# Patient Record
Sex: FEMALE | Race: WHITE | NOT HISPANIC OR LATINO | URBAN - METROPOLITAN AREA
[De-identification: names, ages, dates, MRNs, and addresses within clinical notes are randomized per-mention and may not be internally consistent; named-entity substitution may affect disease eponyms.]

---

## 2019-10-05 ENCOUNTER — INPATIENT (INPATIENT)
Facility: HOSPITAL | Age: 84
LOS: 0 days | Discharge: ANOTHER HOSPITAL | End: 2019-10-06
Attending: INTERNAL MEDICINE | Admitting: INTERNAL MEDICINE
Payer: MEDICARE

## 2019-10-05 VITALS
RESPIRATION RATE: 18 BRPM | SYSTOLIC BLOOD PRESSURE: 111 MMHG | DIASTOLIC BLOOD PRESSURE: 53 MMHG | OXYGEN SATURATION: 99 % | TEMPERATURE: 98 F | HEART RATE: 59 BPM

## 2019-10-05 DIAGNOSIS — Z90.710 ACQUIRED ABSENCE OF BOTH CERVIX AND UTERUS: Chronic | ICD-10-CM

## 2019-10-05 LAB
ALBUMIN SERPL ELPH-MCNC: 4.2 G/DL — SIGNIFICANT CHANGE UP (ref 3.5–5.2)
ALP SERPL-CCNC: 78 U/L — SIGNIFICANT CHANGE UP (ref 30–115)
ALT FLD-CCNC: 17 U/L — SIGNIFICANT CHANGE UP (ref 0–41)
ANION GAP SERPL CALC-SCNC: 13 MMOL/L — SIGNIFICANT CHANGE UP (ref 7–14)
APTT BLD: 37 SEC — SIGNIFICANT CHANGE UP (ref 27–39.2)
AST SERPL-CCNC: 16 U/L — SIGNIFICANT CHANGE UP (ref 0–41)
BILIRUB SERPL-MCNC: 0.2 MG/DL — SIGNIFICANT CHANGE UP (ref 0.2–1.2)
BUN SERPL-MCNC: 87 MG/DL — CRITICAL HIGH (ref 10–20)
CALCIUM SERPL-MCNC: 10.3 MG/DL — HIGH (ref 8.5–10.1)
CHLORIDE SERPL-SCNC: 101 MMOL/L — SIGNIFICANT CHANGE UP (ref 98–110)
CO2 SERPL-SCNC: 20 MMOL/L — SIGNIFICANT CHANGE UP (ref 17–32)
CREAT SERPL-MCNC: 2.5 MG/DL — HIGH (ref 0.7–1.5)
GLUCOSE BLDC GLUCOMTR-MCNC: 274 MG/DL — HIGH (ref 70–99)
GLUCOSE SERPL-MCNC: 132 MG/DL — HIGH (ref 70–99)
HCT VFR BLD CALC: 28.2 % — LOW (ref 37–47)
HGB BLD-MCNC: 9.2 G/DL — LOW (ref 12–16)
INR BLD: 1.52 RATIO — HIGH (ref 0.65–1.3)
LIDOCAIN IGE QN: 129 U/L — HIGH (ref 7–60)
MAGNESIUM SERPL-MCNC: 2 MG/DL — SIGNIFICANT CHANGE UP (ref 1.8–2.4)
MCHC RBC-ENTMCNC: 28 PG — SIGNIFICANT CHANGE UP (ref 27–31)
MCHC RBC-ENTMCNC: 32.6 G/DL — SIGNIFICANT CHANGE UP (ref 32–37)
MCV RBC AUTO: 86 FL — SIGNIFICANT CHANGE UP (ref 81–99)
NRBC # BLD: 0 /100 WBCS — SIGNIFICANT CHANGE UP (ref 0–0)
NT-PROBNP SERPL-SCNC: 2694 PG/ML — HIGH (ref 0–300)
PLATELET # BLD AUTO: 196 K/UL — SIGNIFICANT CHANGE UP (ref 130–400)
POTASSIUM SERPL-MCNC: 4.7 MMOL/L — SIGNIFICANT CHANGE UP (ref 3.5–5)
POTASSIUM SERPL-SCNC: 4.7 MMOL/L — SIGNIFICANT CHANGE UP (ref 3.5–5)
PROT SERPL-MCNC: 6.8 G/DL — SIGNIFICANT CHANGE UP (ref 6–8)
PROTHROM AB SERPL-ACNC: 17.4 SEC — HIGH (ref 9.95–12.87)
RBC # BLD: 3.28 M/UL — LOW (ref 4.2–5.4)
RBC # FLD: 13.1 % — SIGNIFICANT CHANGE UP (ref 11.5–14.5)
SODIUM SERPL-SCNC: 134 MMOL/L — LOW (ref 135–146)
TROPONIN T SERPL-MCNC: 0.03 NG/ML — CRITICAL HIGH
TROPONIN T SERPL-MCNC: 0.05 NG/ML — CRITICAL HIGH
WBC # BLD: 9.68 K/UL — SIGNIFICANT CHANGE UP (ref 4.8–10.8)
WBC # FLD AUTO: 9.68 K/UL — SIGNIFICANT CHANGE UP (ref 4.8–10.8)

## 2019-10-05 PROCEDURE — 93010 ELECTROCARDIOGRAM REPORT: CPT

## 2019-10-05 PROCEDURE — 99285 EMERGENCY DEPT VISIT HI MDM: CPT | Mod: GC

## 2019-10-05 PROCEDURE — 71045 X-RAY EXAM CHEST 1 VIEW: CPT | Mod: 26

## 2019-10-05 RX ORDER — ATORVASTATIN CALCIUM 80 MG/1
80 TABLET, FILM COATED ORAL AT BEDTIME
Refills: 0 | Status: DISCONTINUED | OUTPATIENT
Start: 2019-10-05 | End: 2019-10-06

## 2019-10-05 RX ORDER — ASPIRIN/CALCIUM CARB/MAGNESIUM 324 MG
81 TABLET ORAL DAILY
Refills: 0 | Status: DISCONTINUED | OUTPATIENT
Start: 2019-10-05 | End: 2019-10-06

## 2019-10-05 RX ORDER — CLOPIDOGREL BISULFATE 75 MG/1
300 TABLET, FILM COATED ORAL ONCE
Refills: 0 | Status: DISCONTINUED | OUTPATIENT
Start: 2019-10-05 | End: 2019-10-05

## 2019-10-05 RX ORDER — INSULIN GLARGINE 100 [IU]/ML
0 INJECTION, SOLUTION SUBCUTANEOUS
Qty: 0 | Refills: 0 | DISCHARGE

## 2019-10-05 RX ORDER — ISOSORBIDE DINITRATE 5 MG/1
5 TABLET ORAL THREE TIMES A DAY
Refills: 0 | Status: DISCONTINUED | OUTPATIENT
Start: 2019-10-05 | End: 2019-10-05

## 2019-10-05 RX ORDER — CHLORHEXIDINE GLUCONATE 213 G/1000ML
1 SOLUTION TOPICAL
Refills: 0 | Status: DISCONTINUED | OUTPATIENT
Start: 2019-10-05 | End: 2019-10-06

## 2019-10-05 RX ORDER — ASPIRIN/CALCIUM CARB/MAGNESIUM 324 MG
162 TABLET ORAL ONCE
Refills: 0 | Status: COMPLETED | OUTPATIENT
Start: 2019-10-05 | End: 2019-10-05

## 2019-10-05 RX ORDER — INSULIN GLARGINE 100 [IU]/ML
10 INJECTION, SOLUTION SUBCUTANEOUS EVERY MORNING
Refills: 0 | Status: DISCONTINUED | OUTPATIENT
Start: 2019-10-05 | End: 2019-10-06

## 2019-10-05 RX ORDER — APIXABAN 2.5 MG/1
2.5 TABLET, FILM COATED ORAL
Refills: 0 | Status: DISCONTINUED | OUTPATIENT
Start: 2019-10-05 | End: 2019-10-06

## 2019-10-05 RX ORDER — SODIUM CHLORIDE 9 MG/ML
1000 INJECTION INTRAMUSCULAR; INTRAVENOUS; SUBCUTANEOUS
Refills: 0 | Status: DISCONTINUED | OUTPATIENT
Start: 2019-10-05 | End: 2019-10-06

## 2019-10-05 RX ORDER — CALCITRIOL 0.5 UG/1
0.25 CAPSULE ORAL DAILY
Refills: 0 | Status: DISCONTINUED | OUTPATIENT
Start: 2019-10-05 | End: 2019-10-06

## 2019-10-05 RX ORDER — HYDRALAZINE HCL 50 MG
10 TABLET ORAL EVERY 8 HOURS
Refills: 0 | Status: DISCONTINUED | OUTPATIENT
Start: 2019-10-05 | End: 2019-10-05

## 2019-10-05 RX ORDER — FOLIC ACID 0.8 MG
1 TABLET ORAL DAILY
Refills: 0 | Status: DISCONTINUED | OUTPATIENT
Start: 2019-10-05 | End: 2019-10-06

## 2019-10-05 RX ORDER — CARVEDILOL PHOSPHATE 80 MG/1
6.25 CAPSULE, EXTENDED RELEASE ORAL EVERY 12 HOURS
Refills: 0 | Status: DISCONTINUED | OUTPATIENT
Start: 2019-10-05 | End: 2019-10-06

## 2019-10-05 RX ORDER — FUROSEMIDE 40 MG
40 TABLET ORAL DAILY
Refills: 0 | Status: DISCONTINUED | OUTPATIENT
Start: 2019-10-06 | End: 2019-10-06

## 2019-10-05 RX ORDER — HEPARIN SODIUM 5000 [USP'U]/ML
700 INJECTION INTRAVENOUS; SUBCUTANEOUS
Qty: 25000 | Refills: 0 | Status: DISCONTINUED | OUTPATIENT
Start: 2019-10-05 | End: 2019-10-05

## 2019-10-05 RX ADMIN — SODIUM CHLORIDE 50 MILLILITER(S): 9 INJECTION INTRAMUSCULAR; INTRAVENOUS; SUBCUTANEOUS at 21:23

## 2019-10-05 RX ADMIN — CARVEDILOL PHOSPHATE 6.25 MILLIGRAM(S): 80 CAPSULE, EXTENDED RELEASE ORAL at 21:24

## 2019-10-05 RX ADMIN — APIXABAN 2.5 MILLIGRAM(S): 2.5 TABLET, FILM COATED ORAL at 21:24

## 2019-10-05 RX ADMIN — ATORVASTATIN CALCIUM 80 MILLIGRAM(S): 80 TABLET, FILM COATED ORAL at 21:24

## 2019-10-05 RX ADMIN — Medication 162 MILLIGRAM(S): at 14:44

## 2019-10-05 NOTE — H&P ADULT - ATTENDING COMMENTS
This is an 83 year old female with a significant PMHx of CAD/Stent (3 in 2017), HFrEF, Atrial Fibrillation/Eliquis, and T2DM who presented with a cc/o chest pain. Her chest pain was similar to chest pain she felt in the past. Her pain is substernal, intermittent (episodes 10-15mins in duration), and 7/10 on the pain scale. Her pain is exacerbated with minimal exertion. Her symptoms are associated with dyspnea. She has a cardiologist affiliated with [facility]. ROS was otherwise negative.    REVIEW OF SYSTEMS:    CONSTITUTIONAL: No weakness, fevers or chills  EYES/ENT: No visual changes;  No vertigo or throat pain   NECK: No pain or stiffness  RESPIRATORY: No cough, wheezing, hemoptysis; No shortness of breath  CARDIOVASCULAR: No chest pain or palpitations  GASTROINTESTINAL: No abdominal or epigastric pain. No nausea, vomiting, or hematemesis; No diarrhea or constipation. No melena or hematochezia.  GENITOURINARY: No dysuria, frequency or hematuria  NEUROLOGICAL: No numbness or weakness  SKIN: No itching, rashes    Physical Exam:    General: WN/WD NAD  Neurology: A&Ox3, nonfocal, follows commands  Eyes: PERRLA/ EOMI  ENT/Neck: Neck supple, trachea midline, No JVD  Respiratory: CTA B/L, No wheezing, rales, rhonchi  CV: Normal rate regular rhythm, S1S2, no murmurs, rubs or gallops  Abdominal: Soft, NT, ND +BS,   Extremities: No edema, + peripheral pulses  Skin: No Rashes, Hematoma, Ecchymosis  Incisions: n/a  Tubes: n/a This is an 83 year old female with a significant PMHx of CAD/Stent (3 in 2017), HFrEF, Atrial Fibrillation/Eliquis, and T2DM who presented with a cc/o chest pain. Her chest pain was similar to chest pain she felt in the past. Her pain is substernal, intermittent (episodes 10-15mins in duration), and 7/10 on the pain scale. Her pain is exacerbated with minimal exertion. Her symptoms are associated with dyspnea. She has a cardiologist affiliated with [facility] where her has a recent catheterization. ROS was otherwise negative.    REVIEW OF SYSTEMS: see cc/HPI  CONSTITUTIONAL: No weakness, fevers or chills  EYES/ENT: No visual changes;  No vertigo or throat pain   NECK: No pain or stiffness  RESPIRATORY: No cough, wheezing, hemoptysis; No shortness of breath  CARDIOVASCULAR: (+) chest pain or palpitations  GASTROINTESTINAL: No abdominal or epigastric pain. No nausea, vomiting, or hematemesis; No diarrhea or constipation. No melena or hematochezia.  GENITOURINARY: No dysuria, frequency or hematuria  NEUROLOGICAL: No numbness or weakness  SKIN: No itching, rashes    Physical Exam:  General: WN/WD NAD, appears anxious and tearful - life changes and childhood memories causing stress  Neurology: A&Ox3, nonfocal, follows commands  Eyes: PERRLA/ EOMI  ENT/Neck: Neck supple, trachea midline, No JVD  Respiratory: CTA B/L, No wheezing, rales, rhonchi  CV: Normal rate regular rhythm, S1S2, no murmurs, rubs or gallops  Abdominal: Soft, NT, ND +BS,   Extremities: No edema, + peripheral pulses  Skin: No Rashes, Hematoma, Ecchymosis  Incisions: n/a  Tubes: n/a    A/P  ACS inpatient w/ CAD /PCI and stents  -serial Chon and EKG  -ASA/ BBlocker/ Statin/IV heparin for now  -will try to obtain recent Cath report from [facility]  -Cardiology on the case     A.Fib- rate controlled  -c/w AC ( unless procedure planned)    ELOISE on CKD III possible 2/2 dehydration (BUN/Cr =87/2.5)  -agree w/ gentle hydration   -check renal U/S r/o obstructive process  -Is and Os/Urine lytes/cr  -serial BUN/SCr    HTN - monitoring off ARB and started on BBlocker     Hypercalcemia   -agree w/ PTH, add Vit D level and hydration    Anxiety / ?? depression   -patient to f/u w/ outpatient PMD / therapist

## 2019-10-05 NOTE — CONSULT NOTE ADULT - SUBJECTIVE AND OBJECTIVE BOX
Date of Admission: 10/5/2019    CHIEF COMPLAINT: chest pain    HISTORY OF PRESENT ILLNESS:   82 YO F with PMH of HTN, DLD, CAD s/p MI s/p PCI x 3 stents in 2017, HFrEF (pt. reported EF of 45%), CKD 3, colon cancer s/p resection, DM 2, A.Fib on Eliquis presented with cc of chest pain. As per the patient all of her doctors are at [facility]. She     PAST MEDICAL & SURGICAL HISTORY:  Diverticulitis  History of Clostridium difficile colitis: s/p fecal transplant  CKD (chronic kidney disease): Stage 3  Hypertension  Colon cancer  Type 2 diabetes mellitus  Atrial fibrillation  Chronic CHF  CAD (coronary artery disease)  S/P hysterectomy      FAMILY HISTORY:  [x] no pertinent family history of premature cardiovascular disease in first degree relatives.  Mother:   Father:   Siblings:     SOCIAL HISTORY:    [x] Non-smoker  [ ] Smoker  [-] Alcohol    Allergies    No Known Allergies    Intolerances    	    REVIEW OF SYSTEMS:  CONSTITUTIONAL: No fever, weight loss, or fatigue  CARDIOLOGY: denies chest pain, shortness of breath or syncopal episodes.   RESPIRATORY: denies shortness of breath, wheezeing.   NEUROLOGICAL: No weakness, no focal deficits to report.  ENDOCRINOLOGICAL: no recent change in diabetic medications.   GI: no BRBPR, no N,V,diarrhea.    PSYCHIATRY: normal mood and affect  HEENT: no nasal discharge, no ecchymosis  SKIN: no ecchymosis, no breakdown  MUSCULOSKELETAL: Full range of motion x4.     PHYSICAL EXAM:  T(C): 36.3 (10-05-19 @ 15:50), Max: 36.4 (10-05-19 @ 14:01)  HR: 68 (10-05-19 @ 19:33) (59 - 68)  BP: 121/51 (10-05-19 @ 19:33) (111/53 - 152/66)  RR: 18 (10-05-19 @ 19:33) (18 - 18)  SpO2: 99% (10-05-19 @ 19:33) (99% - 100%)  Wt(kg): --  I&O's Summary      General Appearance: well appearing, normal for age and gender. 	  Neck: normal JVP, no bruit.   Eyes: No xanthomalasia, Extra ocular muscles intact.   Cardiovascular: regular rate and rhythm S1 S2, No JVD, No murmurs, No edema  Respiratory: Lungs clear to auscultation	  Psychiatry: Alert and oriented x 3, Mood & affect appropriate  Gastrointestinal:  Soft, Non-tender  Skin/Integumen: No rashes, No ecchymoses, No cyanosis	  Neurologic: Non-focal  Musculoskeletal/ extremities: Normal range of motion, No clubbing, cyanosis or edema  Vascular: Peripheral pulses palpable 2+ bilaterally    LABS:	 	                          9.2    9.68  )-----------( 196      ( 05 Oct 2019 14:30 )             28.2     10-05    134<L>  |  101  |  87<HH>  ----------------------------<  132<H>  4.7   |  20  |  2.5<H>    Ca    10.3<H>      05 Oct 2019 14:30  Mg     2.0     10-05    TPro  6.8  /  Alb  4.2  /  TBili  0.2  /  DBili  x   /  AST  16  /  ALT  17  /  AlkPhos  78  10-05    CARDIAC MARKERS ( 05 Oct 2019 20:00 )  x     / 0.03 ng/mL / x     / x     / x      CARDIAC MARKERS ( 05 Oct 2019 14:30 )  x     / 0.05 ng/mL / x     / x     / x          PT/INR - ( 05 Oct 2019 14:30 )   PT: 17.40 sec;   INR: 1.52 ratio         PTT - ( 05 Oct 2019 14:30 )  PTT:37.0 sec        TELEMETRY EVENTS: 	      ECG:  	  NSR @ 69 bpm, first degree AVB, inferior TWI, anterolateral TWI    RADIOLOGY:  CXR: no radiographic evidence of acute cardiopulmonary disease    PREVIOUS DIAGNOSTIC TESTING:    [x] Echocardiogram:  pending    [x]  Catheterization: reported cath in June 2017 with PCI and 3 stents       	    Home Medications:  atorvastatin 80 mg oral tablet:  (05 Oct 2019 15:44)  calcitriol 0.25 mcg oral capsule:  (05 Oct 2019 15:44)  carvedilol 25 mg oral tablet:  (05 Oct 2019 15:44)  Eliquis 2.5 mg oral tablet: 1 tab(s) orally 2 times a day (05 Oct 2019 15:44)  Evista 60 mg oral tablet: 1 tab(s) orally once a day (05 Oct 2019 15:44)  folic acid 1 mg oral tablet:  (05 Oct 2019 15:44)  furosemide 40 mg oral tablet: 1 tab(s) orally once a day (05 Oct 2019 15:44)  Lantus: 10 unit(s) subcutaneous once a day (in the morning) (05 Oct 2019 19:54)  losartan 25 mg oral tablet: 1 tab(s) orally once a day (05 Oct 2019 15:44)  spironolactone 25 mg oral tablet:  (05 Oct 2019 15:44)    MEDICATIONS  (STANDING):  apixaban 2.5 milliGRAM(s) Oral two times a day  aspirin enteric coated 81 milliGRAM(s) Oral daily  atorvastatin 80 milliGRAM(s) Oral at bedtime  calcitriol   Capsule 0.25 MICROGram(s) Oral daily  carvedilol 6.25 milliGRAM(s) Oral every 12 hours  chlorhexidine 4% Liquid 1 Application(s) Topical <User Schedule>  folic acid 1 milliGRAM(s) Oral daily  hydrALAZINE 10 milliGRAM(s) Oral every 8 hours  insulin glargine Injectable (LANTUS) 10 Unit(s) SubCutaneous every morning  sodium chloride 0.9%. 1000 milliLiter(s) (50 mL/Hr) IV Continuous <Continuous> Date of Admission: 10/5/2019    CHIEF COMPLAINT: chest pain    HISTORY OF PRESENT ILLNESS:   84 YO F with PMH of HTN, DLD, CAD s/p MI s/p PCI x 3 stents in 2017, HFrEF (pt. reported EF of 45%), CKD 3, colon cancer s/p resection, DM 2, A.Fib on Eliquis presented with cc of chest pain. Pt. describes the pain as chest pressure that is exertional, lasting several minutes, resolves with rest and is associated with sob. Reports that her first episode of chest pain was 1 day prior to presentation that lasted 15 minutes, and second episode was on the day of presentation that lasted several minutes and prompted her to come to ER.    As per the patient she see Dr. Brambila (Cardiologist) at [facility]. She reports that since her PCI she has not had a stress test. She reports that she is unable to tolerate nuclear stress test, as she got extremely sob when she received the test the first time, and she ended up getting cardiac cath.    Denies any episodes of syncope/loc, palpitations.    PAST MEDICAL & SURGICAL HISTORY:  Diverticulitis  History of Clostridium difficile colitis: s/p fecal transplant  CKD (chronic kidney disease): Stage 3  Hypertension  Colon cancer  Type 2 diabetes mellitus  Atrial fibrillation  Chronic CHF  CAD (coronary artery disease)  S/P hysterectomy      FAMILY HISTORY:  [x] no pertinent family history of premature cardiovascular disease in first degree relatives.  Mother:   Father:   Siblings:     SOCIAL HISTORY:    [x] Non-smoker  [ ] Smoker  [-] Alcohol    Allergies    No Known Allergies    Intolerances    	    REVIEW OF SYSTEMS:  CONSTITUTIONAL: No fever, weight loss, or fatigue  CARDIOLOGY: denies chest pain, shortness of breath or syncopal episodes.   RESPIRATORY: denies shortness of breath, wheezeing.   NEUROLOGICAL: No weakness, no focal deficits to report.  ENDOCRINOLOGICAL: no recent change in diabetic medications.   GI: no BRBPR, no N,V,diarrhea.    PSYCHIATRY: normal mood and affect  HEENT: no nasal discharge, no ecchymosis  SKIN: no ecchymosis, no breakdown  MUSCULOSKELETAL: Full range of motion x4.     PHYSICAL EXAM:  T(C): 36.3 (10-05-19 @ 15:50), Max: 36.4 (10-05-19 @ 14:01)  HR: 68 (10-05-19 @ 19:33) (59 - 68)  BP: 121/51 (10-05-19 @ 19:33) (111/53 - 152/66)  RR: 18 (10-05-19 @ 19:33) (18 - 18)  SpO2: 99% (10-05-19 @ 19:33) (99% - 100%)  Wt(kg): --  I&O's Summary      General Appearance: well appearing, normal for age and gender. 	  Neck: normal JVP, no bruit.   Eyes: No xanthomalasia, Extra ocular muscles intact.   Cardiovascular: regular rate and rhythm S1 S2, No JVD, No murmurs, No edema  Respiratory: Lungs clear to auscultation	  Psychiatry: Alert and oriented x 3, Mood & affect appropriate  Gastrointestinal:  Soft, Non-tender  Skin/Integumen: No rashes, No ecchymoses, No cyanosis	  Neurologic: Non-focal  Musculoskeletal/ extremities: Normal range of motion, No clubbing, cyanosis or edema  Vascular: Peripheral pulses palpable 2+ bilaterally    LABS:	 	                          9.2    9.68  )-----------( 196      ( 05 Oct 2019 14:30 )             28.2     10-05    134<L>  |  101  |  87<HH>  ----------------------------<  132<H>  4.7   |  20  |  2.5<H>    Ca    10.3<H>      05 Oct 2019 14:30  Mg     2.0     10-05    TPro  6.8  /  Alb  4.2  /  TBili  0.2  /  DBili  x   /  AST  16  /  ALT  17  /  AlkPhos  78  10-05    CARDIAC MARKERS ( 05 Oct 2019 20:00 )  x     / 0.03 ng/mL / x     / x     / x      CARDIAC MARKERS ( 05 Oct 2019 14:30 )  x     / 0.05 ng/mL / x     / x     / x          PT/INR - ( 05 Oct 2019 14:30 )   PT: 17.40 sec;   INR: 1.52 ratio         PTT - ( 05 Oct 2019 14:30 )  PTT:37.0 sec        TELEMETRY EVENTS: 	      ECG:  	  NSR @ 69 bpm, first degree AVB, inferior TWI, anterolateral TWI    RADIOLOGY:  CXR: no radiographic evidence of acute cardiopulmonary disease    PREVIOUS DIAGNOSTIC TESTING:    [x] Echocardiogram:  pending    [x]  Catheterization: reported cath in June 2017 with PCI and 3 stents       	    Home Medications:  atorvastatin 80 mg oral tablet:  (05 Oct 2019 15:44)  calcitriol 0.25 mcg oral capsule:  (05 Oct 2019 15:44)  carvedilol 25 mg oral tablet:  (05 Oct 2019 15:44)  Eliquis 2.5 mg oral tablet: 1 tab(s) orally 2 times a day (05 Oct 2019 15:44)  Evista 60 mg oral tablet: 1 tab(s) orally once a day (05 Oct 2019 15:44)  folic acid 1 mg oral tablet:  (05 Oct 2019 15:44)  furosemide 40 mg oral tablet: 1 tab(s) orally once a day (05 Oct 2019 15:44)  Lantus: 10 unit(s) subcutaneous once a day (in the morning) (05 Oct 2019 19:54)  losartan 25 mg oral tablet: 1 tab(s) orally once a day (05 Oct 2019 15:44)  spironolactone 25 mg oral tablet:  (05 Oct 2019 15:44)    MEDICATIONS  (STANDING):  apixaban 2.5 milliGRAM(s) Oral two times a day  aspirin enteric coated 81 milliGRAM(s) Oral daily  atorvastatin 80 milliGRAM(s) Oral at bedtime  calcitriol   Capsule 0.25 MICROGram(s) Oral daily  carvedilol 6.25 milliGRAM(s) Oral every 12 hours  chlorhexidine 4% Liquid 1 Application(s) Topical <User Schedule>  folic acid 1 milliGRAM(s) Oral daily  hydrALAZINE 10 milliGRAM(s) Oral every 8 hours  insulin glargine Injectable (LANTUS) 10 Unit(s) SubCutaneous every morning  sodium chloride 0.9%. 1000 milliLiter(s) (50 mL/Hr) IV Continuous <Continuous>

## 2019-10-05 NOTE — H&P ADULT - NSICDXPASTMEDICALHX_GEN_ALL_CORE_FT
PAST MEDICAL HISTORY:  Atrial fibrillation     CAD (coronary artery disease)     Chronic CHF     CKD (chronic kidney disease) Stage 3    Colon cancer     Diverticulitis     History of Clostridium difficile colitis s/p fecal transplant    Hypertension     Type 2 diabetes mellitus

## 2019-10-05 NOTE — ED PROVIDER NOTE - CLINICAL SUMMARY MEDICAL DECISION MAKING FREE TEXT BOX
84 yo woman with a few days of exertional chest pain without radiation.  Exacerbated by exertion and relieved by rest.  No radiation.  EKg with some changes but no old to compare.  Minimally elevated troponin.  ASA and admission to tele.  Currently chest pain free.

## 2019-10-05 NOTE — H&P ADULT - NSHPPHYSICALEXAM_GEN_ALL_CORE
:  VITAL SIGNS: Last 24 Hours  T(C): 36.3 (05 Oct 2019 15:50), Max: 36.4 (05 Oct 2019 14:01)  T(F): 97.3 (05 Oct 2019 15:50), Max: 97.6 (05 Oct 2019 14:01)  HR: 68 (05 Oct 2019 15:50) (59 - 68)  BP: 152/66 (05 Oct 2019 16:05) (111/53 - 152/66)  RR: 18 (05 Oct 2019 15:50) (18 - 18)  SpO2: 100% (05 Oct 2019 15:50) (99% - 100%)    PHYSICAL EXAM:  GENERAL:   Awake, alert; NAD.  HEENT:  Head NC/AT; Conjunctivae pink, Sclera anicteric; Oral mucosa moist.  CARDIO:   Regular rate; Regular rhythm; S1 & S2.  RESP:   No rales or rhonchi appreciated.  GI:   Soft; NT/ND; BS; No guarding; No rebound tenderness.  EXT:   No edema in UE and LE.  NEURO:   PERRL.  SKIN:   Intact. :  VITAL SIGNS: Last 24 Hours  T(C): 36.3 (05 Oct 2019 15:50), Max: 36.4 (05 Oct 2019 14:01)  T(F): 97.3 (05 Oct 2019 15:50), Max: 97.6 (05 Oct 2019 14:01)  HR: 68 (05 Oct 2019 15:50) (59 - 68)  BP: 152/66 (05 Oct 2019 16:05) (111/53 - 152/66)  RR: 18 (05 Oct 2019 15:50) (18 - 18)  SpO2: 100% (05 Oct 2019 15:50) (99% - 100%)    PHYSICAL EXAM:  GENERAL:   Awake, alert; NAD.  HEENT:  Head NC/AT; Conjunctivae pink, Sclera anicteric; Oral mucosa moist.  CARDIO:   Regular rate; IRRegular rhythm; S1 & S2; No murmur.  RESP:   No rales or rhonchi appreciated.  GI:   Soft; NT/ND; BS; No guarding; No rebound tenderness.  EXT:   No edema in LE.  NEURO:   PERRL.  SKIN:   Intact.

## 2019-10-05 NOTE — ED PROVIDER NOTE - PHYSICAL EXAMINATION
VITAL SIGNS: I have reviewed nursing notes and confirm.  CONSTITUTIONAL: well-appearing, non-toxic, NAD  SKIN: Warm dry, normal skin turgor  HEAD: NCAT  EYES: EOMI, PERRLA, no scleral icterus  ENT: Moist mucous membranes, normal pharynx with no erythema or exudates  NECK: Supple; non tender. Full ROM. No cervical LAD  CARD: irregular no murmurs, rubs or gallops  RESP: clear to ausculation b/l.  No rales, rhonchi, or wheezing.  ABD: soft, + BS, non-tender, non-distended, no rebound or guarding. No CVA tenderness  EXT: Full ROM, no bony tenderness, no pedal edema, no calf tenderness  NEURO: normal motor. normal sensory.   PSYCH: Cooperative, appropriate.

## 2019-10-05 NOTE — ED PROVIDER NOTE - OBJECTIVE STATEMENT
83 year old female with a significant PMHx of CAD/Stent (3 in 2017), HFrEF, Atrial Fibrillation/Eliquis, and T2DM who presented with a cc/o chest pain. Her chest pain was similar to chest pain she felt in the past. Her pain is substernal, intermittent (episodes 10-15mins in duration), and 7/10 on the pain scale. Her pain is exacerbated with minimal exertion. Her symptoms are associated with dyspnea. She has a cardiologist affiliated with [facility]. ROS was otherwise negative.

## 2019-10-05 NOTE — H&P ADULT - ASSESSMENT
This is an 83 year old male with a significant PMHx of HFrEF, Atrial Fibrillation/Eliquis, and T2DM who presented with a cc/o chest pain.    Rule out ACS  - Presented with chest pain  - EKG with NAD; 1st degree block; T-wave inversions; No prior EKG available  - Initial troponin 0.05  - Trend troponin  - Serial EKGs  - Follow up Cardiology    Hypercalcemia; Mild  - Asymptomatic;  - Follow up PTH    Normocytic Anemia; Likely of chronic disease: Baseline unknown; Monitor  Hx of HFrEF: BNP 2694; Baseline unknown  Hx of Osteoporosis: Resume home medications  Hx of T2DM: Monitor BS  Hx of CKD (stage unknown): ?Stable; Monitor  Hx of HTN: Continue antihypertensives from home    Electrolyte Imbalances:   [X]  Hyponatremia; mild; monitor      GI ppx:                                   [X] Not indicated    DVT ppx:  []     Fluids:   [X] IVF    Activity:  [X] Bed Rest    BMI:  Weight (kg): 60 (10-05)    DISPO:  Patient to be discharged when condition(s) optimized.    CODE STATUS  [X] FULL This is an 83 year old male with a significant PMHx of HFrEF, Atrial Fibrillation/Eliquis, and T2DM who presented with a cc/o chest pain.    ***[facility] is currently closed. Will need to complete medication reconciliation in AM. Patient does not have a list and is unaware of her dosing.***    Unstable angina; NSTEMI  - Presented with chest pain  - EKG with NAD; 1st degree block; T-wave inversions V3-V6; No prior EKG available  - Initial troponin 0.05; Trend troponin  - Serial EKGs  - Received ASA 325mg; Resume 81mg  - Will give Plavix load  - Atorvastatin 80mg PO daily  - Carvedilol home dose unknown; start 6.25mg PO BID for now  - Holding ARB given ELOISE  - Heparin for anticoagulation  - Follow up Cardiology    Acute Kidney Injury; Hx of CKD III  - Continue IVF and monitor  - Continue calcitriol  - Follow up home dose Evista    Hypercalcemia; Mild  - Asymptomatic;  - Follow up PTH    Normocytic Anemia; Likely of chronic disease: Baseline unknown; Monitor  Hx of HFrEF: BNP 2694; Clinically euvolemic; Resume home Lasix; Strict I+O; Daily weight  Hx of T2DM: Monitor BS; Continue home Lantus dose  Hx of CKD (stage unknown): ?Stable; Monitor  Hx of HTN: Monitor; Holding losartan given ELOISE; will start hydralazine/nitrate for BP and benefit of reducing preload/afterload  Hx of Colon Cancer; s/p resection  Hx of folate deficiency: Continue home folate    Electrolyte Imbalances:   [X]  Hyponatremia; mild; monitor      GI ppx:                                   [X] Not indicated    DVT ppx:  [X] Not indicated; on AC    Fluids:   [X] IVF    Activity:  [X] Bed Rest    BMI:  Weight (kg): 60 (10-05)    DISPO:  Patient to be discharged when condition(s) optimized.    CODE STATUS  [X] FULL This is an 83 year old male with a significant PMHx of HFrEF, Atrial Fibrillation/Eliquis, and T2DM who presented with a cc/o chest pain.    ***[facility] is currently closed. Will need to complete medication reconciliation in AM. Patient does not have a list and is unaware of her dosing.***    Unstable angina; NSTEMI  - Presented with chest pain  - EKG with NAD; 1st degree block; T-wave inversions V3-V6; No prior EKG available  - Initial troponin 0.05; Trend troponin  - Serial EKGs  - Received ASA 325mg today; Resume 81mg  - Will give Plavix load  - Atorvastatin 80mg PO daily  - Carvedilol home dose unknown; start 6.25mg PO BID for now  - Holding ARB given ELOISE  - Heparin for anticoagulation  - Follow up Cardiology    Acute Kidney Injury; Hx of CKD III  - Continue IVF and monitor  - Continue calcitriol    Hypercalcemia; Mild  - Asymptomatic;  - Follow up PTH    Normocytic Anemia; Likely of chronic disease: Baseline unknown; Monitor  Hx of HFrEF: BNP 2694; Clinically euvolemic; Resume home Lasix; Strict I+O; Daily weight  Hx of T2DM: Monitor BS; Continue home Lantus dose  Hx of CKD (stage unknown): ?Stable; Monitor  Hx of HTN: Monitor; Holding losartan given ELOISE; will start hydralazine/nitrate for BP and benefit of reducing preload/afterload  Hx of Colon Cancer; s/p resection  Hx of folate deficiency: Continue home folate    Electrolyte Imbalances:   [X]  Hyponatremia; mild; monitor      GI ppx:                                   [X] Not indicated    DVT ppx:  [X] Not indicated; on AC    Fluids:   [X] IVF    Activity:  [X] Bed Rest    BMI:  Weight (kg): 60 (10-05)    DISPO:  Patient to be discharged when condition(s) optimized.    CODE STATUS  [X] FULL This is an 83 year old male with a significant PMHx of HFrEF, Atrial Fibrillation/Eliquis, and T2DM who presented with a cc/o chest pain.    ***[facility] is currently closed. Will need to complete medication reconciliation in AM. Patient does not have a list and is unaware of her dosing.***    Unstable angina; NSTEMI  - Presented with chest pain  - EKG with NAD; 1st degree block; T-wave inversions V3-V6; No prior EKG available  - Initial troponin 0.05; Trend troponin  - Serial EKGs  - Received ASA 325mg today; Resume 81mg  - Will give Plavix load  - Atorvastatin 80mg PO daily  - Carvedilol home dose unknown; start 6.25mg PO BID for now  - Holding ARB given ELOISE  - Heparin for anticoagulation  - Follow up Cardiology    Acute Kidney Injury; Hx of CKD III  - Continue IVF and monitor  - Continue calcitriol    Hypercalcemia; Mild  - Asymptomatic;  - Follow up PTH    Normocytic Anemia; Likely of chronic disease: Baseline unknown; Monitor  Hx of HFrEF: BNP 2694; Clinically euvolemic; Resume home Lasix; Strict I+O; Daily weight  Hx of T2DM: Monitor BS; Continue home Lantus dose  Hx of CKD (stage unknown): ?Stable; Monitor  Hx of HTN: Monitor; Holding losartan given ELOISE and lack of med rec; will start hydralazine in interim  Hx of Colon Cancer; s/p resection  Hx of folate deficiency: Continue home folate    Electrolyte Imbalances:   [X]  Hyponatremia; mild; monitor      GI ppx:                                   [X] Not indicated    DVT ppx:  [X] Not indicated; on AC    Fluids:   [X] IVF    Activity:  [X] Bed Rest    BMI:  Weight (kg): 60 (10-05)    DISPO:  Patient to be discharged when condition(s) optimized.    CODE STATUS  [X] FULL This is an 83 year old male with a significant PMHx of HFrEF, Atrial Fibrillation/Eliquis, and T2DM who presented with a cc/o chest pain.    ***[facility] is currently closed. Will need to complete medication reconciliation in AM. Patient does not have a list and is unaware of her dosing.***    Typical chest pain; Rule out ACS  - Presented with chest pain  - EKG with NAD; 1st degree block; T-wave inversions V3-V6; No prior EKG available  - Initial troponin 0.05; Trend troponin  - Serial EKGs  - Received ASA 325mg today; Resume 81mg  - Will give Plavix load  - Atorvastatin 80mg PO daily  - Carvedilol home dose unknown; start 6.25mg PO BID for now  - Holding ARB given ELOISE  - Heparin for anticoagulation  - Follow up Cardiology    Acute Kidney Injury; Hx of CKD III  - Continue IVF and monitor  - Continue calcitriol    Hypercalcemia; Mild  - Asymptomatic;  - Follow up PTH    Normocytic Anemia; Likely of chronic disease: Baseline unknown; Monitor  Hx of HFrEF: BNP 2694; Clinically euvolemic; Resume home Lasix; Strict I+O; Daily weight  Hx of T2DM: Monitor BS; Continue home Lantus dose  Hx of CKD (stage unknown): ?Stable; Monitor  Hx of HTN: Monitor; Holding losartan given ELOISE and lack of med rec; will start hydralazine in interim  Hx of Colon Cancer; s/p resection  Hx of folate deficiency: Continue home folate    Electrolyte Imbalances:   [X]  Hyponatremia; mild; monitor      GI ppx:                                   [X] Not indicated    DVT ppx:  [X] Not indicated; on AC    Fluids:   [X] IVF    Activity:  [X] Bed Rest    BMI:  Weight (kg): 60 (10-05)    DISPO:  Patient to be discharged when condition(s) optimized.    CODE STATUS  [X] FULL This is an 83 year old male with a significant PMHx of HFrEF, Atrial Fibrillation/Eliquis, and T2DM who presented with a cc/o chest pain.    ***[facility] is currently closed. Will need to complete medication reconciliation in AM. Patient does not have a list and is unaware of her dosing.***    Typical chest pain; Rule out ACS  - Presented with chest pain  - EKG with NAD; 1st degree block; T-wave inversions V3-V6; No prior EKG available  - Initial troponin 0.05; Trend troponin  - Serial EKGs  - Received ASA 325mg today; Resume 81mg  - Atorvastatin 80mg PO daily  - Carvedilol home dose unknown; start 6.25mg PO BID for now  - Holding ARB given ELOISE  - Heparin for anticoagulation  - Follow up Cardiology    Acute Kidney Injury; Hx of CKD III  - Continue IVF and monitor  - Continue calcitriol    Hypercalcemia; Mild  - Asymptomatic;  - Follow up PTH    Normocytic Anemia; Likely of chronic disease: Baseline unknown; Monitor  Hx of HFrEF: BNP 2694; Clinically euvolemic; Resume home Lasix; Strict I+O; Daily weight  Hx of T2DM: Monitor BS; Continue home Lantus dose  Hx of CKD (stage unknown): ?Stable; Monitor  Hx of HTN: Monitor; Holding losartan given ELOISE and lack of med rec; will start hydralazine in interim  Hx of Colon Cancer; s/p resection  Hx of folate deficiency: Continue home folate    Electrolyte Imbalances:   [X]  Hyponatremia; mild; monitor      GI ppx:                                   [X] Not indicated    DVT ppx:  [X] Not indicated; on AC    Fluids:   [X] IVF    Activity:  [X] Bed Rest    BMI:  Weight (kg): 60 (10-05)    DISPO:  Patient to be discharged when condition(s) optimized.    CODE STATUS  [X] FULL This is an 83 year old male with a significant PMHx of HFrEF, Atrial Fibrillation/Eliquis, and T2DM who presented with a cc/o chest pain.    ***[facility] is currently closed. Will need to complete medication reconciliation in AM. Patient does not have a list and is unaware of her dosing.***    Typical chest pain; Rule out ACS  - Presented with chest pain  - EKG with NAD; 1st degree block; T-wave inversions V3-V6; No prior EKG available  - Initial troponin 0.05; Trend troponin  - Serial EKGs  - Received ASA 325mg today; Resume 81mg  - Atorvastatin 80mg PO daily  - Carvedilol home dose unknown; start 6.25mg PO BID for now  - Holding ARB given ELOISE  - Heparin for anticoagulation  - Discussed plan with cardiology; appreciated and will continue to follow    Acute Kidney Injury; Hx of CKD III  - Continue IVF and monitor  - Continue calcitriol    Hypercalcemia; Mild  - Asymptomatic;  - Follow up PTH    Normocytic Anemia; Likely of chronic disease: Baseline unknown; Monitor  Hx of HFrEF: BNP 2694; Clinically euvolemic; Resume home Lasix; Strict I+O; Daily weight  Hx of T2DM: Monitor BS; Continue home Lantus dose  Hx of CKD (stage unknown): ?Stable; Monitor  Hx of HTN: Monitor; Holding losartan given ELOISE and lack of med rec; will start hydralazine in interim  Hx of Colon Cancer; s/p resection  Hx of folate deficiency: Continue home folate    Electrolyte Imbalances:   [X]  Hyponatremia; mild; monitor      GI ppx:                                   [X] Not indicated    DVT ppx:  [X] Not indicated; on AC    Fluids:   [X] IVF    Activity:  [X] Bed Rest    BMI:  Weight (kg): 60 (10-05)    DISPO:  Patient to be discharged when condition(s) optimized.    CODE STATUS  [X] FULL

## 2019-10-05 NOTE — H&P ADULT - HISTORY OF PRESENT ILLNESS
This is an 83 year old male with a significant PMHx of HFrEF, Atrial Fibrillation/Eliquis, and T2DM who presented with a cc/o chest pain. This is an 83 year old female with a significant PMHx of CAD/Stent (3 in 2017), HFrEF, Atrial Fibrillation/Eliquis, and T2DM who presented with a cc/o chest pain. Her chest pain was similar to chest pain she felt in the past. Her pain is substernal, intermittent (episodes 10-15mins in duration), and 7/10 on the pain scale. Her pain is exacerbated with minimal exertion. Her symptoms are associated with dyspnea. She has a cardiologist affiliated with [facility]. ROS was otherwise negative.

## 2019-10-05 NOTE — ED PROVIDER NOTE - NS ED ROS FT
Constitutional: See HPI.  Eyes: No visual changes, eye pain or discharge. No Photophobia  ENMT: No hearing changes, pain, discharge or infections. No neck pain or stiffness. No limited ROM  Cardiac: see hpi  Respiratory: No cough or respiratory distress. No hemoptysis.   GI: No nausea, vomiting, diarrhea or abdominal pain.  : No dysuria, frequency or burning. No Discharge  MS: No myalgia, muscle weakness, joint pain or back pain.  Neuro: No headache or weakness. No LOC.  Skin: No skin rash.  Except as documented in the HPI, all other systems are negative.

## 2019-10-05 NOTE — H&P ADULT - NSHPSOCIALHISTORY_GEN_ALL_CORE
:  Lives   Smoking Hx:  EtOH Hx:  Illicit Drug Hx: :  Lives home alone; walks with cane  Smoking Hx: Denied  EtOH Hx: Denied  Illicit Drug Hx: Denied

## 2019-10-05 NOTE — ED PROVIDER NOTE - ATTENDING CONTRIBUTION TO CARE
I personally evaluated the patient. I reviewed the Resident’s or Physician Assistant’s note (as assigned above), and agree with the findings and plan except as documented in my note.  82 yo woman with known CAD and cardiac stents, last one 2 years ago presents with a few days of worsening exertional chest pain associated with SOB.  No leg pain.  Pain does not radiate.  No other alleviating or exacerbating factors.  Pain is 5/10 when it occurs.  EKG with some ST changes in anterior septal leads but no old to compare.  Admit to tele for further monitoring and workup.

## 2019-10-05 NOTE — H&P ADULT - NSHPLABSRESULTS_GEN_ALL_CORE
DAILY PROGRESS NOTE  ===========================================================    Patient Information:  SEAN SAWYER  /  83y  /  Female  /  MRN#: [id number]    Hospital Day:  1    |:::::::::::::::::::::::::::| SUBJECTIVE |:::::::::::::::::::::::::::|    OVERNIGHT EVENTS:   TODAY: Patient was seen today at bedside. Review of systems is otherwise negative.    |:::::::::::::::::::::::::::| OBJECTIVE |:::::::::::::::::::::::::::|    VITAL SIGNS: Last 24 Hours  T(C): 36.3 (05 Oct 2019 15:50), Max: 36.4 (05 Oct 2019 14:01)  T(F): 97.3 (05 Oct 2019 15:50), Max: 97.6 (05 Oct 2019 14:01)  HR: 68 (05 Oct 2019 15:50) (59 - 68)  BP: 152/66 (05 Oct 2019 16:05) (111/53 - 152/66)  RR: 18 (05 Oct 2019 15:50) (18 - 18)  SpO2: 100% (05 Oct 2019 15:50) (99% - 100%)    PHYSICAL EXAM:  GENERAL:   Awake, alert; NAD.  HEENT:  Head NC/AT; Conjunctivae pink, Sclera anicteric; Oral mucosa moist.  CARDIO:   Regular rate; Regular rhythm; S1 & S2.  RESP:   No rales or rhonchi appreciated.  GI:   Soft; NT/ND; BS; No guarding; No rebound tenderness.  EXT:   No edema in UE and LE.  NEURO:   PERRL.  SKIN:   Intact.    LAB RESULTS:                        9.2    9.68  )-----------( 196      ( 05 Oct 2019 14:30 )             28.2     134<L>  |  101  |  87<HH>  -------------------------------<  132<H>  4.7   |  20  |  2.5<H>    Ca      10.3<H>       Mg     2.0         TPro  6.8  /  Alb  4.2  /  TBili  0.2  /  DBili  x   /  AST  16  /  ALT  17  /  AlkPhos  78     PT: 17.40 sec;   INR: 1.52 ratio    PTT:37.0 sec    Troponin T, Serum: 0.05 ng/mL <HH> (10-05-19 @ 14:30)    MICROBIOLOGY: NTR    RADIOLOGY: NTR    ALLERGIES:  No Known Allergies  =========================================================== :  LAB RESULTS:                        9.2    9.68  )-----------( 196      ( 05 Oct 2019 14:30 )             28.2     134<L>  |  101  |  87<HH>  -------------------------------<  132<H>  4.7   |  20  |  2.5<H>    Ca      10.3<H>       Mg     2.0         TPro  6.8  /  Alb  4.2  /  TBili  0.2  /  DBili  x   /  AST  16  /  ALT  17  /  AlkPhos  78     PT: 17.40 sec;   INR: 1.52 ratio    PTT:37.0 sec    Troponin T, Serum: 0.05 ng/mL <HH> (10-05-19 @ 14:30)    MICROBIOLOGY: NTR    RADIOLOGY: NTR    ALLERGIES:  No Known Allergies  ===========================================================

## 2019-10-06 VITALS
RESPIRATION RATE: 18 BRPM | DIASTOLIC BLOOD PRESSURE: 70 MMHG | SYSTOLIC BLOOD PRESSURE: 153 MMHG | OXYGEN SATURATION: 98 % | HEART RATE: 74 BPM | TEMPERATURE: 97 F

## 2019-10-06 LAB
ANION GAP SERPL CALC-SCNC: 14 MMOL/L — SIGNIFICANT CHANGE UP (ref 7–14)
APTT BLD: 31.8 SEC — SIGNIFICANT CHANGE UP (ref 27–39.2)
APTT BLD: 34.4 SEC — SIGNIFICANT CHANGE UP (ref 27–39.2)
BASOPHILS # BLD AUTO: 0.02 K/UL — SIGNIFICANT CHANGE UP (ref 0–0.2)
BASOPHILS NFR BLD AUTO: 0.3 % — SIGNIFICANT CHANGE UP (ref 0–1)
BLD GP AB SCN SERPL QL: SIGNIFICANT CHANGE UP
BUN SERPL-MCNC: 85 MG/DL — CRITICAL HIGH (ref 10–20)
CALCIUM SERPL-MCNC: 9.4 MG/DL — SIGNIFICANT CHANGE UP (ref 8.5–10.1)
CHLORIDE SERPL-SCNC: 108 MMOL/L — SIGNIFICANT CHANGE UP (ref 98–110)
CO2 SERPL-SCNC: 14 MMOL/L — LOW (ref 17–32)
CREAT SERPL-MCNC: 2.4 MG/DL — HIGH (ref 0.7–1.5)
EOSINOPHIL # BLD AUTO: 0.12 K/UL — SIGNIFICANT CHANGE UP (ref 0–0.7)
EOSINOPHIL NFR BLD AUTO: 1.8 % — SIGNIFICANT CHANGE UP (ref 0–8)
GLUCOSE BLDC GLUCOMTR-MCNC: 153 MG/DL — HIGH (ref 70–99)
GLUCOSE BLDC GLUCOMTR-MCNC: 229 MG/DL — HIGH (ref 70–99)
GLUCOSE BLDC GLUCOMTR-MCNC: 234 MG/DL — HIGH (ref 70–99)
GLUCOSE SERPL-MCNC: 162 MG/DL — HIGH (ref 70–99)
HCT VFR BLD CALC: 24.4 % — LOW (ref 37–47)
HGB BLD-MCNC: 8.1 G/DL — LOW (ref 12–16)
IMM GRANULOCYTES NFR BLD AUTO: 0.6 % — HIGH (ref 0.1–0.3)
INR BLD: 1.4 RATIO — HIGH (ref 0.65–1.3)
LYMPHOCYTES # BLD AUTO: 1.4 K/UL — SIGNIFICANT CHANGE UP (ref 1.2–3.4)
LYMPHOCYTES # BLD AUTO: 20.7 % — SIGNIFICANT CHANGE UP (ref 20.5–51.1)
MCHC RBC-ENTMCNC: 28.3 PG — SIGNIFICANT CHANGE UP (ref 27–31)
MCHC RBC-ENTMCNC: 33.2 G/DL — SIGNIFICANT CHANGE UP (ref 32–37)
MCV RBC AUTO: 85.3 FL — SIGNIFICANT CHANGE UP (ref 81–99)
MONOCYTES # BLD AUTO: 0.59 K/UL — SIGNIFICANT CHANGE UP (ref 0.1–0.6)
MONOCYTES NFR BLD AUTO: 8.7 % — SIGNIFICANT CHANGE UP (ref 1.7–9.3)
NEUTROPHILS # BLD AUTO: 4.59 K/UL — SIGNIFICANT CHANGE UP (ref 1.4–6.5)
NEUTROPHILS NFR BLD AUTO: 67.9 % — SIGNIFICANT CHANGE UP (ref 42.2–75.2)
NRBC # BLD: 0 /100 WBCS — SIGNIFICANT CHANGE UP (ref 0–0)
PLATELET # BLD AUTO: 178 K/UL — SIGNIFICANT CHANGE UP (ref 130–400)
POTASSIUM SERPL-MCNC: 4.8 MMOL/L — SIGNIFICANT CHANGE UP (ref 3.5–5)
POTASSIUM SERPL-SCNC: 4.8 MMOL/L — SIGNIFICANT CHANGE UP (ref 3.5–5)
PROTHROM AB SERPL-ACNC: 16.1 SEC — HIGH (ref 9.95–12.87)
RBC # BLD: 2.86 M/UL — LOW (ref 4.2–5.4)
RBC # FLD: 13.1 % — SIGNIFICANT CHANGE UP (ref 11.5–14.5)
SODIUM SERPL-SCNC: 136 MMOL/L — SIGNIFICANT CHANGE UP (ref 135–146)
TROPONIN T SERPL-MCNC: 0.04 NG/ML — CRITICAL HIGH
TROPONIN T SERPL-MCNC: 0.04 NG/ML — CRITICAL HIGH
WBC # BLD: 6.76 K/UL — SIGNIFICANT CHANGE UP (ref 4.8–10.8)
WBC # FLD AUTO: 6.76 K/UL — SIGNIFICANT CHANGE UP (ref 4.8–10.8)

## 2019-10-06 PROCEDURE — 99222 1ST HOSP IP/OBS MODERATE 55: CPT

## 2019-10-06 PROCEDURE — 93306 TTE W/DOPPLER COMPLETE: CPT | Mod: 26

## 2019-10-06 PROCEDURE — 93010 ELECTROCARDIOGRAM REPORT: CPT

## 2019-10-06 PROCEDURE — 99223 1ST HOSP IP/OBS HIGH 75: CPT | Mod: AI

## 2019-10-06 RX ORDER — ASPIRIN/CALCIUM CARB/MAGNESIUM 324 MG
1 TABLET ORAL
Qty: 0 | Refills: 0 | DISCHARGE
Start: 2019-10-06

## 2019-10-06 RX ORDER — CALCITRIOL 0.5 UG/1
0 CAPSULE ORAL
Qty: 0 | Refills: 0 | DISCHARGE

## 2019-10-06 RX ORDER — ONDANSETRON 8 MG/1
4 TABLET, FILM COATED ORAL ONCE
Refills: 0 | Status: COMPLETED | OUTPATIENT
Start: 2019-10-06 | End: 2019-10-06

## 2019-10-06 RX ORDER — LOSARTAN POTASSIUM 100 MG/1
1 TABLET, FILM COATED ORAL
Qty: 0 | Refills: 0 | DISCHARGE

## 2019-10-06 RX ADMIN — APIXABAN 2.5 MILLIGRAM(S): 2.5 TABLET, FILM COATED ORAL at 17:34

## 2019-10-06 RX ADMIN — INSULIN GLARGINE 10 UNIT(S): 100 INJECTION, SOLUTION SUBCUTANEOUS at 10:05

## 2019-10-06 RX ADMIN — Medication 81 MILLIGRAM(S): at 11:05

## 2019-10-06 RX ADMIN — CALCITRIOL 0.25 MICROGRAM(S): 0.5 CAPSULE ORAL at 11:05

## 2019-10-06 RX ADMIN — Medication 1 MILLIGRAM(S): at 11:05

## 2019-10-06 RX ADMIN — CARVEDILOL PHOSPHATE 6.25 MILLIGRAM(S): 80 CAPSULE, EXTENDED RELEASE ORAL at 06:47

## 2019-10-06 RX ADMIN — Medication 40 MILLIGRAM(S): at 06:47

## 2019-10-06 RX ADMIN — ONDANSETRON 4 MILLIGRAM(S): 8 TABLET, FILM COATED ORAL at 17:34

## 2019-10-06 RX ADMIN — APIXABAN 2.5 MILLIGRAM(S): 2.5 TABLET, FILM COATED ORAL at 06:47

## 2019-10-06 RX ADMIN — CARVEDILOL PHOSPHATE 6.25 MILLIGRAM(S): 80 CAPSULE, EXTENDED RELEASE ORAL at 17:34

## 2019-10-06 NOTE — DISCHARGE NOTE PROVIDER - NSDCCPCAREPLAN_GEN_ALL_CORE_FT
PRINCIPAL DISCHARGE DIAGNOSIS  Diagnosis: Unstable angina  Assessment and Plan of Treatment: plan to transfer to [facility]

## 2019-10-06 NOTE — CHART NOTE - NSCHARTNOTEFT_GEN_A_CORE
I was called by Dr Arevalo to initiate transfer to [facility] Dr Brambila as per family wishes. Family wants the patient to get cardiac cath by Dr Brambila at [facility].     Contact Number: [phone]    Resident on call was Dr Lemons there. They took pt info and said they will call us back.

## 2019-10-07 LAB
CALCIUM SERPL-MCNC: 10.2 MG/DL — SIGNIFICANT CHANGE UP (ref 8.4–10.5)
PTH-INTACT FLD-MCNC: 56 PG/ML — SIGNIFICANT CHANGE UP (ref 15–65)

## 2019-10-21 DIAGNOSIS — R07.9 CHEST PAIN, UNSPECIFIED: ICD-10-CM

## 2019-10-21 DIAGNOSIS — F32.9 MAJOR DEPRESSIVE DISORDER, SINGLE EPISODE, UNSPECIFIED: ICD-10-CM

## 2019-10-21 DIAGNOSIS — F41.9 ANXIETY DISORDER, UNSPECIFIED: ICD-10-CM

## 2019-10-21 DIAGNOSIS — Z79.4 LONG TERM (CURRENT) USE OF INSULIN: ICD-10-CM

## 2019-10-21 DIAGNOSIS — Z79.01 LONG TERM (CURRENT) USE OF ANTICOAGULANTS: ICD-10-CM

## 2019-10-21 DIAGNOSIS — N18.3 CHRONIC KIDNEY DISEASE, STAGE 3 (MODERATE): ICD-10-CM

## 2019-10-21 DIAGNOSIS — Z85.038 PERSONAL HISTORY OF OTHER MALIGNANT NEOPLASM OF LARGE INTESTINE: ICD-10-CM

## 2019-10-21 DIAGNOSIS — R79.89 OTHER SPECIFIED ABNORMAL FINDINGS OF BLOOD CHEMISTRY: ICD-10-CM

## 2019-10-21 DIAGNOSIS — E11.22 TYPE 2 DIABETES MELLITUS WITH DIABETIC CHRONIC KIDNEY DISEASE: ICD-10-CM

## 2019-10-21 DIAGNOSIS — I50.22 CHRONIC SYSTOLIC (CONGESTIVE) HEART FAILURE: ICD-10-CM

## 2019-10-21 DIAGNOSIS — E86.0 DEHYDRATION: ICD-10-CM

## 2019-10-21 DIAGNOSIS — I13.0 HYPERTENSIVE HEART AND CHRONIC KIDNEY DISEASE WITH HEART FAILURE AND STAGE 1 THROUGH STAGE 4 CHRONIC KIDNEY DISEASE, OR UNSPECIFIED CHRONIC KIDNEY DISEASE: ICD-10-CM

## 2019-10-21 DIAGNOSIS — Z90.49 ACQUIRED ABSENCE OF OTHER SPECIFIED PARTS OF DIGESTIVE TRACT: ICD-10-CM

## 2019-10-21 DIAGNOSIS — E83.52 HYPERCALCEMIA: ICD-10-CM

## 2019-10-21 DIAGNOSIS — I25.2 OLD MYOCARDIAL INFARCTION: ICD-10-CM

## 2019-10-21 DIAGNOSIS — Z95.5 PRESENCE OF CORONARY ANGIOPLASTY IMPLANT AND GRAFT: ICD-10-CM

## 2019-10-21 DIAGNOSIS — I25.110 ATHEROSCLEROTIC HEART DISEASE OF NATIVE CORONARY ARTERY WITH UNSTABLE ANGINA PECTORIS: ICD-10-CM

## 2019-10-21 DIAGNOSIS — I48.91 UNSPECIFIED ATRIAL FIBRILLATION: ICD-10-CM

## 2019-10-21 DIAGNOSIS — E53.8 DEFICIENCY OF OTHER SPECIFIED B GROUP VITAMINS: ICD-10-CM

## 2021-09-04 ENCOUNTER — INPATIENT (INPATIENT)
Facility: HOSPITAL | Age: 86
LOS: 22 days | Discharge: NURSING, REHAB OR LONG-TERM CARE | End: 2021-09-27
Attending: INTERNAL MEDICINE | Admitting: INTERNAL MEDICINE
Payer: MEDICARE

## 2021-09-04 VITALS
DIASTOLIC BLOOD PRESSURE: 79 MMHG | OXYGEN SATURATION: 99 % | HEART RATE: 73 BPM | TEMPERATURE: 98 F | SYSTOLIC BLOOD PRESSURE: 142 MMHG | RESPIRATION RATE: 17 BRPM | WEIGHT: 130.07 LBS

## 2021-09-04 DIAGNOSIS — Z90.710 ACQUIRED ABSENCE OF BOTH CERVIX AND UTERUS: Chronic | ICD-10-CM

## 2021-09-04 LAB
ALBUMIN SERPL ELPH-MCNC: 4.2 G/DL — SIGNIFICANT CHANGE UP (ref 3.5–5.2)
ALP SERPL-CCNC: 114 U/L — SIGNIFICANT CHANGE UP (ref 30–115)
ALT FLD-CCNC: 11 U/L — SIGNIFICANT CHANGE UP (ref 0–41)
ANION GAP SERPL CALC-SCNC: 13 MMOL/L — SIGNIFICANT CHANGE UP (ref 7–14)
APTT BLD: 35 SEC — SIGNIFICANT CHANGE UP (ref 27–39.2)
AST SERPL-CCNC: 13 U/L — SIGNIFICANT CHANGE UP (ref 0–41)
BASOPHILS # BLD AUTO: 0.02 K/UL — SIGNIFICANT CHANGE UP (ref 0–0.2)
BASOPHILS NFR BLD AUTO: 0.2 % — SIGNIFICANT CHANGE UP (ref 0–1)
BILIRUB SERPL-MCNC: 0.2 MG/DL — SIGNIFICANT CHANGE UP (ref 0.2–1.2)
BLD GP AB SCN SERPL QL: SIGNIFICANT CHANGE UP
BUN SERPL-MCNC: 50 MG/DL — HIGH (ref 10–20)
CALCIUM SERPL-MCNC: 9.8 MG/DL — SIGNIFICANT CHANGE UP (ref 8.5–10.1)
CHLORIDE SERPL-SCNC: 106 MMOL/L — SIGNIFICANT CHANGE UP (ref 98–110)
CK SERPL-CCNC: 56 U/L — SIGNIFICANT CHANGE UP (ref 0–225)
CK SERPL-CCNC: 56 U/L — SIGNIFICANT CHANGE UP (ref 0–225)
CO2 SERPL-SCNC: 21 MMOL/L — SIGNIFICANT CHANGE UP (ref 17–32)
CREAT SERPL-MCNC: 1.8 MG/DL — HIGH (ref 0.7–1.5)
EOSINOPHIL # BLD AUTO: 0.19 K/UL — SIGNIFICANT CHANGE UP (ref 0–0.7)
EOSINOPHIL NFR BLD AUTO: 2 % — SIGNIFICANT CHANGE UP (ref 0–8)
GLUCOSE SERPL-MCNC: 164 MG/DL — HIGH (ref 70–99)
HCT VFR BLD CALC: 29.6 % — LOW (ref 37–47)
HGB BLD-MCNC: 9.7 G/DL — LOW (ref 12–16)
IMM GRANULOCYTES NFR BLD AUTO: 0.5 % — HIGH (ref 0.1–0.3)
INR BLD: 1.55 RATIO — HIGH (ref 0.65–1.3)
LACTATE SERPL-SCNC: 0.8 MMOL/L — SIGNIFICANT CHANGE UP (ref 0.7–2)
LIDOCAIN IGE QN: 88 U/L — HIGH (ref 7–60)
LYMPHOCYTES # BLD AUTO: 1.83 K/UL — SIGNIFICANT CHANGE UP (ref 1.2–3.4)
LYMPHOCYTES # BLD AUTO: 19.4 % — LOW (ref 20.5–51.1)
MCHC RBC-ENTMCNC: 27.6 PG — SIGNIFICANT CHANGE UP (ref 27–31)
MCHC RBC-ENTMCNC: 32.8 G/DL — SIGNIFICANT CHANGE UP (ref 32–37)
MCV RBC AUTO: 84.3 FL — SIGNIFICANT CHANGE UP (ref 81–99)
MONOCYTES # BLD AUTO: 0.6 K/UL — SIGNIFICANT CHANGE UP (ref 0.1–0.6)
MONOCYTES NFR BLD AUTO: 6.4 % — SIGNIFICANT CHANGE UP (ref 1.7–9.3)
NEUTROPHILS # BLD AUTO: 6.73 K/UL — HIGH (ref 1.4–6.5)
NEUTROPHILS NFR BLD AUTO: 71.5 % — SIGNIFICANT CHANGE UP (ref 42.2–75.2)
NRBC # BLD: 0 /100 WBCS — SIGNIFICANT CHANGE UP (ref 0–0)
PLATELET # BLD AUTO: 179 K/UL — SIGNIFICANT CHANGE UP (ref 130–400)
POTASSIUM SERPL-MCNC: 3.9 MMOL/L — SIGNIFICANT CHANGE UP (ref 3.5–5)
POTASSIUM SERPL-SCNC: 3.9 MMOL/L — SIGNIFICANT CHANGE UP (ref 3.5–5)
PROT SERPL-MCNC: 6.6 G/DL — SIGNIFICANT CHANGE UP (ref 6–8)
PROTHROM AB SERPL-ACNC: 17.8 SEC — HIGH (ref 9.95–12.87)
RBC # BLD: 3.51 M/UL — LOW (ref 4.2–5.4)
RBC # FLD: 13.1 % — SIGNIFICANT CHANGE UP (ref 11.5–14.5)
SARS-COV-2 RNA SPEC QL NAA+PROBE: SIGNIFICANT CHANGE UP
SODIUM SERPL-SCNC: 140 MMOL/L — SIGNIFICANT CHANGE UP (ref 135–146)
TROPONIN T SERPL-MCNC: 0.02 NG/ML — HIGH
WBC # BLD: 9.42 K/UL — SIGNIFICANT CHANGE UP (ref 4.8–10.8)
WBC # FLD AUTO: 9.42 K/UL — SIGNIFICANT CHANGE UP (ref 4.8–10.8)

## 2021-09-04 PROCEDURE — 70450 CT HEAD/BRAIN W/O DYE: CPT | Mod: 26,MA

## 2021-09-04 PROCEDURE — 72125 CT NECK SPINE W/O DYE: CPT | Mod: 26,MA

## 2021-09-04 PROCEDURE — 99285 EMERGENCY DEPT VISIT HI MDM: CPT | Mod: GC

## 2021-09-04 PROCEDURE — 93010 ELECTROCARDIOGRAM REPORT: CPT

## 2021-09-04 PROCEDURE — 73551 X-RAY EXAM OF FEMUR 1: CPT | Mod: 26,LT

## 2021-09-04 PROCEDURE — 71250 CT THORAX DX C-: CPT | Mod: 26,MA

## 2021-09-04 PROCEDURE — 71045 X-RAY EXAM CHEST 1 VIEW: CPT | Mod: 26

## 2021-09-04 PROCEDURE — 73562 X-RAY EXAM OF KNEE 3: CPT | Mod: 26,LT

## 2021-09-04 PROCEDURE — 74176 CT ABD & PELVIS W/O CONTRAST: CPT | Mod: 26,MA

## 2021-09-04 PROCEDURE — 73502 X-RAY EXAM HIP UNI 2-3 VIEWS: CPT | Mod: 26,LT

## 2021-09-04 RX ORDER — METOCLOPRAMIDE HCL 10 MG
10 TABLET ORAL ONCE
Refills: 0 | Status: COMPLETED | OUTPATIENT
Start: 2021-09-04 | End: 2021-09-04

## 2021-09-04 RX ORDER — SODIUM CHLORIDE 9 MG/ML
1000 INJECTION, SOLUTION INTRAVENOUS ONCE
Refills: 0 | Status: COMPLETED | OUTPATIENT
Start: 2021-09-04 | End: 2021-09-04

## 2021-09-04 RX ORDER — FENTANYL CITRATE 50 UG/ML
50 INJECTION INTRAVENOUS ONCE
Refills: 0 | Status: DISCONTINUED | OUTPATIENT
Start: 2021-09-04 | End: 2021-09-04

## 2021-09-04 RX ORDER — ONDANSETRON 8 MG/1
4 TABLET, FILM COATED ORAL ONCE
Refills: 0 | Status: COMPLETED | OUTPATIENT
Start: 2021-09-04 | End: 2021-09-04

## 2021-09-04 RX ORDER — MORPHINE SULFATE 50 MG/1
4 CAPSULE, EXTENDED RELEASE ORAL ONCE
Refills: 0 | Status: DISCONTINUED | OUTPATIENT
Start: 2021-09-04 | End: 2021-09-04

## 2021-09-04 RX ADMIN — MORPHINE SULFATE 4 MILLIGRAM(S): 50 CAPSULE, EXTENDED RELEASE ORAL at 22:17

## 2021-09-04 RX ADMIN — Medication 10 MILLIGRAM(S): at 21:52

## 2021-09-04 RX ADMIN — FENTANYL CITRATE 50 MICROGRAM(S): 50 INJECTION INTRAVENOUS at 20:10

## 2021-09-04 RX ADMIN — SODIUM CHLORIDE 1000 MILLILITER(S): 9 INJECTION, SOLUTION INTRAVENOUS at 21:52

## 2021-09-04 RX ADMIN — ONDANSETRON 4 MILLIGRAM(S): 8 TABLET, FILM COATED ORAL at 20:29

## 2021-09-04 RX ADMIN — ONDANSETRON 4 MILLIGRAM(S): 8 TABLET, FILM COATED ORAL at 21:00

## 2021-09-04 NOTE — CONSULT NOTE ADULT - ASSESSMENT
ORTHOPEDIC SURGERY CONSULT NOTE      CC / HPI    85F w/PMHx CAD, CHF, Afib on Eliquis, DM, HTN, colon cancer, CKD stage 3, hx of diverticulitis, presents s/p mechanical fall at home, +HT, -LOC, +AC (Eliquis). Patient was in her kitchen and "turned around too fast" when she fell over and hit her head on a chair. She denies loss of consciousness. She remained down for ~30 min before EMS came to take her into the ED. She is GCS 15, primary survey negative. No external signs of trauma. Complains of LLE pain. Denies scalp, spinal, chest, abdominal pain. ROM of all extremities in tact. C-collar in place.     c/o L hip hip, constant, sharp pain. worse with movt and touch, since the fall. no abd pain. no cp. no sob.    PAST MEDICAL & SURGICAL HISTORY:  CAD (coronary artery disease)  Chronic CHF  Atrial fibrillation  Type 2 diabetes mellitus  Colon cancer  Hypertension  CKD (chronic kidney disease)  Stage 3  History of Clostridium difficile colitis  s/p fecal transplant  Diverticulitis  S/P hysterectomy    Allergies  No Known Allergies    Home Medications:  aspirin 81 mg oral delayed release tablet: 1 tab(s) orally once a day (06 Oct 2019 15:30)  atorvastatin 80 mg oral tablet:  (05 Oct 2019 15:44)  carvedilol 25 mg oral tablet:  (05 Oct 2019 15:44)  Eliquis 2.5 mg oral tablet: 1 tab(s) orally 2 times a day (05 Oct 2019 15:44)  Evista 60 mg oral tablet: 1 tab(s) orally once a day (05 Oct 2019 15:44)  folic acid 1 mg oral tablet:  (05 Oct 2019 15:44)  furosemide 40 mg oral tablet: 1 tab(s) orally once a day (05 Oct 2019 15:44)  Lantus: 10 unit(s) subcutaneous once a day (in the morning) (05 Oct 2019 19:54)  spironolactone 25 mg oral tablet:  (05 Oct 2019 15:44)    ALLERGIES AND HOME MEDICATIONS:   No Known Allergies:       PHYSICAL EXAM  General: In no acute distress, awake/alert and oriented    Left Hip  Unable to range hip secondary to pain  No swelling or ecchymosis  Skin intact about the hip  AROM limited due to pain  Motor intact: 5/5 gastroc-soleus, tibialis anterior, FHL, EHL  Sensation intact to light touch sural/saphenous/DP/SP/tibial   DP/PT palpable pulses, toes warm and well perfused    Complete orthopaedic secondary exam unremarkable for other injuries    IMAGING  Studies During Admission:  Multiple XR views today of pelvis, L hip and femur demonstrate   L basi-cervical femoral neck fracture     ASSESSMENT AND PLAN   67F w/ a Left basi-cervical femoral neck fracture.     Discussed with patient the nature and severity of the injury, and the need and rationale for operative intervention for best chance return to function. All questions answered.  patient would like to proceed with surgery.        - Medicine team consult, needs medical clearance  - Cardiology consult per medicine eval  - Preop labs: need CBC, BMP, PT/PTT, active type and screen   - Chest XR  - EKG  - Nonweight bearing  - Pain control

## 2021-09-04 NOTE — ED ADULT NURSE NOTE - OBJECTIVE STATEMENT
Pt fell while reaching to grab items in her kitchen. Pt fell around 05;40pm and was not able to get up until the  EMS arrived on scene about 30 min ago. Pt denies LOC but is on Eliquis for previous MI and stents. Pt complaining of left leg pain and is unable to  her hip.

## 2021-09-04 NOTE — CONSULT NOTE ADULT - ASSESSMENT
85F w/PMHx CAD, CHF, Afib on Eliquis, DM, HTN, colon cancer, CKD stage 3, hx of diverticulitis, presents s/p mechanical fall at home, +HT, -LOC, +AC (Eliquis).     Plan:   -CT pan scan  -CXR, pelvis XR, LLE XR  -Trauma labs  -C-collar   -Patient and plan discussed with Dr. Rouse

## 2021-09-04 NOTE — ED ADULT TRIAGE NOTE - CHIEF COMPLAINT QUOTE
Pt fell while standing in her kitchen. Pt fell around 1740, and has been on the ground until EMS arrived on scene about 30 min ago. Pt denies LOC but is on Eliquis for previous MI and stents. Pt complaining of left leg pain.

## 2021-09-04 NOTE — CONSULT NOTE ADULT - SUBJECTIVE AND OBJECTIVE BOX
TRAUMA ACTIVATION LEVEL:  Trauma Alert     MECHANISM OF INJURY: S/p mechanical fall, +HT, -LOC, +AC (Eliquis)     GCS: 15 	E: 4	V: 5	M: 6    HPI:  85F w/PMHx CAD, CHF, Afib on Eliquis, DM, HTN, colon cancer, CKD stage 3, hx of diverticulitis, presents s/p mechanical fall at home, +HT, -LOC, +AC (Eliquis). Patient was in her kitchen and "turned around too fast" when she fell over and hit her head on a chair. She denies loss of consciousness. She remained down for ~30 min before EMS came to take her into the ED. She is GCS 15, primary survey negative. No external signs of trauma. Complains of LLE pain. Denies scalp, spinal, chest, abdominal pain. ROM of all extremities in tact. C-collar in place.     PAST MEDICAL & SURGICAL HISTORY:  CAD (coronary artery disease)  Chronic CHF  Atrial fibrillation  Type 2 diabetes mellitus  Colon cancer  Hypertension  CKD (chronic kidney disease)  Stage 3  History of Clostridium difficile colitis  s/p fecal transplant  Diverticulitis  S/P hysterectomy    Allergies  No Known Allergies    Home Medications:  aspirin 81 mg oral delayed release tablet: 1 tab(s) orally once a day (06 Oct 2019 15:30)  atorvastatin 80 mg oral tablet:  (05 Oct 2019 15:44)  carvedilol 25 mg oral tablet:  (05 Oct 2019 15:44)  Eliquis 2.5 mg oral tablet: 1 tab(s) orally 2 times a day (05 Oct 2019 15:44)  Evista 60 mg oral tablet: 1 tab(s) orally once a day (05 Oct 2019 15:44)  folic acid 1 mg oral tablet:  (05 Oct 2019 15:44)  furosemide 40 mg oral tablet: 1 tab(s) orally once a day (05 Oct 2019 15:44)  Lantus: 10 unit(s) subcutaneous once a day (in the morning) (05 Oct 2019 19:54)  spironolactone 25 mg oral tablet:  (05 Oct 2019 15:44)      ROS: 10-system review is otherwise negative except HPI above.      Primary Survey:    A - airway intact  B - bilateral breath sounds and good chest rise  C - palpable pulses in all extremities  D - GCS 15 on arrival, SHARIF  Exposure obtained    Vital Signs Last 24 Hrs  T(C): 36.3 (04 Sep 2021 19:45), Max: 36.9 (04 Sep 2021 19:22)  T(F): 97.3 (04 Sep 2021 19:45), Max: 98.4 (04 Sep 2021 19:22)  HR: 72 (04 Sep 2021 19:45) (72 - 80)  BP: 167/81 (04 Sep 2021 19:45) (107/87 - 167/81)  BP(mean): 113 (04 Sep 2021 19:45) (100 - 113)  RR: 19 (04 Sep 2021 19:45) (17 - 19)  SpO2: 100% (04 Sep 2021 19:45) (99% - 100%)    Secondary Survey:   General: NAD, GCS 15  HEENT: Normocephalic, atraumatic, EOMI, PEERLA. no scalp lacerations   Neck: Soft, midline trachea. no cspine tenderness  Chest: No chest wall tenderness. or subq  emphysema   Cardiac: S1, S2, RRR  Respiratory: Bilateral breath sounds, clear and equal bilaterally  Abdomen: Soft, non-distended, non-tender, no rebound,   Groin: Normal appearing, pelvis stable   Ext: palp radial b/l UE, b/l DP palp in Lower Extrem. LLE tenderness, ROM in tact  Back: no TTP, no palpable runoff/stepoff/deformity    LABS:  *Trauma labs pending     RADIOLOGY & ADDITIONAL STUDIES:  *Pending pan scan, CXR, pelvis XR, LLE XR

## 2021-09-05 PROBLEM — Z86.19 PERSONAL HISTORY OF OTHER INFECTIOUS AND PARASITIC DISEASES: Chronic | Status: ACTIVE | Noted: 2019-10-05

## 2021-09-05 PROBLEM — K57.92 DIVERTICULITIS OF INTESTINE, PART UNSPECIFIED, WITHOUT PERFORATION OR ABSCESS WITHOUT BLEEDING: Chronic | Status: ACTIVE | Noted: 2019-10-05

## 2021-09-05 PROBLEM — I48.91 UNSPECIFIED ATRIAL FIBRILLATION: Chronic | Status: ACTIVE | Noted: 2019-10-05

## 2021-09-05 PROBLEM — I10 ESSENTIAL (PRIMARY) HYPERTENSION: Chronic | Status: ACTIVE | Noted: 2019-10-05

## 2021-09-05 PROBLEM — C18.9 MALIGNANT NEOPLASM OF COLON, UNSPECIFIED: Chronic | Status: ACTIVE | Noted: 2019-10-05

## 2021-09-05 PROBLEM — I25.10 ATHEROSCLEROTIC HEART DISEASE OF NATIVE CORONARY ARTERY WITHOUT ANGINA PECTORIS: Chronic | Status: ACTIVE | Noted: 2019-10-05

## 2021-09-05 PROBLEM — I50.9 HEART FAILURE, UNSPECIFIED: Chronic | Status: ACTIVE | Noted: 2019-10-05

## 2021-09-05 PROBLEM — N18.9 CHRONIC KIDNEY DISEASE, UNSPECIFIED: Chronic | Status: ACTIVE | Noted: 2019-10-05

## 2021-09-05 PROBLEM — E11.9 TYPE 2 DIABETES MELLITUS WITHOUT COMPLICATIONS: Chronic | Status: ACTIVE | Noted: 2019-10-05

## 2021-09-05 LAB
ANION GAP SERPL CALC-SCNC: 14 MMOL/L — SIGNIFICANT CHANGE UP (ref 7–14)
ANION GAP SERPL CALC-SCNC: 9 MMOL/L — SIGNIFICANT CHANGE UP (ref 7–14)
APPEARANCE UR: CLEAR — SIGNIFICANT CHANGE UP
APTT BLD: 34.7 SEC — SIGNIFICANT CHANGE UP (ref 27–39.2)
BACTERIA # UR AUTO: ABNORMAL
BASOPHILS # BLD AUTO: 0 K/UL — SIGNIFICANT CHANGE UP (ref 0–0.2)
BASOPHILS # BLD AUTO: 0.01 K/UL — SIGNIFICANT CHANGE UP (ref 0–0.2)
BASOPHILS NFR BLD AUTO: 0 % — SIGNIFICANT CHANGE UP (ref 0–1)
BASOPHILS NFR BLD AUTO: 0.1 % — SIGNIFICANT CHANGE UP (ref 0–1)
BILIRUB UR-MCNC: NEGATIVE — SIGNIFICANT CHANGE UP
BLD GP AB SCN SERPL QL: SIGNIFICANT CHANGE UP
BUN SERPL-MCNC: 50 MG/DL — HIGH (ref 10–20)
BUN SERPL-MCNC: 55 MG/DL — HIGH (ref 10–20)
CALCIUM SERPL-MCNC: 8.9 MG/DL — SIGNIFICANT CHANGE UP (ref 8.5–10.1)
CALCIUM SERPL-MCNC: 9.5 MG/DL — SIGNIFICANT CHANGE UP (ref 8.5–10.1)
CHLORIDE SERPL-SCNC: 104 MMOL/L — SIGNIFICANT CHANGE UP (ref 98–110)
CHLORIDE SERPL-SCNC: 108 MMOL/L — SIGNIFICANT CHANGE UP (ref 98–110)
CK SERPL-CCNC: 41 U/L — SIGNIFICANT CHANGE UP (ref 0–225)
CO2 SERPL-SCNC: 17 MMOL/L — SIGNIFICANT CHANGE UP (ref 17–32)
CO2 SERPL-SCNC: 20 MMOL/L — SIGNIFICANT CHANGE UP (ref 17–32)
COLOR SPEC: SIGNIFICANT CHANGE UP
CREAT SERPL-MCNC: 1.9 MG/DL — HIGH (ref 0.7–1.5)
CREAT SERPL-MCNC: 2.2 MG/DL — HIGH (ref 0.7–1.5)
DIFF PNL FLD: NEGATIVE — SIGNIFICANT CHANGE UP
EOSINOPHIL # BLD AUTO: 0 K/UL — SIGNIFICANT CHANGE UP (ref 0–0.7)
EOSINOPHIL # BLD AUTO: 0.06 K/UL — SIGNIFICANT CHANGE UP (ref 0–0.7)
EOSINOPHIL NFR BLD AUTO: 0 % — SIGNIFICANT CHANGE UP (ref 0–8)
EOSINOPHIL NFR BLD AUTO: 0.6 % — SIGNIFICANT CHANGE UP (ref 0–8)
EPI CELLS # UR: 1 /HPF — SIGNIFICANT CHANGE UP (ref 0–5)
GLUCOSE BLDC GLUCOMTR-MCNC: 134 MG/DL — HIGH (ref 70–99)
GLUCOSE BLDC GLUCOMTR-MCNC: 160 MG/DL — HIGH (ref 70–99)
GLUCOSE BLDC GLUCOMTR-MCNC: 164 MG/DL — HIGH (ref 70–99)
GLUCOSE BLDC GLUCOMTR-MCNC: 233 MG/DL — HIGH (ref 70–99)
GLUCOSE SERPL-MCNC: 128 MG/DL — HIGH (ref 70–99)
GLUCOSE SERPL-MCNC: 170 MG/DL — HIGH (ref 70–99)
GLUCOSE UR QL: NEGATIVE — SIGNIFICANT CHANGE UP
HCT VFR BLD CALC: 20.5 % — LOW (ref 37–47)
HCT VFR BLD CALC: 23.2 % — LOW (ref 37–47)
HCT VFR BLD CALC: 23.4 % — LOW (ref 37–47)
HGB BLD-MCNC: 6.7 G/DL — CRITICAL LOW (ref 12–16)
HGB BLD-MCNC: 7.4 G/DL — LOW (ref 12–16)
HGB BLD-MCNC: 7.6 G/DL — LOW (ref 12–16)
HYALINE CASTS # UR AUTO: 2 /LPF — SIGNIFICANT CHANGE UP (ref 0–7)
IMM GRANULOCYTES NFR BLD AUTO: 0.4 % — HIGH (ref 0.1–0.3)
IMM GRANULOCYTES NFR BLD AUTO: 0.5 % — HIGH (ref 0.1–0.3)
INR BLD: 1.45 RATIO — HIGH (ref 0.65–1.3)
KETONES UR-MCNC: NEGATIVE — SIGNIFICANT CHANGE UP
LEUKOCYTE ESTERASE UR-ACNC: ABNORMAL
LYMPHOCYTES # BLD AUTO: 1.53 K/UL — SIGNIFICANT CHANGE UP (ref 1.2–3.4)
LYMPHOCYTES # BLD AUTO: 1.77 K/UL — SIGNIFICANT CHANGE UP (ref 1.2–3.4)
LYMPHOCYTES # BLD AUTO: 16.7 % — LOW (ref 20.5–51.1)
LYMPHOCYTES # BLD AUTO: 17.7 % — LOW (ref 20.5–51.1)
MAGNESIUM SERPL-MCNC: 1.8 MG/DL — SIGNIFICANT CHANGE UP (ref 1.8–2.4)
MAGNESIUM SERPL-MCNC: 1.8 MG/DL — SIGNIFICANT CHANGE UP (ref 1.8–2.4)
MCHC RBC-ENTMCNC: 27.1 PG — SIGNIFICANT CHANGE UP (ref 27–31)
MCHC RBC-ENTMCNC: 27.2 PG — SIGNIFICANT CHANGE UP (ref 27–31)
MCHC RBC-ENTMCNC: 27.9 PG — SIGNIFICANT CHANGE UP (ref 27–31)
MCHC RBC-ENTMCNC: 31.9 G/DL — LOW (ref 32–37)
MCHC RBC-ENTMCNC: 32.5 G/DL — SIGNIFICANT CHANGE UP (ref 32–37)
MCHC RBC-ENTMCNC: 32.7 G/DL — SIGNIFICANT CHANGE UP (ref 32–37)
MCV RBC AUTO: 83.9 FL — SIGNIFICANT CHANGE UP (ref 81–99)
MCV RBC AUTO: 85 FL — SIGNIFICANT CHANGE UP (ref 81–99)
MCV RBC AUTO: 85.4 FL — SIGNIFICANT CHANGE UP (ref 81–99)
MONOCYTES # BLD AUTO: 0.72 K/UL — HIGH (ref 0.1–0.6)
MONOCYTES # BLD AUTO: 0.96 K/UL — HIGH (ref 0.1–0.6)
MONOCYTES NFR BLD AUTO: 7.8 % — SIGNIFICANT CHANGE UP (ref 1.7–9.3)
MONOCYTES NFR BLD AUTO: 9.6 % — HIGH (ref 1.7–9.3)
NEUTROPHILS # BLD AUTO: 6.89 K/UL — HIGH (ref 1.4–6.5)
NEUTROPHILS # BLD AUTO: 7.14 K/UL — HIGH (ref 1.4–6.5)
NEUTROPHILS NFR BLD AUTO: 71.5 % — SIGNIFICANT CHANGE UP (ref 42.2–75.2)
NEUTROPHILS NFR BLD AUTO: 75.1 % — SIGNIFICANT CHANGE UP (ref 42.2–75.2)
NITRITE UR-MCNC: NEGATIVE — SIGNIFICANT CHANGE UP
NRBC # BLD: 0 /100 WBCS — SIGNIFICANT CHANGE UP (ref 0–0)
PH UR: 6 — SIGNIFICANT CHANGE UP (ref 5–8)
PHOSPHATE SERPL-MCNC: 3 MG/DL — SIGNIFICANT CHANGE UP (ref 2.1–4.9)
PHOSPHATE SERPL-MCNC: 3.2 MG/DL — SIGNIFICANT CHANGE UP (ref 2.1–4.9)
PLATELET # BLD AUTO: 122 K/UL — LOW (ref 130–400)
PLATELET # BLD AUTO: 137 K/UL — SIGNIFICANT CHANGE UP (ref 130–400)
PLATELET # BLD AUTO: 140 K/UL — SIGNIFICANT CHANGE UP (ref 130–400)
POTASSIUM SERPL-MCNC: 3.5 MMOL/L — SIGNIFICANT CHANGE UP (ref 3.5–5)
POTASSIUM SERPL-MCNC: 3.8 MMOL/L — SIGNIFICANT CHANGE UP (ref 3.5–5)
POTASSIUM SERPL-SCNC: 3.5 MMOL/L — SIGNIFICANT CHANGE UP (ref 3.5–5)
POTASSIUM SERPL-SCNC: 3.8 MMOL/L — SIGNIFICANT CHANGE UP (ref 3.5–5)
PROT UR-MCNC: SIGNIFICANT CHANGE UP
PROTHROM AB SERPL-ACNC: 16.6 SEC — HIGH (ref 9.95–12.87)
RBC # BLD: 2.4 M/UL — LOW (ref 4.2–5.4)
RBC # BLD: 2.73 M/UL — LOW (ref 4.2–5.4)
RBC # BLD: 2.79 M/UL — LOW (ref 4.2–5.4)
RBC # FLD: 12.9 % — SIGNIFICANT CHANGE UP (ref 11.5–14.5)
RBC # FLD: 13.1 % — SIGNIFICANT CHANGE UP (ref 11.5–14.5)
RBC # FLD: 13.2 % — SIGNIFICANT CHANGE UP (ref 11.5–14.5)
RBC CASTS # UR COMP ASSIST: 0 /HPF — SIGNIFICANT CHANGE UP (ref 0–4)
SODIUM SERPL-SCNC: 133 MMOL/L — LOW (ref 135–146)
SODIUM SERPL-SCNC: 139 MMOL/L — SIGNIFICANT CHANGE UP (ref 135–146)
SP GR SPEC: 1.01 — SIGNIFICANT CHANGE UP (ref 1.01–1.03)
TROPONIN T SERPL-MCNC: 0.02 NG/ML — HIGH
UROBILINOGEN FLD QL: SIGNIFICANT CHANGE UP
WBC # BLD: 11.68 K/UL — HIGH (ref 4.8–10.8)
WBC # BLD: 9.18 K/UL — SIGNIFICANT CHANGE UP (ref 4.8–10.8)
WBC # BLD: 9.99 K/UL — SIGNIFICANT CHANGE UP (ref 4.8–10.8)
WBC # FLD AUTO: 11.68 K/UL — HIGH (ref 4.8–10.8)
WBC # FLD AUTO: 9.18 K/UL — SIGNIFICANT CHANGE UP (ref 4.8–10.8)
WBC # FLD AUTO: 9.99 K/UL — SIGNIFICANT CHANGE UP (ref 4.8–10.8)
WBC UR QL: 15 /HPF — HIGH (ref 0–5)

## 2021-09-05 PROCEDURE — 73700 CT LOWER EXTREMITY W/O DYE: CPT | Mod: 26,LT

## 2021-09-05 PROCEDURE — 99223 1ST HOSP IP/OBS HIGH 75: CPT

## 2021-09-05 RX ORDER — OXYCODONE HYDROCHLORIDE 5 MG/1
5 TABLET ORAL EVERY 4 HOURS
Refills: 0 | Status: DISCONTINUED | OUTPATIENT
Start: 2021-09-05 | End: 2021-09-06

## 2021-09-05 RX ORDER — FOLIC ACID 0.8 MG
1 TABLET ORAL DAILY
Refills: 0 | Status: DISCONTINUED | OUTPATIENT
Start: 2021-09-05 | End: 2021-09-06

## 2021-09-05 RX ORDER — FAMOTIDINE 10 MG/ML
20 INJECTION INTRAVENOUS ONCE
Refills: 0 | Status: DISCONTINUED | OUTPATIENT
Start: 2021-09-05 | End: 2021-09-06

## 2021-09-05 RX ORDER — MAGNESIUM SULFATE 500 MG/ML
2 VIAL (ML) INJECTION ONCE
Refills: 0 | Status: COMPLETED | OUTPATIENT
Start: 2021-09-05 | End: 2021-09-06

## 2021-09-05 RX ORDER — ATORVASTATIN CALCIUM 80 MG/1
0 TABLET, FILM COATED ORAL
Qty: 0 | Refills: 0 | DISCHARGE

## 2021-09-05 RX ORDER — LATANOPROST 0.05 MG/ML
1 SOLUTION/ DROPS OPHTHALMIC; TOPICAL AT BEDTIME
Refills: 0 | Status: DISCONTINUED | OUTPATIENT
Start: 2021-09-05 | End: 2021-09-06

## 2021-09-05 RX ORDER — FUROSEMIDE 40 MG
40 TABLET ORAL DAILY
Refills: 0 | Status: DISCONTINUED | OUTPATIENT
Start: 2021-09-05 | End: 2021-09-06

## 2021-09-05 RX ORDER — LEVOTHYROXINE SODIUM 125 MCG
25 TABLET ORAL DAILY
Refills: 0 | Status: DISCONTINUED | OUTPATIENT
Start: 2021-09-05 | End: 2021-09-06

## 2021-09-05 RX ORDER — ASCORBIC ACID 60 MG
500 TABLET,CHEWABLE ORAL
Refills: 0 | Status: DISCONTINUED | OUTPATIENT
Start: 2021-09-05 | End: 2021-09-06

## 2021-09-05 RX ORDER — CARVEDILOL PHOSPHATE 80 MG/1
25 CAPSULE, EXTENDED RELEASE ORAL EVERY 12 HOURS
Refills: 0 | Status: DISCONTINUED | OUTPATIENT
Start: 2021-09-05 | End: 2021-09-06

## 2021-09-05 RX ORDER — CEFTRIAXONE 500 MG/1
1000 INJECTION, POWDER, FOR SOLUTION INTRAMUSCULAR; INTRAVENOUS ONCE
Refills: 0 | Status: COMPLETED | OUTPATIENT
Start: 2021-09-05 | End: 2021-09-05

## 2021-09-05 RX ORDER — FUROSEMIDE 40 MG
1 TABLET ORAL
Qty: 0 | Refills: 0 | DISCHARGE

## 2021-09-05 RX ORDER — SODIUM CHLORIDE 9 MG/ML
1000 INJECTION, SOLUTION INTRAVENOUS
Refills: 0 | Status: DISCONTINUED | OUTPATIENT
Start: 2021-09-05 | End: 2021-09-06

## 2021-09-05 RX ORDER — DEXTROSE 50 % IN WATER 50 %
15 SYRINGE (ML) INTRAVENOUS ONCE
Refills: 0 | Status: DISCONTINUED | OUTPATIENT
Start: 2021-09-05 | End: 2021-09-06

## 2021-09-05 RX ORDER — DEXTROSE 50 % IN WATER 50 %
25 SYRINGE (ML) INTRAVENOUS ONCE
Refills: 0 | Status: DISCONTINUED | OUTPATIENT
Start: 2021-09-05 | End: 2021-09-06

## 2021-09-05 RX ORDER — CALCITRIOL 0.5 UG/1
1 CAPSULE ORAL
Qty: 0 | Refills: 0 | DISCHARGE

## 2021-09-05 RX ORDER — FERROUS SULFATE 325(65) MG
325 TABLET ORAL DAILY
Refills: 0 | Status: DISCONTINUED | OUTPATIENT
Start: 2021-09-05 | End: 2021-09-06

## 2021-09-05 RX ORDER — RALOXIFENE HYDROCHLORIDE 60 MG/1
1 TABLET, COATED ORAL
Qty: 0 | Refills: 0 | DISCHARGE

## 2021-09-05 RX ORDER — APIXABAN 2.5 MG/1
1 TABLET, FILM COATED ORAL
Qty: 0 | Refills: 0 | DISCHARGE

## 2021-09-05 RX ORDER — DIPHENHYDRAMINE HCL 50 MG
25 CAPSULE ORAL AT BEDTIME
Refills: 0 | Status: DISCONTINUED | OUTPATIENT
Start: 2021-09-05 | End: 2021-09-06

## 2021-09-05 RX ORDER — LOSARTAN POTASSIUM 100 MG/1
1 TABLET, FILM COATED ORAL
Qty: 0 | Refills: 0 | DISCHARGE

## 2021-09-05 RX ORDER — INSULIN LISPRO 100/ML
VIAL (ML) SUBCUTANEOUS EVERY 6 HOURS
Refills: 0 | Status: DISCONTINUED | OUTPATIENT
Start: 2021-09-05 | End: 2021-09-06

## 2021-09-05 RX ORDER — CLOPIDOGREL BISULFATE 75 MG/1
1 TABLET, FILM COATED ORAL
Qty: 0 | Refills: 0 | DISCHARGE

## 2021-09-05 RX ORDER — LEVOTHYROXINE SODIUM 125 MCG
1 TABLET ORAL
Qty: 0 | Refills: 0 | DISCHARGE

## 2021-09-05 RX ORDER — CARVEDILOL PHOSPHATE 80 MG/1
0 CAPSULE, EXTENDED RELEASE ORAL
Qty: 0 | Refills: 0 | DISCHARGE

## 2021-09-05 RX ORDER — MORPHINE SULFATE 50 MG/1
4 CAPSULE, EXTENDED RELEASE ORAL EVERY 4 HOURS
Refills: 0 | Status: DISCONTINUED | OUTPATIENT
Start: 2021-09-05 | End: 2021-09-05

## 2021-09-05 RX ORDER — SENNA PLUS 8.6 MG/1
2 TABLET ORAL AT BEDTIME
Refills: 0 | Status: DISCONTINUED | OUTPATIENT
Start: 2021-09-05 | End: 2021-09-06

## 2021-09-05 RX ORDER — OXYCODONE HYDROCHLORIDE 5 MG/1
10 TABLET ORAL EVERY 4 HOURS
Refills: 0 | Status: DISCONTINUED | OUTPATIENT
Start: 2021-09-05 | End: 2021-09-05

## 2021-09-05 RX ORDER — SPIRONOLACTONE 25 MG/1
0 TABLET, FILM COATED ORAL
Qty: 0 | Refills: 0 | DISCHARGE

## 2021-09-05 RX ORDER — LATANOPROST 0.05 MG/ML
1 SOLUTION/ DROPS OPHTHALMIC; TOPICAL
Qty: 0 | Refills: 0 | DISCHARGE

## 2021-09-05 RX ORDER — CALCITRIOL 0.5 UG/1
0.25 CAPSULE ORAL DAILY
Refills: 0 | Status: DISCONTINUED | OUTPATIENT
Start: 2021-09-05 | End: 2021-09-06

## 2021-09-05 RX ORDER — HEPARIN SODIUM 5000 [USP'U]/ML
5000 INJECTION INTRAVENOUS; SUBCUTANEOUS EVERY 8 HOURS
Refills: 0 | Status: DISCONTINUED | OUTPATIENT
Start: 2021-09-05 | End: 2021-09-06

## 2021-09-05 RX ORDER — SODIUM CHLORIDE 9 MG/ML
1000 INJECTION INTRAMUSCULAR; INTRAVENOUS; SUBCUTANEOUS
Refills: 0 | Status: DISCONTINUED | OUTPATIENT
Start: 2021-09-05 | End: 2021-09-05

## 2021-09-05 RX ORDER — GLUCAGON INJECTION, SOLUTION 0.5 MG/.1ML
1 INJECTION, SOLUTION SUBCUTANEOUS ONCE
Refills: 0 | Status: DISCONTINUED | OUTPATIENT
Start: 2021-09-05 | End: 2021-09-06

## 2021-09-05 RX ORDER — PANTOPRAZOLE SODIUM 20 MG/1
40 TABLET, DELAYED RELEASE ORAL
Refills: 0 | Status: DISCONTINUED | OUTPATIENT
Start: 2021-09-05 | End: 2021-09-06

## 2021-09-05 RX ORDER — ACETAMINOPHEN 500 MG
650 TABLET ORAL EVERY 6 HOURS
Refills: 0 | Status: DISCONTINUED | OUTPATIENT
Start: 2021-09-05 | End: 2021-09-05

## 2021-09-05 RX ORDER — CHLORHEXIDINE GLUCONATE 213 G/1000ML
1 SOLUTION TOPICAL
Refills: 0 | Status: DISCONTINUED | OUTPATIENT
Start: 2021-09-05 | End: 2021-09-06

## 2021-09-05 RX ORDER — ATORVASTATIN CALCIUM 80 MG/1
80 TABLET, FILM COATED ORAL AT BEDTIME
Refills: 0 | Status: DISCONTINUED | OUTPATIENT
Start: 2021-09-05 | End: 2021-09-06

## 2021-09-05 RX ORDER — ACETAMINOPHEN 500 MG
650 TABLET ORAL EVERY 6 HOURS
Refills: 0 | Status: DISCONTINUED | OUTPATIENT
Start: 2021-09-05 | End: 2021-09-06

## 2021-09-05 RX ORDER — DEXTROSE MONOHYDRATE, SODIUM CHLORIDE, AND POTASSIUM CHLORIDE 50; .745; 4.5 G/1000ML; G/1000ML; G/1000ML
1000 INJECTION, SOLUTION INTRAVENOUS
Refills: 0 | Status: DISCONTINUED | OUTPATIENT
Start: 2021-09-06 | End: 2021-09-06

## 2021-09-05 RX ORDER — LOSARTAN POTASSIUM 100 MG/1
25 TABLET, FILM COATED ORAL DAILY
Refills: 0 | Status: DISCONTINUED | OUTPATIENT
Start: 2021-09-05 | End: 2021-09-06

## 2021-09-05 RX ORDER — RALOXIFENE HYDROCHLORIDE 60 MG/1
60 TABLET, COATED ORAL DAILY
Refills: 0 | Status: DISCONTINUED | OUTPATIENT
Start: 2021-09-05 | End: 2021-09-06

## 2021-09-05 RX ORDER — INSULIN GLARGINE 100 [IU]/ML
10 INJECTION, SOLUTION SUBCUTANEOUS
Qty: 0 | Refills: 0 | DISCHARGE

## 2021-09-05 RX ORDER — CEFTRIAXONE 500 MG/1
INJECTION, POWDER, FOR SOLUTION INTRAMUSCULAR; INTRAVENOUS
Refills: 0 | Status: DISCONTINUED | OUTPATIENT
Start: 2021-09-05 | End: 2021-09-05

## 2021-09-05 RX ORDER — FOLIC ACID 0.8 MG
0 TABLET ORAL
Qty: 0 | Refills: 0 | DISCHARGE

## 2021-09-05 RX ADMIN — Medication 25 MILLIGRAM(S): at 21:42

## 2021-09-05 RX ADMIN — CARVEDILOL PHOSPHATE 25 MILLIGRAM(S): 80 CAPSULE, EXTENDED RELEASE ORAL at 17:07

## 2021-09-05 RX ADMIN — HEPARIN SODIUM 5000 UNIT(S): 5000 INJECTION INTRAVENOUS; SUBCUTANEOUS at 13:16

## 2021-09-05 RX ADMIN — LOSARTAN POTASSIUM 25 MILLIGRAM(S): 100 TABLET, FILM COATED ORAL at 09:47

## 2021-09-05 RX ADMIN — Medication 325 MILLIGRAM(S): at 11:29

## 2021-09-05 RX ADMIN — Medication 1 TABLET(S): at 11:29

## 2021-09-05 RX ADMIN — Medication 25 MICROGRAM(S): at 06:04

## 2021-09-05 RX ADMIN — CARVEDILOL PHOSPHATE 25 MILLIGRAM(S): 80 CAPSULE, EXTENDED RELEASE ORAL at 06:04

## 2021-09-05 RX ADMIN — LATANOPROST 1 DROP(S): 0.05 SOLUTION/ DROPS OPHTHALMIC; TOPICAL at 21:39

## 2021-09-05 RX ADMIN — Medication 650 MILLIGRAM(S): at 17:06

## 2021-09-05 RX ADMIN — ATORVASTATIN CALCIUM 80 MILLIGRAM(S): 80 TABLET, FILM COATED ORAL at 21:39

## 2021-09-05 RX ADMIN — CHLORHEXIDINE GLUCONATE 1 APPLICATION(S): 213 SOLUTION TOPICAL at 06:05

## 2021-09-05 RX ADMIN — PANTOPRAZOLE SODIUM 40 MILLIGRAM(S): 20 TABLET, DELAYED RELEASE ORAL at 06:05

## 2021-09-05 RX ADMIN — SENNA PLUS 2 TABLET(S): 8.6 TABLET ORAL at 21:39

## 2021-09-05 RX ADMIN — Medication 650 MILLIGRAM(S): at 12:30

## 2021-09-05 RX ADMIN — RALOXIFENE HYDROCHLORIDE 60 MILLIGRAM(S): 60 TABLET, COATED ORAL at 09:47

## 2021-09-05 RX ADMIN — HEPARIN SODIUM 5000 UNIT(S): 5000 INJECTION INTRAVENOUS; SUBCUTANEOUS at 21:40

## 2021-09-05 RX ADMIN — Medication 650 MILLIGRAM(S): at 11:29

## 2021-09-05 RX ADMIN — SODIUM CHLORIDE 100 MILLILITER(S): 9 INJECTION INTRAMUSCULAR; INTRAVENOUS; SUBCUTANEOUS at 01:25

## 2021-09-05 RX ADMIN — Medication 500 MILLIGRAM(S): at 06:04

## 2021-09-05 RX ADMIN — Medication 40 MILLIGRAM(S): at 09:47

## 2021-09-05 RX ADMIN — CALCITRIOL 0.25 MICROGRAM(S): 0.5 CAPSULE ORAL at 09:47

## 2021-09-05 RX ADMIN — Medication 4: at 17:07

## 2021-09-05 RX ADMIN — Medication 500 MILLIGRAM(S): at 17:07

## 2021-09-05 RX ADMIN — Medication 1 MILLIGRAM(S): at 11:29

## 2021-09-05 NOTE — ED PROVIDER NOTE - ATTENDING CONTRIBUTION TO CARE
I personally evaluated the patient. I reviewed the Resident’s or Physician Assistant’s note (as assigned above), and agree with the findings and plan except as documented in my note.    85F w/PMHx CAD, CHF, Afib on Eliquis, DM, HTN, colon cancer, CKD stage 3, hx of diverticulitis, presents s/p mechanical fall at home, +HT, -LOC, +AC (Eliquis) I personally evaluated the patient. I reviewed the Resident’s or Physician Assistant’s note (as assigned above), and agree with the findings and plan except as documented in my note.    85F w/PMHx CAD, CHF, Afib on Eliquis, DM, HTN, colon cancer, CKD stage 3, hx of diverticulitis, presents s/p mechanical fall at home, +HT, -LOC, +AC (Eliquis). Pt was a trauma alert.  Pan scanned. Has left intertrochanetric fx. Orthopedic consulted. Pt admitted for further management and care.

## 2021-09-05 NOTE — ED PROVIDER NOTE - CLINICAL SUMMARY MEDICAL DECISION MAKING FREE TEXT BOX
Left hip fracture. Admitted to the hospital for further management and care. Trauma and ortho consulted.

## 2021-09-05 NOTE — CONSULT NOTE ADULT - SUBJECTIVE AND OBJECTIVE BOX
HPI:  85F w/PMHx CAD, CHF, Afib on Eliquis, DM, HTN, colon cancer, CKD stage 3, hx of diverticulitis, presents s/p mechanical fall at home, +HT, -LOC, +AC (Eliquis). Patient was in her kitchen and "turned around too fast" when she fell over and hit her head on a chair. She denies loss of consciousness. She remained down for ~30 min before EMS came to take her into the ED. She is GCS 15, primary survey negative. No external signs of trauma. Complains of LLE pain. Denies scalp, spinal, chest, abdominal pain. ROM of all extremities in tact. C-collar in place.    (05 Sep 2021 00:18)      PAST MEDICAL & SURGICAL HISTORY  CAD (coronary artery disease)    Chronic CHF    Atrial fibrillation    Type 2 diabetes mellitus    Colon cancer    Hypertension    CKD (chronic kidney disease)  Stage 3    History of Clostridium difficile colitis  s/p fecal transplant    Diverticulitis    S/P hysterectomy        FAMILY HISTORY:  FAMILY HISTORY:  FHx: breast cancer  Mother        SOCIAL HISTORY:  []smoker  []Alcohol  []Drug    ALLERGIES:  No Known Allergies      MEDICATIONS:  MEDICATIONS  (STANDING):  acetaminophen   Tablet .. 650 milliGRAM(s) Oral every 6 hours  ascorbic acid 500 milliGRAM(s) Oral two times a day  atorvastatin 80 milliGRAM(s) Oral at bedtime  calcitriol   Capsule 0.25 MICROGram(s) Oral daily  carvedilol 25 milliGRAM(s) Oral every 12 hours  chlorhexidine 4% Liquid 1 Application(s) Topical <User Schedule>  dextrose 40% Gel 15 Gram(s) Oral once  dextrose 5%. 1000 milliLiter(s) (50 mL/Hr) IV Continuous <Continuous>  dextrose 50% Injectable 25 Gram(s) IV Push once  famotidine Injectable 20 milliGRAM(s) IV Push once  ferrous    sulfate 325 milliGRAM(s) Oral daily  folic acid 1 milliGRAM(s) Oral daily  furosemide    Tablet 40 milliGRAM(s) Oral daily  glucagon  Injectable 1 milliGRAM(s) IntraMuscular once  heparin   Injectable 5000 Unit(s) SubCutaneous every 8 hours  insulin lispro (ADMELOG) corrective regimen sliding scale   SubCutaneous every 6 hours  latanoprost 0.005% Ophthalmic Solution 1 Drop(s) Both EYES at bedtime  levothyroxine 25 MICROGram(s) Oral daily  losartan 25 milliGRAM(s) Oral daily  multivitamin 1 Tablet(s) Oral daily  pantoprazole    Tablet 40 milliGRAM(s) Oral before breakfast  raloxifene 60 milliGRAM(s) Oral daily  senna 2 Tablet(s) Oral at bedtime  sodium chloride 0.9%. 1000 milliLiter(s) (100 mL/Hr) IV Continuous <Continuous>    MEDICATIONS  (PRN):  diphenhydrAMINE 25 milliGRAM(s) Oral at bedtime PRN Insomnia  oxyCODONE    IR 5 milliGRAM(s) Oral every 4 hours PRN Mild Pain (1 - 3)      HOME MEDICATIONS:  Home Medications:  atorvastatin 80 mg oral tablet:  (05 Sep 2021 00:23)  calcitriol 0.25 mcg oral capsule: 1 cap(s) orally once a day (05 Sep 2021 00:23)  carvedilol 25 mg oral tablet:  (05 Sep 2021 00:23)  clopidogrel 75 mg oral tablet: 1 tab(s) orally once a day (05 Sep 2021 00:23)  Eliquis 2.5 mg oral tablet: 1 tab(s) orally 2 times a day (05 Sep 2021 00:23)  Evista 60 mg oral tablet: 1 tab(s) orally once a day (05 Sep 2021 00:23)  folic acid 1 mg oral tablet:  (05 Sep 2021 00:23)  furosemide 40 mg oral tablet: 1 tab(s) orally once a day (05 Sep 2021 00:23)  Lantus: 10 unit(s) subcutaneous once a day (in the morning) (05 Sep 2021 00:23)  latanoprost 0.005% ophthalmic solution: 1 drop(s) to each affected eye once a day (in the evening) (05 Sep 2021 00:23)  levothyroxine 25 mcg (0.025 mg) oral tablet: 1 tab(s) orally once a day (05 Sep 2021 00:23)  losartan 25 mg oral tablet: 1 tab(s) orally once a day (05 Sep 2021 00:23)      VITALS:   T(F): 97.6 (09-05 @ 09:16), Max: 98.4 (09-04 @ 19:22)  HR: 64 (09-05 @ 09:16) (64 - 80)  BP: 128/63 (09-05 @ 09:16) (107/87 - 167/81)  BP(mean): 89 (09-04 @ 20:10) (89 - 113)  RR: 18 (09-05 @ 09:16) (17 - 19)  SpO2: 98% (09-05 @ 09:16) (98% - 100%)    I&O's Summary    04 Sep 2021 07:01  -  05 Sep 2021 07:00  --------------------------------------------------------  IN: 400 mL / OUT: 0 mL / NET: 400 mL        REVIEW OF SYSTEMS:  CONSTITUTIONAL: No weakness, fevers or chills  EYES: No visual changes  ENT: No vertigo or throat pain   NECK: No pain or stiffness  RESPIRATORY: No cough, wheezing, hemoptysis; No shortness of breath  CARDIOVASCULAR: No chest pain or palpitations  GASTROINTESTINAL: No abdominal or epigastric pain. No nausea, vomiting, or hematemesis; No diarrhea or constipation. No melena or hematochezia.  GENITOURINARY: No dysuria, frequency or hematuria  NEUROLOGICAL: No numbness or weakness  SKIN: No itching, no rashes  MSK: No pain    PHYSICAL EXAM:  NEURO: patient is awake , alert and oriented  GEN: Not in acute distress  NECK: no thyroid enlargement, no JVD  LUNGS: Clear to auscultation bilaterally   CARDIOVASCULAR: S1/S2 present, RRR , no murmurs or rubs, no carotid bruits,  + PP bilaterally  ABD: Soft, non-tender, non-distended, +BS  EXT: No RACHEL  SKIN: Intact    LABS:                        7.6    11.68 )-----------( 137      ( 05 Sep 2021 06:12 )             23.4     09-05    139  |  108  |  50<H>  ----------------------------<  170<H>  3.8   |  17  |  1.9<H>    Ca    9.5      05 Sep 2021 06:12  Phos  3.2     09-05  Mg     1.8     09-05    TPro  6.6  /  Alb  4.2  /  TBili  0.2  /  DBili  x   /  AST  13  /  ALT  11  /  AlkPhos  114  09-04    PT/INR - ( 04 Sep 2021 19:50 )   PT: 17.80 sec;   INR: 1.55 ratio         PTT - ( 04 Sep 2021 19:50 )  PTT:35.0 sec  Creatine Kinase, Serum: 41 U/L (09-05-21 @ 06:12)  Troponin T, Serum: 0.02 ng/mL *H* (09-05-21 @ 06:12)  Lactate, Blood: 0.8 mmol/L (09-04-21 @ 19:50)  Creatine Kinase, Serum: 56 U/L (09-04-21 @ 19:50)  Troponin T, Serum: 0.02 ng/mL *H* (09-04-21 @ 19:50)  Creatine Kinase, Serum: 56 U/L (09-04-21 @ 19:50)    CARDIAC MARKERS ( 05 Sep 2021 06:12 )  x     / 0.02 ng/mL / 41 U/L / x     / x      CARDIAC MARKERS ( 04 Sep 2021 19:50 )  x     / 0.02 ng/mL / 56 U/L / x     / x            Troponin trend:            RADIOLOGY:  -CXR:  -TTE:  -CCTA:  -STRESS TEST:  -CATHETERIZATION:    ECG:    TELEMETRY EVENTS:   HPI:  85F w/PMHx CAD, CHF, Afib on Eliquis, DM, HTN, colon cancer, CKD stage 3, hx of diverticulitis, presents s/p mechanical fall at home, +HT, -LOC, +AC (Eliquis). Patient was in her kitchen and "turned around too fast" when she fell over and hit her head on a chair. She denies loss of consciousness. She remained down for ~30 min before EMS came to take her into the ED. She is GCS 15, primary survey negative. No external signs of trauma. Complains of LLE pain. Denies scalp, spinal, chest, abdominal pain. ROM of all extremities intact. C-collar in place. Imaging showed Left femoral intertrochanteric fracture. Cardiology consulted for preoperative risk assessment.      PAST MEDICAL & SURGICAL HISTORY  CAD (coronary artery disease)    Chronic CHF    Atrial fibrillation    Type 2 diabetes mellitus    Colon cancer    Hypertension    CKD (chronic kidney disease)  Stage 3    History of Clostridium difficile colitis  s/p fecal transplant    Diverticulitis    S/P hysterectomy        FAMILY HISTORY:  FAMILY HISTORY:  FHx: breast cancer  Mother        SOCIAL HISTORY:  []smoker  []Alcohol  []Drug    ALLERGIES:  No Known Allergies      MEDICATIONS:  MEDICATIONS  (STANDING):  acetaminophen   Tablet .. 650 milliGRAM(s) Oral every 6 hours  ascorbic acid 500 milliGRAM(s) Oral two times a day  atorvastatin 80 milliGRAM(s) Oral at bedtime  calcitriol   Capsule 0.25 MICROGram(s) Oral daily  carvedilol 25 milliGRAM(s) Oral every 12 hours  chlorhexidine 4% Liquid 1 Application(s) Topical <User Schedule>  dextrose 40% Gel 15 Gram(s) Oral once  dextrose 5%. 1000 milliLiter(s) (50 mL/Hr) IV Continuous <Continuous>  dextrose 50% Injectable 25 Gram(s) IV Push once  famotidine Injectable 20 milliGRAM(s) IV Push once  ferrous    sulfate 325 milliGRAM(s) Oral daily  folic acid 1 milliGRAM(s) Oral daily  furosemide    Tablet 40 milliGRAM(s) Oral daily  glucagon  Injectable 1 milliGRAM(s) IntraMuscular once  heparin   Injectable 5000 Unit(s) SubCutaneous every 8 hours  insulin lispro (ADMELOG) corrective regimen sliding scale   SubCutaneous every 6 hours  latanoprost 0.005% Ophthalmic Solution 1 Drop(s) Both EYES at bedtime  levothyroxine 25 MICROGram(s) Oral daily  losartan 25 milliGRAM(s) Oral daily  multivitamin 1 Tablet(s) Oral daily  pantoprazole    Tablet 40 milliGRAM(s) Oral before breakfast  raloxifene 60 milliGRAM(s) Oral daily  senna 2 Tablet(s) Oral at bedtime  sodium chloride 0.9%. 1000 milliLiter(s) (100 mL/Hr) IV Continuous <Continuous>    MEDICATIONS  (PRN):  diphenhydrAMINE 25 milliGRAM(s) Oral at bedtime PRN Insomnia  oxyCODONE    IR 5 milliGRAM(s) Oral every 4 hours PRN Mild Pain (1 - 3)      HOME MEDICATIONS:  Home Medications:  atorvastatin 80 mg oral tablet:  (05 Sep 2021 00:23)  calcitriol 0.25 mcg oral capsule: 1 cap(s) orally once a day (05 Sep 2021 00:23)  carvedilol 25 mg oral tablet:  (05 Sep 2021 00:23)  clopidogrel 75 mg oral tablet: 1 tab(s) orally once a day (05 Sep 2021 00:23)  Eliquis 2.5 mg oral tablet: 1 tab(s) orally 2 times a day (05 Sep 2021 00:23)  Evista 60 mg oral tablet: 1 tab(s) orally once a day (05 Sep 2021 00:23)  folic acid 1 mg oral tablet:  (05 Sep 2021 00:23)  furosemide 40 mg oral tablet: 1 tab(s) orally once a day (05 Sep 2021 00:23)  Lantus: 10 unit(s) subcutaneous once a day (in the morning) (05 Sep 2021 00:23)  latanoprost 0.005% ophthalmic solution: 1 drop(s) to each affected eye once a day (in the evening) (05 Sep 2021 00:23)  levothyroxine 25 mcg (0.025 mg) oral tablet: 1 tab(s) orally once a day (05 Sep 2021 00:23)  losartan 25 mg oral tablet: 1 tab(s) orally once a day (05 Sep 2021 00:23)      VITALS:   T(F): 97.6 (09-05 @ 09:16), Max: 98.4 (09-04 @ 19:22)  HR: 64 (09-05 @ 09:16) (64 - 80)  BP: 128/63 (09-05 @ 09:16) (107/87 - 167/81)  BP(mean): 89 (09-04 @ 20:10) (89 - 113)  RR: 18 (09-05 @ 09:16) (17 - 19)  SpO2: 98% (09-05 @ 09:16) (98% - 100%)    I&O's Summary    04 Sep 2021 07:01  -  05 Sep 2021 07:00  --------------------------------------------------------  IN: 400 mL / OUT: 0 mL / NET: 400 mL        REVIEW OF SYSTEMS:  CONSTITUTIONAL: No weakness, fevers or chills  EYES: No visual changes  ENT: No vertigo or throat pain   NECK: No pain or stiffness  RESPIRATORY: No cough, wheezing, hemoptysis; No shortness of breath  CARDIOVASCULAR: No chest pain or palpitations  GASTROINTESTINAL: No abdominal or epigastric pain. No nausea, vomiting, or hematemesis; No diarrhea or constipation. No melena or hematochezia.  GENITOURINARY: No dysuria, frequency or hematuria  NEUROLOGICAL: No numbness or weakness  SKIN: No itching, no rashes  MSK: left hip pain with limited ROM    PHYSICAL EXAM:  NEURO: patient is awake , alert and oriented  GEN: Not in acute distress  NECK: no thyroid enlargement, no JVD  LUNGS: Clear to auscultation bilaterally   CARDIOVASCULAR: S1/S2 present, RRR , no murmurs or rubs, no carotid bruits,  + PP bilaterally  ABD: Soft, non-tender, non-distended, +BS  EXT: LLE limited ROM  SKIN: Intact    LABS:                        7.6    11.68 )-----------( 137      ( 05 Sep 2021 06:12 )             23.4     09-05    139  |  108  |  50<H>  ----------------------------<  170<H>  3.8   |  17  |  1.9<H>    Ca    9.5      05 Sep 2021 06:12  Phos  3.2     09-05  Mg     1.8     09-05    TPro  6.6  /  Alb  4.2  /  TBili  0.2  /  DBili  x   /  AST  13  /  ALT  11  /  AlkPhos  114  09-04    PT/INR - ( 04 Sep 2021 19:50 )   PT: 17.80 sec;   INR: 1.55 ratio         PTT - ( 04 Sep 2021 19:50 )  PTT:35.0 sec  Creatine Kinase, Serum: 41 U/L (09-05-21 @ 06:12)  Troponin T, Serum: 0.02 ng/mL *H* (09-05-21 @ 06:12)  Lactate, Blood: 0.8 mmol/L (09-04-21 @ 19:50)  Creatine Kinase, Serum: 56 U/L (09-04-21 @ 19:50)  Troponin T, Serum: 0.02 ng/mL *H* (09-04-21 @ 19:50)  Creatine Kinase, Serum: 56 U/L (09-04-21 @ 19:50)    CARDIAC MARKERS ( 05 Sep 2021 06:12 )  x     / 0.02 ng/mL / 41 U/L / x     / x      CARDIAC MARKERS ( 04 Sep 2021 19:50 )  x     / 0.02 ng/mL / 56 U/L / x     / x            Troponin trend:            RADIOLOGY:  -CXR:  < from: CT Chest No Cont (09.04.21 @ 21:45) >    IMPRESSION:      Left femoral intertrochanteric fracture    Otherwise, no evidence of acute abdominal or pelvic pathology    Above fluid density lesion in the interpolar region of the right kidney measuring up to 2.3 cm. Nonemergent ultrasound may be of benefit for further evaluation.    < end of copied text >      -TTE:  < from: Transthoracic Echocardiogram (10.06.19 @ 09:28) >   1. Mid anteroseptal segment is abnormal as described above.   2. LV Ejection Fraction by Oro's Method with a biplane EF of 56 %.   3. Spectral Doppler shows impaired relaxation pattern of left   ventricular myocardial filling (Grade I diastolic dysfunction).   4. Mild mitral valve regurgitation.   5. Thickening and calcification of the anterior and posterior mitral   valve leaflets.   6. Mild-moderate tricuspid regurgitation.   7. Estimated pulmonary artery systolic pressure is 39.9 mmHg assuming a   right atrial pressure of 3 mmHg, which is consistent with borderline   pulmonary hypertension.    < end of copied text >      ECG:    < from: 12 Lead ECG (09.04.21 @ 22:48) >  Diagnosis Line Sinus rhythm with 1st degree A-V block  Cannot rule out Anterior infarct , age undetermined  Abnormal ECG    < end of copied text >     HPI:  85F w/PMHx CAD, CHF, Afib on Eliquis, DM, HTN, colon cancer, CKD stage 3, hx of diverticulitis, presents s/p mechanical fall at home, +HT, -LOC, +AC (Eliquis). Patient was in her kitchen and "turned around too fast" when she fell over and hit her head on a chair. She denies loss of consciousness. She remained down for ~30 min before EMS came to take her into the ED. She is GCS 15, primary survey negative. No external signs of trauma. Complains of LLE pain. Denies scalp, spinal, chest, abdominal pain. ROM of all extremities intact. C-collar in place. Imaging showed Left femoral intertrochanteric fracture. Cardiology consulted for preoperative risk assessment.      PAST MEDICAL & SURGICAL HISTORY  CAD (coronary artery disease)    Chronic CHF    Atrial fibrillation    Type 2 diabetes mellitus    Colon cancer    Hypertension    CKD (chronic kidney disease)  Stage 3    History of Clostridium difficile colitis  s/p fecal transplant    Diverticulitis    S/P hysterectomy      No pertinent family history of premature CAD in first degree relatives    ALLERGIES:  No Known Allergies      MEDICATIONS:  MEDICATIONS  (STANDING):  acetaminophen   Tablet .. 650 milliGRAM(s) Oral every 6 hours  ascorbic acid 500 milliGRAM(s) Oral two times a day  atorvastatin 80 milliGRAM(s) Oral at bedtime  calcitriol   Capsule 0.25 MICROGram(s) Oral daily  carvedilol 25 milliGRAM(s) Oral every 12 hours  chlorhexidine 4% Liquid 1 Application(s) Topical <User Schedule>  dextrose 40% Gel 15 Gram(s) Oral once  dextrose 5%. 1000 milliLiter(s) (50 mL/Hr) IV Continuous <Continuous>  dextrose 50% Injectable 25 Gram(s) IV Push once  famotidine Injectable 20 milliGRAM(s) IV Push once  ferrous    sulfate 325 milliGRAM(s) Oral daily  folic acid 1 milliGRAM(s) Oral daily  furosemide    Tablet 40 milliGRAM(s) Oral daily  glucagon  Injectable 1 milliGRAM(s) IntraMuscular once  heparin   Injectable 5000 Unit(s) SubCutaneous every 8 hours  insulin lispro (ADMELOG) corrective regimen sliding scale   SubCutaneous every 6 hours  latanoprost 0.005% Ophthalmic Solution 1 Drop(s) Both EYES at bedtime  levothyroxine 25 MICROGram(s) Oral daily  losartan 25 milliGRAM(s) Oral daily  multivitamin 1 Tablet(s) Oral daily  pantoprazole    Tablet 40 milliGRAM(s) Oral before breakfast  raloxifene 60 milliGRAM(s) Oral daily  senna 2 Tablet(s) Oral at bedtime  sodium chloride 0.9%. 1000 milliLiter(s) (100 mL/Hr) IV Continuous <Continuous>    MEDICATIONS  (PRN):  diphenhydrAMINE 25 milliGRAM(s) Oral at bedtime PRN Insomnia  oxyCODONE    IR 5 milliGRAM(s) Oral every 4 hours PRN Mild Pain (1 - 3)      HOME MEDICATIONS:  Home Medications:  atorvastatin 80 mg oral tablet:  (05 Sep 2021 00:23)  calcitriol 0.25 mcg oral capsule: 1 cap(s) orally once a day (05 Sep 2021 00:23)  carvedilol 25 mg oral tablet:  (05 Sep 2021 00:23)  clopidogrel 75 mg oral tablet: 1 tab(s) orally once a day (05 Sep 2021 00:23)  Eliquis 2.5 mg oral tablet: 1 tab(s) orally 2 times a day (05 Sep 2021 00:23)  Evista 60 mg oral tablet: 1 tab(s) orally once a day (05 Sep 2021 00:23)  folic acid 1 mg oral tablet:  (05 Sep 2021 00:23)  furosemide 40 mg oral tablet: 1 tab(s) orally once a day (05 Sep 2021 00:23)  Lantus: 10 unit(s) subcutaneous once a day (in the morning) (05 Sep 2021 00:23)  latanoprost 0.005% ophthalmic solution: 1 drop(s) to each affected eye once a day (in the evening) (05 Sep 2021 00:23)  levothyroxine 25 mcg (0.025 mg) oral tablet: 1 tab(s) orally once a day (05 Sep 2021 00:23)  losartan 25 mg oral tablet: 1 tab(s) orally once a day (05 Sep 2021 00:23)      VITALS:   T(F): 97.6 (09-05 @ 09:16), Max: 98.4 (09-04 @ 19:22)  HR: 64 (09-05 @ 09:16) (64 - 80)  BP: 128/63 (09-05 @ 09:16) (107/87 - 167/81)  BP(mean): 89 (09-04 @ 20:10) (89 - 113)  RR: 18 (09-05 @ 09:16) (17 - 19)  SpO2: 98% (09-05 @ 09:16) (98% - 100%)    I&O's Summary    04 Sep 2021 07:01  -  05 Sep 2021 07:00  --------------------------------------------------------  IN: 400 mL / OUT: 0 mL / NET: 400 mL        REVIEW OF SYSTEMS:  CONSTITUTIONAL: No weakness, fevers or chills  EYES: No visual changes  ENT: No vertigo or throat pain   NECK: No pain or stiffness  RESPIRATORY: No cough, wheezing, hemoptysis; No shortness of breath  CARDIOVASCULAR: No chest pain or palpitations  GASTROINTESTINAL: No abdominal or epigastric pain. No nausea, vomiting, or hematemesis; No diarrhea or constipation. No melena or hematochezia.  GENITOURINARY: No dysuria, frequency or hematuria  NEUROLOGICAL: No numbness or weakness  SKIN: No itching, no rashes  MSK: left hip pain with limited ROM    PHYSICAL EXAM:  NEURO: patient is awake , alert and oriented  GEN: Not in acute distress  NECK: no thyroid enlargement, no JVD  LUNGS: Clear to auscultation bilaterally   CARDIOVASCULAR: S1/S2 present, RRR , no murmurs or rubs, no carotid bruits,  + PP bilaterally  ABD: Soft, non-tender, non-distended, +BS  EXT: LLE limited ROM  SKIN: Intact    LABS:                        7.6    11.68 )-----------( 137      ( 05 Sep 2021 06:12 )             23.4     09-05    139  |  108  |  50<H>  ----------------------------<  170<H>  3.8   |  17  |  1.9<H>    Ca    9.5      05 Sep 2021 06:12  Phos  3.2     09-05  Mg     1.8     09-05    TPro  6.6  /  Alb  4.2  /  TBili  0.2  /  DBili  x   /  AST  13  /  ALT  11  /  AlkPhos  114  09-04    PT/INR - ( 04 Sep 2021 19:50 )   PT: 17.80 sec;   INR: 1.55 ratio    PTT - ( 04 Sep 2021 19:50 )  PTT:35.0 sec      Creatine Kinase, Serum: 41 U/L (09-05-21 @ 06:12)  Troponin T, Serum: 0.02 ng/mL *H* (09-05-21 @ 06:12)  Lactate, Blood: 0.8 mmol/L (09-04-21 @ 19:50)  Creatine Kinase, Serum: 56 U/L (09-04-21 @ 19:50)  Troponin T, Serum: 0.02 ng/mL *H* (09-04-21 @ 19:50)  Creatine Kinase, Serum: 56 U/L (09-04-21 @ 19:50)        RADIOLOGY:    < from: CT Chest No Cont (09.04.21 @ 21:45) >    IMPRESSION:      Left femoral intertrochanteric fracture    Otherwise, no evidence of acute abdominal or pelvic pathology    Above fluid density lesion in the interpolar region of the right kidney measuring up to 2.3 cm. Nonemergent ultrasound may be of benefit for further evaluation.    < end of copied text >        < from: Transthoracic Echocardiogram (10.06.19 @ 09:28) >   1. Mid anteroseptal segment is abnormal as described above.   2. LV Ejection Fraction by Oro's Method with a biplane EF of 56 %.   3. Spectral Doppler shows impaired relaxation pattern of left   ventricular myocardial filling (Grade I diastolic dysfunction).   4. Mild mitral valve regurgitation.   5. Thickening and calcification of the anterior and posterior mitral   valve leaflets.   6. Mild-moderate tricuspid regurgitation.   7. Estimated pulmonary artery systolic pressure is 39.9 mmHg assuming a   right atrial pressure of 3 mmHg, which is consistent with borderline   pulmonary hypertension.        < from: 12 Lead ECG (09.04.21 @ 22:48) >  Diagnosis Line Sinus rhythm with 1st degree A-V block  Cannot rule out Anterior infarct , age undetermined  Abnormal ECG

## 2021-09-05 NOTE — H&P ADULT - NSHPLABSRESULTS_GEN_ALL_CORE
Labs:    POCT Blood Glucose.: 170 mg/dL (04 Sep 2021 19:42)                        9.7    9.42  )-----------( 179      ( 04 Sep 2021 19:50 )             29.6       Auto Neutrophil %: 71.5 % (09-04-21 @ 19:50)  Auto Immature Granulocyte %: 0.5 % (09-04-21 @ 19:50)    09-04    140  |  106  |  50<H>  ----------------------------<  164<H>  3.9   |  21  |  1.8<H>    Calcium, Total Serum: 9.8 mg/dL (09-04-21 @ 19:50)    LFTs:             6.6  | 0.2  | 13       ------------------[114     ( 04 Sep 2021 19:50 )  4.2  | x    | 11          Lipase:88       Lactate, Blood: 0.8 mmol/L (09-04-21 @ 19:50)    Coags:     17.80  ----< 1.55    ( 04 Sep 2021 19:50 )     35.0      CARDIAC MARKERS ( 04 Sep 2021 19:50 )  x     / 0.02 ng/mL / 56 U/L / x     / x        Radiology:   < from: CT Head No Cont (09.04.21 @ 21:34) >  Impression:  No evidence of intracranial hemorrhage, territorial infarct, or mass effect.  Left frontoparietal scalp soft tissue swelling.    < from: CT Cervical Spine No Cont (09.04.21 @ 21:41) >  IMPRESSION:  No evidence of a cervical spine fracture or subluxation.    < from: CT Chest No Cont (09.04.21 @ 21:45) >  IMPRESSION:  Left femoral intertrochanteric fracture  Otherwise, no evidence of acute abdominal or pelvic pathology  Above fluid density lesion in the interpolar region of the right kidney measuring up to 2.3 cm. Nonemergent ultrasound may be of benefit for further evaluation.    < from: Xray Hip 2-3 Views, Left (09.04.21 @ 22:19) >  Pelvis/hip radiograph:  There is a left intertrochanteric fracture with extension through the left greater trochanter. There are minimal degenerative changes of both hips. Osteopenia is noted.

## 2021-09-05 NOTE — H&P ADULT - ASSESSMENT
85F w/PMHx CAD, CHF, Afib on Eliquis, DM, HTN, colon cancer, CKD stage 3, hx of diverticulitis, presents s/p mechanical fall at home, +HT, -LOC, +AC (Eliquis), with the following traumatic injuries:     1. Left femoral intertrochanteric fracture    Plan:   -Admission to Trauma Surgery Service   -Per orthopedics: will require surgical intervention, plan for 9/5 as long as cleared by medicine and cardiology   -Medicine and cardiology clearance   -NPO, IVF  -Adequate pain control  -Continue home medications   -DVT ppx  -GI ppx   -Patient and plan discussed with Dr. Rouse

## 2021-09-05 NOTE — H&P ADULT - HISTORY OF PRESENT ILLNESS
85F w/PMHx CAD, CHF, Afib on Eliquis, DM, HTN, colon cancer, CKD stage 3, hx of diverticulitis, presents s/p mechanical fall at home, +HT, -LOC, +AC (Eliquis). Patient was in her kitchen and "turned around too fast" when she fell over and hit her head on a chair. She denies loss of consciousness. She remained down for ~30 min before EMS came to take her into the ED. She is GCS 15, primary survey negative. No external signs of trauma. Complains of LLE pain. Denies scalp, spinal, chest, abdominal pain. ROM of all extremities in tact. C-collar in place.

## 2021-09-05 NOTE — H&P ADULT - ATTENDING COMMENTS
s/p fall   intertroch fracture   on Eliquis   ortho eval   pre op evaluation   hold Eliquis   admit to trauma   pain control

## 2021-09-05 NOTE — CONSULT NOTE ADULT - SUBJECTIVE AND OBJECTIVE BOX
85F w/PMHx CAD, CHF, Afib on Eliquis, DM, HTN, colon cancer, CKD stage 3, hx of diverticulitis, presents s/p mechanical fall at home, +HT, -LOC, +AC (Eliquis). Patient was in her kitchen and "turned around too fast" when she fell over and hit her head on a chair. She denies loss of consciousness. She remained down for ~30 min before EMS came to take her into the ED. She is GCS 15, primary survey negative. No external signs of trauma. Complains of LLE pain. Denies scalp, spinal, chest, abdominal pain. ROM of all extremities intact. C-collar in place. Imaging showed Left femoral intertrochanteric fracture. Cardiology consulted for preoperative risk assessment.    Preop Dx: L femoral intertrochanteric fracture   Surgeon:   Procedure: L Hip fracture repair    Vital Signs Last 24 Hrs  T(C): 37.1 (05 Sep 2021 21:36), Max: 37.1 (05 Sep 2021 21:36)  T(F): 98.8 (05 Sep 2021 21:36), Max: 98.8 (05 Sep 2021 21:36)  HR: 78 (05 Sep 2021 21:36) (64 - 78)  BP: 126/88 (05 Sep 2021 21:36) (122/59 - 138/69)  BP(mean): --  RR: 18 (05 Sep 2021 21:36) (18 - 18)  SpO2: 98% (05 Sep 2021 21:36) (98% - 99%)                        6.7    9.99  )-----------( 122      ( 05 Sep 2021 21:36 )             20.5     09-05    133<L>  |  104  |  55<H>  ----------------------------<  128<H>  3.5   |  20  |  2.2<H>    Ca    8.9      05 Sep 2021 21:36  Phos  3.0     09-05  Mg     1.8     09-05    TPro  6.6  /  Alb  4.2  /  TBili  0.2  /  DBili  x   /  AST  13  /  ALT  11  /  AlkPhos  114  09-04    PT/INR - ( 05 Sep 2021 21:36 )   PT: 16.60 sec;   INR: 1.45 ratio         PTT - ( 05 Sep 2021 21:36 )  PTT:34.7 sec  Daily Height in cm: 160.02 (05 Sep 2021 03:30)    Daily     acetaminophen   Tablet .. 650 milliGRAM(s) Oral every 6 hours  ascorbic acid 500 milliGRAM(s) Oral two times a day  atorvastatin 80 milliGRAM(s) Oral at bedtime  calcitriol   Capsule 0.25 MICROGram(s) Oral daily  carvedilol 25 milliGRAM(s) Oral every 12 hours  chlorhexidine 4% Liquid 1 Application(s) Topical <User Schedule>  dextrose 40% Gel 15 Gram(s) Oral once  dextrose 5%. 1000 milliLiter(s) IV Continuous <Continuous>  dextrose 50% Injectable 25 Gram(s) IV Push once  diphenhydrAMINE 25 milliGRAM(s) Oral at bedtime PRN  famotidine Injectable 20 milliGRAM(s) IV Push once  ferrous    sulfate 325 milliGRAM(s) Oral daily  folic acid 1 milliGRAM(s) Oral daily  furosemide    Tablet 40 milliGRAM(s) Oral daily  glucagon  Injectable 1 milliGRAM(s) IntraMuscular once  heparin   Injectable 5000 Unit(s) SubCutaneous every 8 hours  insulin lispro (ADMELOG) corrective regimen sliding scale   SubCutaneous every 6 hours  latanoprost 0.005% Ophthalmic Solution 1 Drop(s) Both EYES at bedtime  levothyroxine 25 MICROGram(s) Oral daily  losartan 25 milliGRAM(s) Oral daily  magnesium sulfate  IVPB 2 Gram(s) IV Intermittent once  multivitamin 1 Tablet(s) Oral daily  oxyCODONE    IR 5 milliGRAM(s) Oral every 4 hours PRN  pantoprazole    Tablet 40 milliGRAM(s) Oral before breakfast  raloxifene 60 milliGRAM(s) Oral daily  senna 2 Tablet(s) Oral at bedtime    CAD (coronary artery disease)    Chronic CHF    Atrial fibrillation    Type 2 diabetes mellitus    Colon cancer    Hypertension    CKD (chronic kidney disease)    History of Clostridium difficile colitis    Diverticulitis    S/P hysterectomy      Marital Status:  (   )    (   ) Single    (   )    (  )   Occupation:   Lives with: (  ) alone  (  ) children   (  ) spouse   (  ) parents  (  ) other  Recent Travel:   Substance Use (street drugs): (  ) never used  (  ) other:  Tobacco Usage:  (   ) never smoked   (   ) former smoker   (   ) current smoker  (     ) pack year  Alcohol Usage:  Sexual History:       Physical Exam:  General: WN/WD NAD  Neurology: A&Ox3, nonfocal, follows commands  Eyes: PERRLA/ EOMI  ENT/Neck: Neck supple, trachea midline, No JVD  Respiratory: CTA B/L, No wheezing, rales, rhonchi  CV: Normal rate regular rhythm, S1S2, no murmurs, rubs or gallops  Abdominal: Soft, NT, ND +BS,   Extremities: No edema, + peripheral pulses  Skin: No Rashes, Hematoma, Ecchymosis  Incisions:   Tubes:    EKG: < from: 12 Lead ECG (09.04.21 @ 22:48) >  Ventricular Rate 68 BPM  < from: 12 Lead ECG (09.04.21 @ 22:48) >  Diagnosis Line Sinus rhythm with 1st degree A-V block  Cannot rule out Anterior infarct , age undetermined  Abnormal ECG    Confirmed by Lucrecia Perez MD ([id number]) on 9/5/2021 8:20:56 AM    < end of copied text >      CXR: < from: Xray Chest 1 View AP/PA (09.04.21 @ 22:18) >  Impression:  No radiographic evidence of acute cardiopulmonary disease.  < end of copied text >    Type and Screen:     Plan:  - OR 09/06/21 for ___L hip fracture repair___ with  _____  - NPO after midnight except meds  - IVF while NPO  -PRN pain Rx     H/o CAD   chronic A. fib   HTN   -stable and cleared by Cardiology   -c/w Blbocker / statin / ARB / raloxifene / furosemide     Anemia - ?? acute on chronic and on Eliquis   -transfuse PRBC to goal Hgb >8 pre-op and monitor for intra-op loss w/ PRN transfusions  DM type II   -F/S monitoring and Lispro sliding scale while NPO     CKD III   -avoid nephrotoxic Rx and monitoring of Scr    DVT prophylaxis  85F w/PMHx CAD, CHF, Afib on Eliquis, DM, HTN, colon cancer, CKD stage 3, hx of diverticulitis, presents s/p mechanical fall at home, +HT, -LOC, +AC (Eliquis). Patient was in her kitchen and "turned around too fast" when she fell over and hit her head on a chair. She denies loss of consciousness. She remained down for ~30 min before EMS came to take her into the ED. She is GCS 15, primary survey negative. No external signs of trauma. Complains of LLE pain. Denies scalp, spinal, chest, abdominal pain. ROM of all extremities intact. C-collar in place. Imaging showed Left femoral intertrochanteric fracture. Cardiology consulted for preoperative risk assessment.    Preop Dx: L femoral intertrochanteric fracture   Surgeon:   Procedure: L Hip fracture repair    Vital Signs Last 24 Hrs  T(C): 37.1 (05 Sep 2021 21:36), Max: 37.1 (05 Sep 2021 21:36)  T(F): 98.8 (05 Sep 2021 21:36), Max: 98.8 (05 Sep 2021 21:36)  HR: 78 (05 Sep 2021 21:36) (64 - 78)  BP: 126/88 (05 Sep 2021 21:36) (122/59 - 138/69)  BP(mean): --  RR: 18 (05 Sep 2021 21:36) (18 - 18)  SpO2: 98% (05 Sep 2021 21:36) (98% - 99%)                        6.7    9.99  )-----------( 122      ( 05 Sep 2021 21:36 )             20.5     09-05    133<L>  |  104  |  55<H>  ----------------------------<  128<H>  3.5   |  20  |  2.2<H>    Ca    8.9      05 Sep 2021 21:36  Phos  3.0     09-05  Mg     1.8     09-05    TPro  6.6  /  Alb  4.2  /  TBili  0.2  /  DBili  x   /  AST  13  /  ALT  11  /  AlkPhos  114  09-04    PT/INR - ( 05 Sep 2021 21:36 )   PT: 16.60 sec;   INR: 1.45 ratio         PTT - ( 05 Sep 2021 21:36 )  PTT:34.7 sec  Daily Height in cm: 160.02 (05 Sep 2021 03:30)    Daily     acetaminophen   Tablet .. 650 milliGRAM(s) Oral every 6 hours  ascorbic acid 500 milliGRAM(s) Oral two times a day  atorvastatin 80 milliGRAM(s) Oral at bedtime  calcitriol   Capsule 0.25 MICROGram(s) Oral daily  carvedilol 25 milliGRAM(s) Oral every 12 hours  chlorhexidine 4% Liquid 1 Application(s) Topical <User Schedule>  dextrose 40% Gel 15 Gram(s) Oral once  dextrose 5%. 1000 milliLiter(s) IV Continuous <Continuous>  dextrose 50% Injectable 25 Gram(s) IV Push once  diphenhydrAMINE 25 milliGRAM(s) Oral at bedtime PRN  famotidine Injectable 20 milliGRAM(s) IV Push once  ferrous    sulfate 325 milliGRAM(s) Oral daily  folic acid 1 milliGRAM(s) Oral daily  furosemide    Tablet 40 milliGRAM(s) Oral daily  glucagon  Injectable 1 milliGRAM(s) IntraMuscular once  heparin   Injectable 5000 Unit(s) SubCutaneous every 8 hours  insulin lispro (ADMELOG) corrective regimen sliding scale   SubCutaneous every 6 hours  latanoprost 0.005% Ophthalmic Solution 1 Drop(s) Both EYES at bedtime  levothyroxine 25 MICROGram(s) Oral daily  losartan 25 milliGRAM(s) Oral daily  magnesium sulfate  IVPB 2 Gram(s) IV Intermittent once  multivitamin 1 Tablet(s) Oral daily  oxyCODONE    IR 5 milliGRAM(s) Oral every 4 hours PRN  pantoprazole    Tablet 40 milliGRAM(s) Oral before breakfast  raloxifene 60 milliGRAM(s) Oral daily  senna 2 Tablet(s) Oral at bedtime    CAD (coronary artery disease)    Chronic CHF    Atrial fibrillation    Type 2 diabetes mellitus    Colon cancer    Hypertension    CKD (chronic kidney disease)    History of Clostridium difficile colitis    Diverticulitis    S/P hysterectomy      Marital Status:  (   )    (   ) Single    (   )    (  )   Occupation: Retires   Lives with: ( X ) alone  (  ) children   (  ) spouse   (  ) parents  (  ) other  Recent Travel:   Substance Use (street drugs): (  X ) never used  (  ) other:  Tobacco Usage:  ( X  ) never smoked   (   ) former smoker   (   ) current smoker  (     ) pack year  Alcohol Usage: No abuse   Sexual History: N/A       Physical Exam:  General: WN/WD NAD  Neurology: A&Ox3, nonfocal, follows commands  Eyes: PERRLA/ EOMI  ENT/Neck: Neck supple, trachea midline, No JVD  Respiratory: CTA B/L, No wheezing, rales, rhonchi  CV: Normal rate regular rhythm, S1S2, no murmurs, rubs or gallops  Abdominal: Soft, NT, ND +BS,   Extremities: No edema, + peripheral pulses  Skin: No Rashes, Hematoma, Ecchymosis  Incisions:   Tubes:    EKG: < from: 12 Lead ECG (09.04.21 @ 22:48) >  Ventricular Rate 68 BPM  < from: 12 Lead ECG (09.04.21 @ 22:48) >  Diagnosis Line Sinus rhythm with 1st degree A-V block  Cannot rule out Anterior infarct , age undetermined  Abnormal ECG    Confirmed by Chris YOON Lucrecia ([id number]) on 9/5/2021 8:20:56 AM    < end of copied text >      CXR: < from: Xray Chest 1 View AP/PA (09.04.21 @ 22:18) >  Impression:  No radiographic evidence of acute cardiopulmonary disease.  < end of copied text >    Type and Screen:     Plan:  L hip fracture in patient  w/ osteoporosis   - OR 09/06/21 for ___L hip fracture repair___ with  _____  - NPO after midnight except meds  - IVF while NPO  -PRN pain Rx   -c/w Raloxifene as an outpatient     H/o CAD   chronic A. fib   HTN   -stable and cleared by Cardiology   -c/w Blbocker / statin / ARB / raloxifene / furosemide  -hold A/C and Plavix for surgery     Anemia - ?? acute on chronic ( most likely 2/2 CKD) and on Eliquis w/o evidence of acute blood loss ( last colonoscopy w/in last yr - no mass/polyp)  -transfuse PRBC to goal Hgb >8 pre-op and monitor for intra-op loss w/ PRN transfusions  -follow up with H[facility]ology ( [facility]. [facility] and Dr. Aguayo @ [facility] )   -c/w FeSO4 and Vit C     DM type II -stable blood glucose   -F/S monitoring and Lispro sliding scale while NPO     CKD IV  - stable as per patient history   -avoid nephrotoxic Rx and monitoring of Scr  -patient may need Epo and will follow up w/ Dr. Aguayo @ [facility])    Patient has good functional capacity but is at high risk for per-op complications ( multiple predictors by RCRI criteria) while undergoing an intermediate risk procedure. May proceed once hemodynamically stable/ Hgb stable after transfusion.    DVT prophylaxis

## 2021-09-05 NOTE — ED PROVIDER NOTE - OBJECTIVE STATEMENT
5F w/PMHx CAD, CHF, Afib on Eliquis, DM, HTN, colon cancer, CKD stage 3, hx of diverticulitis, presents s/p mechanical fall at home, +HT, -LOC, +AC (Eliquis). Patient was in her kitchen and "turned around too fast" when she fell over and hit her head on a chair. She denies loss of consciousness. She remained down for ~30 min before EMS came to take her into the ED. She is GCS 15, primary survey negative. No external signs of trauma. Complains of LLE pain. Denies scalp, spinal, chest, abdominal pain. ROM of all extremities in tact. C-collar in place.

## 2021-09-05 NOTE — H&P ADULT - NSHPPHYSICALEXAM_GEN_ALL_CORE
General: NAD, GCS 15  HEENT: Normocephalic, atraumatic, EOMI, PEERLA. no scalp lacerations   Neck: Soft, midline trachea. no cspine tenderness  Chest: No chest wall tenderness. or subq  emphysema   Cardiac: S1, S2, RRR  Respiratory: Bilateral breath sounds, clear and equal bilaterally  Abdomen: Soft, non-distended, non-tender, no rebound,   Groin: Normal appearing, pelvis stable   Ext: palp radial b/l UE, b/l DP palp in Lower Extrem. LLE tenderness, ROM in tact  Back: no TTP, no palpable runoff/stepoff/deformity

## 2021-09-05 NOTE — CONSULT NOTE ADULT - ASSESSMENT
Impression   -Left femoral intertrochanteric fracture.  -CAD s/p PCI in 2017 at [facility]  -Recovered  HFrEF  -Paroxysmal Afib on Eliquis  -DM II  -HTN  -CKD III      * SUMMARY:    * Patient-based characteristics (Functional capacity)  Patient is able to achieve more than 4 MET (walk 4 blocks, climb 2 flights of stairs, etc...)          Y [X] / N []    High-risk patient features:  - Recent (<30 days) or active MI          Y [] / N [X]  - Unstable or severe angina          Y [] / N [X]  - Decompensated heart failure, or worsening or new-onset heart failure          Y [] / N [X]  - Severe valvular disease          Y [] / N [X]  - Significant arrhythmia (Tachy- or Bradyarrhythmia)          Y [] / N [X]    * Surgery/Procedure-based characteristics (Type of surgery)  - Elevated or Moderate-risk procedure (Inpatient)          Y [x] / N []      * Revised Cardiac Risk Index (RCRI)  1- History of ischemic heart disease          Y [x] / N []  2- History of congestive heart failure          Y [x] / N []  3- History of stroke/TIA          Y [] / N [X]  4- History of insulin-dependent diabetes          Y [x] / N []  5- Chronic kidney disease (Cr >2mg/dL)          Y [x] / N []  6- Undergoing suprainguinal vascular, intraperitoneal, or intrathoracic surgery          Y [] / N [X]    Class IV risk (Three factors or more) --> >15% risk (30-day risk of death, MI, or cardiac arrest)    * IMPRESSION & RECOMMENDATIONS:   Moderate risk patient for a  Moderate risk surgery/procedure  please restart plavix and Eliquis as appropriate after OR  No further cardiac work-up is needed at the moment. There are no current cardiac contraindications to prevent from proceeding with the scheduled surgery/procedure.    - This consult serves only as a esme-operative cardiac risk stratification and evaluation to predict 30-days cardiac complications risk and mortality. The decision to proceed with the surgery/procedure is made by the performing physician and the patient -   Impression   -Left femoral intertrochanteric fracture.  -CAD s/p PCI in 2017 at [facility]  -Recovered HFrEF  -Paroxysmal Afib on Eliquis  -DM II  -HTN  -CKD III      * SUMMARY:    * Patient-based characteristics (Functional capacity)  Patient is able to achieve more than 4 MET (walk 4 blocks, climb 2 flights of stairs, etc...)          Y [X] / N []    High-risk patient features:  - Recent (<30 days) or active MI          Y [] / N [X]  - Unstable or severe angina          Y [] / N [X]  - Decompensated heart failure, or worsening or new-onset heart failure          Y [] / N [X]  - Severe valvular disease          Y [] / N [X]  - Significant arrhythmia (Tachy- or Bradyarrhythmia)          Y [] / N [X]    * Surgery/Procedure-based characteristics (Type of surgery)  - Elevated or Moderate-risk procedure (Inpatient)          Y [X] / N []      * Revised Cardiac Risk Index (RCRI)  1- History of ischemic heart disease          Y [x] / N []  2- History of congestive heart failure          Y [x] / N []  3- History of stroke/TIA          Y [] / N [X]  4- History of insulin-dependent diabetes          Y [x] / N []  5- Chronic kidney disease (Cr >2mg/dL)          Y [x] / N []  6- Undergoing suprainguinal vascular, intraperitoneal, or intrathoracic surgery          Y [] / N [X]    Class IV risk (Three factors or more) --> >15% risk (30-day risk of death, MI, or cardiac arrest)

## 2021-09-06 LAB
A1C WITH ESTIMATED AVERAGE GLUCOSE RESULT: 6.3 % — HIGH (ref 4–5.6)
ANION GAP SERPL CALC-SCNC: 13 MMOL/L — SIGNIFICANT CHANGE UP (ref 7–14)
ANION GAP SERPL CALC-SCNC: 9 MMOL/L — SIGNIFICANT CHANGE UP (ref 7–14)
APTT BLD: 33.7 SEC — SIGNIFICANT CHANGE UP (ref 27–39.2)
BASOPHILS # BLD AUTO: 0.02 K/UL — SIGNIFICANT CHANGE UP (ref 0–0.2)
BASOPHILS # BLD AUTO: 0.02 K/UL — SIGNIFICANT CHANGE UP (ref 0–0.2)
BASOPHILS NFR BLD AUTO: 0.1 % — SIGNIFICANT CHANGE UP (ref 0–1)
BASOPHILS NFR BLD AUTO: 0.2 % — SIGNIFICANT CHANGE UP (ref 0–1)
BUN SERPL-MCNC: 48 MG/DL — HIGH (ref 10–20)
BUN SERPL-MCNC: 48 MG/DL — HIGH (ref 10–20)
CALCIUM SERPL-MCNC: 8.6 MG/DL — SIGNIFICANT CHANGE UP (ref 8.5–10.1)
CALCIUM SERPL-MCNC: 9 MG/DL — SIGNIFICANT CHANGE UP (ref 8.5–10.1)
CHLORIDE SERPL-SCNC: 107 MMOL/L — SIGNIFICANT CHANGE UP (ref 98–110)
CHLORIDE SERPL-SCNC: 109 MMOL/L — SIGNIFICANT CHANGE UP (ref 98–110)
CO2 SERPL-SCNC: 15 MMOL/L — LOW (ref 17–32)
CO2 SERPL-SCNC: 19 MMOL/L — SIGNIFICANT CHANGE UP (ref 17–32)
CREAT SERPL-MCNC: 2 MG/DL — HIGH (ref 0.7–1.5)
CREAT SERPL-MCNC: 2.1 MG/DL — HIGH (ref 0.7–1.5)
EOSINOPHIL # BLD AUTO: 0 K/UL — SIGNIFICANT CHANGE UP (ref 0–0.7)
EOSINOPHIL # BLD AUTO: 0.08 K/UL — SIGNIFICANT CHANGE UP (ref 0–0.7)
EOSINOPHIL NFR BLD AUTO: 0 % — SIGNIFICANT CHANGE UP (ref 0–8)
EOSINOPHIL NFR BLD AUTO: 0.9 % — SIGNIFICANT CHANGE UP (ref 0–8)
ESTIMATED AVERAGE GLUCOSE: 134 MG/DL — HIGH (ref 68–114)
GLUCOSE BLDC GLUCOMTR-MCNC: 112 MG/DL — HIGH (ref 70–99)
GLUCOSE BLDC GLUCOMTR-MCNC: 143 MG/DL — HIGH (ref 70–99)
GLUCOSE BLDC GLUCOMTR-MCNC: 143 MG/DL — HIGH (ref 70–99)
GLUCOSE SERPL-MCNC: 126 MG/DL — HIGH (ref 70–99)
GLUCOSE SERPL-MCNC: 203 MG/DL — HIGH (ref 70–99)
HCT VFR BLD CALC: 23.5 % — LOW (ref 37–47)
HCT VFR BLD CALC: 23.6 % — LOW (ref 37–47)
HCT VFR BLD CALC: 30 % — LOW (ref 37–47)
HGB BLD-MCNC: 10 G/DL — LOW (ref 12–16)
HGB BLD-MCNC: 7.7 G/DL — LOW (ref 12–16)
HGB BLD-MCNC: 7.8 G/DL — LOW (ref 12–16)
IMM GRANULOCYTES NFR BLD AUTO: 0.6 % — HIGH (ref 0.1–0.3)
IMM GRANULOCYTES NFR BLD AUTO: 0.7 % — HIGH (ref 0.1–0.3)
INR BLD: 1.35 RATIO — HIGH (ref 0.65–1.3)
LACTATE SERPL-SCNC: 0.8 MMOL/L — SIGNIFICANT CHANGE UP (ref 0.7–2)
LYMPHOCYTES # BLD AUTO: 1.04 K/UL — LOW (ref 1.2–3.4)
LYMPHOCYTES # BLD AUTO: 1.81 K/UL — SIGNIFICANT CHANGE UP (ref 1.2–3.4)
LYMPHOCYTES # BLD AUTO: 19.4 % — LOW (ref 20.5–51.1)
LYMPHOCYTES # BLD AUTO: 7.2 % — LOW (ref 20.5–51.1)
MAGNESIUM SERPL-MCNC: 1.9 MG/DL — SIGNIFICANT CHANGE UP (ref 1.8–2.4)
MAGNESIUM SERPL-MCNC: 2.2 MG/DL — SIGNIFICANT CHANGE UP (ref 1.8–2.4)
MCHC RBC-ENTMCNC: 25.2 PG — LOW (ref 27–31)
MCHC RBC-ENTMCNC: 25.2 PG — LOW (ref 27–31)
MCHC RBC-ENTMCNC: 27.5 PG — SIGNIFICANT CHANGE UP (ref 27–31)
MCHC RBC-ENTMCNC: 32.8 G/DL — SIGNIFICANT CHANGE UP (ref 32–37)
MCHC RBC-ENTMCNC: 33.1 G/DL — SIGNIFICANT CHANGE UP (ref 32–37)
MCHC RBC-ENTMCNC: 33.3 G/DL — SIGNIFICANT CHANGE UP (ref 32–37)
MCV RBC AUTO: 76.1 FL — LOW (ref 81–99)
MCV RBC AUTO: 76.8 FL — LOW (ref 81–99)
MCV RBC AUTO: 82.6 FL — SIGNIFICANT CHANGE UP (ref 81–99)
MONOCYTES # BLD AUTO: 0.55 K/UL — SIGNIFICANT CHANGE UP (ref 0.1–0.6)
MONOCYTES # BLD AUTO: 0.73 K/UL — HIGH (ref 0.1–0.6)
MONOCYTES NFR BLD AUTO: 3.8 % — SIGNIFICANT CHANGE UP (ref 1.7–9.3)
MONOCYTES NFR BLD AUTO: 7.8 % — SIGNIFICANT CHANGE UP (ref 1.7–9.3)
NEUTROPHILS # BLD AUTO: 12.77 K/UL — HIGH (ref 1.4–6.5)
NEUTROPHILS # BLD AUTO: 6.61 K/UL — HIGH (ref 1.4–6.5)
NEUTROPHILS NFR BLD AUTO: 71.1 % — SIGNIFICANT CHANGE UP (ref 42.2–75.2)
NEUTROPHILS NFR BLD AUTO: 88.2 % — HIGH (ref 42.2–75.2)
NRBC # BLD: 0 /100 WBCS — SIGNIFICANT CHANGE UP (ref 0–0)
PHOSPHATE SERPL-MCNC: 2.7 MG/DL — SIGNIFICANT CHANGE UP (ref 2.1–4.9)
PHOSPHATE SERPL-MCNC: 3.8 MG/DL — SIGNIFICANT CHANGE UP (ref 2.1–4.9)
PLATELET # BLD AUTO: 115 K/UL — LOW (ref 130–400)
PLATELET # BLD AUTO: 121 K/UL — LOW (ref 130–400)
PLATELET # BLD AUTO: 99 K/UL — LOW (ref 130–400)
POTASSIUM SERPL-MCNC: 3.9 MMOL/L — SIGNIFICANT CHANGE UP (ref 3.5–5)
POTASSIUM SERPL-MCNC: 4.6 MMOL/L — SIGNIFICANT CHANGE UP (ref 3.5–5)
POTASSIUM SERPL-SCNC: 3.9 MMOL/L — SIGNIFICANT CHANGE UP (ref 3.5–5)
POTASSIUM SERPL-SCNC: 4.6 MMOL/L — SIGNIFICANT CHANGE UP (ref 3.5–5)
PROTHROM AB SERPL-ACNC: 15.5 SEC — HIGH (ref 9.95–12.87)
RBC # BLD: 3.06 M/UL — LOW (ref 4.2–5.4)
RBC # BLD: 3.1 M/UL — LOW (ref 4.2–5.4)
RBC # BLD: 3.63 M/UL — LOW (ref 4.2–5.4)
RBC # FLD: 19.1 % — HIGH (ref 11.5–14.5)
RBC # FLD: SIGNIFICANT CHANGE UP % (ref 11.5–14.5)
RBC # FLD: SIGNIFICANT CHANGE UP % (ref 11.5–14.5)
SODIUM SERPL-SCNC: 135 MMOL/L — SIGNIFICANT CHANGE UP (ref 135–146)
SODIUM SERPL-SCNC: 137 MMOL/L — SIGNIFICANT CHANGE UP (ref 135–146)
WBC # BLD: 10.02 K/UL — SIGNIFICANT CHANGE UP (ref 4.8–10.8)
WBC # BLD: 14.48 K/UL — HIGH (ref 4.8–10.8)
WBC # BLD: 9.31 K/UL — SIGNIFICANT CHANGE UP (ref 4.8–10.8)
WBC # FLD AUTO: 10.02 K/UL — SIGNIFICANT CHANGE UP (ref 4.8–10.8)
WBC # FLD AUTO: 14.48 K/UL — HIGH (ref 4.8–10.8)
WBC # FLD AUTO: 9.31 K/UL — SIGNIFICANT CHANGE UP (ref 4.8–10.8)

## 2021-09-06 PROCEDURE — 99232 SBSQ HOSP IP/OBS MODERATE 35: CPT

## 2021-09-06 PROCEDURE — 99223 1ST HOSP IP/OBS HIGH 75: CPT

## 2021-09-06 RX ORDER — FERROUS SULFATE 325(65) MG
325 TABLET ORAL DAILY
Refills: 0 | Status: DISCONTINUED | OUTPATIENT
Start: 2021-09-06 | End: 2021-09-27

## 2021-09-06 RX ORDER — GLUCAGON INJECTION, SOLUTION 0.5 MG/.1ML
1 INJECTION, SOLUTION SUBCUTANEOUS ONCE
Refills: 0 | Status: DISCONTINUED | OUTPATIENT
Start: 2021-09-06 | End: 2021-09-27

## 2021-09-06 RX ORDER — ATORVASTATIN CALCIUM 80 MG/1
80 TABLET, FILM COATED ORAL AT BEDTIME
Refills: 0 | Status: DISCONTINUED | OUTPATIENT
Start: 2021-09-06 | End: 2021-09-15

## 2021-09-06 RX ORDER — LOSARTAN POTASSIUM 100 MG/1
25 TABLET, FILM COATED ORAL DAILY
Refills: 0 | Status: DISCONTINUED | OUTPATIENT
Start: 2021-09-06 | End: 2021-09-08

## 2021-09-06 RX ORDER — CARVEDILOL PHOSPHATE 80 MG/1
25 CAPSULE, EXTENDED RELEASE ORAL EVERY 12 HOURS
Refills: 0 | Status: DISCONTINUED | OUTPATIENT
Start: 2021-09-06 | End: 2021-09-27

## 2021-09-06 RX ORDER — ASCORBIC ACID 60 MG
500 TABLET,CHEWABLE ORAL
Refills: 0 | Status: DISCONTINUED | OUTPATIENT
Start: 2021-09-06 | End: 2021-09-27

## 2021-09-06 RX ORDER — HYDROMORPHONE HYDROCHLORIDE 2 MG/ML
0.5 INJECTION INTRAMUSCULAR; INTRAVENOUS; SUBCUTANEOUS
Refills: 0 | Status: DISCONTINUED | OUTPATIENT
Start: 2021-09-06 | End: 2021-09-06

## 2021-09-06 RX ORDER — SODIUM CHLORIDE 9 MG/ML
1000 INJECTION, SOLUTION INTRAVENOUS
Refills: 0 | Status: DISCONTINUED | OUTPATIENT
Start: 2021-09-06 | End: 2021-09-06

## 2021-09-06 RX ORDER — ONDANSETRON 8 MG/1
4 TABLET, FILM COATED ORAL EVERY 6 HOURS
Refills: 0 | Status: DISCONTINUED | OUTPATIENT
Start: 2021-09-06 | End: 2021-09-27

## 2021-09-06 RX ORDER — PANTOPRAZOLE SODIUM 20 MG/1
40 TABLET, DELAYED RELEASE ORAL
Refills: 0 | Status: DISCONTINUED | OUTPATIENT
Start: 2021-09-06 | End: 2021-09-11

## 2021-09-06 RX ORDER — CEFAZOLIN SODIUM 1 G
2000 VIAL (EA) INJECTION EVERY 8 HOURS
Refills: 0 | Status: COMPLETED | OUTPATIENT
Start: 2021-09-06 | End: 2021-09-07

## 2021-09-06 RX ORDER — ACETAMINOPHEN 500 MG
650 TABLET ORAL EVERY 6 HOURS
Refills: 0 | Status: DISCONTINUED | OUTPATIENT
Start: 2021-09-06 | End: 2021-09-27

## 2021-09-06 RX ORDER — HEPARIN SODIUM 5000 [USP'U]/ML
5000 INJECTION INTRAVENOUS; SUBCUTANEOUS EVERY 8 HOURS
Refills: 0 | Status: DISCONTINUED | OUTPATIENT
Start: 2021-09-06 | End: 2021-09-06

## 2021-09-06 RX ORDER — MORPHINE SULFATE 50 MG/1
4 CAPSULE, EXTENDED RELEASE ORAL EVERY 4 HOURS
Refills: 0 | Status: DISCONTINUED | OUTPATIENT
Start: 2021-09-06 | End: 2021-09-06

## 2021-09-06 RX ORDER — RALOXIFENE HYDROCHLORIDE 60 MG/1
60 TABLET, COATED ORAL DAILY
Refills: 0 | Status: DISCONTINUED | OUTPATIENT
Start: 2021-09-06 | End: 2021-09-27

## 2021-09-06 RX ORDER — CALCITRIOL 0.5 UG/1
0.25 CAPSULE ORAL DAILY
Refills: 0 | Status: DISCONTINUED | OUTPATIENT
Start: 2021-09-06 | End: 2021-09-27

## 2021-09-06 RX ORDER — LATANOPROST 0.05 MG/ML
1 SOLUTION/ DROPS OPHTHALMIC; TOPICAL AT BEDTIME
Refills: 0 | Status: DISCONTINUED | OUTPATIENT
Start: 2021-09-06 | End: 2021-09-27

## 2021-09-06 RX ORDER — HEPARIN SODIUM 5000 [USP'U]/ML
5000 INJECTION INTRAVENOUS; SUBCUTANEOUS EVERY 12 HOURS
Refills: 0 | Status: DISCONTINUED | OUTPATIENT
Start: 2021-09-06 | End: 2021-09-08

## 2021-09-06 RX ORDER — OXYCODONE HYDROCHLORIDE 5 MG/1
10 TABLET ORAL EVERY 4 HOURS
Refills: 0 | Status: DISCONTINUED | OUTPATIENT
Start: 2021-09-06 | End: 2021-09-07

## 2021-09-06 RX ORDER — LEVOTHYROXINE SODIUM 125 MCG
25 TABLET ORAL DAILY
Refills: 0 | Status: DISCONTINUED | OUTPATIENT
Start: 2021-09-06 | End: 2021-09-27

## 2021-09-06 RX ORDER — DIPHENHYDRAMINE HCL 50 MG
25 CAPSULE ORAL AT BEDTIME
Refills: 0 | Status: DISCONTINUED | OUTPATIENT
Start: 2021-09-06 | End: 2021-09-27

## 2021-09-06 RX ORDER — SENNA PLUS 8.6 MG/1
2 TABLET ORAL AT BEDTIME
Refills: 0 | Status: DISCONTINUED | OUTPATIENT
Start: 2021-09-06 | End: 2021-09-17

## 2021-09-06 RX ORDER — OXYCODONE HYDROCHLORIDE 5 MG/1
5 TABLET ORAL EVERY 4 HOURS
Refills: 0 | Status: DISCONTINUED | OUTPATIENT
Start: 2021-09-06 | End: 2021-09-07

## 2021-09-06 RX ORDER — FOLIC ACID 0.8 MG
1 TABLET ORAL DAILY
Refills: 0 | Status: DISCONTINUED | OUTPATIENT
Start: 2021-09-06 | End: 2021-09-27

## 2021-09-06 RX ORDER — DEXTROSE 50 % IN WATER 50 %
15 SYRINGE (ML) INTRAVENOUS ONCE
Refills: 0 | Status: DISCONTINUED | OUTPATIENT
Start: 2021-09-06 | End: 2021-09-27

## 2021-09-06 RX ORDER — FUROSEMIDE 40 MG
40 TABLET ORAL DAILY
Refills: 0 | Status: DISCONTINUED | OUTPATIENT
Start: 2021-09-06 | End: 2021-09-08

## 2021-09-06 RX ORDER — DEXTROSE 50 % IN WATER 50 %
25 SYRINGE (ML) INTRAVENOUS ONCE
Refills: 0 | Status: DISCONTINUED | OUTPATIENT
Start: 2021-09-06 | End: 2021-09-27

## 2021-09-06 RX ORDER — INSULIN LISPRO 100/ML
VIAL (ML) SUBCUTANEOUS EVERY 6 HOURS
Refills: 0 | Status: DISCONTINUED | OUTPATIENT
Start: 2021-09-06 | End: 2021-09-27

## 2021-09-06 RX ORDER — ONDANSETRON 8 MG/1
4 TABLET, FILM COATED ORAL ONCE
Refills: 0 | Status: DISCONTINUED | OUTPATIENT
Start: 2021-09-06 | End: 2021-09-06

## 2021-09-06 RX ADMIN — OXYCODONE HYDROCHLORIDE 10 MILLIGRAM(S): 5 TABLET ORAL at 21:10

## 2021-09-06 RX ADMIN — Medication 50 GRAM(S): at 05:38

## 2021-09-06 RX ADMIN — HYDROMORPHONE HYDROCHLORIDE 0.5 MILLIGRAM(S): 2 INJECTION INTRAMUSCULAR; INTRAVENOUS; SUBCUTANEOUS at 14:25

## 2021-09-06 RX ADMIN — Medication 650 MILLIGRAM(S): at 05:38

## 2021-09-06 RX ADMIN — LOSARTAN POTASSIUM 25 MILLIGRAM(S): 100 TABLET, FILM COATED ORAL at 05:38

## 2021-09-06 RX ADMIN — DEXTROSE MONOHYDRATE, SODIUM CHLORIDE, AND POTASSIUM CHLORIDE 75 MILLILITER(S): 50; .745; 4.5 INJECTION, SOLUTION INTRAVENOUS at 00:01

## 2021-09-06 RX ADMIN — CHLORHEXIDINE GLUCONATE 1 APPLICATION(S): 213 SOLUTION TOPICAL at 05:39

## 2021-09-06 RX ADMIN — OXYCODONE HYDROCHLORIDE 10 MILLIGRAM(S): 5 TABLET ORAL at 20:01

## 2021-09-06 RX ADMIN — SODIUM CHLORIDE 75 MILLILITER(S): 9 INJECTION, SOLUTION INTRAVENOUS at 14:00

## 2021-09-06 RX ADMIN — Medication 25 MICROGRAM(S): at 05:38

## 2021-09-06 RX ADMIN — Medication 40 MILLIGRAM(S): at 05:38

## 2021-09-06 RX ADMIN — Medication 500 MILLIGRAM(S): at 05:39

## 2021-09-06 RX ADMIN — HEPARIN SODIUM 5000 UNIT(S): 5000 INJECTION INTRAVENOUS; SUBCUTANEOUS at 17:35

## 2021-09-06 RX ADMIN — CARVEDILOL PHOSPHATE 25 MILLIGRAM(S): 80 CAPSULE, EXTENDED RELEASE ORAL at 05:38

## 2021-09-06 RX ADMIN — Medication 650 MILLIGRAM(S): at 00:10

## 2021-09-06 RX ADMIN — PANTOPRAZOLE SODIUM 40 MILLIGRAM(S): 20 TABLET, DELAYED RELEASE ORAL at 05:38

## 2021-09-06 RX ADMIN — Medication 650 MILLIGRAM(S): at 00:40

## 2021-09-06 NOTE — PROGRESS NOTE ADULT - ASSESSMENT
ORTHOPAEDIC SURGERY   POST-OP CHECK NOTE    Patient Name: SEAN SAWYER  Operation: LEFT FEMUR IMN  Surgeon:  CINTIA  ---------------------------------------  SUBJECTIVE    85y Female seen and examined approximately 4 hours after above procedure. Patient is resting comfortably in no acute distress. Denies f/c/cp/sob/n/v/d.    ---------------------------------------  P/E:  T(F): 96.6 (09-07-21 @ 00:39), Max: 97.8 (09-06-21 @ 14:00)  HR: 73 (09-07-21 @ 00:39) (47 - 80)  BP: 116/58 (09-07-21 @ 00:39) (102/52 - 150/67)  RR: 18 (09-07-21 @ 00:39) (12 - 21)    General: awake, alert, resting comfortably in NAD  Cardio: RRR per peripheral pulses  Respiratory: breathing quietly without use of accessory muscles    LLE  Dressing in place, c/d/i  SILT sp/dp/t/sural/saph  Firing ta/ehl/fhl/gs  Foot WWP, 2+ DP pulse  ---------------------------------------  LABS                        10.0   14.48 )-----------( 99       ( 06 Sep 2021 20:57 )             30.0       09-06    135  |  107  |  48<H>  ----------------------------<  203<H>  4.6   |  15<L>  |  2.0<H>    Ca    8.6      06 Sep 2021 20:57  Phos  3.8     09-06  Mg     1.9     09-06        PT/INR - ( 06 Sep 2021 07:11 )   PT: 15.50 sec;   INR: 1.35 ratio    PTT - ( 06 Sep 2021 07:11 )  PTT:33.7 sec    COVID-19 PCR: NotDetec (04 Sep 2021 22:40)      MEDS  MEDICATIONS  (STANDING):  acetaminophen   Tablet .. 650 milliGRAM(s) Oral every 6 hours  ascorbic acid 500 milliGRAM(s) Oral two times a day  atorvastatin 80 milliGRAM(s) Oral at bedtime  calcitriol   Capsule 0.25 MICROGram(s) Oral daily  carvedilol 25 milliGRAM(s) Oral every 12 hours  ceFAZolin   IVPB 2000 milliGRAM(s) IV Intermittent every 8 hours  dextrose 40% Gel 15 Gram(s) Oral once  dextrose 50% Injectable 25 Gram(s) IV Push once  ferrous    sulfate 325 milliGRAM(s) Oral daily  folic acid 1 milliGRAM(s) Oral daily  furosemide    Tablet 40 milliGRAM(s) Oral daily  glucagon  Injectable 1 milliGRAM(s) IntraMuscular once  heparin   Injectable 5000 Unit(s) SubCutaneous every 12 hours  insulin lispro (ADMELOG) corrective regimen sliding scale   SubCutaneous every 6 hours  latanoprost 0.005% Ophthalmic Solution 1 Drop(s) Both EYES at bedtime  levothyroxine 25 MICROGram(s) Oral daily  losartan 25 milliGRAM(s) Oral daily  multivitamin 1 Tablet(s) Oral daily  pantoprazole    Tablet 40 milliGRAM(s) Oral before breakfast  raloxifene 60 milliGRAM(s) Oral daily  senna 2 Tablet(s) Oral at bedtime    MEDICATIONS  (PRN):  bisacodyl Suppository 10 milliGRAM(s) Rectal daily PRN If no bowel movement by POD#2  diphenhydrAMINE 25 milliGRAM(s) Oral at bedtime PRN Insomnia  morphine IVPB 4 milliGRAM(s) IV Intermittent every 4 hours PRN Severe Pain (7 - 10)  ondansetron Injectable 4 milliGRAM(s) IV Push every 6 hours PRN Nausea and/or Vomiting  oxyCODONE    IR 10 milliGRAM(s) Oral every 4 hours PRN Moderate Pain (4 - 6)  oxyCODONE    IR 5 milliGRAM(s) Oral every 4 hours PRN Mild Pain (1 - 3)    ---------------------------------------    ASSESSMENT  85y Female w/ Left femur IT fx s/p L femur IMN on 9/6/21, seen approximately 4 hours, after surgery, in stable condition.    PLAN:   - follow-up post-op labs  - Activity: WBAT LLE  - Antibiotics: Ancef 2g q8h for 24h  - Pain: continue care per current regimen  - Prophylaxis: SCDs; pharmacologic prophylaxis to begin at 23h post-op  - PT    -Disposition:   Return to floor when cleared by anesthesia  Home vs. subacute rehab vs. acute rehab  pending PT evaluation      Please page ORTHO w/ any questions or concerns

## 2021-09-06 NOTE — CHART NOTE - NSCHARTNOTEFT_GEN_A_CORE
PACU ANESTHESIA ADMISSION NOTE      Procedure: ORIF lef hip  Post op diagnosis:  Left hip fracture     ____  Intubated  TV:______       Rate: ______      FiO2: ______    _x___  Patent Airway    _x___  Full return of protective reflexes    _x___  Full recovery from anesthesia / back to baseline status    Vitals:  T-97  HR: 50  BP: 114/57  RR: 18 (09-06-21 @ 11:36) (18 - 18)  SpO2: 98% (09-06-21 @ 11:36) (98% - 99%)    Mental Status:  _x___ Awake   _____ Alert   _____ Drowsy   _____ Sedated    Nausea/Vomiting:  _x___  NO       ______Yes,   See Post - Op Orders         Pain Scale (0-10):  __0___    Treatment: _x___ None    ____ See Post - Op/PCA Orders    Post - Operative Fluids:   __x__ Oral   ____ See Post - Op Orders    Plan: Discharge:   ____Home       ___x__Floor     _____Critical Care    _____  Other:_________________    Comments:  No anesthesia issues or complications noted.  Discharge when criteria met.

## 2021-09-06 NOTE — CHART NOTE - NSCHARTNOTEFT_GEN_A_CORE
Surgery Transfer Note    Transfer from: Surgery  Transfer to: Orthopedics  Accepting physican: EduarTerese    Primary Dx: INTERTOCHANTERIC FRACTURE OF RIGHT HIP FALL      Procedures: s/p orif    Hospital Course:  This is a 85y Female admitted for right intertrochanteric fracture s/p fall, now s/p ORIF with orthopedics. The patient was admitted to trauma for pre-operative management, the patient was evaluated by cardiology and medicine. The patient was transfused 1 unit prbc pre-operatively for a hgb of 6.8, repeat was 7.8. Routine UA showed possible UTI, with leuks and bacteria, but patient denied any symptoms and refused treatment. The patient will be transferred to orthopedic service for post operative management.     PMHx/PSHx:  CAD (coronary artery disease)    Chronic CHF    Atrial fibrillation    Type 2 diabetes mellitus    Colon cancer    Hypertension    CKD (chronic kidney disease)    History of Clostridium difficile colitis    Diverticulitis      S/P hysterectomy      Medications:  ceFAZolin   IVPB 2000 milliGRAM(s) IV Intermittent every 8 hours  ferrous    sulfate 325 milliGRAM(s) Oral daily  folic acid 1 milliGRAM(s) Oral daily  heparin   Injectable 5000 Unit(s) SubCutaneous every 12 hours  HYDROmorphone  Injectable 0.5 milliGRAM(s) IV Push every 10 minutes PRN  lactated ringers. 1000 milliLiter(s) IV Continuous <Continuous>  morphine IVPB 4 milliGRAM(s) IV Intermittent every 4 hours PRN  multivitamin 1 Tablet(s) Oral daily  ondansetron Injectable 4 milliGRAM(s) IV Push every 6 hours PRN  ondansetron Injectable 4 milliGRAM(s) IV Push once PRN  oxyCODONE    IR 10 milliGRAM(s) Oral every 4 hours PRN  oxyCODONE    IR 5 milliGRAM(s) Oral every 4 hours PRN    Allergy:  No Known Allergies      ASSESSMENT & PLAN:   -UA +leuks, bacteria, but patient is asymptomatic. no treatment necessary  -PT/Rehab per orthopedics  -Continue RD, IVL,   -Recommend DVT ppx, gi ppx  -On home medications      For Follow-Up:  -PT/rehab per orthopedics  -Routine labs, trend vitals as per routine    -----------------------------------------------------------------------------------------------------    Sign out provided to orthopedics resident at [id number]   at  09-06-21 @ 15:01

## 2021-09-06 NOTE — PROGRESS NOTE ADULT - ASSESSMENT
ASSESSMENT:  85F w/PMHx CAD, CHF, Afib on Eliquis, DM, HTN, colon cancer, CKD stage 3, hx of diverticulitis, presents s/p mechanical fall at home, +HT, -LOC, +AC (Eliquis), with Left femoral intertrochanteric fracture.    PLAN:  -OR today with orthopedics  -Medicine: Patient has good functional capacity but is at high risk for per-op complications ( multiple predictors by RCRI criteria) while undergoing an intermediate risk procedure. May proceed once hemodynamically stable/ Hgb stable after transfusion.  -Cardiology: Class IV risk (Three factors or more) --> >15% risk (30-day risk of death, MI, or cardiac arrest)  -NPO, IVF  -Adequate pain control  -Continue home medications   -GI ppx     TRAUMA SPECTRA: [id number]

## 2021-09-06 NOTE — BRIEF OPERATIVE NOTE - NSICDXBRIEFPOSTOP_GEN_ALL_CORE_FT
POST-OP DIAGNOSIS:  Closed intertrochanteric fracture of left femur 07-Sep-2021 03:02:21  Misha Raygoza

## 2021-09-06 NOTE — PROGRESS NOTE ADULT - SUBJECTIVE AND OBJECTIVE BOX
GENERAL SURGERY PROGRESS NOTE    Patient: SEAN SAWYER , 85y (35)Female   MRN: [id number]  Location: [address]  Visit: 21 Inpatient  Date: 21 @ 08:29    Hospital Day #: 3  Post-Op Day #: none    Procedure/Dx/Injuries:  85F w/PMHx CAD, CHF, Afib on Eliquis, DM, HTN, colon cancer, CKD stage 3, hx of diverticulitis, presents s/p mechanical fall at home, +HT, -LOC, +AC (Eliquis), with Left femoral intertrochanteric fracture.    Events of past 24 hours: HgB dropped to 6.7, 1 unit PRBC administered with repeat HGB 7.8.     PAST MEDICAL & SURGICAL HISTORY:  CAD (coronary artery disease)    Chronic CHF    Atrial fibrillation    Type 2 diabetes mellitus    Colon cancer    Hypertension    CKD (chronic kidney disease)  Stage 3    History of Clostridium difficile colitis  s/p fecal transplant    Diverticulitis    S/P hysterectomy        Vitals:   T(F): 96.5 (21 @ 05:00), Max: 98.8 (21 @ 21:36)  HR: 80 (21 @ 05:00)  BP: 136/68 (21 @ 05:00)  RR: 18 (21 @ 05:00)  SpO2: 98% (21 @ 05:00)      Diet, NPO after Midnight:      NPO Start Date: 05-Sep-2021,   NPO Start Time: 23:59  Diet, Regular      Fluids:     I & O's:    21 @ 07:01  -  21 @ 07:00  --------------------------------------------------------  IN:    PRBCs (Packed Red Blood Cells): 311 mL    sodium chloride 0.9% w/ Additives: 300 mL  Total IN: 611 mL    OUT:    Indwelling Catheter - Urethral (mL): 700 mL    Voided (mL): 400 mL  Total OUT: 1100 mL    Total NET: -489 mL    Bowel Movement: : [x] YES [] NO  Flatus: : [x] YES [] NO    PHYSICAL EXAM:  General: NAD, AAOx3, calm and cooperative  HEENT: Normocephalic, atraumatic, EOMI, PEERLA. no scalp lacerations   Neck: Soft, midline trachea  Chest: No chest wall tenderness. or subq  emphysema   Cardiac: S1, S2, RRR  Respiratory: Bilateral breath sounds, clear and equal bilaterally  Abdomen: Soft, non-distended, non-tender, no rebound  Ext: Left femoral fracture    MEDICATIONS  (STANDING):  acetaminophen   Tablet .. 650 milliGRAM(s) Oral every 6 hours  ascorbic acid 500 milliGRAM(s) Oral two times a day  atorvastatin 80 milliGRAM(s) Oral at bedtime  calcitriol   Capsule 0.25 MICROGram(s) Oral daily  carvedilol 25 milliGRAM(s) Oral every 12 hours  chlorhexidine 4% Liquid 1 Application(s) Topical <User Schedule>  dextrose 40% Gel 15 Gram(s) Oral once  dextrose 5%. 1000 milliLiter(s) (50 mL/Hr) IV Continuous <Continuous>  dextrose 50% Injectable 25 Gram(s) IV Push once  famotidine Injectable 20 milliGRAM(s) IV Push once  ferrous    sulfate 325 milliGRAM(s) Oral daily  folic acid 1 milliGRAM(s) Oral daily  furosemide    Tablet 40 milliGRAM(s) Oral daily  glucagon  Injectable 1 milliGRAM(s) IntraMuscular once  insulin lispro (ADMELOG) corrective regimen sliding scale   SubCutaneous every 6 hours  latanoprost 0.005% Ophthalmic Solution 1 Drop(s) Both EYES at bedtime  levothyroxine 25 MICROGram(s) Oral daily  losartan 25 milliGRAM(s) Oral daily  multivitamin 1 Tablet(s) Oral daily  pantoprazole    Tablet 40 milliGRAM(s) Oral before breakfast  raloxifene 60 milliGRAM(s) Oral daily  senna 2 Tablet(s) Oral at bedtime  sodium chloride 0.9% with potassium chloride 20 mEq/L 1000 milliLiter(s) (75 mL/Hr) IV Continuous <Continuous>    MEDICATIONS  (PRN):  diphenhydrAMINE 25 milliGRAM(s) Oral at bedtime PRN Insomnia  oxyCODONE    IR 5 milliGRAM(s) Oral every 4 hours PRN Mild Pain (1 - 3)      DVT PROPHYLAXIS:   GI PROPHYLAXIS: pantoprazole    Tablet 40 milliGRAM(s) Oral before breakfast    ANTICOAGULATION:   ANTIBIOTICS:      LAB/STUDIES:  Labs:  CAPILLARY BLOOD GLUCOSE      POCT Blood Glucose.: 143 mg/dL (06 Sep 2021 06:06)  POCT Blood Glucose.: 143 mg/dL (06 Sep 2021 00:04)  POCT Blood Glucose.: 160 mg/dL (05 Sep 2021 21:03)  POCT Blood Glucose.: 233 mg/dL (05 Sep 2021 16:14)  POCT Blood Glucose.: 134 mg/dL (05 Sep 2021 11:43)                          7.8    10.02 )-----------( 115      ( 06 Sep 2021 04:00 )             23.6       Auto Immature Granulocyte %: 0.5 % (21 @ 21:36)  Auto Neutrophil %: 71.5 % (21 @ 21:36)  Auto Neutrophil %: 75.1 % (21 @ 11:37)  Auto Immature Granulocyte %: 0.4 % (21 @ 11:37)        133<L>  |  104  |  55<H>  ----------------------------<  128<H>  3.5   |  20  |  2.2<H>      Calcium, Total Serum: 8.9 mg/dL (21 @ 21:36)      LFTs:             6.6  | 0.2  | 13       ------------------[114     ( 04 Sep 2021 19:50 )  4.2  | x    | 11          Lipase:88     Amylase:x         Lactate, Blood: 0.8 mmol/L (21 @ 19:50)      Coags:     16.60  ----< 1.45    ( 05 Sep 2021 21:36 )     34.7        CARDIAC MARKERS ( 05 Sep 2021 06:12 )  x     / 0.02 ng/mL / 41 U/L / x     / x      CARDIAC MARKERS ( 04 Sep 2021 19:50 )  x     / 0.02 ng/mL / 56 U/L / x     / x        Urinalysis Basic - ( 05 Sep 2021 01:33 )    Color: Light Yellow / Appearance: Clear / S.012 / pH: x  Gluc: x / Ketone: Negative  / Bili: Negative / Urobili: <2 mg/dL   Blood: x / Protein: Trace / Nitrite: Negative   Leuk Esterase: Moderate / RBC: 0 /HPF / WBC 15 /HPF   Sq Epi: x / Non Sq Epi: 1 /HPF / Bacteria: Many

## 2021-09-07 LAB
COVID-19 SPIKE DOMAIN AB INTERP: POSITIVE
COVID-19 SPIKE DOMAIN ANTIBODY RESULT: 44.9 U/ML — HIGH
GLUCOSE BLDC GLUCOMTR-MCNC: 186 MG/DL — HIGH (ref 70–99)
HCT VFR BLD CALC: 25 % — LOW (ref 37–47)
HGB BLD-MCNC: 8.3 G/DL — LOW (ref 12–16)
MCHC RBC-ENTMCNC: 27.2 PG — SIGNIFICANT CHANGE UP (ref 27–31)
MCHC RBC-ENTMCNC: 33.2 G/DL — SIGNIFICANT CHANGE UP (ref 32–37)
MCV RBC AUTO: 82 FL — SIGNIFICANT CHANGE UP (ref 81–99)
NRBC # BLD: 0 /100 WBCS — SIGNIFICANT CHANGE UP (ref 0–0)
PLATELET # BLD AUTO: 113 K/UL — LOW (ref 130–400)
RBC # BLD: 3.05 M/UL — LOW (ref 4.2–5.4)
RBC # FLD: 19.2 % — HIGH (ref 11.5–14.5)
SARS-COV-2 IGG+IGM SERPL QL IA: 44.9 U/ML — HIGH
SARS-COV-2 IGG+IGM SERPL QL IA: POSITIVE
WBC # BLD: 13.58 K/UL — HIGH (ref 4.8–10.8)
WBC # FLD AUTO: 13.58 K/UL — HIGH (ref 4.8–10.8)

## 2021-09-07 PROCEDURE — 99223 1ST HOSP IP/OBS HIGH 75: CPT | Mod: AI

## 2021-09-07 RX ORDER — MIDODRINE HYDROCHLORIDE 2.5 MG/1
5 TABLET ORAL THREE TIMES A DAY
Refills: 0 | Status: DISCONTINUED | OUTPATIENT
Start: 2021-09-07 | End: 2021-09-13

## 2021-09-07 RX ORDER — MIDODRINE HYDROCHLORIDE 2.5 MG/1
10 TABLET ORAL ONCE
Refills: 0 | Status: COMPLETED | OUTPATIENT
Start: 2021-09-07 | End: 2021-09-07

## 2021-09-07 RX ORDER — SODIUM CHLORIDE 9 MG/ML
500 INJECTION, SOLUTION INTRAVENOUS ONCE
Refills: 0 | Status: COMPLETED | OUTPATIENT
Start: 2021-09-07 | End: 2021-09-07

## 2021-09-07 RX ORDER — MIDODRINE HYDROCHLORIDE 2.5 MG/1
TABLET ORAL
Refills: 0 | Status: DISCONTINUED | OUTPATIENT
Start: 2021-09-07 | End: 2021-09-13

## 2021-09-07 RX ADMIN — Medication 4: at 00:29

## 2021-09-07 RX ADMIN — Medication 650 MILLIGRAM(S): at 05:20

## 2021-09-07 RX ADMIN — CARVEDILOL PHOSPHATE 25 MILLIGRAM(S): 80 CAPSULE, EXTENDED RELEASE ORAL at 18:10

## 2021-09-07 RX ADMIN — SODIUM CHLORIDE 500 MILLILITER(S): 9 INJECTION, SOLUTION INTRAVENOUS at 15:41

## 2021-09-07 RX ADMIN — Medication 500 MILLIGRAM(S): at 05:21

## 2021-09-07 RX ADMIN — Medication 325 MILLIGRAM(S): at 11:57

## 2021-09-07 RX ADMIN — Medication 2: at 17:16

## 2021-09-07 RX ADMIN — OXYCODONE HYDROCHLORIDE 10 MILLIGRAM(S): 5 TABLET ORAL at 09:08

## 2021-09-07 RX ADMIN — Medication 25 MICROGRAM(S): at 05:21

## 2021-09-07 RX ADMIN — Medication 650 MILLIGRAM(S): at 12:00

## 2021-09-07 RX ADMIN — Medication 4: at 11:58

## 2021-09-07 RX ADMIN — PANTOPRAZOLE SODIUM 40 MILLIGRAM(S): 20 TABLET, DELAYED RELEASE ORAL at 05:21

## 2021-09-07 RX ADMIN — SENNA PLUS 2 TABLET(S): 8.6 TABLET ORAL at 21:35

## 2021-09-07 RX ADMIN — LATANOPROST 1 DROP(S): 0.05 SOLUTION/ DROPS OPHTHALMIC; TOPICAL at 00:31

## 2021-09-07 RX ADMIN — SENNA PLUS 2 TABLET(S): 8.6 TABLET ORAL at 00:31

## 2021-09-07 RX ADMIN — Medication 650 MILLIGRAM(S): at 00:30

## 2021-09-07 RX ADMIN — ATORVASTATIN CALCIUM 80 MILLIGRAM(S): 80 TABLET, FILM COATED ORAL at 21:35

## 2021-09-07 RX ADMIN — Medication 500 MILLIGRAM(S): at 18:11

## 2021-09-07 RX ADMIN — LOSARTAN POTASSIUM 25 MILLIGRAM(S): 100 TABLET, FILM COATED ORAL at 05:21

## 2021-09-07 RX ADMIN — HEPARIN SODIUM 5000 UNIT(S): 5000 INJECTION INTRAVENOUS; SUBCUTANEOUS at 05:21

## 2021-09-07 RX ADMIN — MIDODRINE HYDROCHLORIDE 10 MILLIGRAM(S): 2.5 TABLET ORAL at 11:56

## 2021-09-07 RX ADMIN — Medication 650 MILLIGRAM(S): at 18:10

## 2021-09-07 RX ADMIN — Medication 650 MILLIGRAM(S): at 13:34

## 2021-09-07 RX ADMIN — Medication 1 MILLIGRAM(S): at 11:57

## 2021-09-07 RX ADMIN — Medication 650 MILLIGRAM(S): at 05:28

## 2021-09-07 RX ADMIN — RALOXIFENE HYDROCHLORIDE 60 MILLIGRAM(S): 60 TABLET, COATED ORAL at 11:58

## 2021-09-07 RX ADMIN — Medication 2: at 07:55

## 2021-09-07 RX ADMIN — ATORVASTATIN CALCIUM 80 MILLIGRAM(S): 80 TABLET, FILM COATED ORAL at 00:31

## 2021-09-07 RX ADMIN — LATANOPROST 1 DROP(S): 0.05 SOLUTION/ DROPS OPHTHALMIC; TOPICAL at 21:35

## 2021-09-07 RX ADMIN — Medication 650 MILLIGRAM(S): at 00:32

## 2021-09-07 RX ADMIN — CALCITRIOL 0.25 MICROGRAM(S): 0.5 CAPSULE ORAL at 11:57

## 2021-09-07 RX ADMIN — HEPARIN SODIUM 5000 UNIT(S): 5000 INJECTION INTRAVENOUS; SUBCUTANEOUS at 18:11

## 2021-09-07 RX ADMIN — Medication 1 TABLET(S): at 11:58

## 2021-09-07 NOTE — DIETITIAN INITIAL EVALUATION ADULT. - PHYSCIAL ASSESSMENT
WDL; No edema noted; GI: WDL, LBM 9/4 -- reports feeling like she can't go to the bathroom, no exactly constipated though -- active order for dulcolax, senna noted; Skin: ecchymosis; no other wts in EMR

## 2021-09-07 NOTE — DIETITIAN INITIAL EVALUATION ADULT. - PERTINENT LABORATORY DATA
(9/7) H/H: 8.3/25.0  99/6) BUN: 48, Cr: 2.0, Glucose: 203, eGFR: 22  A1c: 6.3     CAPILLARY BLOOD GLUCOSE  POCT Blood Glucose.: 172 mg/dL (07 Sep 2021 07:41)  POCT Blood Glucose.: 231 mg/dL (07 Sep 2021 00:18)  POCT Blood Glucose.: 225 mg/dL (06 Sep 2021 21:05)  POCT Blood Glucose.: 170 mg/dL (06 Sep 2021 16:53)  POCT Blood Glucose.: 112 mg/dL (06 Sep 2021 11:55)

## 2021-09-07 NOTE — DIETITIAN INITIAL EVALUATION ADULT. - ADD RECOMMEND
1. add CHO consistent diet modifier. 2. Add Glucerna BID (vanilla only). 2. Add Prosource gelatein daily. 3. encourage po intake. 1. add CHO consistent diet modifier. 2. Add Glucerna BID (vanilla only). 2. Add Ensure Pudding daily. 3. encourage po intake.

## 2021-09-07 NOTE — PROGRESS NOTE ADULT - ASSESSMENT
ORTHOPAEDIC SURGERY PROGRESS NOTE    Patient seen and examined at bedside this morning s/p L femur IMN yesterday. Pain well controlled. No new complaints. Denies f/c, cp/sob, n/v, numbness/tingling.    EXAM  LLE  Dressings in place with mild spotting, intact  Compartments soft, compressible  Motor intact ehl/fhl, ta/gs  SILT dp/sp/s/s  Foot wwp, CR <2s    A&P  85F L basi-cervical femoral neck fracture s/p IMN yesterday. Doing well post-operatively.  -WBAT LLE  -pain control  -DVT ppx  -ancef x 24h post-op   -PT/OT

## 2021-09-07 NOTE — OCCUPATIONAL THERAPY INITIAL EVALUATION ADULT - THERAPY FREQUENCY, OT EVAL
Please call patient:   Your vitamin D is low and your cholesterol is high. Take Vitamin D 50,000 units weekly for 8 weeks (prescription has been sent to your pharmacy), then take over-the-counter vitamin D 2000 units daily indefinitely. You can lower your risk for heart disease by taking a daily cholesterol medication called atorvastatin 40 mg daily. Possible side effects include muscle aches and liver problems. I have sent a prescription to the pharmacy.     Your kidney test is normal. Your iron level is still low -- continue taking the iron tabs for 3 more months, then discontinue.     See the educator as planned for follow-up of diabetes and with me in 3 months.     Edilia Xie MD        Result Notes   Notes Recorded by Kaia Calhoun, RN on 4/25/2018 at 2:27 PM  Message left for patient to call the RN hotline # at [phone]  Telephone encounter started.    Kaia Calhoun RN - BC        2-3x/week

## 2021-09-07 NOTE — DIETITIAN INITIAL EVALUATION ADULT. - NAME AND PHONE
Nutrition Intervention:meals and snacks,medical food supplements; Nutrition Monitoring:diet order,energy intake,body composition,NFPF, glucose profile

## 2021-09-07 NOTE — OCCUPATIONAL THERAPY INITIAL EVALUATION ADULT - PERTINENT HX OF CURRENT PROBLEM, REHAB EVAL
85F w/PMHx CAD, CHF, Afib on Eliquis, DM, HTN, colon cancer, CKD stage 3, hx of diverticulitis, presents s/p mechanical fall at home, +HT, -LOC, +AC (Eliquis). Patient was in her kitchen and "turned around too fast" when she fell over and hit her head on a chair.

## 2021-09-07 NOTE — DIETITIAN INITIAL EVALUATION ADULT. - OTHER CALCULATIONS
using ABW 50.7kg; 1112-1205kcal (MSJ 1.2-1.3AF); protein: 61-64g/day (1.2-1.3 g/kg -- d/t recent sx vs. renal fx noted -- will mon); Fluid: 1mL/kcal or LIP

## 2021-09-07 NOTE — OCCUPATIONAL THERAPY INITIAL EVALUATION ADULT - LEVEL OF INDEPENDENCE: BED TO CHAIR, REHAB EVAL
Pt completed a sit pivot and did not reach full standing position/maximum assist (25% patients effort)

## 2021-09-07 NOTE — CONSULT NOTE ADULT - ASSESSMENT
IMPRESSION: Rehab of left hip it fx, s/p orif    PRECAUTIONS: [   ] Cardiac  [   ] Respiratory  [   ] Seizures [   ] Contact Isolation  [   ] Droplet Isolation  [   ] Other    Weight Bearing Status: WBAT LLE    RECOMMENDATION:    Out of Bed to Chair     DVT/Decubiti Prophylaxis    REHAB PLAN:     [  x  ] Bedside P/T 3-5 times a week   [ x   ]   Bedside O/T  2-3 times a week             [    ] Speech Therapy               [    ]  No Rehab Therapy Indicated   Conditioning/ROM                                    ADL  Bed Mobility                                               Conditioning/ROM  Transfers                                                     Bed Mobility  Sitting /Standing Balance                         Transfers                                        Gait Training                                               Sitting/Standing Balance  Stair Training [   ]Applicable                    Home equipment Eval                                                                        Splinting  [   ] Only      GOALS:   ADL   [  x  ]   Independent                    Transfers  [  x  ] Independent                          Ambulation  [   x ] Independent     [   x  ] With device                            [    ]  CG                                                         [    ]  CG                                                                  [    ] CG                            [    ] Min A                                                   [    ] Min A                                                              [    ] Min  A          DISCHARGE PLAN:   [    ]  Good candidate for Intensive Rehabilitation/Hospital based                                             Will tolerate 3hrs Intensive Rehab Daily                                       [     ]  Short Term Rehab in Skilled Nursing Facility                                       [     ]  Home with Outpatient or  services                                         [   x  ]  Possible Candidate for Intensive Hospital based Rehab

## 2021-09-07 NOTE — PROGRESS NOTE ADULT - SUBJECTIVE AND OBJECTIVE BOX
ORTHOPAEDIC SURGERY UPDATE NOTE    Nursing called orthopaedic team to alter the team that patient's BP was 66/46 after attempting stand and sit in a chair at bedside. This hypotension was associated with dizziness. The patient was placed back in bed, and her blood pressure improved to 86/49. Patient's symptoms of dizziness also improved, per nursing.    Once alerted of this episode, orthopaedics came to the bedside to assess the patient. Patient states that she is feeling better back in bed. Her dizziness has improved. Denies cp/sob. Ortho repeated her BP again, and it was 80/51, HR 64, RR 18.     Orthopaedics immediately called the hospitalist on call for recommendations regarding hypotension management given the patient's CHF.

## 2021-09-07 NOTE — CONSULT NOTE ADULT - SUBJECTIVE AND OBJECTIVE BOX
HPI:  85F w/PMHx CAD, CHF, Afib on Eliquis, DM, HTN, colon cancer, CKD stage 3, hx of diverticulitis, presents s/p mechanical fall at home, +HT, -LOC, +AC (Eliquis). Patient was in her kitchen and "turned around too fast" when she fell over and hit her head on a chair. She denies loss of consciousness. She remained down for ~30 min before EMS came to take her into the ED. She is GCS 15, primary survey negative. No external signs of trauma. Complains of LLE pain. Denies scalp, spinal, chest, abdominal pain. ROM of all extremities in tact. C-collar in place. Trauma w/u + for left hip intertrochanteric fx, s/p orif on 9/6, wbat as per ortho        PAST MEDICAL & SURGICAL HISTORY:  CAD (coronary artery disease)    Chronic CHF    Atrial fibrillation    Type 2 diabetes mellitus    Colon cancer    Hypertension    CKD (chronic kidney disease)  Stage 3    History of Clostridium difficile colitis  s/p fecal transplant    Diverticulitis    S/P hysterectomy        Hospital Course:    TODAY'S SUBJECTIVE & REVIEW OF SYMPTOMS:     Constitutional WNL   Cardio WNL   Resp WNL   GI WNL  Heme WNL  Endo WNL  Skin WNL  MSK pain  Neuro WNL  Cognitive WNL  Psych WNL      MEDICATIONS  (STANDING):  acetaminophen   Tablet .. 650 milliGRAM(s) Oral every 6 hours  ascorbic acid 500 milliGRAM(s) Oral two times a day  atorvastatin 80 milliGRAM(s) Oral at bedtime  calcitriol   Capsule 0.25 MICROGram(s) Oral daily  carvedilol 25 milliGRAM(s) Oral every 12 hours  dextrose 40% Gel 15 Gram(s) Oral once  dextrose 50% Injectable 25 Gram(s) IV Push once  ferrous    sulfate 325 milliGRAM(s) Oral daily  folic acid 1 milliGRAM(s) Oral daily  furosemide    Tablet 40 milliGRAM(s) Oral daily  glucagon  Injectable 1 milliGRAM(s) IntraMuscular once  heparin   Injectable 5000 Unit(s) SubCutaneous every 12 hours  insulin lispro (ADMELOG) corrective regimen sliding scale   SubCutaneous every 6 hours  latanoprost 0.005% Ophthalmic Solution 1 Drop(s) Both EYES at bedtime  levothyroxine 25 MICROGram(s) Oral daily  losartan 25 milliGRAM(s) Oral daily  midodrine.      midodrine. 5 milliGRAM(s) Oral three times a day  multivitamin 1 Tablet(s) Oral daily  pantoprazole    Tablet 40 milliGRAM(s) Oral before breakfast  raloxifene 60 milliGRAM(s) Oral daily  senna 2 Tablet(s) Oral at bedtime    MEDICATIONS  (PRN):  bisacodyl Suppository 10 milliGRAM(s) Rectal daily PRN If no bowel movement by POD#2  diphenhydrAMINE 25 milliGRAM(s) Oral at bedtime PRN Insomnia  morphine IVPB 4 milliGRAM(s) IV Intermittent every 4 hours PRN Severe Pain (7 - 10)  ondansetron Injectable 4 milliGRAM(s) IV Push every 6 hours PRN Nausea and/or Vomiting      FAMILY HISTORY:  FHx: breast cancer  Mother        Allergies    No Known Allergies    Intolerances        SOCIAL HISTORY:    [  ] Etoh  [  ] Smoking  [  ] Substance abuse     Home Environment:  [ x  ] Home Alone  [   ] Lives with Family  [   ] Home Health Aid    Dwelling:  [   ] Apartment  [ x  ] Private House  [   ] Adult Home  [   ] Skilled Nursing Facility      [   ] Short Term  [   ] Long Term  [ x  ] Stairs       Elevator [   ]    FUNCTIONAL STATUS PTA: (Check all that apply)  Ambulation: [  x  ]Independent    [   ] Dependent     [   ] Non-Ambulatory  Assistive Device: [ x  ] SA Cane  [   ]  Q Cane  [   ] Walker  [   ]  Wheelchair  ADL : [x   ] Independent  [    ]  Dependent       Vital Signs Last 24 Hrs  T(C): 36.1 (07 Sep 2021 13:44), Max: 36.1 (07 Sep 2021 09:26)  T(F): 97 (07 Sep 2021 13:44), Max: 97 (07 Sep 2021 13:44)  HR: 69 (07 Sep 2021 14:18) (60 - 84)  BP: 108/54 (07 Sep 2021 14:18) (66/46 - 145/65)  BP(mean): --  RR: 20 (07 Sep 2021 14:18) (18 - 20)  SpO2: 97% (07 Sep 2021 13:44) (95% - 100%)      PHYSICAL EXAM: Awake & Alert  GENERAL: NAD  HEAD:  Normocephalic  CHEST/LUNG: Clear   HEART: S1S2+  ABDOMEN: Soft, Nontender  EXTREMITIES:  no calf tenderness    NERVOUS SYSTEM:  Cranial Nerves 2-12 intact [   ] Abnormal  [   ]  ROM: WFL all extremities [   ]  Abnormal [ x  ]limited lle  Motor Strength: WFL all extremities  [   ]  Abnormal [ x  ]limited lle  Sensation: intact to light touch [x   ] Abnormal [   ]    FUNCTIONAL STATUS:  Bed Mobility: Independent [   ]  Supervision [   ]  Needs Assistance [  x ]  N/A [   ]  Transfers: Independent [   ]  Supervision [   ]  Needs Assistance [x   ]  N/A [   ]   Ambulation: Independent [   ]  Supervision [   ]  Needs Assistance [   ]  N/A [   ]  ADL: Independent [   ] Requires Assistance [   ] N/A [   ]      LABS:                        8.3    13.58 )-----------( 113      ( 07 Sep 2021 05:43 )             25.0     09-06    135  |  107  |  48<H>  ----------------------------<  203<H>  4.6   |  15<L>  |  2.0<H>    Ca    8.6      06 Sep 2021 20:57  Phos  3.8     09-06  Mg     1.9     09-06      PT/INR - ( 06 Sep 2021 07:11 )   PT: 15.50 sec;   INR: 1.35 ratio         PTT - ( 06 Sep 2021 07:11 )  PTT:33.7 sec      RADIOLOGY & ADDITIONAL STUDIES:

## 2021-09-07 NOTE — PROGRESS NOTE ADULT - ATTENDING COMMENTS
84 YO F with a PMH of CAD, HFpEF, paroxysmal Afib (Apixaban), DM2, HTN, CKD3, and hx of diverticulitis who was admitted to ortho service for eval and surgical correction of left femoral IT fracture s/p mechanical fall. While on ortho service, pt experienced episode of hypotension and Ortho called medical service to evaluate.     Currently, pt only complains of left hip dull pain. Denies any flatus since 9/6 in the AM, no BM since her admission. Denies any fevers/chills, CP, SOB, sore throat, headache, cough, ABD pain, N/V/D, or rashes.     FMHx: Reviewed, not relevant    Physical exam shows pt in NAD. VSS, afebrile, not hypoxic on RA. A&Ox3. Neuro exam without deficits, motor/sensory intact, no dysarthria, no facial asymmetry. Muscle strength/sensation intact. CTA B/L with no W/C/R. RRR, no M/G/R. ABD is soft and non-tender, normoactive BSs. Left hip with lateral surgical wound, clean bandage, TTP around the site, firm skin findings, no erythema, or foul smelling drainage. No rashes. Labs and radiology as above.     Hypotension, rule out opioid-induced vs cardiac vs infectious. Send procal/CRP. TSH. Echo. Hold opioids for now, APAP only. 86 YO F with a PMH of CAD, HFpEF, paroxysmal Afib (Apixaban), DM2, HTN, CKD3, and hx of diverticulitis who was admitted to ortho service for eval and surgical correction of left femoral IT fracture s/p mechanical fall. While on ortho service, pt experienced episode of hypotension and Ortho called medical service to evaluate.     Currently, pt only complains of left hip dull pain. Denies any flatus since 9/6 in the AM, no BM since her admission. Denies any fevers/chills, CP, SOB, sore throat, headache, cough, ABD pain, N/V/D, or rashes.     FMHx: Reviewed, not relevant    Physical exam shows pt in NAD. VSS, afebrile, not hypoxic on RA. A&Ox3. Neuro exam without deficits, motor/sensory intact, no dysarthria, no facial asymmetry. Muscle strength/sensation intact. CTA B/L with no W/C/R. RRR, no M/G/R. ABD is soft and non-tender, normoactive BSs. Left hip with lateral surgical wound, clean bandage, TTP around the site, firm skin findings, no erythema, or foul smelling drainage. No rashes. Labs and radiology as above.     Hypotension, rule out opioid-induced vs cardiac vs infectious. Send procal/CRP. TSH. Echo. Send blood cultures. Hold opioids for now, APAP only. Check orthostatics. Hold ABXs for now. Give small bolus of 500 cc IVFs. Hold BP meds.   -Pt denies passing any gas or stool for > 24 hours. Denies any ABD pain. Bowel sounds are present, no TTP. If pt develops N/V, obtain STAT CT-ABD.   -Will take pt onto medical service. Ortho/surgical teams will co-manage     Recent left hip surgery. Wound care. Management as per ortho. PT consult. Fall precautions.    HX of paroxysmal Afib (Apixaban), DM2, HTN, CKD3, and hx of diverticulitis. Restart home meds, except as stated above. DVT PPX. Inform PCP of pt's admission to hospital. My note supersedes the residents note.

## 2021-09-07 NOTE — DIETITIAN INITIAL EVALUATION ADULT. - OTHER INFO
pertinent medical information:   --85F w/PMHx CAD, CHF, Afib on Eliquis, DM, HTN, colon cancer, CKD stage 3, hx of diverticulitis, presents s/p mechanical fall at home, +HT, -LOC, +AC (Eliquis).  -- L basi-cervical femoral neck fracture s/p IMN yesterday. Doing well post-operatively.  -WBAT LLE  -pain control    pertinent subjective information: Pt reports "so-so" appetite po @ admit. She just doesn't feel too good. Had some of her oatmeal and jello for breakfast today. No chewing/swallowing difficulty reported. discussed nutritional supplements -- agreeable to add, prefers vanilla flavor.

## 2021-09-07 NOTE — DIETITIAN INITIAL EVALUATION ADULT. - PERTINENT MEDS FT
MEDICATIONS  (STANDING):  ascorbic acid 500 milliGRAM(s) Oral two times a day  atorvastatin 80 milliGRAM(s) Oral at bedtime  calcitriol   Capsule 0.25 MICROGram(s) Oral daily  carvedilol 25 milliGRAM(s) Oral every 12 hours  dextrose 40% Gel 15 Gram(s) Oral once  dextrose 50% Injectable 25 Gram(s) IV Push once  ferrous    sulfate 325 milliGRAM(s) Oral daily  folic acid 1 milliGRAM(s) Oral daily  furosemide    Tablet 40 milliGRAM(s) Oral daily  glucagon  Injectable 1 milliGRAM(s) IntraMuscular once  heparin   Injectable 5000 Unit(s) SubCutaneous every 12 hours  insulin lispro (ADMELOG) corrective regimen sliding scale   SubCutaneous every 6 hours  levothyroxine 25 MICROGram(s) Oral daily  losartan 25 milliGRAM(s) Oral daily  midodrine.      midodrine. 10 milliGRAM(s) Oral once  midodrine. 5 milliGRAM(s) Oral three times a day  multivitamin 1 Tablet(s) Oral daily  pantoprazole    Tablet 40 milliGRAM(s) Oral before breakfast  raloxifene 60 milliGRAM(s) Oral daily  senna 2 Tablet(s) Oral at bedtime    MEDICATIONS  (PRN):  bisacodyl Suppository 10 milliGRAM(s) Rectal daily PRN If no bowel movement by POD#2  morphine IVPB 4 milliGRAM(s) IV Intermittent every 4 hours PRN Severe Pain (7 - 10)  ondansetron Injectable 4 milliGRAM(s) IV Push every 6 hours PRN Nausea and/or Vomiting  oxyCODONE    IR 10 milliGRAM(s) Oral every 4 hours PRN Moderate Pain (4 - 6)

## 2021-09-07 NOTE — DIETITIAN INITIAL EVALUATION ADULT. - ORAL INTAKE PTA/DIET HISTORY
PTA PO/appetite is good. Eats 3 meals a day w/ snacks in between. Says she hates to cook and eats a lot of pre-prepared foods. UBW: 51.3kg vs. wt at admit: 50.7kg -- % change: 1.16%. pt herself does not report any wt loss. says 2 yrs ago she lost some wt when she had C.diff but doesn't remember the wt prior to loss. NKFA. Takes Vit D, folic acid, B12 vitamins. No cul/rel or personal food rest/prefs noted.

## 2021-09-07 NOTE — PROVIDER CONTACT NOTE (OTHER) - BACKGROUND
Pt received 500cc LR bolus  Pt drinking and eating  no distention, no pain or tenderness upon palpation

## 2021-09-08 LAB
ALBUMIN SERPL ELPH-MCNC: 3 G/DL — LOW (ref 3.5–5.2)
ALP SERPL-CCNC: 53 U/L — SIGNIFICANT CHANGE UP (ref 30–115)
ALT FLD-CCNC: 21 U/L — SIGNIFICANT CHANGE UP (ref 0–41)
ANION GAP SERPL CALC-SCNC: 15 MMOL/L — HIGH (ref 7–14)
AST SERPL-CCNC: 30 U/L — SIGNIFICANT CHANGE UP (ref 0–41)
BASOPHILS # BLD AUTO: 0.01 K/UL — SIGNIFICANT CHANGE UP (ref 0–0.2)
BASOPHILS # BLD AUTO: 0.02 K/UL — SIGNIFICANT CHANGE UP (ref 0–0.2)
BASOPHILS NFR BLD AUTO: 0.1 % — SIGNIFICANT CHANGE UP (ref 0–1)
BASOPHILS NFR BLD AUTO: 0.1 % — SIGNIFICANT CHANGE UP (ref 0–1)
BILIRUB SERPL-MCNC: 0.3 MG/DL — SIGNIFICANT CHANGE UP (ref 0.2–1.2)
BLD GP AB SCN SERPL QL: SIGNIFICANT CHANGE UP
BUN SERPL-MCNC: 71 MG/DL — CRITICAL HIGH (ref 10–20)
CALCIUM SERPL-MCNC: 9.3 MG/DL — SIGNIFICANT CHANGE UP (ref 8.5–10.1)
CHLORIDE SERPL-SCNC: 96 MMOL/L — LOW (ref 98–110)
CO2 SERPL-SCNC: 19 MMOL/L — SIGNIFICANT CHANGE UP (ref 17–32)
CREAT SERPL-MCNC: 2.8 MG/DL — HIGH (ref 0.7–1.5)
EOSINOPHIL # BLD AUTO: 0.02 K/UL — SIGNIFICANT CHANGE UP (ref 0–0.7)
EOSINOPHIL # BLD AUTO: 0.03 K/UL — SIGNIFICANT CHANGE UP (ref 0–0.7)
EOSINOPHIL NFR BLD AUTO: 0.1 % — SIGNIFICANT CHANGE UP (ref 0–8)
EOSINOPHIL NFR BLD AUTO: 0.2 % — SIGNIFICANT CHANGE UP (ref 0–8)
GLUCOSE BLDC GLUCOMTR-MCNC: 139 MG/DL — HIGH (ref 70–99)
GLUCOSE BLDC GLUCOMTR-MCNC: 162 MG/DL — HIGH (ref 70–99)
GLUCOSE BLDC GLUCOMTR-MCNC: 162 MG/DL — HIGH (ref 70–99)
GLUCOSE BLDC GLUCOMTR-MCNC: 228 MG/DL — HIGH (ref 70–99)
GLUCOSE BLDC GLUCOMTR-MCNC: 264 MG/DL — HIGH (ref 70–99)
GLUCOSE SERPL-MCNC: 149 MG/DL — HIGH (ref 70–99)
HCT VFR BLD CALC: 22.7 % — LOW (ref 37–47)
HCT VFR BLD CALC: 25 % — LOW (ref 37–47)
HGB BLD-MCNC: 7.7 G/DL — LOW (ref 12–16)
HGB BLD-MCNC: 8.4 G/DL — LOW (ref 12–16)
IMM GRANULOCYTES NFR BLD AUTO: 0.7 % — HIGH (ref 0.1–0.3)
IMM GRANULOCYTES NFR BLD AUTO: 0.8 % — HIGH (ref 0.1–0.3)
LYMPHOCYTES # BLD AUTO: 11.6 % — LOW (ref 20.5–51.1)
LYMPHOCYTES # BLD AUTO: 12.8 % — LOW (ref 20.5–51.1)
LYMPHOCYTES # BLD AUTO: 2.02 K/UL — SIGNIFICANT CHANGE UP (ref 1.2–3.4)
LYMPHOCYTES # BLD AUTO: 2.15 K/UL — SIGNIFICANT CHANGE UP (ref 1.2–3.4)
MAGNESIUM SERPL-MCNC: 2 MG/DL — SIGNIFICANT CHANGE UP (ref 1.8–2.4)
MCHC RBC-ENTMCNC: 27.5 PG — SIGNIFICANT CHANGE UP (ref 27–31)
MCHC RBC-ENTMCNC: 28.2 PG — SIGNIFICANT CHANGE UP (ref 27–31)
MCHC RBC-ENTMCNC: 33.6 G/DL — SIGNIFICANT CHANGE UP (ref 32–37)
MCHC RBC-ENTMCNC: 33.9 G/DL — SIGNIFICANT CHANGE UP (ref 32–37)
MCV RBC AUTO: 81.7 FL — SIGNIFICANT CHANGE UP (ref 81–99)
MCV RBC AUTO: 83.2 FL — SIGNIFICANT CHANGE UP (ref 81–99)
MONOCYTES # BLD AUTO: 1.12 K/UL — HIGH (ref 0.1–0.6)
MONOCYTES # BLD AUTO: 1.2 K/UL — HIGH (ref 0.1–0.6)
MONOCYTES NFR BLD AUTO: 6.7 % — SIGNIFICANT CHANGE UP (ref 1.7–9.3)
MONOCYTES NFR BLD AUTO: 6.9 % — SIGNIFICANT CHANGE UP (ref 1.7–9.3)
NEUTROPHILS # BLD AUTO: 13.29 K/UL — HIGH (ref 1.4–6.5)
NEUTROPHILS # BLD AUTO: 14 K/UL — HIGH (ref 1.4–6.5)
NEUTROPHILS NFR BLD AUTO: 79.4 % — HIGH (ref 42.2–75.2)
NEUTROPHILS NFR BLD AUTO: 80.6 % — HIGH (ref 42.2–75.2)
NRBC # BLD: 0 /100 WBCS — SIGNIFICANT CHANGE UP (ref 0–0)
NRBC # BLD: 0 /100 WBCS — SIGNIFICANT CHANGE UP (ref 0–0)
PLATELET # BLD AUTO: 138 K/UL — SIGNIFICANT CHANGE UP (ref 130–400)
PLATELET # BLD AUTO: 154 K/UL — SIGNIFICANT CHANGE UP (ref 130–400)
POTASSIUM SERPL-MCNC: 4.8 MMOL/L — SIGNIFICANT CHANGE UP (ref 3.5–5)
POTASSIUM SERPL-SCNC: 4.8 MMOL/L — SIGNIFICANT CHANGE UP (ref 3.5–5)
PROT SERPL-MCNC: 4.9 G/DL — LOW (ref 6–8)
RBC # BLD: 2.73 M/UL — LOW (ref 4.2–5.4)
RBC # BLD: 3.06 M/UL — LOW (ref 4.2–5.4)
RBC # FLD: 19.6 % — HIGH (ref 11.5–14.5)
RBC # FLD: 19.9 % — HIGH (ref 11.5–14.5)
SODIUM SERPL-SCNC: 130 MMOL/L — LOW (ref 135–146)
TROPONIN T SERPL-MCNC: 0.1 NG/ML — CRITICAL HIGH
TROPONIN T SERPL-MCNC: 0.11 NG/ML — CRITICAL HIGH
TROPONIN T SERPL-MCNC: 0.11 NG/ML — CRITICAL HIGH
WBC # BLD: 16.75 K/UL — HIGH (ref 4.8–10.8)
WBC # BLD: 17.38 K/UL — HIGH (ref 4.8–10.8)
WBC # FLD AUTO: 16.75 K/UL — HIGH (ref 4.8–10.8)
WBC # FLD AUTO: 17.38 K/UL — HIGH (ref 4.8–10.8)

## 2021-09-08 PROCEDURE — 93010 ELECTROCARDIOGRAM REPORT: CPT

## 2021-09-08 PROCEDURE — 93306 TTE W/DOPPLER COMPLETE: CPT | Mod: 26

## 2021-09-08 PROCEDURE — 74176 CT ABD & PELVIS W/O CONTRAST: CPT | Mod: 26

## 2021-09-08 PROCEDURE — 99233 SBSQ HOSP IP/OBS HIGH 50: CPT

## 2021-09-08 RX ORDER — SODIUM CHLORIDE 9 MG/ML
500 INJECTION, SOLUTION INTRAVENOUS
Refills: 0 | Status: DISCONTINUED | OUTPATIENT
Start: 2021-09-08 | End: 2021-09-13

## 2021-09-08 RX ORDER — APIXABAN 2.5 MG/1
2.5 TABLET, FILM COATED ORAL
Refills: 0 | Status: DISCONTINUED | OUTPATIENT
Start: 2021-09-08 | End: 2021-09-10

## 2021-09-08 RX ORDER — POLYETHYLENE GLYCOL 3350 17 G/17G
17 POWDER, FOR SOLUTION ORAL DAILY
Refills: 0 | Status: DISCONTINUED | OUTPATIENT
Start: 2021-09-08 | End: 2021-09-13

## 2021-09-08 RX ADMIN — ATORVASTATIN CALCIUM 80 MILLIGRAM(S): 80 TABLET, FILM COATED ORAL at 21:42

## 2021-09-08 RX ADMIN — CARVEDILOL PHOSPHATE 25 MILLIGRAM(S): 80 CAPSULE, EXTENDED RELEASE ORAL at 17:34

## 2021-09-08 RX ADMIN — POLYETHYLENE GLYCOL 3350 17 GRAM(S): 17 POWDER, FOR SOLUTION ORAL at 11:46

## 2021-09-08 RX ADMIN — SENNA PLUS 2 TABLET(S): 8.6 TABLET ORAL at 21:42

## 2021-09-08 RX ADMIN — Medication 1 MILLIGRAM(S): at 11:43

## 2021-09-08 RX ADMIN — APIXABAN 2.5 MILLIGRAM(S): 2.5 TABLET, FILM COATED ORAL at 17:34

## 2021-09-08 RX ADMIN — Medication 650 MILLIGRAM(S): at 17:34

## 2021-09-08 RX ADMIN — CALCITRIOL 0.25 MICROGRAM(S): 0.5 CAPSULE ORAL at 11:43

## 2021-09-08 RX ADMIN — MIDODRINE HYDROCHLORIDE 5 MILLIGRAM(S): 2.5 TABLET ORAL at 11:43

## 2021-09-08 RX ADMIN — Medication 1 TABLET(S): at 11:43

## 2021-09-08 RX ADMIN — HEPARIN SODIUM 5000 UNIT(S): 5000 INJECTION INTRAVENOUS; SUBCUTANEOUS at 06:18

## 2021-09-08 RX ADMIN — Medication 650 MILLIGRAM(S): at 11:58

## 2021-09-08 RX ADMIN — RALOXIFENE HYDROCHLORIDE 60 MILLIGRAM(S): 60 TABLET, COATED ORAL at 11:43

## 2021-09-08 RX ADMIN — MIDODRINE HYDROCHLORIDE 5 MILLIGRAM(S): 2.5 TABLET ORAL at 17:34

## 2021-09-08 RX ADMIN — Medication 325 MILLIGRAM(S): at 11:43

## 2021-09-08 RX ADMIN — Medication 650 MILLIGRAM(S): at 11:46

## 2021-09-08 RX ADMIN — Medication 6: at 02:07

## 2021-09-08 RX ADMIN — Medication 4: at 17:33

## 2021-09-08 RX ADMIN — LATANOPROST 1 DROP(S): 0.05 SOLUTION/ DROPS OPHTHALMIC; TOPICAL at 21:45

## 2021-09-08 RX ADMIN — Medication 500 MILLIGRAM(S): at 06:14

## 2021-09-08 RX ADMIN — ONDANSETRON 4 MILLIGRAM(S): 8 TABLET, FILM COATED ORAL at 06:22

## 2021-09-08 RX ADMIN — Medication 2: at 13:02

## 2021-09-08 RX ADMIN — Medication 500 MILLIGRAM(S): at 17:34

## 2021-09-08 NOTE — PROGRESS NOTE ADULT - ASSESSMENT
ORTHOPAEDIC SURGERY PROGRESS NOTE    Patient seen and examined at bedside this morning. Nursing reports a history of coffee ground emesis overnight. Upon examination this morning, patient states that her nausea has improved. Pain in LLE controlled.     EXAM  LLE  Dressings in place with mild spotting, intact  Compartments soft, compressible  Motor intact ehl/fhl, ta/gs  SILT dp/sp/s/s  Foot wwp, CR <2s    A&P  85F L basi-cervical femoral neck fracture s/p IMN POD2. Doing well post-operatively. No orthopaedic contraindication to discharge.  -WBAT LLE  -pain control  -DVT ppx  -PT/OT  -Upon discharge, please have patient follow up at 2 weeks post-op with Dr. Parsons at [facility]. Please call [phone] to schedule an appointment.

## 2021-09-08 NOTE — PHYSICAL THERAPY INITIAL EVALUATION ADULT - GENERAL OBSERVATIONS, REHAB EVAL
9:15-Chart reviewed. Attempted to see pt for PT IE however pt with decreased BP sitting in chair (BP 66/37 HR 75). RN Romana placed pt back to bed, vitals taken again (BP 86/49 HR 73). PT to hold IE. PT to f/u
Pt encountered semifowler in bed in NAD, + primafit, + IV R UE,  dressing R lateral hip clean and intact, pt agreeable to PT.

## 2021-09-08 NOTE — CHART NOTE - NSCHARTNOTEFT_GEN_A_CORE
Patient with PMHx of CAD w/multiple stents complained of chest pain at 00:50. Pt complained of worsening CP when coughing. On physical exam, CP was not reproducible to palpation. Vital signs were (BP: 133/62) (HR: 99) (SpO2 on RA: 97). Stat ECG  was ordered and Troponin was ordered for 04:30.

## 2021-09-08 NOTE — PROGRESS NOTE ADULT - ASSESSMENT
85F w/PMHx CAD, CHF, Afib on Eliquis, DM, HTN, colon cancer, CKD stage 3, hx of diverticulitis, presents s/p mechanical fall at home, +HT, -LOC, +AC (Eliquis).     86 YO F with a PMH of CAD, HFpEF, paroxysmal Afib (Apixaban), DM2, HTN, CKD3, and hx of diverticulitis who was admitted to ortho service for eval and surgical correction of left femoral IT fracture s/p mechanical fall at home. While on ortho service, pt experienced episode of hypotension and Ortho called medical service to evaluate.     #Recent left hip surgery.   -Management as per ortho.   -Wound care.   -PT consult.   -Fall precautions.  -Adequate pain control    Hypotension, rule out opioid-induced vs cardiac vs infectious.   - Send procal/CRP. TSH. Echo.   - Send blood cultures. Hold opioids for now, APAP only. Check orthostatics.   - Hold ABXs for now. Give small bolus of 500 cc IVFs. Hold BP meds.   -Pt denies passing any gas or stool for > 24 hours. Denies any ABD pain.   - Bowel sounds are present, no TTP. If pt develops N/V, obtain STAT CT-ABD.   -Will take pt onto medical service. Ortho/surgical teams will co-manage     #HX of paroxysmal Afib (Apixaban),     DM2  - c/w home meds     HTN  - c/w home meds       CKD3  - c/w home meds     hx of diverticulitis.   - c/w home meds     DVT PPX.   - c/w home meds     GI ppx   -     -Patient and plan discussed with Dr. Rouse        84 YO F with a PMH of CAD, HFpEF, paroxysmal Afib (Apixaban), DM2, HTN, CKD3, and hx of diverticulitis who was admitted to ortho service for eval and surgical correction of left femoral IT fracture s/p mechanical fall at home. While on ortho service, pt experienced episode of hypotension.     #Recent left hip surgery.   -Wound care.   -Fall precautions.  -Adequate pain control  Management as per ortho:   -WBAT LLE  -pain control  -DVT ppx  -ancef x 24h post-op   -PT/OT    Hypotension, rule out opioid-induced vs cardiac vs infectious.   - Send procal/CRP. TSH. Echo.   - Send blood cultures.   - Hold opioids for now, APAP only. Check orthostatics.   - Hold ABXs for now. Give small bolus of 500 cc IVFs. Hold BP meds.   - Pt denies passing any gas or stool for > 24 hours. Denies any ABD pain.   - Bowel sounds are present, no TTP. If pt develops N/V, obtain STAT CT-ABD.   -Will take pt onto medical service. Ortho/surgical teams will co-manage     #HX of paroxysmal Afib (Apixaban),     #DM2 #HTN #CKD3  - c/w home meds     #hx of diverticulitis.   - c/w home meds     DVT PPX.   - c/w home meds     GI ppx            86 YO F with a PMH of CAD, HFpEF, paroxysmal Afib (Apixaban), DM2, HTN, CKD3, and hx of diverticulitis who was admitted to ortho service for eval and surgical correction of left femoral IT fracture s/p mechanical fall at home. While on ortho service, pt experienced episode of hypotension. Transferred to medicine. Ortho/surgical teams co-managing.     #left hip intertrochanteric fx, s/p orif on 9/6, wbat as per ortho  - Wound care.   - Fall precautions.  - Adequate pain control  Management as per ortho:   - WBAT LLE  - DVT ppx  - PT/OT    #Hypotension, rule out opioid-induced vs cardiac vs infectious.   - Send procal/CRP. TSH. Echo.   - Send blood cultures.   - Hold opioids for now, APAP only. Check orthostatics.   - Holding ABXs for now.    #DM2 #HTN #CKD3 #Diverticulitis, #HFpEF #Paroxysmal Afib   - c/w home meds: atorvastatin, clopidogrel, calcitriol, , evista, folic acid, latanoprost, levothyroxine, losartan, carvededilol    #HTN  - c/w home med(s): furosemide     #DM 2  - c/w home med(s): lantus,    DVT PPX.   - c/w eliquis 2.5 bid    GI ppx   - pantoprazole 40mg daily            86 YO F with a PMH of CAD, HFpEF, paroxysmal Afib (Apixaban), DM2, HTN, CKD3, and hx of diverticulitis who was admitted to ortho service for eval and surgical correction of left femoral IT fracture s/p mechanical fall at home. While on ortho service, pt experienced episode of hypotension. Transferred to medicine. Ortho/surgical teams co-managing.     #left hip intertrochanteric fx, s/p orif on 9/6, wbat as per ortho  - Wound care.   - Fall precautions.  - Adequate pain control  Management as per ortho:   - WBAT LLE  - DVT ppx  - PT/OT    #Hypotension, rule out opioid-induced vs cardiac vs infectious.   - Send procal/CRP. TSH. Echo.   - Send blood cultures.   - Hold opioids for now, APAP only. Check orthostatics.   - Holding ABXs for now.  - EKG ordered    #DM2 #HTN #CKD3 #Diverticulitis, #HFpEF #Paroxysmal Afib   - c/w home meds: atorvastatin, clopidogrel, calcitriol, , evista, folic acid, latanoprost, levothyroxine, losartan, carvededilol    #HTN  - c/w home med(s): furosemide     #DM 2  - c/w home med(s): lantus,    DVT PPX.   - c/w eliquis 2.5 bid    GI ppx   - pantoprazole 40mg daily            86 YO F with a PMH of CAD, HFpEF, paroxysmal Afib (Apixaban), DM2, HTN, CKD3, and hx of diverticulitis who was admitted to ortho service for eval and surgical correction of left femoral IT fracture s/p mechanical fall at home. While on ortho service, pt experienced episode of hypotension. Transferred to medicine. Ortho/surgical teams co-managing.     #left hip intertrochanteric fx, s/p orif on 9/6, wbat as per ortho  - Wound care.   - Fall precautions.  - Adequate pain control  Management as per ortho:   - WBAT LLE  - DVT ppx  - PT/OT    #Hypotension, rule out opioid-induced vs cardiac vs infectious.   - Send procal/CRP. TSH. Echo.   - Send blood cultures.   - Hold opioids for now, APAP only. Check orthostatics.   - Holding ABXs for now.  - EKG ordered    #DM2 #HTN #CKD3 #Diverticulitis, #HFpEF #Paroxysmal Afib   - c/w home meds: atorvastatin, clopidogrel, calcitriol, , evista, folic acid, latanoprost, levothyroxine, losartan, carvededilol, eliquis 2.5 bid    #HTN  - c/w home med(s): furosemide     #DM 2  - c/w home med(s): lantus,    DVT PPX.   - SQH q12h    GI ppx   - pantoprazole 40mg daily            84 YO F with a PMH of CAD, HFpEF, paroxysmal Afib (Apixaban), DM2, HTN, CKD3, and hx of diverticulitis who was admitted to ortho service for eval and surgical correction of left femoral IT fracture s/p mechanical fall at home. While on ortho service, pt experienced episode of hypotension. Transferred to medicine. Ortho/surgical teams co-managing.     #left hip intertrochanteric fx, s/p orif on 9/6, wbat as per ortho  - Wound care.   - Fall precautions.  - Adequate pain control  Management as per ortho:   - WBAT LLE  - PT/OT    #Hypotension, rule out opioid-induced vs cardiac vs infectious.   - Improved significantly. Normotensive in AM today.   - ordered procal/CRP. TSH.   - Ordered blood cultures.   - Echo.   - Hold opioids for now. Check orthostatics.   - Holding ABXs for now.  - EKG: T wave abnormality, consider lateral ischemia    #DM2 #HTN #CKD3 #Diverticulitis, #HFpEF #Paroxysmal Afib   - c/w home meds: atorvastatin, clopidogrel, calcitriol, , evista, folic acid, latanoprost, levothyroxine, losartan, carvededilol, eliquis 2.5 bid  - Patient reports ongoing retrosternal chest pain, cardiac consult     #HTN  - c/w home med(s): furosemide     #DM 2  - c/w home med(s): lantus,    DVT PPX.   - SQH q12h    GI ppx   - pantoprazole 40mg daily            86 YO F with a PMH of CAD, HFpEF, paroxysmal Afib (Apixaban), DM2, HTN, CKD3, and hx of diverticulitis who was admitted to ortho service for eval and surgical correction of left femoral IT fracture s/p mechanical fall at home. While on ortho service, pt experienced episode of hypotension. Transferred to medicine. Ortho/surgical teams co-managing.     #left hip intertrochanteric fx, s/p orif on 9/6, wbat as per ortho  - Wound care.   - Fall precautions.  - Adequate pain control  Management as per ortho:   - WBAT LLE  - PT/OT    #Hypotension, rule out opioid-induced vs cardiac vs infectious.   - Improved significantly - Normotensive in AM today 9/8/21   - BP was previously 66/46. 1u PRBC before OR. 2 units in OR.   - ordered procal/CRP. TSH.   - Ordered blood cultures.   - Echo.   - Hold opioids for now.   - Ordered orthostatics.   - Holding ABXs for now.  - EKG: T wave abnormality, consider lateral ischemia    #DM2 #HTN #CKD3 #Diverticulitis, #HFpEF #Paroxysmal Afib   - c/w home meds: atorvastatin, clopidogrel, calcitriol, , evista, folic acid, latanoprost, levothyroxine, losartan, carvededilol   - c/w eliquis 2.5 bid  - cardiac consult - for retrosternal chest pain.     #HTN  - c/w home med(s): furosemide     #DM 2  - c/w home med(s): lantus,    DVT PPX.   - SQH q12h    GI ppx   - pantoprazole 40mg daily            84 YO F with a PMH of CAD, HFpEF, paroxysmal Afib (Apixaban), DM2, HTN, CKD3, and hx of diverticulitis who was admitted to ortho service for eval and surgical correction of left femoral IT fracture s/p mechanical fall at home. While on ortho service, pt experienced episode of hypotension. Transferred to medicine. Ortho/surgical teams co-managing.     #left hip intertrochanteric fx, s/p orif on 9/6, wbat as per ortho  - Wound care.   - Fall precautions.  - Adequate pain control  Management as per ortho:   - WBAT LLE  - PT/OT    #Hypotension, rule out opioid-induced vs cardiac vs infectious.   - Improved significantly - Normotensive in AM today 9/8/21   - BP was previously 66/46. 1u PRBC before OR. 2 units in OR.   - ordered procal/CRP. TSH.   - Ordered blood cultures.   - Echo.   - Hold opioids for now.   - Ordered orthostatics.   - Holding ABXs for now.  - EKG: T wave abnormality, consider lateral ischemia    #DM2 #HTN #CKD3 #Diverticulitis, #HFpEF #Paroxysmal Afib   - c/w home meds: atorvastatin, clopidogrel, calcitriol, , evista, folic acid, latanoprost, levothyroxine, losartan, carvededilol   - c/w eliquis 2.5 bid  - cardiac consult - for retrosternal chest pain.     #HTN  - c/w home med(s): furosemide     #DM 2  - c/w home med(s): lantus,    DVT PPX.   - eliquis    GI ppx   - pantoprazole 40mg daily

## 2021-09-08 NOTE — PROGRESS NOTE ADULT - SUBJECTIVE AND OBJECTIVE BOX
Hospital Day:  3d    Subjective:    No acute events overnight.    Chart reviewed, patient seen and examined at bedside in AM. Patient appears comfortable while at rest in bed. No other complaints.       Past Medical Hx:   CAD (coronary artery disease)    Chronic CHF    Atrial fibrillation    Type 2 diabetes mellitus    Colon cancer    Hypertension    CKD (chronic kidney disease)    History of Clostridium difficile colitis    Diverticulitis      Past Sx:  S/P hysterectomy      Allergies:  No Known Allergies    Current Meds:   Standng Meds:  acetaminophen   Tablet .. 650 milliGRAM(s) Oral every 6 hours  ascorbic acid 500 milliGRAM(s) Oral two times a day  atorvastatin 80 milliGRAM(s) Oral at bedtime  calcitriol   Capsule 0.25 MICROGram(s) Oral daily  carvedilol 25 milliGRAM(s) Oral every 12 hours  dextrose 40% Gel 15 Gram(s) Oral once  dextrose 50% Injectable 25 Gram(s) IV Push once  ferrous    sulfate 325 milliGRAM(s) Oral daily  folic acid 1 milliGRAM(s) Oral daily  glucagon  Injectable 1 milliGRAM(s) IntraMuscular once  heparin   Injectable 5000 Unit(s) SubCutaneous every 12 hours  insulin lispro (ADMELOG) corrective regimen sliding scale   SubCutaneous every 6 hours  lactated ringers. 500 milliLiter(s) (75 mL/Hr) IV Continuous <Continuous>  latanoprost 0.005% Ophthalmic Solution 1 Drop(s) Both EYES at bedtime  levothyroxine 25 MICROGram(s) Oral daily  losartan 25 milliGRAM(s) Oral daily  midodrine.      midodrine. 5 milliGRAM(s) Oral three times a day  multivitamin 1 Tablet(s) Oral daily  pantoprazole    Tablet 40 milliGRAM(s) Oral before breakfast  polyethylene glycol 3350 17 Gram(s) Oral daily  raloxifene 60 milliGRAM(s) Oral daily  senna 2 Tablet(s) Oral at bedtime    PRN Meds:  bisacodyl Suppository 10 milliGRAM(s) Rectal daily PRN If no bowel movement by POD#2  diphenhydrAMINE 25 milliGRAM(s) Oral at bedtime PRN Insomnia  morphine IVPB 4 milliGRAM(s) IV Intermittent every 4 hours PRN Severe Pain (7 - 10)  ondansetron Injectable 4 milliGRAM(s) IV Push every 6 hours PRN Nausea and/or Vomiting    HOME MEDICATIONS:  atorvastatin 80 mg oral tablet:   calcitriol 0.25 mcg oral capsule: 1 cap(s) orally once a day  carvedilol 25 mg oral tablet:   clopidogrel 75 mg oral tablet: 1 tab(s) orally once a day  Eliquis 2.5 mg oral tablet: 1 tab(s) orally 2 times a day  Evista 60 mg oral tablet: 1 tab(s) orally once a day  folic acid 1 mg oral tablet:   furosemide 40 mg oral tablet: 1 tab(s) orally once a day  Lantus: 10 unit(s) subcutaneous once a day (in the morning)  latanoprost 0.005% ophthalmic solution: 1 drop(s) to each affected eye once a day (in the evening)  levothyroxine 25 mcg (0.025 mg) oral tablet: 1 tab(s) orally once a day  losartan 25 mg oral tablet: 1 tab(s) orally once a day      Vital Signs:   T(F): 97.3 (21 @ 08:03), Max: 97.8 (21 @ 22:49)  HR: 79 (21 @ 08:03) (68 - 99)  BP: 120/57 (21 @ 08:03) (66/46 - 141/67)  RR: 18 (21 @ 08:03) (17 - 20)  SpO2: 99% (21 @ 06:00) (95% - 99%)      21 @ 07:01  -  21 @ 07:00  --------------------------------------------------------  IN: 0 mL / OUT: 250 mL / NET: -250 mL        Physical Exam:   CONSTITUTIONAL: Well-developed; well-nourished; in no acute distress, speaking in full sentences  HEAD: Normocephalic; atraumatic  EYES: PERRL, EOMI, no conjunctival erythema  NECK: Supple; non tender, FROM  CARD: +S1, S2 Regular rate and rhythm.  RESP: CTABL  ABD: soft nontender, nondistended, no rebound, no guarding, no rigidity  EXT: moves all extremities,  No clubbing, cyanosis or edema.   NEURO: Alert, oriented, grossly unremarkable, no focal deficits, cn ii-xii grossly intact  PSYCH: Cooperative, appropriate         Labs:                         8.4    16.75 )-----------( 154      ( 08 Sep 2021 06:48 )             25.0     Hemoglobin: 8.4 g/dL ( @ 06:48)  Hemoglobin: 8.3 g/dL ( @ 05:43)  Hemoglobin: 10.0 g/dL ( @ 20:57)  Hemoglobin: 7.7 g/dL ( @ 07:11)  Hemoglobin: 7.8 g/dL ( @ 04:00)      Neutophil% 79.4, Lymphocyte% 12.8, Monocyte% 6.7, Bands% 0.8 21 @ 06:48    08 Sep 2021 06:48    130    |  96     |  71     ----------------------------<  149    4.8     |  19     |  2.8      Ca    9.3        08 Sep 2021 06:48  Phos  3.8       06 Sep 2021 20:57  Mg     2.0       08 Sep 2021 06:48    TPro  4.9    /  Alb  3.0    /  TBili  0.3    /  DBili  x      /  AST  30     /  ALT  21     /  AlkPhos  53     08 Sep 2021 06:48        Amylase --, Lipase 88, 21 @ 19:50        Troponin 0.11, CKMB --, CK -- 21 @ 06:48  Troponin 0.02, CKMB --, CK 41 21 @ 06:12  Troponin 0.02, CKMB --, CK 56 21 @ 19:50      Urinalysis Basic - ( 05 Sep 2021 01:33 )    Color: Light Yellow / Appearance: Clear / S.012 / pH: x  Gluc: x / Ketone: Negative  / Bili: Negative / Urobili: <2 mg/dL   Blood: x / Protein: Trace / Nitrite: Negative   Leuk Esterase: Moderate / RBC: 0 /HPF / WBC 15 /HPF   Sq Epi: x / Non Sq Epi: 1 /HPF / Bacteria: Many        Radiology:   IMAGING:    < from: CT Abdomen and Pelvis No Cont (21 @ 05:52) >    IMPRESSION:    Diffuse gaseous distention of the bowel including 10 cm gaseous distention of the rectum. No definite obstructions. Findings could reflect with ileus in the appropriate setting.    Bilateral pleural effusions with adjacent opacities, likely atelectasis.    Interval left intramedullary gamma nail fixation with adjacent partially imaged intramuscular hematoma.    --- End of Report ---        < end of copied text >  < from: CT Hip No Cont, Left (21 @ 10:43) >    IMPRESSION:    Acute minimally displaced left trochanteric hip fracture with extension through the greater trochanter, unchanged.    --- End of Report ---      < end of copied text >  < from: Xray Hip 2-3 Views, Left (21 @ 22:19) >      IMPRESSION:      Left femoral intertrochanteric fracture    Otherwise, no evidence of acute abdominal or pelvic pathology    Above fluid density lesion in the interpolar region of the right kidney measuring up to 2.3 cm. Nonemergent ultrasound may be of benefit for further evaluation.    --- End of Report ---      < end of copied text >  < from: Xray Femur 1 View, Left (21 @ 22:19) >  FINDINGS/  IMPRESSION:    Left intertrochanteric fracture, better evaluated on referenced CT.    Degenerative changes left knee with chondrocalcinosis.    --- End of Report ---      < end of copied text >  < from: Xray Chest 1 View AP/PA (21 @ 22:18) >  Impression:    No radiographic evidence of acute cardiopulmonary disease.        --- End of Report ---    < end of copied text >  < from: CT Abdomen and Pelvis No Cont (21 @ 21:45) >  IMPRESSION:      Left femoral intertrochanteric fracture    Otherwise, no evidence of acute abdominal or pelvic pathology    Above fluid density lesion in the interpolar region of the right kidney measuring up to 2.3 cm. Nonemergent ultrasound may be of benefit for further evaluation.    --- End of Report ---      < end of copied text >  < from: CT Chest No Cont (21 @ 21:45) >  IMPRESSION:      Left femoral intertrochanteric fracture    Otherwise, no evidence of acute abdominal or pelvic pathology    Above fluid density lesion in the interpolar region of the right kidney measuring up to 2.3 cm. Nonemergent ultrasound may be of benefit for further evaluation.    --- End of Report ---      < end of copied text >           Hospital Day:  3d    Subjective:    No acute events overnight.    Chart reviewed, patient seen and examined at bedside in AM. Patient appears comfortable while at rest in bed. No other complaints.       Past Medical Hx:   CAD (coronary artery disease)    Chronic CHF    Atrial fibrillation    Type 2 diabetes mellitus    Colon cancer    Hypertension    CKD (chronic kidney disease)    History of Clostridium difficile colitis    Diverticulitis      Past Sx:  S/P hysterectomy      Allergies:  No Known Allergies    Current Meds:   Standng Meds:  acetaminophen   Tablet .. 650 milliGRAM(s) Oral every 6 hours  ascorbic acid 500 milliGRAM(s) Oral two times a day  atorvastatin 80 milliGRAM(s) Oral at bedtime  calcitriol   Capsule 0.25 MICROGram(s) Oral daily  carvedilol 25 milliGRAM(s) Oral every 12 hours  dextrose 40% Gel 15 Gram(s) Oral once  dextrose 50% Injectable 25 Gram(s) IV Push once  ferrous    sulfate 325 milliGRAM(s) Oral daily  folic acid 1 milliGRAM(s) Oral daily  glucagon  Injectable 1 milliGRAM(s) IntraMuscular once  heparin   Injectable 5000 Unit(s) SubCutaneous every 12 hours  insulin lispro (ADMELOG) corrective regimen sliding scale   SubCutaneous every 6 hours  lactated ringers. 500 milliLiter(s) (75 mL/Hr) IV Continuous <Continuous>  latanoprost 0.005% Ophthalmic Solution 1 Drop(s) Both EYES at bedtime  levothyroxine 25 MICROGram(s) Oral daily  losartan 25 milliGRAM(s) Oral daily  midodrine.      midodrine. 5 milliGRAM(s) Oral three times a day  multivitamin 1 Tablet(s) Oral daily  pantoprazole    Tablet 40 milliGRAM(s) Oral before breakfast  polyethylene glycol 3350 17 Gram(s) Oral daily  raloxifene 60 milliGRAM(s) Oral daily  senna 2 Tablet(s) Oral at bedtime    PRN Meds:  bisacodyl Suppository 10 milliGRAM(s) Rectal daily PRN If no bowel movement by POD#2  diphenhydrAMINE 25 milliGRAM(s) Oral at bedtime PRN Insomnia  morphine IVPB 4 milliGRAM(s) IV Intermittent every 4 hours PRN Severe Pain (7 - 10)  ondansetron Injectable 4 milliGRAM(s) IV Push every 6 hours PRN Nausea and/or Vomiting    HOME MEDICATIONS:  atorvastatin 80 mg oral tablet:   calcitriol 0.25 mcg oral capsule: 1 cap(s) orally once a day  carvedilol 25 mg oral tablet:   clopidogrel 75 mg oral tablet: 1 tab(s) orally once a day  Eliquis 2.5 mg oral tablet: 1 tab(s) orally 2 times a day  Evista 60 mg oral tablet: 1 tab(s) orally once a day  folic acid 1 mg oral tablet:   furosemide 40 mg oral tablet: 1 tab(s) orally once a day  Lantus: 10 unit(s) subcutaneous once a day (in the morning)  latanoprost 0.005% ophthalmic solution: 1 drop(s) to each affected eye once a day (in the evening)  levothyroxine 25 mcg (0.025 mg) oral tablet: 1 tab(s) orally once a day  losartan 25 mg oral tablet: 1 tab(s) orally once a day        Vital Signs:   T(F): 97.3 (21 @ 08:03), Max: 97.8 (21 @ 22:49)  HR: 79 (21 @ 08:03) (68 - 99)  BP: 120/57 (21 @ 08:03) (66/46 - 141/67)  RR: 18 (21 @ 08:03) (17 - 20)  SpO2: 99% (21 @ 06:00) (95% - 99%)      21 @ 07:01  -  21 @ 07:00  --------------------------------------------------------  IN: 0 mL / OUT: 250 mL / NET: -250 mL        Physical Exam:   CONSTITUTIONAL: Well-developed; well-nourished; in no acute distress, speaking in full sentences  HEAD: Normocephalic; atraumatic  EYES: PERRL, EOMI, no conjunctival erythema  NECK: Supple; non tender, FROM  CARD: +S1, S2 Regular rate and rhythm.  RESP: CTABL  ABD: soft nontender, nondistended, no rebound, no guarding, no rigidity  EXT: moves all extremities,  No clubbing, cyanosis or edema.   NEURO: Alert, oriented, grossly unremarkable, no focal deficits, cn ii-xii grossly intact  PSYCH: Cooperative, appropriate         Labs:                         8.4    16.75 )-----------( 154      ( 08 Sep 2021 06:48 )             25.0     Hemoglobin: 8.4 g/dL ( @ 06:48)  Hemoglobin: 8.3 g/dL ( @ 05:43)  Hemoglobin: 10.0 g/dL ( @ 20:57)  Hemoglobin: 7.7 g/dL ( @ 07:11)  Hemoglobin: 7.8 g/dL ( @ 04:00)      Neutophil% 79.4, Lymphocyte% 12.8, Monocyte% 6.7, Bands% 0.8 21 @ 06:48    08 Sep 2021 06:48    130    |  96     |  71     ----------------------------<  149    4.8     |  19     |  2.8      Ca    9.3        08 Sep 2021 06:48  Phos  3.8       06 Sep 2021 20:57  Mg     2.0       08 Sep 2021 06:48    TPro  4.9    /  Alb  3.0    /  TBili  0.3    /  DBili  x      /  AST  30     /  ALT  21     /  AlkPhos  53     08 Sep 2021 06:48        Amylase --, Lipase 88, 21 @ 19:50        Troponin 0.11, CKMB --, CK -- 21 @ 06:48  Troponin 0.02, CKMB --, CK 41 21 @ 06:12  Troponin 0.02, CKMB --, CK 56 21 @ 19:50      Urinalysis Basic - ( 05 Sep 2021 01:33 )    Color: Light Yellow / Appearance: Clear / S.012 / pH: x  Gluc: x / Ketone: Negative  / Bili: Negative / Urobili: <2 mg/dL   Blood: x / Protein: Trace / Nitrite: Negative   Leuk Esterase: Moderate / RBC: 0 /HPF / WBC 15 /HPF   Sq Epi: x / Non Sq Epi: 1 /HPF / Bacteria: Many        Radiology:   IMAGING:    < from: CT Abdomen and Pelvis No Cont (21 @ 05:52) >    IMPRESSION:    Diffuse gaseous distention of the bowel including 10 cm gaseous distention of the rectum. No definite obstructions. Findings could reflect with ileus in the appropriate setting.    Bilateral pleural effusions with adjacent opacities, likely atelectasis.    Interval left intramedullary gamma nail fixation with adjacent partially imaged intramuscular hematoma.    --- End of Report ---        < end of copied text >  < from: CT Hip No Cont, Left (21 @ 10:43) >    IMPRESSION:    Acute minimally displaced left trochanteric hip fracture with extension through the greater trochanter, unchanged.    --- End of Report ---      < end of copied text >  < from: Xray Hip 2-3 Views, Left (21 @ 22:19) >      IMPRESSION:      Left femoral intertrochanteric fracture    Otherwise, no evidence of acute abdominal or pelvic pathology    Above fluid density lesion in the interpolar region of the right kidney measuring up to 2.3 cm. Nonemergent ultrasound may be of benefit for further evaluation.    --- End of Report ---      < end of copied text >  < from: Xray Femur 1 View, Left (21 @ 22:19) >  FINDINGS/  IMPRESSION:    Left intertrochanteric fracture, better evaluated on referenced CT.    Degenerative changes left knee with chondrocalcinosis.    --- End of Report ---      < end of copied text >  < from: Xray Chest 1 View AP/PA (21 @ 22:18) >  Impression:    No radiographic evidence of acute cardiopulmonary disease.        --- End of Report ---    < end of copied text >  < from: CT Abdomen and Pelvis No Cont (21 @ 21:45) >  IMPRESSION:      Left femoral intertrochanteric fracture    Otherwise, no evidence of acute abdominal or pelvic pathology    Above fluid density lesion in the interpolar region of the right kidney measuring up to 2.3 cm. Nonemergent ultrasound may be of benefit for further evaluation.    --- End of Report ---      < end of copied text >  < from: CT Chest No Cont (21 @ 21:45) >  IMPRESSION:      Left femoral intertrochanteric fracture    Otherwise, no evidence of acute abdominal or pelvic pathology    Above fluid density lesion in the interpolar region of the right kidney measuring up to 2.3 cm. Nonemergent ultrasound may be of benefit for further evaluation.    --- End of Report ---      < end of copied text >           Hospital Day:  3d    Subjective:    No acute events overnight.    Chart reviewed, patient seen and examined at bedside in AM. Patient appears comfortable while at rest in bed. No other complaints.       Past Medical Hx:   CAD (coronary artery disease)    Chronic CHF    Atrial fibrillation    Type 2 diabetes mellitus    Colon cancer    Hypertension    CKD (chronic kidney disease)    History of Clostridium difficile colitis    Diverticulitis      Past Sx:  S/P hysterectomy      Allergies:  No Known Allergies    Current Meds:   Standng Meds:  acetaminophen   Tablet .. 650 milliGRAM(s) Oral every 6 hours  ascorbic acid 500 milliGRAM(s) Oral two times a day  atorvastatin 80 milliGRAM(s) Oral at bedtime  calcitriol   Capsule 0.25 MICROGram(s) Oral daily  carvedilol 25 milliGRAM(s) Oral every 12 hours  dextrose 40% Gel 15 Gram(s) Oral once  dextrose 50% Injectable 25 Gram(s) IV Push once  ferrous    sulfate 325 milliGRAM(s) Oral daily  folic acid 1 milliGRAM(s) Oral daily  glucagon  Injectable 1 milliGRAM(s) IntraMuscular once  heparin   Injectable 5000 Unit(s) SubCutaneous every 12 hours  insulin lispro (ADMELOG) corrective regimen sliding scale   SubCutaneous every 6 hours  lactated ringers. 500 milliLiter(s) (75 mL/Hr) IV Continuous <Continuous>  latanoprost 0.005% Ophthalmic Solution 1 Drop(s) Both EYES at bedtime  levothyroxine 25 MICROGram(s) Oral daily  losartan 25 milliGRAM(s) Oral daily  midodrine.      midodrine. 5 milliGRAM(s) Oral three times a day  multivitamin 1 Tablet(s) Oral daily  pantoprazole    Tablet 40 milliGRAM(s) Oral before breakfast  polyethylene glycol 3350 17 Gram(s) Oral daily  raloxifene 60 milliGRAM(s) Oral daily  senna 2 Tablet(s) Oral at bedtime    PRN Meds:  bisacodyl Suppository 10 milliGRAM(s) Rectal daily PRN If no bowel movement by POD#2  diphenhydrAMINE 25 milliGRAM(s) Oral at bedtime PRN Insomnia  morphine IVPB 4 milliGRAM(s) IV Intermittent every 4 hours PRN Severe Pain (7 - 10)  ondansetron Injectable 4 milliGRAM(s) IV Push every 6 hours PRN Nausea and/or Vomiting    HOME MEDICATIONS:  atorvastatin 80 mg oral tablet:   calcitriol 0.25 mcg oral capsule: 1 cap(s) orally once a day  carvedilol 25 mg oral tablet:   clopidogrel 75 mg oral tablet: 1 tab(s) orally once a day  Eliquis 2.5 mg oral tablet: 1 tab(s) orally 2 times a day  Evista 60 mg oral tablet: 1 tab(s) orally once a day  folic acid 1 mg oral tablet:   furosemide 40 mg oral tablet: 1 tab(s) orally once a day  Lantus: 10 unit(s) subcutaneous once a day (in the morning)  latanoprost 0.005% ophthalmic solution: 1 drop(s) to each affected eye once a day (in the evening)  levothyroxine 25 mcg (0.025 mg) oral tablet: 1 tab(s) orally once a day  losartan 25 mg oral tablet: 1 tab(s) orally once a day        Vital Signs:   T(F): 97.3 (21 @ 08:03), Max: 97.8 (21 @ 22:49)  HR: 79 (21 @ 08:03) (68 - 99)  BP: 120/57 (21 @ 08:03) (66/46 - 141/67)  RR: 18 (21 @ 08:03) (17 - 20)  SpO2: 99% (21 @ 06:00) (95% - 99%)      21 @ 07:01  -  21 @ 07:00  --------------------------------------------------------  IN: 0 mL / OUT: 250 mL / NET: -250 mL        Physical Exam:   CONSTITUTIONAL: Well-developed; well-nourished; speaking in full sentences  HEAD: Normocephalic; atraumatic  EYES: EOMI, no conjunctival erythema  NECK: Supple; non tender,   CARD: +S1, S2 Regular rate and rhythm.  RESP: CTABL  ABD: soft nontender, nondistended, no rebound, no guarding, no rigidity  EXT: No clubbing, cyanosis or edema. Equal sensation bilaterally in upper and lower extremities.   NEURO: Alert, oriented, grossly unremarkable, no focal deficits, cn ii-xii grossly intact  PSYCH: Cooperative, appropriate       Labs:                         8.4    16.75 )-----------( 154      ( 08 Sep 2021 06:48 )             25.0     Hemoglobin: 8.4 g/dL ( @ 06:48)  Hemoglobin: 8.3 g/dL ( @ 05:43)  Hemoglobin: 10.0 g/dL ( @ 20:57)  Hemoglobin: 7.7 g/dL ( @ 07:11)  Hemoglobin: 7.8 g/dL ( @ 04:00)      Neutophil% 79.4, Lymphocyte% 12.8, Monocyte% 6.7, Bands% 0.8 21 @ 06:48    08 Sep 2021 06:48    130    |  96     |  71     ----------------------------<  149    4.8     |  19     |  2.8      Ca    9.3        08 Sep 2021 06:48  Phos  3.8       06 Sep 2021 20:57  Mg     2.0       08 Sep 2021 06:48    TPro  4.9    /  Alb  3.0    /  TBili  0.3    /  DBili  x      /  AST  30     /  ALT  21     /  AlkPhos  53     08 Sep 2021 06:48        Amylase --, Lipase 88, 21 @ 19:50        Troponin 0.11, CKMB --, CK -- 21 @ 06:48  Troponin 0.02, CKMB --, CK 41 21 @ 06:12  Troponin 0.02, CKMB --, CK 56 21 @ 19:50      Urinalysis Basic - ( 05 Sep 2021 01:33 )    Color: Light Yellow / Appearance: Clear / S.012 / pH: x  Gluc: x / Ketone: Negative  / Bili: Negative / Urobili: <2 mg/dL   Blood: x / Protein: Trace / Nitrite: Negative   Leuk Esterase: Moderate / RBC: 0 /HPF / WBC 15 /HPF   Sq Epi: x / Non Sq Epi: 1 /HPF / Bacteria: Many        Radiology:   IMAGING:    < from: CT Abdomen and Pelvis No Cont (21 @ 05:52) >    IMPRESSION:    Diffuse gaseous distention of the bowel including 10 cm gaseous distention of the rectum. No definite obstructions. Findings could reflect with ileus in the appropriate setting.    Bilateral pleural effusions with adjacent opacities, likely atelectasis.    Interval left intramedullary gamma nail fixation with adjacent partially imaged intramuscular hematoma.    --- End of Report ---        < end of copied text >  < from: CT Hip No Cont, Left (21 @ 10:43) >    IMPRESSION:    Acute minimally displaced left trochanteric hip fracture with extension through the greater trochanter, unchanged.    --- End of Report ---      < end of copied text >  < from: Xray Hip 2-3 Views, Left (21 @ 22:19) >      IMPRESSION:      Left femoral intertrochanteric fracture    Otherwise, no evidence of acute abdominal or pelvic pathology    Above fluid density lesion in the interpolar region of the right kidney measuring up to 2.3 cm. Nonemergent ultrasound may be of benefit for further evaluation.    --- End of Report ---      < end of copied text >  < from: Xray Femur 1 View, Left (21 @ 22:19) >  FINDINGS/  IMPRESSION:    Left intertrochanteric fracture, better evaluated on referenced CT.    Degenerative changes left knee with chondrocalcinosis.    --- End of Report ---      < end of copied text >  < from: Xray Chest 1 View AP/PA (21 @ 22:18) >  Impression:    No radiographic evidence of acute cardiopulmonary disease.        --- End of Report ---    < end of copied text >  < from: CT Abdomen and Pelvis No Cont (21 @ 21:45) >  IMPRESSION:      Left femoral intertrochanteric fracture    Otherwise, no evidence of acute abdominal or pelvic pathology    Above fluid density lesion in the interpolar region of the right kidney measuring up to 2.3 cm. Nonemergent ultrasound may be of benefit for further evaluation.    --- End of Report ---      < end of copied text >  < from: CT Chest No Cont (21 @ 21:45) >  IMPRESSION:      Left femoral intertrochanteric fracture    Otherwise, no evidence of acute abdominal or pelvic pathology    Above fluid density lesion in the interpolar region of the right kidney measuring up to 2.3 cm. Nonemergent ultrasound may be of benefit for further evaluation.    --- End of Report ---      < end of copied text >           Hospital Day:  3d    Subjective:    No acute events overnight.    Chart reviewed, patient seen and examined at bedside in AM. Patient appears comfortable while at rest in bed. No other complaints.       Past Medical Hx:   CAD (coronary artery disease)    Chronic CHF    Atrial fibrillation    Type 2 diabetes mellitus    Colon cancer    Hypertension    CKD (chronic kidney disease)    History of Clostridium difficile colitis    Diverticulitis      Past Sx:  S/P hysterectomy      Allergies:  No Known Allergies    Current Meds:   Standng Meds:  acetaminophen   Tablet .. 650 milliGRAM(s) Oral every 6 hours  ascorbic acid 500 milliGRAM(s) Oral two times a day  atorvastatin 80 milliGRAM(s) Oral at bedtime  calcitriol   Capsule 0.25 MICROGram(s) Oral daily  carvedilol 25 milliGRAM(s) Oral every 12 hours  dextrose 40% Gel 15 Gram(s) Oral once  dextrose 50% Injectable 25 Gram(s) IV Push once  ferrous    sulfate 325 milliGRAM(s) Oral daily  folic acid 1 milliGRAM(s) Oral daily  glucagon  Injectable 1 milliGRAM(s) IntraMuscular once  heparin   Injectable 5000 Unit(s) SubCutaneous every 12 hours  insulin lispro (ADMELOG) corrective regimen sliding scale   SubCutaneous every 6 hours  lactated ringers. 500 milliLiter(s) (75 mL/Hr) IV Continuous <Continuous>  latanoprost 0.005% Ophthalmic Solution 1 Drop(s) Both EYES at bedtime  levothyroxine 25 MICROGram(s) Oral daily  losartan 25 milliGRAM(s) Oral daily  midodrine.      midodrine. 5 milliGRAM(s) Oral three times a day  multivitamin 1 Tablet(s) Oral daily  pantoprazole    Tablet 40 milliGRAM(s) Oral before breakfast  polyethylene glycol 3350 17 Gram(s) Oral daily  raloxifene 60 milliGRAM(s) Oral daily  senna 2 Tablet(s) Oral at bedtime    PRN Meds:  bisacodyl Suppository 10 milliGRAM(s) Rectal daily PRN If no bowel movement by POD#2  diphenhydrAMINE 25 milliGRAM(s) Oral at bedtime PRN Insomnia  morphine IVPB 4 milliGRAM(s) IV Intermittent every 4 hours PRN Severe Pain (7 - 10)  ondansetron Injectable 4 milliGRAM(s) IV Push every 6 hours PRN Nausea and/or Vomiting    HOME MEDICATIONS:  atorvastatin 80 mg oral tablet:   calcitriol 0.25 mcg oral capsule: 1 cap(s) orally once a day  carvedilol 25 mg oral tablet:   clopidogrel 75 mg oral tablet: 1 tab(s) orally once a day  Eliquis 2.5 mg oral tablet: 1 tab(s) orally 2 times a day  Evista 60 mg oral tablet: 1 tab(s) orally once a day  folic acid 1 mg oral tablet:   furosemide 40 mg oral tablet: 1 tab(s) orally once a day  Lantus: 10 unit(s) subcutaneous once a day (in the morning)  latanoprost 0.005% ophthalmic solution: 1 drop(s) to each affected eye once a day (in the evening)  levothyroxine 25 mcg (0.025 mg) oral tablet: 1 tab(s) orally once a day  losartan 25 mg oral tablet: 1 tab(s) orally once a day        Vital Signs:   T(F): 97.3 (21 @ 08:03), Max: 97.8 (21 @ 22:49)  HR: 79 (21 @ 08:03) (68 - 99)  BP: 120/57 (21 @ 08:03) (66/46 - 141/67)  RR: 18 (21 @ 08:03) (17 - 20)  SpO2: 99% (21 @ 06:00) (95% - 99%)      21 @ 07:01  -  21 @ 07:00  --------------------------------------------------------  IN: 0 mL / OUT: 250 mL / NET: -250 mL        Physical Exam:   CONSTITUTIONAL: speaking in full sentences  HEAD: Normocephalic; atraumatic  EYES: EOMI, NECK: Supple; non tender,   CARD: +S1, S2 Regular rate and rhythm.  RESP: CTABL  ABD: soft nontender, nondistended, no rebound, no guarding, no rigidity  EXT: No clubbing, cyanosis or edema. Equal sensation bilaterally in upper and lower extremities.   NEURO: Alert, oriented, grossly unremarkable, no focal deficits, cn ii-xii grossly intact  PSYCH: Cooperative      Labs:                         8.4    16.75 )-----------( 154      ( 08 Sep 2021 06:48 )             25.0     WBC Count: 17.38 K/uL ( @ 12:08)  WBC Count: 16.75 K/uL ( @ 06:48)  WBC Count: 13.58 K/uL ( @ 05:43)  WBC Count: 14.48 K/uL ( @ 20:57)  WBC Count: 9.31 K/uL ( @ 07:11)    Hemoglobin: 8.4 g/dL (:48)  Hemoglobin: 8.3 g/dL ( @ 05:43)  Hemoglobin: 10.0 g/dL ( 20:57)  Hemoglobin: 7.7 g/dL ( @ 07:11)  Hemoglobin: 7.8 g/dL ( @ 04:00)    Neutophil% 79.4, Lymphocyte% 12.8, Monocyte% 6.7, Bands% 0.8 21 @ 06:48    08 Sep 2021 06:48    130    |  96     |  71     ----------------------------<  149    4.8     |  19     |  2.8      Ca    9.3        08 Sep 2021 06:48  Phos  3.8       06 Sep 2021 20:57  Mg     2.0       08 Sep 2021 06:48    TPro  4.9    /  Alb  3.0    /  TBili  0.3    /  DBili  x      /  AST  30     /  ALT  21     /  AlkPhos  53     08 Sep 2021 06:48      Amylase --, Lipase 88, 21 @ 19:50      Troponin 0.11, CKMB --, CK -- 21 @ 06:48  Troponin 0.02, CKMB --, CK 41 21 @ 06:12  Troponin 0.02, CKMB --, CK 56 21 @ 19:50      Urinalysis Basic - ( 05 Sep 2021 01:33 )    Color: Light Yellow / Appearance: Clear / S.012 / pH: x  Gluc: x / Ketone: Negative  / Bili: Negative / Urobili: <2 mg/dL   Blood: x / Protein: Trace / Nitrite: Negative   Leuk Esterase: Moderate / RBC: 0 /HPF / WBC 15 /HPF   Sq Epi: x / Non Sq Epi: 1 /HPF / Bacteria: Many        Radiology:     IMAGING:    < from: CT Abdomen and Pelvis No Cont (21 @ 05:52) >    IMPRESSION:    Diffuse gaseous distention of the bowel including 10 cm gaseous distention of the rectum. No definite obstructions. Findings could reflect with ileus in the appropriate setting.    Bilateral pleural effusions with adjacent opacities, likely atelectasis.    Interval left intramedullary gamma nail fixation with adjacent partially imaged intramuscular hematoma.    --- End of Report ---        < end of copied text >  < from: CT Hip No Cont, Left (21 @ 10:43) >    IMPRESSION:    Acute minimally displaced left trochanteric hip fracture with extension through the greater trochanter, unchanged.    --- End of Report ---      < end of copied text >  < from: Xray Hip 2-3 Views, Left (21 @ 22:19) >      IMPRESSION:      Left femoral intertrochanteric fracture    Otherwise, no evidence of acute abdominal or pelvic pathology    Above fluid density lesion in the interpolar region of the right kidney measuring up to 2.3 cm. Nonemergent ultrasound may be of benefit for further evaluation.    --- End of Report ---      < end of copied text >  < from: Xray Femur 1 View, Left (21 @ 22:19) >  FINDINGS/  IMPRESSION:    Left intertrochanteric fracture, better evaluated on referenced CT.    Degenerative changes left knee with chondrocalcinosis.    --- End of Report ---      < end of copied text >  < from: Xray Chest 1 View AP/PA (21 @ 22:18) >  Impression:    No radiographic evidence of acute cardiopulmonary disease.        --- End of Report ---    < end of copied text >  < from: CT Abdomen and Pelvis No Cont (21 @ 21:45) >  IMPRESSION:      Left femoral intertrochanteric fracture    Otherwise, no evidence of acute abdominal or pelvic pathology    Above fluid density lesion in the interpolar region of the right kidney measuring up to 2.3 cm. Nonemergent ultrasound may be of benefit for further evaluation.    --- End of Report ---      < end of copied text >  < from: CT Chest No Cont (21 @ 21:45) >  IMPRESSION:      Left femoral intertrochanteric fracture    Otherwise, no evidence of acute abdominal or pelvic pathology    Above fluid density lesion in the interpolar region of the right kidney measuring up to 2.3 cm. Nonemergent ultrasound may be of benefit for further evaluation.    --- End of Report ---      < end of copied text >      < from: 12 Lead ECG (21 @ 01:04) >  Diagnosis Line Normal sinus rhythm  Inferior infarct , age undetermined  Possible Anterior infarct , age undetermined  T wave abnormality, consider lateral ischemia  Abnormal ECG    < end of copied text >

## 2021-09-08 NOTE — PROGRESS NOTE ADULT - ATTENDING COMMENTS
86 YO F with a PMH of CAD, HFpEF, paroxysmal Afib (Apixaban), DM2, HTN, CKD3, and hx of diverticulitis who was admitted to ortho service for eval and surgical correction of left femoral IT fracture s/p mechanical fall at home. While on ortho service, pt experienced episode of hypotension. Transferred to medicine. Ortho/surgical teams co-managing.     Left hip intertrochanteric fracture s/p orif on 9/6  Typical chest pain with elevated trops  Acute blood loss anemia  Acute hypotensive episode after OR   ELOISE , possible pre-renal with CKD stage 3  Chronic HFpEF  Paroxysmal Afib   CAD, /sp PCI        WBAT LLE  PT/OT  Cardiology consult  Typical chest pain with elevated trops- possibly related to CKD/ELOISE and demand. Cardiology consult  Follow procal,TSH.   EKG: T wave abnormality, possible lateral ischemia  Continue DAPT, statin, carvedilol   ELOISE , possible pre-renal with CKD stage 3. Hold losartan. Added midodrine  Continue with Eliquis 2.5 bid  Handoff: Acute inpatient management  required further

## 2021-09-09 LAB
CRP SERPL-MCNC: 58 MG/L — HIGH
GLUCOSE BLDC GLUCOMTR-MCNC: 154 MG/DL — HIGH (ref 70–99)
GLUCOSE BLDC GLUCOMTR-MCNC: 157 MG/DL — HIGH (ref 70–99)
GLUCOSE BLDC GLUCOMTR-MCNC: 191 MG/DL — HIGH (ref 70–99)
GLUCOSE BLDC GLUCOMTR-MCNC: 250 MG/DL — HIGH (ref 70–99)
PROCALCITONIN SERPL-MCNC: 1.17 NG/ML — HIGH (ref 0.02–0.1)
TSH SERPL-MCNC: 5.53 UIU/ML — HIGH (ref 0.27–4.2)

## 2021-09-09 PROCEDURE — 99233 SBSQ HOSP IP/OBS HIGH 50: CPT

## 2021-09-09 RX ADMIN — Medication 1 MILLIGRAM(S): at 11:43

## 2021-09-09 RX ADMIN — LATANOPROST 1 DROP(S): 0.05 SOLUTION/ DROPS OPHTHALMIC; TOPICAL at 21:56

## 2021-09-09 RX ADMIN — Medication 25 MICROGRAM(S): at 05:40

## 2021-09-09 RX ADMIN — CARVEDILOL PHOSPHATE 25 MILLIGRAM(S): 80 CAPSULE, EXTENDED RELEASE ORAL at 05:40

## 2021-09-09 RX ADMIN — Medication 2: at 06:55

## 2021-09-09 RX ADMIN — CALCITRIOL 0.25 MICROGRAM(S): 0.5 CAPSULE ORAL at 11:43

## 2021-09-09 RX ADMIN — RALOXIFENE HYDROCHLORIDE 60 MILLIGRAM(S): 60 TABLET, COATED ORAL at 11:43

## 2021-09-09 RX ADMIN — Medication 1 TABLET(S): at 11:43

## 2021-09-09 RX ADMIN — Medication 650 MILLIGRAM(S): at 11:43

## 2021-09-09 RX ADMIN — Medication 650 MILLIGRAM(S): at 17:05

## 2021-09-09 RX ADMIN — Medication 650 MILLIGRAM(S): at 17:03

## 2021-09-09 RX ADMIN — ATORVASTATIN CALCIUM 80 MILLIGRAM(S): 80 TABLET, FILM COATED ORAL at 21:58

## 2021-09-09 RX ADMIN — Medication 325 MILLIGRAM(S): at 11:43

## 2021-09-09 RX ADMIN — CARVEDILOL PHOSPHATE 25 MILLIGRAM(S): 80 CAPSULE, EXTENDED RELEASE ORAL at 17:03

## 2021-09-09 RX ADMIN — Medication 650 MILLIGRAM(S): at 12:29

## 2021-09-09 RX ADMIN — Medication 500 MILLIGRAM(S): at 17:03

## 2021-09-09 RX ADMIN — Medication 4: at 17:02

## 2021-09-09 RX ADMIN — Medication 500 MILLIGRAM(S): at 05:39

## 2021-09-09 RX ADMIN — PANTOPRAZOLE SODIUM 40 MILLIGRAM(S): 20 TABLET, DELAYED RELEASE ORAL at 06:15

## 2021-09-09 RX ADMIN — APIXABAN 2.5 MILLIGRAM(S): 2.5 TABLET, FILM COATED ORAL at 17:03

## 2021-09-09 RX ADMIN — MIDODRINE HYDROCHLORIDE 5 MILLIGRAM(S): 2.5 TABLET ORAL at 17:03

## 2021-09-09 RX ADMIN — MIDODRINE HYDROCHLORIDE 5 MILLIGRAM(S): 2.5 TABLET ORAL at 11:43

## 2021-09-09 RX ADMIN — MIDODRINE HYDROCHLORIDE 5 MILLIGRAM(S): 2.5 TABLET ORAL at 05:40

## 2021-09-09 RX ADMIN — POLYETHYLENE GLYCOL 3350 17 GRAM(S): 17 POWDER, FOR SOLUTION ORAL at 11:45

## 2021-09-09 RX ADMIN — APIXABAN 2.5 MILLIGRAM(S): 2.5 TABLET, FILM COATED ORAL at 05:39

## 2021-09-09 NOTE — PROGRESS NOTE ADULT - SUBJECTIVE AND OBJECTIVE BOX
Hospital Day:  4d    Subjective:    No acute events overnight.    Chart reviewed, patient seen and examined at bedside in AM. Patient appears comfortable while at rest in bed. No other complaints.     Denies headaches, changes in vision, chest pains, SOB, n/v/d, abd pain, constipation, changes in urination, pain on urination, weakness, fatigue, joint pain or muscle pain.       Past Medical Hx:   CAD (coronary artery disease)    Chronic CHF    Atrial fibrillation    Type 2 diabetes mellitus    Colon cancer    Hypertension    CKD (chronic kidney disease)    History of Clostridium difficile colitis    Diverticulitis      Past Sx:  S/P hysterectomy      Allergies:  No Known Allergies    Current Meds:   Standng Meds:  acetaminophen   Tablet .. 650 milliGRAM(s) Oral every 6 hours  apixaban 2.5 milliGRAM(s) Oral two times a day  ascorbic acid 500 milliGRAM(s) Oral two times a day  atorvastatin 80 milliGRAM(s) Oral at bedtime  calcitriol   Capsule 0.25 MICROGram(s) Oral daily  carvedilol 25 milliGRAM(s) Oral every 12 hours  dextrose 40% Gel 15 Gram(s) Oral once  dextrose 50% Injectable 25 Gram(s) IV Push once  ferrous    sulfate 325 milliGRAM(s) Oral daily  folic acid 1 milliGRAM(s) Oral daily  glucagon  Injectable 1 milliGRAM(s) IntraMuscular once  insulin lispro (ADMELOG) corrective regimen sliding scale   SubCutaneous every 6 hours  lactated ringers. 500 milliLiter(s) (75 mL/Hr) IV Continuous <Continuous>  latanoprost 0.005% Ophthalmic Solution 1 Drop(s) Both EYES at bedtime  levothyroxine 25 MICROGram(s) Oral daily  midodrine.      midodrine. 5 milliGRAM(s) Oral three times a day  multivitamin 1 Tablet(s) Oral daily  pantoprazole    Tablet 40 milliGRAM(s) Oral before breakfast  polyethylene glycol 3350 17 Gram(s) Oral daily  raloxifene 60 milliGRAM(s) Oral daily  senna 2 Tablet(s) Oral at bedtime    PRN Meds:  bisacodyl Suppository 10 milliGRAM(s) Rectal daily PRN If no bowel movement by POD#2  diphenhydrAMINE 25 milliGRAM(s) Oral at bedtime PRN Insomnia  morphine IVPB 4 milliGRAM(s) IV Intermittent every 4 hours PRN Severe Pain (7 - 10)  ondansetron Injectable 4 milliGRAM(s) IV Push every 6 hours PRN Nausea and/or Vomiting    HOME MEDICATIONS:  atorvastatin 80 mg oral tablet:   calcitriol 0.25 mcg oral capsule: 1 cap(s) orally once a day  carvedilol 25 mg oral tablet:   clopidogrel 75 mg oral tablet: 1 tab(s) orally once a day  Eliquis 2.5 mg oral tablet: 1 tab(s) orally 2 times a day  Evista 60 mg oral tablet: 1 tab(s) orally once a day  folic acid 1 mg oral tablet:   furosemide 40 mg oral tablet: 1 tab(s) orally once a day  Lantus: 10 unit(s) subcutaneous once a day (in the morning)  latanoprost 0.005% ophthalmic solution: 1 drop(s) to each affected eye once a day (in the evening)  levothyroxine 25 mcg (0.025 mg) oral tablet: 1 tab(s) orally once a day  losartan 25 mg oral tablet: 1 tab(s) orally once a day      Vital Signs:   T(F): 96.6 (09-09-21 @ 00:00), Max: 97.3 (09-08-21 @ 08:03)  HR: 72 (09-09-21 @ 05:36) (71 - 79)  BP: 127/60 (09-09-21 @ 05:36) (120/57 - 141/76)  RR: 18 (09-09-21 @ 05:36) (18 - 19)  SpO2: 97% (09-09-21 @ 05:36) (97% - 97%)      09-07-21 @ 07:01  -  09-08-21 @ 07:00  --------------------------------------------------------  IN: 0 mL / OUT: 250 mL / NET: -250 mL    09-08-21 @ 07:01  -  09-09-21 @ 06:18  --------------------------------------------------------  IN: 240 mL / OUT: 625 mL / NET: -385 mL        Physical Exam:   CONSTITUTIONAL: Well-developed; well-nourished; in no acute distress, speaking in full sentences  HEAD: Normocephalic; atraumatic  EYES: PERRL, EOMI, no conjunctival erythema  NECK: Supple; non tender, FROM  CARD: +S1, S2 Regular rate and rhythm.  RESP: CTABL  ABD: soft nontender, nondistended, no rebound, no guarding, no rigidity  EXT: moves all extremities,  No clubbing, cyanosis or edema.   NEURO: Alert, oriented, grossly unremarkable, no focal deficits, cn ii-xii grossly intact  PSYCH: Cooperative, appropriate         Labs:                         7.7    17.38 )-----------( 138      ( 08 Sep 2021 12:08 )             22.7     Neutophil% 80.6, Lymphocyte% 11.6, Monocyte% 6.9, Bands% 0.7 09-08-21 @ 12:08    08 Sep 2021 06:48    130    |  96     |  71     ----------------------------<  149    4.8     |  19     |  2.8      Ca    9.3        08 Sep 2021 06:48  Mg     2.0       08 Sep 2021 06:48    TPro  4.9    /  Alb  3.0    /  TBili  0.3    /  DBili  x      /  AST  30     /  ALT  21     /  AlkPhos  53     08 Sep 2021 06:48        Amylase --, Lipase 88, 09-04-21 @ 19:50        Troponin 0.11, CKMB --, CK -- 09-08-21 @ 16:44  Troponin 0.10, CKMB --, CK -- 09-08-21 @ 12:08  Troponin 0.11, CKMB --, CK -- 09-08-21 @ 06:48        Radiology:   IMAGING:             Hospital Day:  4d    Subjective:    No acute events overnight.    Chart reviewed, patient seen and examined at bedside in AM. Patient appears comfortable while at rest in bed. No other complaints.     Denies headaches, changes in vision, chest pains, SOB, n/v/d, abd pain, constipation, changes in urination, pain on urination, weakness, fatigue, joint pain or muscle pain.       Past Medical Hx:   CAD (coronary artery disease)    Chronic CHF    Atrial fibrillation    Type 2 diabetes mellitus    Colon cancer    Hypertension    CKD (chronic kidney disease)    History of Clostridium difficile colitis    Diverticulitis      Past Sx:  S/P hysterectomy      Allergies:  No Known Allergies    Current Meds:   Standng Meds:  acetaminophen   Tablet .. 650 milliGRAM(s) Oral every 6 hours  apixaban 2.5 milliGRAM(s) Oral two times a day  ascorbic acid 500 milliGRAM(s) Oral two times a day  atorvastatin 80 milliGRAM(s) Oral at bedtime  calcitriol   Capsule 0.25 MICROGram(s) Oral daily  carvedilol 25 milliGRAM(s) Oral every 12 hours  dextrose 40% Gel 15 Gram(s) Oral once  dextrose 50% Injectable 25 Gram(s) IV Push once  ferrous    sulfate 325 milliGRAM(s) Oral daily  folic acid 1 milliGRAM(s) Oral daily  glucagon  Injectable 1 milliGRAM(s) IntraMuscular once  insulin lispro (ADMELOG) corrective regimen sliding scale   SubCutaneous every 6 hours  lactated ringers. 500 milliLiter(s) (75 mL/Hr) IV Continuous <Continuous>  latanoprost 0.005% Ophthalmic Solution 1 Drop(s) Both EYES at bedtime  levothyroxine 25 MICROGram(s) Oral daily  midodrine.      midodrine. 5 milliGRAM(s) Oral three times a day  multivitamin 1 Tablet(s) Oral daily  pantoprazole    Tablet 40 milliGRAM(s) Oral before breakfast  polyethylene glycol 3350 17 Gram(s) Oral daily  raloxifene 60 milliGRAM(s) Oral daily  senna 2 Tablet(s) Oral at bedtime    PRN Meds:  bisacodyl Suppository 10 milliGRAM(s) Rectal daily PRN If no bowel movement by POD#2  diphenhydrAMINE 25 milliGRAM(s) Oral at bedtime PRN Insomnia  morphine IVPB 4 milliGRAM(s) IV Intermittent every 4 hours PRN Severe Pain (7 - 10)  ondansetron Injectable 4 milliGRAM(s) IV Push every 6 hours PRN Nausea and/or Vomiting    HOME MEDICATIONS:  atorvastatin 80 mg oral tablet:   calcitriol 0.25 mcg oral capsule: 1 cap(s) orally once a day  carvedilol 25 mg oral tablet:   clopidogrel 75 mg oral tablet: 1 tab(s) orally once a day  Eliquis 2.5 mg oral tablet: 1 tab(s) orally 2 times a day  Evista 60 mg oral tablet: 1 tab(s) orally once a day  folic acid 1 mg oral tablet:   furosemide 40 mg oral tablet: 1 tab(s) orally once a day  Lantus: 10 unit(s) subcutaneous once a day (in the morning)  latanoprost 0.005% ophthalmic solution: 1 drop(s) to each affected eye once a day (in the evening)  levothyroxine 25 mcg (0.025 mg) oral tablet: 1 tab(s) orally once a day  losartan 25 mg oral tablet: 1 tab(s) orally once a day      Vital Signs:   T(F): 96.6 (09-09-21 @ 00:00), Max: 97.3 (09-08-21 @ 08:03)  HR: 72 (09-09-21 @ 05:36) (71 - 79)  BP: 127/60 (09-09-21 @ 05:36) (120/57 - 141/76)  RR: 18 (09-09-21 @ 05:36) (18 - 19)  SpO2: 97% (09-09-21 @ 05:36) (97% - 97%)      09-07-21 @ 07:01  -  09-08-21 @ 07:00  --------------------------------------------------------  IN: 0 mL / OUT: 250 mL / NET: -250 mL    09-08-21 @ 07:01  -  09-09-21 @ 06:18  --------------------------------------------------------  IN: 240 mL / OUT: 625 mL / NET: -385 mL        Physical Exam:   CONSTITUTIONAL: speaking in full sentences  HEAD: Normocephalic; atraumatic  EYES: EOMI, NECK: Supple; non tender,   CARD: +S1, S2 Regular rate and rhythm.  RESP: CTABL  ABD: soft nontender, nondistended, no rebound, no guarding, no rigidity  EXT: No clubbing, cyanosis or edema. Equal sensation bilaterally in upper and lower extremities.   NEURO: Alert, oriented, grossly unremarkable, no focal deficits, cn ii-xii grossly intact  PSYCH: Cooperative        Labs:                         7.7    17.38 )-----------( 138      ( 08 Sep 2021 12:08 )             22.7     Neutophil% 80.6, Lymphocyte% 11.6, Monocyte% 6.9, Bands% 0.7 09-08-21 @ 12:08    08 Sep 2021 06:48    130    |  96     |  71     ----------------------------<  149    4.8     |  19     |  2.8      Ca    9.3        08 Sep 2021 06:48  Mg     2.0       08 Sep 2021 06:48    TPro  4.9    /  Alb  3.0    /  TBili  0.3    /  DBili  x      /  AST  30     /  ALT  21     /  AlkPhos  53     08 Sep 2021 06:48        Amylase --, Lipase 88, 09-04-21 @ 19:50        Troponin 0.11, CKMB --, CK -- 09-08-21 @ 16:44  Troponin 0.10, CKMB --, CK -- 09-08-21 @ 12:08  Troponin 0.11, CKMB --, CK -- 09-08-21 @ 06:48        Radiology:   IMAGING:             Hospital Day:  4d    Subjective:    No acute events overnight.    Chart reviewed, patient seen and examined at bedside in AM. Patient appears comfortable while at rest in bed. No other complaints.     Denies headaches, changes in vision, chest pains, SOB, n/v/d, abd pain, constipation, changes in urination, pain on urination, weakness, fatigue, joint pain or muscle pain.       Past Medical Hx:   CAD (coronary artery disease)    Chronic CHF    Atrial fibrillation    Type 2 diabetes mellitus    Colon cancer    Hypertension    CKD (chronic kidney disease)    History of Clostridium difficile colitis    Diverticulitis      Past Sx:  S/P hysterectomy      Allergies:  No Known Allergies    Current Meds:   Standng Meds:  acetaminophen   Tablet .. 650 milliGRAM(s) Oral every 6 hours  apixaban 2.5 milliGRAM(s) Oral two times a day  ascorbic acid 500 milliGRAM(s) Oral two times a day  atorvastatin 80 milliGRAM(s) Oral at bedtime  calcitriol   Capsule 0.25 MICROGram(s) Oral daily  carvedilol 25 milliGRAM(s) Oral every 12 hours  dextrose 40% Gel 15 Gram(s) Oral once  dextrose 50% Injectable 25 Gram(s) IV Push once  ferrous    sulfate 325 milliGRAM(s) Oral daily  folic acid 1 milliGRAM(s) Oral daily  glucagon  Injectable 1 milliGRAM(s) IntraMuscular once  insulin lispro (ADMELOG) corrective regimen sliding scale   SubCutaneous every 6 hours  lactated ringers. 500 milliLiter(s) (75 mL/Hr) IV Continuous <Continuous>  latanoprost 0.005% Ophthalmic Solution 1 Drop(s) Both EYES at bedtime  levothyroxine 25 MICROGram(s) Oral daily  midodrine.      midodrine. 5 milliGRAM(s) Oral three times a day  multivitamin 1 Tablet(s) Oral daily  pantoprazole    Tablet 40 milliGRAM(s) Oral before breakfast  polyethylene glycol 3350 17 Gram(s) Oral daily  raloxifene 60 milliGRAM(s) Oral daily  senna 2 Tablet(s) Oral at bedtime    PRN Meds:  bisacodyl Suppository 10 milliGRAM(s) Rectal daily PRN If no bowel movement by POD#2  diphenhydrAMINE 25 milliGRAM(s) Oral at bedtime PRN Insomnia  morphine IVPB 4 milliGRAM(s) IV Intermittent every 4 hours PRN Severe Pain (7 - 10)  ondansetron Injectable 4 milliGRAM(s) IV Push every 6 hours PRN Nausea and/or Vomiting    HOME MEDICATIONS:  atorvastatin 80 mg oral tablet:   calcitriol 0.25 mcg oral capsule: 1 cap(s) orally once a day  carvedilol 25 mg oral tablet:   clopidogrel 75 mg oral tablet: 1 tab(s) orally once a day  Eliquis 2.5 mg oral tablet: 1 tab(s) orally 2 times a day  Evista 60 mg oral tablet: 1 tab(s) orally once a day  folic acid 1 mg oral tablet:   furosemide 40 mg oral tablet: 1 tab(s) orally once a day  Lantus: 10 unit(s) subcutaneous once a day (in the morning)  latanoprost 0.005% ophthalmic solution: 1 drop(s) to each affected eye once a day (in the evening)  levothyroxine 25 mcg (0.025 mg) oral tablet: 1 tab(s) orally once a day  losartan 25 mg oral tablet: 1 tab(s) orally once a day      Vital Signs:   T(F): 96.6 (09-09-21 @ 00:00), Max: 97.3 (09-08-21 @ 08:03)  HR: 72 (09-09-21 @ 05:36) (71 - 79)  BP: 127/60 (09-09-21 @ 05:36) (120/57 - 141/76)  RR: 18 (09-09-21 @ 05:36) (18 - 19)  SpO2: 97% (09-09-21 @ 05:36) (97% - 97%)      09-07-21 @ 07:01  -  09-08-21 @ 07:00  --------------------------------------------------------  IN: 0 mL / OUT: 250 mL / NET: -250 mL    09-08-21 @ 07:01  -  09-09-21 @ 06:18  --------------------------------------------------------  IN: 240 mL / OUT: 625 mL / NET: -385 mL        Physical Exam:   CONSTITUTIONAL: speaking in full sentences  HEAD: Normocephalic; atraumatic  EYES: EOMI, NECK: Supple; non tender,   CARD: +S1, S2 Regular rate and rhythm.  RESP: CTABL  ABD: soft nontender, nondistended, no rebound, no guarding, no rigidity  EXT: No clubbing, cyanosis or edema. Equal sensation bilaterally in upper and lower extremities.   NEURO: Alert, oriented, grossly unremarkable, no focal deficits, cn ii-xii grossly intact  PSYCH: Cooperative        Labs:                         7.7    17.38 )-----------( 138      ( 08 Sep 2021 12:08 )             22.7     Neutophil% 80.6, Lymphocyte% 11.6, Monocyte% 6.9, Bands% 0.7 09-08-21 @ 12:08    08 Sep 2021 06:48    130    |  96     |  71     ----------------------------<  149    4.8     |  19     |  2.8      Ca    9.3        08 Sep 2021 06:48  Mg     2.0       08 Sep 2021 06:48    TPro  4.9    /  Alb  3.0    /  TBili  0.3    /  DBili  x      /  AST  30     /  ALT  21     /  AlkPhos  53     08 Sep 2021 06:48        Amylase --, Lipase 88, 09-04-21 @ 19:50        Troponin 0.11, CKMB --, CK -- 09-08-21 @ 16:44  Troponin 0.10, CKMB --, CK -- 09-08-21 @ 12:08  Troponin 0.11, CKMB --, CK -- 09-08-21 @ 06:48        Radiology:   IMAGING:      < end of copied text >  < from: CT Hip No Cont, Left (09.05.21 @ 10:43) >    IMPRESSION:    Acute minimally displaced left trochanteric hip fracture with extension through the greater trochanter, unchanged.    --- End of Report ---      < end of copied text >  < from: Xray Hip 2-3 Views, Left (09.04.21 @ 22:19) >      IMPRESSION:      Left femoral intertrochanteric fracture    Otherwise, no evidence of acute abdominal or pelvic pathology    Above fluid density lesion in the interpolar region of the right kidney measuring up to 2.3 cm. Nonemergent ultrasound may be of benefit for further evaluation.    --- End of Report ---      < end of copied text >  < from: Xray Femur 1 View, Left (09.04.21 @ 22:19) >  FINDINGS/  IMPRESSION:    Left intertrochanteric fracture, better evaluated on referenced CT.    Degenerative changes left knee with chondrocalcinosis.    --- End of Report ---      < end of copied text >  < from: Xray Chest 1 View AP/PA (09.04.21 @ 22:18) >  Impression:    No radiographic evidence of acute cardiopulmonary disease.        --- End of Report ---    < end of copied text >  < from: CT Abdomen and Pelvis No Cont (09.04.21 @ 21:45) >  IMPRESSION:      Left femoral intertrochanteric fracture    Otherwise, no evidence of acute abdominal or pelvic pathology    Above fluid density lesion in the interpolar region of the right kidney measuring up to 2.3 cm. Nonemergent ultrasound may be of benefit for further evaluation.    --- End of Report ---      < end of copied text >  < from: CT Chest No Cont (09.04.21 @ 21:45) >  IMPRESSION:      Left femoral intertrochanteric fracture    Otherwise, no evidence of acute abdominal or pelvic pathology    Above fluid density lesion in the interpolar region of the right kidney measuring up to 2.3 cm. Nonemergent ultrasound may be of benefit for further evaluation.    --- End of Report ---      < end of copied text >      < from: 12 Lead ECG (09.08.21 @ 01:04) >  Diagnosis Line Normal sinus rhythm  Inferior infarct , age undetermined  Possible Anterior infarct , age undetermined  T wave abnormality, consider lateral ischemia  Abnormal ECG    < end of copied text >           Hospital Day:  4d    Subjective:    No acute events overnight.    Chart reviewed, patient seen and examined at bedside in AM. Patient appears comfortable while at rest in bed. No other complaints.     Denies headaches, changes in vision, chest pains, SOB, n/v/d, abd pain, constipation, changes in urination, pain on urination, weakness, fatigue, joint pain or muscle pain.       Past Medical Hx:   CAD (coronary artery disease)    Chronic CHF    Atrial fibrillation    Type 2 diabetes mellitus    Colon cancer    Hypertension    CKD (chronic kidney disease)    History of Clostridium difficile colitis    Diverticulitis      Past Sx:  S/P hysterectomy      Allergies:  No Known Allergies    Current Meds:   Standng Meds:  acetaminophen   Tablet .. 650 milliGRAM(s) Oral every 6 hours  apixaban 2.5 milliGRAM(s) Oral two times a day  ascorbic acid 500 milliGRAM(s) Oral two times a day  atorvastatin 80 milliGRAM(s) Oral at bedtime  calcitriol   Capsule 0.25 MICROGram(s) Oral daily  carvedilol 25 milliGRAM(s) Oral every 12 hours  dextrose 40% Gel 15 Gram(s) Oral once  dextrose 50% Injectable 25 Gram(s) IV Push once  ferrous    sulfate 325 milliGRAM(s) Oral daily  folic acid 1 milliGRAM(s) Oral daily  glucagon  Injectable 1 milliGRAM(s) IntraMuscular once  insulin lispro (ADMELOG) corrective regimen sliding scale   SubCutaneous every 6 hours  lactated ringers. 500 milliLiter(s) (75 mL/Hr) IV Continuous <Continuous>  latanoprost 0.005% Ophthalmic Solution 1 Drop(s) Both EYES at bedtime  levothyroxine 25 MICROGram(s) Oral daily  midodrine.      midodrine. 5 milliGRAM(s) Oral three times a day  multivitamin 1 Tablet(s) Oral daily  pantoprazole    Tablet 40 milliGRAM(s) Oral before breakfast  polyethylene glycol 3350 17 Gram(s) Oral daily  raloxifene 60 milliGRAM(s) Oral daily  senna 2 Tablet(s) Oral at bedtime    PRN Meds:  bisacodyl Suppository 10 milliGRAM(s) Rectal daily PRN If no bowel movement by POD#2  diphenhydrAMINE 25 milliGRAM(s) Oral at bedtime PRN Insomnia  morphine IVPB 4 milliGRAM(s) IV Intermittent every 4 hours PRN Severe Pain (7 - 10)  ondansetron Injectable 4 milliGRAM(s) IV Push every 6 hours PRN Nausea and/or Vomiting    HOME MEDICATIONS:  atorvastatin 80 mg oral tablet:   calcitriol 0.25 mcg oral capsule: 1 cap(s) orally once a day  carvedilol 25 mg oral tablet:   clopidogrel 75 mg oral tablet: 1 tab(s) orally once a day  Eliquis 2.5 mg oral tablet: 1 tab(s) orally 2 times a day  Evista 60 mg oral tablet: 1 tab(s) orally once a day  folic acid 1 mg oral tablet:   furosemide 40 mg oral tablet: 1 tab(s) orally once a day  Lantus: 10 unit(s) subcutaneous once a day (in the morning)  latanoprost 0.005% ophthalmic solution: 1 drop(s) to each affected eye once a day (in the evening)  levothyroxine 25 mcg (0.025 mg) oral tablet: 1 tab(s) orally once a day  losartan 25 mg oral tablet: 1 tab(s) orally once a day      Vital Signs:   T(F): 96.6 (09-09-21 @ 00:00), Max: 97.3 (09-08-21 @ 08:03)  HR: 72 (09-09-21 @ 05:36) (71 - 79)  BP: 127/60 (09-09-21 @ 05:36) (120/57 - 141/76)  RR: 18 (09-09-21 @ 05:36) (18 - 19)  SpO2: 97% (09-09-21 @ 05:36) (97% - 97%)      09-07-21 @ 07:01  -  09-08-21 @ 07:00  --------------------------------------------------------  IN: 0 mL / OUT: 250 mL / NET: -250 mL    09-08-21 @ 07:01  -  09-09-21 @ 06:18  --------------------------------------------------------  IN: 240 mL / OUT: 625 mL / NET: -385 mL        Physical Exam:   CONSTITUTIONAL: speaking in full sentences  HEAD: Normocephalic; atraumatic  EYES: EOMI, NECK: Supple; non tender,   CARD: +S1, S2 Regular rate and rhythm.  RESP: CTABL  ABD: soft nontender, nondistended, no rebound, no guarding, no rigidity  EXT: No clubbing, cyanosis or edema. Equal sensation bilaterally in upper and lower extremities.   NEURO: Alert, oriented, grossly unremarkable, no focal deficits, cn ii-xii grossly intact  PSYCH: Cooperative        Labs:                         7.7    17.38 )-----------( 138      ( 08 Sep 2021 12:08 )             22.7     Neutophil% 80.6, Lymphocyte% 11.6, Monocyte% 6.9, Bands% 0.7 09-08-21 @ 12:08    08 Sep 2021 06:48    130    |  96     |  71     ----------------------------<  149    4.8     |  19     |  2.8      Ca    9.3        08 Sep 2021 06:48  Mg     2.0       08 Sep 2021 06:48    TPro  4.9    /  Alb  3.0    /  TBili  0.3    /  DBili  x      /  AST  30     /  ALT  21     /  AlkPhos  53     08 Sep 2021 06:48        Amylase --, Lipase 88, 09-04-21 @ 19:50        Troponin 0.11, CKMB --, CK -- 09-08-21 @ 16:44  Troponin 0.10, CKMB --, CK -- 09-08-21 @ 12:08  Troponin 0.11, CKMB --, CK -- 09-08-21 @ 06:48        Radiology:     < from: TTE Echo Complete w/o Contrast w/ Doppler (09.08.21 @ 10:42) >  Summary:   1. LV Ejection Fraction by Oro's Method with a biplane EF of 45 %.   2. Mildly decreased global left ventricular systolic function.   3. Mid and apical inferior septum, apex, mid anteroseptal segment, and apical inferior segment are abnormal as described above.   4. Mid to [place] anteroseptal wall thinning.   5. Spectral Doppler shows impaired relaxation pattern of left ventricular myocardial filling (Grade I diastolic dysfunction).   6. Aortic valve thickening with decreased leaflet opening.   7. No evidence of aortic stenosis.   8. Mild to moderate mitral annular calcification.   9. Mild thickening and calcification of the anterior and posterior mitral valve leaflets.  10. Mild mitral valve regurgitation.  11. Mild tricuspid regurgitation.  12. Estimated pulmonary artery systolic pressure is 56.6 mmHg assuming a right atrial pressure of 3 mmHg, which is consistent with moderate pulmonary hypertension.  13. Normal left atrial size.  14. LA volume Index is 28.3 ml/m² ml/m2.  15. Normal right atrial size.  16. In comparison to TTE from 10/6/2019 there is Akinesis and thinning of Mid to apical anteroseptal walls.    PHYSICIAN INTERPRETATION:  Left Ventricle: Global LV systolic function was mildly decreased. Spectral Doppler shows impaired relaxation pattern of left ventricular myocardial filling (Grade I diastolic dysfunction). Mid to [place] anteroseptal wall thinning.      LV Wall Scoring:  The mid and apical inferior septum and apex are akinetic. The mid anteroseptal  segment and apical inferior segment are hypokinetic. All remaining scored  segments are normal.    < end of copied text >      IMAGING:  < end of copied text >  < from: CT Hip No Cont, Left (09.05.21 @ 10:43) >    IMPRESSION:    Acute minimally displaced left trochanteric hip fracture with extension through the greater trochanter, unchanged.    --- End of Report ---      < end of copied text >  < from: Xray Hip 2-3 Views, Left (09.04.21 @ 22:19) >      IMPRESSION:      Left femoral intertrochanteric fracture    Otherwise, no evidence of acute abdominal or pelvic pathology    Above fluid density lesion in the interpolar region of the right kidney measuring up to 2.3 cm. Nonemergent ultrasound may be of benefit for further evaluation.    --- End of Report ---      < end of copied text >  < from: Xray Femur 1 View, Left (09.04.21 @ 22:19) >  FINDINGS/  IMPRESSION:    Left intertrochanteric fracture, better evaluated on referenced CT.    Degenerative changes left knee with chondrocalcinosis.    --- End of Report ---      < end of copied text >  < from: Xray Chest 1 View AP/PA (09.04.21 @ 22:18) >  Impression:    No radiographic evidence of acute cardiopulmonary disease.        --- End of Report ---    < end of copied text >  < from: CT Abdomen and Pelvis No Cont (09.04.21 @ 21:45) >  IMPRESSION:      Left femoral intertrochanteric fracture    Otherwise, no evidence of acute abdominal or pelvic pathology    Above fluid density lesion in the interpolar region of the right kidney measuring up to 2.3 cm. Nonemergent ultrasound may be of benefit for further evaluation.    --- End of Report ---      < end of copied text >  < from: CT Chest No Cont (09.04.21 @ 21:45) >  IMPRESSION:      Left femoral intertrochanteric fracture    Otherwise, no evidence of acute abdominal or pelvic pathology    Above fluid density lesion in the interpolar region of the right kidney measuring up to 2.3 cm. Nonemergent ultrasound may be of benefit for further evaluation.    --- End of Report ---      < end of copied text >      < from: 12 Lead ECG (09.08.21 @ 01:04) >  Diagnosis Line Normal sinus rhythm  Inferior infarct , age undetermined  Possible Anterior infarct , age undetermined  T wave abnormality, consider lateral ischemia  Abnormal ECG    < end of copied text >           Hospital Day:  4d    Subjective:    No acute events overnight.    Chart reviewed, patient seen and examined at bedside in AM. Patient appears comfortable while at rest in bed. No other complaints.     Denies headaches, changes in vision, chest pains, SOB, n/v/d, abd pain, constipation, changes in urination, pain on urination, weakness, fatigue, joint pain or muscle pain.       Past Medical Hx:   CAD (coronary artery disease)    Chronic CHF    Atrial fibrillation    Type 2 diabetes mellitus    Colon cancer    Hypertension    CKD (chronic kidney disease)    History of Clostridium difficile colitis    Diverticulitis      Past Sx:  S/P hysterectomy      Allergies:  No Known Allergies    Current Meds:   Standng Meds:  acetaminophen   Tablet .. 650 milliGRAM(s) Oral every 6 hours  apixaban 2.5 milliGRAM(s) Oral two times a day  ascorbic acid 500 milliGRAM(s) Oral two times a day  atorvastatin 80 milliGRAM(s) Oral at bedtime  calcitriol   Capsule 0.25 MICROGram(s) Oral daily  carvedilol 25 milliGRAM(s) Oral every 12 hours  dextrose 40% Gel 15 Gram(s) Oral once  dextrose 50% Injectable 25 Gram(s) IV Push once  ferrous    sulfate 325 milliGRAM(s) Oral daily  folic acid 1 milliGRAM(s) Oral daily  glucagon  Injectable 1 milliGRAM(s) IntraMuscular once  insulin lispro (ADMELOG) corrective regimen sliding scale   SubCutaneous every 6 hours  lactated ringers. 500 milliLiter(s) (75 mL/Hr) IV Continuous <Continuous>  latanoprost 0.005% Ophthalmic Solution 1 Drop(s) Both EYES at bedtime  levothyroxine 25 MICROGram(s) Oral daily  midodrine.      midodrine. 5 milliGRAM(s) Oral three times a day  multivitamin 1 Tablet(s) Oral daily  pantoprazole    Tablet 40 milliGRAM(s) Oral before breakfast  polyethylene glycol 3350 17 Gram(s) Oral daily  raloxifene 60 milliGRAM(s) Oral daily  senna 2 Tablet(s) Oral at bedtime    PRN Meds:  bisacodyl Suppository 10 milliGRAM(s) Rectal daily PRN If no bowel movement by POD#2  diphenhydrAMINE 25 milliGRAM(s) Oral at bedtime PRN Insomnia  morphine IVPB 4 milliGRAM(s) IV Intermittent every 4 hours PRN Severe Pain (7 - 10)  ondansetron Injectable 4 milliGRAM(s) IV Push every 6 hours PRN Nausea and/or Vomiting    HOME MEDICATIONS:  atorvastatin 80 mg oral tablet:   calcitriol 0.25 mcg oral capsule: 1 cap(s) orally once a day  carvedilol 25 mg oral tablet:   clopidogrel 75 mg oral tablet: 1 tab(s) orally once a day  Eliquis 2.5 mg oral tablet: 1 tab(s) orally 2 times a day  Evista 60 mg oral tablet: 1 tab(s) orally once a day  folic acid 1 mg oral tablet:   furosemide 40 mg oral tablet: 1 tab(s) orally once a day  Lantus: 10 unit(s) subcutaneous once a day (in the morning)  latanoprost 0.005% ophthalmic solution: 1 drop(s) to each affected eye once a day (in the evening)  levothyroxine 25 mcg (0.025 mg) oral tablet: 1 tab(s) orally once a day  losartan 25 mg oral tablet: 1 tab(s) orally once a day      Vital Signs:   T(F): 96.6 (09-09-21 @ 00:00), Max: 97.3 (09-08-21 @ 08:03)  HR: 72 (09-09-21 @ 05:36) (71 - 79)  BP: 127/60 (09-09-21 @ 05:36) (120/57 - 141/76)  RR: 18 (09-09-21 @ 05:36) (18 - 19)  SpO2: 97% (09-09-21 @ 05:36) (97% - 97%)      09-07-21 @ 07:01  -  09-08-21 @ 07:00  --------------------------------------------------------  IN: 0 mL / OUT: 250 mL / NET: -250 mL    09-08-21 @ 07:01  -  09-09-21 @ 06:18  --------------------------------------------------------  IN: 240 mL / OUT: 625 mL / NET: -385 mL        Physical Exam:   CONSTITUTIONAL: speaking in full sentences  HEAD: Normocephalic; atraumatic  EYES: EOMI, NECK: Supple; non tender,   CARD: +S1, S2 Regular rate and rhythm.  RESP: CTABL  ABD: soft nontender, nondistended, no rebound, no guarding, no rigidity  EXT: No clubbing, cyanosis or edema. Equal sensation bilaterally in upper and lower extremities.   NEURO: Alert, oriented, grossly unremarkable, no focal deficits, cn ii-xii grossly intact  PSYCH: Cooperative        Labs:                         7.7    17.38 )-----------( 138      ( 08 Sep 2021 12:08 )             22.7     Neutophil% 80.6, Lymphocyte% 11.6, Monocyte% 6.9, Bands% 0.7 09-08-21 @ 12:08    08 Sep 2021 06:48    130    |  96     |  71     ----------------------------<  149    4.8     |  19     |  2.8      Ca    9.3        08 Sep 2021 06:48  Mg     2.0       08 Sep 2021 06:48    TPro  4.9    /  Alb  3.0    /  TBili  0.3    /  DBili  x      /  AST  30     /  ALT  21     /  AlkPhos  53     08 Sep 2021 06:48        Amylase --, Lipase 88, 09-04-21 @ 19:50        Troponin 0.11, CKMB --, CK -- 09-08-21 @ 16:44  Troponin 0.10, CKMB --, CK -- 09-08-21 @ 12:08  Troponin 0.11, CKMB --, CK -- 09-08-21 @ 06:48            Radiology:     < from: TTE Echo Complete w/o Contrast w/ Doppler (09.08.21 @ 10:42) >  Summary:   1. LV Ejection Fraction by Oro's Method with a biplane EF of 45 %.   2. Mildly decreased global left ventricular systolic function.   3. Mid and apical inferior septum, apex, mid anteroseptal segment, and apical inferior segment are abnormal as described above.   4. Mid to [place] anteroseptal wall thinning.   5. Spectral Doppler shows impaired relaxation pattern of left ventricular myocardial filling (Grade I diastolic dysfunction).   6. Aortic valve thickening with decreased leaflet opening.   7. No evidence of aortic stenosis.   8. Mild to moderate mitral annular calcification.   9. Mild thickening and calcification of the anterior and posterior mitral valve leaflets.  10. Mild mitral valve regurgitation.  11. Mild tricuspid regurgitation.  12. Estimated pulmonary artery systolic pressure is 56.6 mmHg assuming a right atrial pressure of 3 mmHg, which is consistent with moderate pulmonary hypertension.  13. Normal left atrial size.  14. LA volume Index is 28.3 ml/m² ml/m2.  15. Normal right atrial size.  16. In comparison to TTE from 10/6/2019 there is Akinesis and thinning of Mid to apical anteroseptal walls.    PHYSICIAN INTERPRETATION:  Left Ventricle: Global LV systolic function was mildly decreased. Spectral Doppler shows impaired relaxation pattern of left ventricular myocardial filling (Grade I diastolic dysfunction). Mid to [place] anteroseptal wall thinning.      LV Wall Scoring:  The mid and apical inferior septum and apex are akinetic. The mid anteroseptal  segment and apical inferior segment are hypokinetic. All remaining scored  segments are normal.    < end of copied text >      IMAGING:  < end of copied text >  < from: CT Hip No Cont, Left (09.05.21 @ 10:43) >    IMPRESSION:    Acute minimally displaced left trochanteric hip fracture with extension through the greater trochanter, unchanged.    --- End of Report ---      < end of copied text >  < from: Xray Hip 2-3 Views, Left (09.04.21 @ 22:19) >      IMPRESSION:      Left femoral intertrochanteric fracture    Otherwise, no evidence of acute abdominal or pelvic pathology    Above fluid density lesion in the interpolar region of the right kidney measuring up to 2.3 cm. Nonemergent ultrasound may be of benefit for further evaluation.    --- End of Report ---      < end of copied text >  < from: Xray Femur 1 View, Left (09.04.21 @ 22:19) >  FINDINGS/  IMPRESSION:    Left intertrochanteric fracture, better evaluated on referenced CT.    Degenerative changes left knee with chondrocalcinosis.    --- End of Report ---      < end of copied text >  < from: Xray Chest 1 View AP/PA (09.04.21 @ 22:18) >  Impression:    No radiographic evidence of acute cardiopulmonary disease.        --- End of Report ---    < end of copied text >  < from: CT Abdomen and Pelvis No Cont (09.04.21 @ 21:45) >  IMPRESSION:      Left femoral intertrochanteric fracture    Otherwise, no evidence of acute abdominal or pelvic pathology    Above fluid density lesion in the interpolar region of the right kidney measuring up to 2.3 cm. Nonemergent ultrasound may be of benefit for further evaluation.    --- End of Report ---      < end of copied text >  < from: CT Chest No Cont (09.04.21 @ 21:45) >  IMPRESSION:      Left femoral intertrochanteric fracture    Otherwise, no evidence of acute abdominal or pelvic pathology    Above fluid density lesion in the interpolar region of the right kidney measuring up to 2.3 cm. Nonemergent ultrasound may be of benefit for further evaluation.    --- End of Report ---      < end of copied text >      < from: 12 Lead ECG (09.08.21 @ 01:04) >  Diagnosis Line Normal sinus rhythm  Inferior infarct , age undetermined  Possible Anterior infarct , age undetermined  T wave abnormality, consider lateral ischemia  Abnormal ECG    < end of copied text >           Hospital Day:  4d    Subjective:    No acute events overnight. Patient was put on straight cath, had low urine output. Now on intermittent caths.     Chart reviewed, patient seen and examined at bedside in AM. Patient appears comfortable while at rest in bed. No complaints.       Past Medical Hx:   CAD (coronary artery disease)    Chronic CHF    Atrial fibrillation    Type 2 diabetes mellitus    Colon cancer    Hypertension    CKD (chronic kidney disease)    History of Clostridium difficile colitis    Diverticulitis      Past Sx:  S/P hysterectomy      Allergies:  No Known Allergies    Current Meds:   Standng Meds:  acetaminophen   Tablet .. 650 milliGRAM(s) Oral every 6 hours  apixaban 2.5 milliGRAM(s) Oral two times a day  ascorbic acid 500 milliGRAM(s) Oral two times a day  atorvastatin 80 milliGRAM(s) Oral at bedtime  calcitriol   Capsule 0.25 MICROGram(s) Oral daily  carvedilol 25 milliGRAM(s) Oral every 12 hours  dextrose 40% Gel 15 Gram(s) Oral once  dextrose 50% Injectable 25 Gram(s) IV Push once  ferrous    sulfate 325 milliGRAM(s) Oral daily  folic acid 1 milliGRAM(s) Oral daily  glucagon  Injectable 1 milliGRAM(s) IntraMuscular once  insulin lispro (ADMELOG) corrective regimen sliding scale   SubCutaneous every 6 hours  lactated ringers. 500 milliLiter(s) (75 mL/Hr) IV Continuous <Continuous>  latanoprost 0.005% Ophthalmic Solution 1 Drop(s) Both EYES at bedtime  levothyroxine 25 MICROGram(s) Oral daily  midodrine.      midodrine. 5 milliGRAM(s) Oral three times a day  multivitamin 1 Tablet(s) Oral daily  pantoprazole    Tablet 40 milliGRAM(s) Oral before breakfast  polyethylene glycol 3350 17 Gram(s) Oral daily  raloxifene 60 milliGRAM(s) Oral daily  senna 2 Tablet(s) Oral at bedtime    PRN Meds:  bisacodyl Suppository 10 milliGRAM(s) Rectal daily PRN If no bowel movement by POD#2  diphenhydrAMINE 25 milliGRAM(s) Oral at bedtime PRN Insomnia  morphine IVPB 4 milliGRAM(s) IV Intermittent every 4 hours PRN Severe Pain (7 - 10)  ondansetron Injectable 4 milliGRAM(s) IV Push every 6 hours PRN Nausea and/or Vomiting    HOME MEDICATIONS:  atorvastatin 80 mg oral tablet:   calcitriol 0.25 mcg oral capsule: 1 cap(s) orally once a day  carvedilol 25 mg oral tablet:   clopidogrel 75 mg oral tablet: 1 tab(s) orally once a day  Eliquis 2.5 mg oral tablet: 1 tab(s) orally 2 times a day  Evista 60 mg oral tablet: 1 tab(s) orally once a day  folic acid 1 mg oral tablet:   furosemide 40 mg oral tablet: 1 tab(s) orally once a day  Lantus: 10 unit(s) subcutaneous once a day (in the morning)  latanoprost 0.005% ophthalmic solution: 1 drop(s) to each affected eye once a day (in the evening)  levothyroxine 25 mcg (0.025 mg) oral tablet: 1 tab(s) orally once a day  losartan 25 mg oral tablet: 1 tab(s) orally once a day      Vital Signs:   T(F): 96.6 (09-09-21 @ 00:00), Max: 97.3 (09-08-21 @ 08:03)  HR: 72 (09-09-21 @ 05:36) (71 - 79)  BP: 127/60 (09-09-21 @ 05:36) (120/57 - 141/76)  RR: 18 (09-09-21 @ 05:36) (18 - 19)  SpO2: 97% (09-09-21 @ 05:36) (97% - 97%)      09-07-21 @ 07:01  -  09-08-21 @ 07:00  --------------------------------------------------------  IN: 0 mL / OUT: 250 mL / NET: -250 mL    09-08-21 @ 07:01  -  09-09-21 @ 06:18  --------------------------------------------------------  IN: 240 mL / OUT: 625 mL / NET: -385 mL        Physical Exam:   CONSTITUTIONAL: speaking in full sentences  HEAD: Normocephalic; atraumatic  EYES: EOMI, NECK: Supple; non tender,   CARD: +S1, S2 Regular rate and rhythm.  RESP: CTABL  ABD: soft nontender, nondistended, no rebound, no guarding, no rigidity  EXT: No clubbing, cyanosis or edema. Equal sensation bilaterally in upper and lower extremities.   NEURO: Alert, oriented, grossly unremarkable, no focal deficits, cn ii-xii grossly intact  PSYCH: Cooperative        Labs:                         7.7    17.38 )-----------( 138      ( 08 Sep 2021 12:08 )             22.7     Neutophil% 80.6, Lymphocyte% 11.6, Monocyte% 6.9, Bands% 0.7 09-08-21 @ 12:08    08 Sep 2021 06:48    130    |  96     |  71     ----------------------------<  149    4.8     |  19     |  2.8      Ca    9.3        08 Sep 2021 06:48  Mg     2.0       08 Sep 2021 06:48    TPro  4.9    /  Alb  3.0    /  TBili  0.3    /  DBili  x      /  AST  30     /  ALT  21     /  AlkPhos  53     08 Sep 2021 06:48        Amylase --, Lipase 88, 09-04-21 @ 19:50        Troponin 0.11, CKMB --, CK -- 09-08-21 @ 16:44  Troponin 0.10, CKMB --, CK -- 09-08-21 @ 12:08  Troponin 0.11, CKMB --, CK -- 09-08-21 @ 06:48            Radiology:     < from: TTE Echo Complete w/o Contrast w/ Doppler (09.08.21 @ 10:42) >  Summary:   1. LV Ejection Fraction by Oro's Method with a biplane EF of 45 %.   2. Mildly decreased global left ventricular systolic function.   3. Mid and apical inferior septum, apex, mid anteroseptal segment, and apical inferior segment are abnormal as described above.   4. Mid to [place] anteroseptal wall thinning.   5. Spectral Doppler shows impaired relaxation pattern of left ventricular myocardial filling (Grade I diastolic dysfunction).   6. Aortic valve thickening with decreased leaflet opening.   7. No evidence of aortic stenosis.   8. Mild to moderate mitral annular calcification.   9. Mild thickening and calcification of the anterior and posterior mitral valve leaflets.  10. Mild mitral valve regurgitation.  11. Mild tricuspid regurgitation.  12. Estimated pulmonary artery systolic pressure is 56.6 mmHg assuming a right atrial pressure of 3 mmHg, which is consistent with moderate pulmonary hypertension.  13. Normal left atrial size.  14. LA volume Index is 28.3 ml/m² ml/m2.  15. Normal right atrial size.  16. In comparison to TTE from 10/6/2019 there is Akinesis and thinning of Mid to apical anteroseptal walls.    PHYSICIAN INTERPRETATION:  Left Ventricle: Global LV systolic function was mildly decreased. Spectral Doppler shows impaired relaxation pattern of left ventricular myocardial filling (Grade I diastolic dysfunction). Mid to [place] anteroseptal wall thinning.      LV Wall Scoring:  The mid and apical inferior septum and apex are akinetic. The mid anteroseptal  segment and apical inferior segment are hypokinetic. All remaining scored  segments are normal.    < end of copied text >      IMAGING:  < end of copied text >  < from: CT Hip No Cont, Left (09.05.21 @ 10:43) >    IMPRESSION:    Acute minimally displaced left trochanteric hip fracture with extension through the greater trochanter, unchanged.    --- End of Report ---      < end of copied text >  < from: Xray Hip 2-3 Views, Left (09.04.21 @ 22:19) >      IMPRESSION:      Left femoral intertrochanteric fracture    Otherwise, no evidence of acute abdominal or pelvic pathology    Above fluid density lesion in the interpolar region of the right kidney measuring up to 2.3 cm. Nonemergent ultrasound may be of benefit for further evaluation.    --- End of Report ---      < end of copied text >  < from: Xray Femur 1 View, Left (09.04.21 @ 22:19) >  FINDINGS/  IMPRESSION:    Left intertrochanteric fracture, better evaluated on referenced CT.    Degenerative changes left knee with chondrocalcinosis.    --- End of Report ---      < end of copied text >  < from: Xray Chest 1 View AP/PA (09.04.21 @ 22:18) >  Impression:    No radiographic evidence of acute cardiopulmonary disease.        --- End of Report ---    < end of copied text >  < from: CT Abdomen and Pelvis No Cont (09.04.21 @ 21:45) >  IMPRESSION:      Left femoral intertrochanteric fracture    Otherwise, no evidence of acute abdominal or pelvic pathology    Above fluid density lesion in the interpolar region of the right kidney measuring up to 2.3 cm. Nonemergent ultrasound may be of benefit for further evaluation.    --- End of Report ---      < end of copied text >  < from: CT Chest No Cont (09.04.21 @ 21:45) >  IMPRESSION:      Left femoral intertrochanteric fracture    Otherwise, no evidence of acute abdominal or pelvic pathology    Above fluid density lesion in the interpolar region of the right kidney measuring up to 2.3 cm. Nonemergent ultrasound may be of benefit for further evaluation.    --- End of Report ---      < end of copied text >      < from: 12 Lead ECG (09.08.21 @ 01:04) >  Diagnosis Line Normal sinus rhythm  Inferior infarct , age undetermined  Possible Anterior infarct , age undetermined  T wave abnormality, consider lateral ischemia  Abnormal ECG    < end of copied text >           Hospital Day:  4d    Subjective:    No acute events overnight. Patient was put on straight cath, had low urine output. Now on intermittent caths.     Chart reviewed, patient seen and examined at bedside in AM. Patient appears comfortable while at rest in bed. No complaints.       Past Medical Hx:   CAD (coronary artery disease)    Chronic CHF    Atrial fibrillation    Type 2 diabetes mellitus    Colon cancer    Hypertension    CKD (chronic kidney disease)    History of Clostridium difficile colitis    Diverticulitis      Past Sx:  S/P hysterectomy      Allergies:  No Known Allergies    Current Meds:   Standng Meds:  acetaminophen   Tablet .. 650 milliGRAM(s) Oral every 6 hours  apixaban 2.5 milliGRAM(s) Oral two times a day  ascorbic acid 500 milliGRAM(s) Oral two times a day  atorvastatin 80 milliGRAM(s) Oral at bedtime  calcitriol   Capsule 0.25 MICROGram(s) Oral daily  carvedilol 25 milliGRAM(s) Oral every 12 hours  dextrose 40% Gel 15 Gram(s) Oral once  dextrose 50% Injectable 25 Gram(s) IV Push once  ferrous    sulfate 325 milliGRAM(s) Oral daily  folic acid 1 milliGRAM(s) Oral daily  glucagon  Injectable 1 milliGRAM(s) IntraMuscular once  insulin lispro (ADMELOG) corrective regimen sliding scale   SubCutaneous every 6 hours  lactated ringers. 500 milliLiter(s) (75 mL/Hr) IV Continuous <Continuous>  latanoprost 0.005% Ophthalmic Solution 1 Drop(s) Both EYES at bedtime  levothyroxine 25 MICROGram(s) Oral daily  midodrine.      midodrine. 5 milliGRAM(s) Oral three times a day  multivitamin 1 Tablet(s) Oral daily  pantoprazole    Tablet 40 milliGRAM(s) Oral before breakfast  polyethylene glycol 3350 17 Gram(s) Oral daily  raloxifene 60 milliGRAM(s) Oral daily  senna 2 Tablet(s) Oral at bedtime    PRN Meds:  bisacodyl Suppository 10 milliGRAM(s) Rectal daily PRN If no bowel movement by POD#2  diphenhydrAMINE 25 milliGRAM(s) Oral at bedtime PRN Insomnia  morphine IVPB 4 milliGRAM(s) IV Intermittent every 4 hours PRN Severe Pain (7 - 10)  ondansetron Injectable 4 milliGRAM(s) IV Push every 6 hours PRN Nausea and/or Vomiting    HOME MEDICATIONS:  atorvastatin 80 mg oral tablet:   calcitriol 0.25 mcg oral capsule: 1 cap(s) orally once a day  carvedilol 25 mg oral tablet:   clopidogrel 75 mg oral tablet: 1 tab(s) orally once a day  Eliquis 2.5 mg oral tablet: 1 tab(s) orally 2 times a day  Evista 60 mg oral tablet: 1 tab(s) orally once a day  folic acid 1 mg oral tablet:   furosemide 40 mg oral tablet: 1 tab(s) orally once a day  Lantus: 10 unit(s) subcutaneous once a day (in the morning)  latanoprost 0.005% ophthalmic solution: 1 drop(s) to each affected eye once a day (in the evening)  levothyroxine 25 mcg (0.025 mg) oral tablet: 1 tab(s) orally once a day  losartan 25 mg oral tablet: 1 tab(s) orally once a day      Vital Signs:   T(F): 96.6 (09-09-21 @ 00:00), Max: 97.3 (09-08-21 @ 08:03)  HR: 72 (09-09-21 @ 05:36) (71 - 79)  BP: 127/60 (09-09-21 @ 05:36) (120/57 - 141/76)  RR: 18 (09-09-21 @ 05:36) (18 - 19)  SpO2: 97% (09-09-21 @ 05:36) (97% - 97%)      09-07-21 @ 07:01  -  09-08-21 @ 07:00  --------------------------------------------------------  IN: 0 mL / OUT: 250 mL / NET: -250 mL    09-08-21 @ 07:01  -  09-09-21 @ 06:18  --------------------------------------------------------  IN: 240 mL / OUT: 625 mL / NET: -385 mL        Physical Exam:   CONSTITUTIONAL: speaking in full sentences  HEAD: Normocephalic; atraumatic  EYES: EOMI, NECK: Supple; non tender,   CARD: +S1, S2 Regular rate and rhythm.  RESP: CTABL  ABD: soft nontender, nondistended, no rebound, no guarding, no rigidity  EXT: No clubbing, cyanosis or edema. Equal sensation bilaterally in upper and lower extremities.   NEURO: Alert, oriented, grossly unremarkable, no focal deficits, cn ii-xii grossly intact  PSYCH: Cooperative        Labs:                         7.7    17.38 )-----------( 138      ( 08 Sep 2021 12:08 )             22.7     Neutophil% 80.6, Lymphocyte% 11.6, Monocyte% 6.9, Bands% 0.7 09-08-21 @ 12:08    08 Sep 2021 06:48    130    |  96     |  71     ----------------------------<  149    4.8     |  19     |  2.8      Ca    9.3        08 Sep 2021 06:48  Mg     2.0       08 Sep 2021 06:48    TPro  4.9    /  Alb  3.0    /  TBili  0.3    /  DBili  x      /  AST  30     /  ALT  21     /  AlkPhos  53     08 Sep 2021 06:48        Amylase --, Lipase 88, 09-04-21 @ 19:50        Troponin 0.11, CKMB --, CK -- 09-08-21 @ 16:44  Troponin 0.10, CKMB --, CK -- 09-08-21 @ 12:08  Troponin 0.11, CKMB --, CK -- 09-08-21 @ 06:48            Radiology:     < from: TTE Echo Complete w/o Contrast w/ Doppler (09.08.21 @ 10:42) >  Summary:   1. LV Ejection Fraction by Oro's Method with a biplane EF of 45 %.   2. Mildly decreased global left ventricular systolic function.   3. Mid and apical inferior septum, apex, mid anteroseptal segment, and apical inferior segment are abnormal as described above.   4. Mid to [place] anteroseptal wall thinning.   5. Spectral Doppler shows impaired relaxation pattern of left ventricular myocardial filling (Grade I diastolic dysfunction).   6. Aortic valve thickening with decreased leaflet opening.   7. No evidence of aortic stenosis.   8. Mild to moderate mitral annular calcification.   9. Mild thickening and calcification of the anterior and posterior mitral valve leaflets.  10. Mild mitral valve regurgitation.  11. Mild tricuspid regurgitation.  12. Estimated pulmonary artery systolic pressure is 56.6 mmHg assuming a right atrial pressure of 3 mmHg, which is consistent with moderate pulmonary hypertension.  13. Normal left atrial size.  14. LA volume Index is 28.3 ml/m² ml/m2.  15. Normal right atrial size.  16. In comparison to TTE from 10/6/2019 there is Akinesis and thinning of Mid to apical anteroseptal walls.    PHYSICIAN INTERPRETATION:  Left Ventricle: Global LV systolic function was mildly decreased. Spectral Doppler shows impaired relaxation pattern of left ventricular myocardial filling (Grade I diastolic dysfunction). Mid to [place] anteroseptal wall thinning.      LV Wall Scoring:  The mid and apical inferior septum and apex are akinetic. The mid anteroseptal  segment and apical inferior segment are hypokinetic. All remaining scored  segments are normal.    < end of copied text >      IMAGING:  < end of copied text >  < from: CT Hip No Cont, Left (09.05.21 @ 10:43) >    IMPRESSION:    Acute minimally displaced left trochanteric hip fracture with extension through the greater trochanter, unchanged.    --- End of Report ---      < end of copied text >  < from: Xray Hip 2-3 Views, Left (09.04.21 @ 22:19) >      IMPRESSION:      Left femoral intertrochanteric fracture    Otherwise, no evidence of acute abdominal or pelvic pathology    Above fluid density lesion in the interpolar region of the right kidney measuring up to 2.3 cm. Nonemergent ultrasound may be of benefit for further evaluation.    --- End of Report ---      < end of copied text >  < from: Xray Femur 1 View, Left (09.04.21 @ 22:19) >  FINDINGS/  IMPRESSION:    Left intertrochanteric fracture, better evaluated on referenced CT.    Degenerative changes left knee with chondrocalcinosis.    --- End of Report ---      < end of copied text >  < from: Xray Chest 1 View AP/PA (09.04.21 @ 22:18) >  Impression:    No radiographic evidence of acute cardiopulmonary disease.        --- End of Report ---    < end of copied text >  < from: CT Abdomen and Pelvis No Cont (09.04.21 @ 21:45) >  IMPRESSION:      Left femoral intertrochanteric fracture    Otherwise, no evidence of acute abdominal or pelvic pathology    Above fluid density lesion in the interpolar region of the right kidney measuring up to 2.3 cm. Nonemergent ultrasound may be of benefit for further evaluation.    --- End of Report ---      < end of copied text >  < from: CT Chest No Cont (09.04.21 @ 21:45) >  IMPRESSION:      Left femoral intertrochanteric fracture    Otherwise, no evidence of acute abdominal or pelvic pathology    Above fluid density lesion in the interpolar region of the right kidney measuring up to 2.3 cm. Nonemergent ultrasound may be of benefit for further evaluation.    --- End of Report ---      < end of copied text >      < from: 12 Lead ECG (09.08.21 @ 01:04) >  Diagnosis Line Normal sinus rhythm  Inferior infarct , age undetermined  Possible Anterior infarct , age undetermined  T wave abnormality, consider lateral ischemia  Abnormal ECG    < end of copied text >

## 2021-09-09 NOTE — CHART NOTE - NSCHARTNOTEFT_GEN_A_CORE
Authored by Hilda De La Vega MS, RD     Current diet is mechanical soft.  Recommend changing diet to Carbohydrate Consistent, mechanical soft, Glucerna 2x/day and Ensure Pudding 1x/day.  Will continue to follow.

## 2021-09-09 NOTE — PROGRESS NOTE ADULT - ASSESSMENT
86 YO F with a PMH of CAD, HFpEF, paroxysmal Afib (Apixaban), DM2, HTN, CKD3, and hx of diverticulitis who was admitted to ortho service for eval and surgical correction of left femoral IT fracture s/p mechanical fall at home. While on ortho service, pt experienced episode of hypotension. Transferred to medicine. Ortho/surgical teams co-managing.     #left hip intertrochanteric fx, s/p orif on 9/6, wbat as per ortho  - Wound care.   - Fall precautions.  - Adequate pain control  Management as per ortho:   - WBAT LLE  - PT/OT    #Hypotension, rule out opioid-induced vs cardiac vs infectious.   - Improved significantly - Normotensive in AM today 9/8/21   - BP was previously 66/46. 1u PRBC before OR. 2 units in OR.   - ordered procal/CRP. TSH.   - Ordered blood cultures.   - Echo.   - Hold opioids for now.   - Ordered orthostatics.   - Holding ABXs for now.  - EKG: T wave abnormality, consider lateral ischemia    #DM2 #HTN #CKD3 #Diverticulitis, #HFpEF #Paroxysmal Afib   - c/w home meds: atorvastatin, clopidogrel, calcitriol, , evista, folic acid, latanoprost, levothyroxine, losartan, carvededilol   - c/w eliquis 2.5 bid  - cardiac consult - for retrosternal chest pain.     #HTN  - c/w home med(s): furosemide     #DM 2  - c/w home med(s): lantus,    DVT PPX.   - eliquis    GI ppx   - pantoprazole 40mg daily      86 YO F with a PMH of CAD, HFpEF, paroxysmal Afib (Apixaban), DM2, HTN, CKD3, and hx of diverticulitis who was admitted to ortho service for eval and surgical correction of left femoral IT fracture s/p mechanical fall at home. While on ortho service, pt experienced episode of hypotension. Transferred to medicine. Ortho/surgical teams co-managing.     #left hip intertrochanteric fx, s/p orif on 9/6, wbat as per ortho  - Wound care.   - Fall precautions.  - Adequate pain control  Management as per ortho:   - WBAT LLE  - PT/OT    #Hypotension, rule out opioid-induced vs cardiac vs infectious.   - Improved significantly - Normotensive in AM today 9/8/21   - BP was previously 66/46. 1u PRBC before OR. 2 units in OR.   - ordered procal/CRP. TSH.   - Ordered blood cultures.   - Echo.   - Hold opioids for now.   - Ordered orthostatics.   - Holding ABXs for now.  - EKG: T wave abnormality, consider lateral ischemia    #DM2 #HTN #CKD3 #Diverticulitis, #HFpEF #Paroxysmal Afib   - c/w home meds: atorvastatin, clopidogrel, calcitriol, , evista, folic acid, latanoprost, levothyroxine, losartan, carvededilol   - c/w eliquis 2.5 bid  - cardiac consult - for retrosternal chest pain.     #HTN  - c/w home med(s): furosemide     #DM 2  - c/w home med(s): lantus,    DVT PPX.   - eliquis    GI ppx   - pantoprazole 40mg daily     #Dispo  -Per Ortho: Upon discharge, please have patient follow up at 2 weeks post-op with Dr. Parsons at [address] [facility]. Please call [phone] to schedule an appointment.      86 YO F with a PMH of CAD, HFpEF, paroxysmal Afib (Apixaban), DM2, HTN, CKD3, and hx of diverticulitis who was admitted to ortho service for eval and surgical correction of left femoral IT fracture s/p mechanical fall at home. While on ortho service, pt experienced episode of hypotension. Transferred to medicine. Ortho/surgical teams co-managing.     #left hip intertrochanteric fx, s/p orif on 9/6, wbat as per ortho  - Wound care.   - Fall precautions.  - pain control  Management as per ortho:   - WBAT LLE  - PT/OT  - Per Ortho: No orthopaedic contraindication to discharge.    #Hypotension, rule out opioid-induced vs cardiac vs infectious.   - Improved significantly - Normotensive in AM today 9/8/21   - BP was previously 66/46. 1u PRBC before OR. 2 units in OR.   - ordered procal/CRP. TSH.   - Ordered blood cultures.   - Echo.   - Hold opioids for now.   - Ordered orthostatics.   - Holding ABXs for now.  - EKG: T wave abnormality, consider lateral ischemia    #DM2 #HTN #CKD3 #Diverticulitis, #HFpEF #Paroxysmal Afib   - c/w home meds: atorvastatin, clopidogrel, calcitriol, , evista, folic acid, latanoprost, levothyroxine, losartan, carvededilol   - c/w eliquis 2.5 bid  - cardiac consult - for retrosternal chest pain.     #HTN  - c/w home med(s): furosemide     #DM 2  - c/w home med(s): lantus,    DVT PPX.   - eliquis    GI ppx   - pantoprazole 40mg daily     #Dispo  -Per Ortho: Upon discharge, please have patient follow up at 2 weeks post-op with Dr. Parsons at [address] [facility]. Please call [phone] to schedule an appointment.      86 YO F with a PMH of CAD, HFpEF, paroxysmal Afib (Apixaban), DM2, HTN, CKD3, and hx of diverticulitis who was admitted to ortho service for eval and surgical correction of left femoral IT fracture s/p mechanical fall at home. While on ortho service, pt experienced episode of hypotension. Transferred to medicine. Ortho/surgical teams co-managing.     #left hip intertrochanteric fx, s/p orif on 9/6, wbat as per ortho  - Wound care.   - Fall precautions.  - pain control  Management as per ortho:   - WBAT LLE  - PT/OT  - Per Ortho: No orthopaedic contraindication to discharge.    #Hypotension, rule out opioid-induced vs cardiac vs infectious.   - Improved significantly - Normotensive in AM today 9/8/21   - BP was previously 66/46. 1u PRBC before OR. 2 units in OR.   - ordered procal/CRP. TSH.   - Ordered blood cultures.   - TTE: EF 50%. The mid and apical inferior septum and apex are akinetic. The mid anteroseptal  segment and apical inferior segment are hypokinetic. All remaining scored  segments are normal.  - Hold opioids for now.   - Ordered orthostatics.   - Holding ABXs for now.  - EKG: T wave abnormality, consider lateral ischemia    #DM2 #HTN #CKD3 #Diverticulitis, #HFpEF #Paroxysmal Afib   - c/w home meds: atorvastatin, clopidogrel, calcitriol, , evista, folic acid, latanoprost, levothyroxine, losartan, carvededilol   - c/w eliquis 2.5 bid  - cardiac consult - for retrosternal chest pain.     #HTN  - c/w home med(s): furosemide     #DM 2  - c/w home med(s): lantus,    DVT PPX.   - eliquis    GI ppx   - pantoprazole 40mg daily     #Dispo  -Per Ortho: Upon discharge, please have patient follow up at 2 weeks post-op with Dr. Parsons at [facility] [facility]. Please call [phone] to schedule an appointment.      86 YO F with a PMH of CAD, HFpEF, paroxysmal Afib (Apixaban), DM2, HTN, CKD3, and hx of diverticulitis who was admitted to ortho service for eval and surgical correction of left femoral IT fracture s/p mechanical fall at home. While on ortho service, pt experienced episode of hypotension. Transferred to medicine. Ortho/surgical teams co-managing.     #left hip intertrochanteric fx, s/p orif on 9/6, wbat as per ortho  - Wound care.   - Fall precautions.  - pain control  Management as per ortho:   - WBAT LLE  - PT/OT  - Per Ortho: No orthopaedic contraindication to discharge.    #Hypotension, rule out opioid-induced vs cardiac vs infectious.   - Improved significantly - Normotensive in AM today 9/8/21   - BP was previously 66/46. 1u PRBC before OR. 2 units in OR.   - ordered procal/CRP. TSH.   - Ordered blood cultures.   - TTE: EF 50%. The mid and apical inferior septum and apex are akinetic. The mid anteroseptal  segment and apical inferior segment are hypokinetic. All remaining scored  segments are normal.  - Hold opioids for now.   - Ordered orthostatics.   - Holding ABXs for now.  - EKG: T wave abnormality, consider lateral ischemia    #DM2 #HTN #CKD3 #Diverticulitis, #HFpEF #Paroxysmal Afib   - c/w home meds: atorvastatin, clopidogrel, calcitriol, , evista, folic acid, latanoprost, levothyroxine, losartan, carvededilol   - c/w eliquis 2.5 bid  - cardiac consult - for retrosternal chest pain.     #HTN  - c/w home med(s): furosemide     #DM 2  - c/w home med(s): lantus,    DVT PPX.   - eliquis    GI ppx   - pantoprazole 40mg daily     #Dispo  -Per Ortho: Upon discharge, please have patient follow up at 2 weeks post-op with Dr. Parsons at [address] [place] [address]. Please call [phone] to schedule an appointment.     #I/Os  - intermittent catheterization  86 YO F with a PMH of CAD, HFpEF, paroxysmal Afib (Apixaban), DM2, HTN, CKD3, and hx of diverticulitis who was admitted to ortho service for eval and surgical correction of left femoral IT fracture s/p mechanical fall at home. While on ortho service, pt experienced episode of hypotension. Transferred to medicine. Ortho/surgical teams co-managing.     #left hip intertrochanteric fx, s/p orif on 9/6, wbat as per ortho  - Wound care.   - Fall precautions.  - pain control  Management as per ortho:   - WBAT LLE  - PT/OT  - Per Ortho: No orthopaedic contraindication to discharge.    #Hypotension, rule out opioid-induced vs cardiac vs infectious.   - Improved significantly - Normotensive in AM today 9/8/21   - BP was previously 66/46. 1u PRBC before OR. 2 units in OR.   - ordered procal/CRP. TSH.   - Ordered blood cultures.   - TTE: EF 50%. The mid and apical inferior septum and apex are akinetic. The mid anteroseptal  segment and apical inferior segment are hypokinetic. All remaining scored  segments are normal.  - Hold opioids for now.   - Ordered orthostatics.   - Holding ABXs for now.  - EKG: T wave abnormality, consider lateral ischemia    #DM2 #HTN #CKD3 #Diverticulitis, #HFpEF #Paroxysmal Afib   - c/w home meds: atorvastatin, clopidogrel, calcitriol, , evista, folic acid, latanoprost, levothyroxine, losartan, carvededilol   - c/w eliquis 2.5 bid  - cardiac consult - for retrosternal chest pain.     #HTN  - c/w home med(s): furosemide     #DM 2  - c/w home med(s): lantus,    DVT PPX.   - eliquis    GI ppx   - pantoprazole 40mg daily     #I/Os  - intermittent catheterization     #Dispo  - anticipate dc - SNF/rehab  -Per Ortho: Upon discharge, please have patient follow up at 2 weeks post-op with Dr. Parsons at [address] [place] [address]. Please call [phone] to schedule an appointment.    86 YO F with a PMH of CAD, HFpEF, paroxysmal Afib (Apixaban), DM2, HTN, CKD3, and hx of diverticulitis who was admitted to ortho service for eval and surgical correction of left femoral IT fracture s/p mechanical fall at home. While on ortho service, pt experienced episode of hypotension. Transferred to medicine. Ortho/surgical teams co-managing.     #left hip intertrochanteric fx, s/p orif on 9/6, wbat as per ortho  - Wound care.   - Fall precautions.  - pain control  Management as per ortho:   - WBAT LLE  - PT/OT  - Per Ortho: No orthopaedic contraindication to discharge.  - Physiatry eval - possible 4a candidate?    #Hypotension, rule out opioid-induced vs cardiac vs infectious.   - Improved significantly - Normotensive in AM today 9/8/21   - BP was previously 66/46. 1u PRBC before OR. 2 units in OR.   - ordered procal/CRP. TSH.   - Ordered blood cultures.   - TTE: EF 50%. The mid and apical inferior septum and apex are akinetic. The mid anteroseptal  segment and apical inferior segment are hypokinetic. All remaining scored  segments are normal.  - Hold opioids for now.   - Ordered orthostatics.   - Holding ABXs for now.  - EKG: T wave abnormality, consider lateral ischemia    #DM2 #HTN #CKD3 #Diverticulitis, #HFpEF #Paroxysmal Afib   - c/w home meds: atorvastatin, clopidogrel, calcitriol, , evista, folic acid, latanoprost, levothyroxine, losartan, carvededilol   - c/w eliquis 2.5 bid  - cardiac consult - for retrosternal chest pain.     #HTN  - c/w home med(s): furosemide     #DM 2  - c/w home med(s): lantus,    DVT PPX.   - eliquis    GI ppx   - pantoprazole 40mg daily     #I/Os  - intermittent catheterization   - may consider elliott after intermittent cath x3    #Dispo  - anticipate dc - SNF/rehab  -Per Ortho: Upon discharge, please have patient follow up at 2 weeks post-op with Dr. Parsons at [address] [place] [address]. Please call [phone] to schedule an appointment.    84 YO F with a PMH of CAD, HFpEF, paroxysmal Afib (Apixaban), DM2, HTN, CKD3, and hx of diverticulitis who was admitted to ortho service for eval and surgical correction of left femoral IT fracture s/p mechanical fall at home. While on ortho service, pt experienced episode of hypotension. Transferred to medicine. Ortho/surgical teams co-managing.     #left hip intertrochanteric fx, s/p orif on 9/6, wbat as per ortho  - Wound care.   - Fall precautions.  - pain control  Management as per ortho:   - WBAT LLE  - PT/OT  - Per Ortho: No orthopaedic contraindication to discharge.  - Physiatry eval - possible 4a candidate?    #Hypotension, rule out opioid-induced vs cardiac vs infectious.   - Improved significantly - Normotensive in AM today 9/8/21   - BP was previously 66/46. 1u PRBC before OR. 2 units in OR.   - ordered procal/CRP. TSH.   - Ordered blood cultures.   - TTE: EF 50%. The mid and apical inferior septum and apex are akinetic. The mid anteroseptal  segment and apical inferior segment are hypokinetic. All remaining scored  segments are normal.  - Hold opioids for now.   - Ordered orthostatics.   - Holding ABXs for now.  - EKG: T wave abnormality, consider lateral ischemia    #DM2 #HTN #CKD3 #Diverticulitis, #HFpEF #Paroxysmal Afib   - c/w home meds: atorvastatin, clopidogrel, calcitriol, , evista, folic acid, latanoprost, levothyroxine, losartan, carvededilol   - c/w eliquis 2.5 bid  - cardiac consult - for retrosternal chest pain.     #HTN  - c/w home med(s): furosemide     #DM 2  - c/w home med(s): lantus,    DVT PPX.   - eliquis    GI ppx   - pantoprazole 40mg daily     #I/Os  - intermittent catheterization   - may consider elliott after intermittent cath x3    #Diet  - started soft diet  9/9    #Dispo  - anticipate dc - SNF/rehab  -Per Ortho: Upon discharge, please have patient follow up at 2 weeks post-op with Dr. Parsons at [facility] [facility]. Please call [phone] to schedule an appointment.    86 YO F with a PMH of CAD, HFpEF, paroxysmal Afib (Apixaban), DM2, HTN, CKD3, and hx of diverticulitis who was admitted to ortho service for eval and surgical correction of left femoral IT fracture s/p mechanical fall at home. While on ortho service, pt experienced episode of hypotension. Transferred to medicine. Ortho/surgical teams co-managing.     #left hip intertrochanteric fx, s/p orif on 9/6, wbat as per ortho  - Wound care.   - Fall precautions.  - pain control  Management as per ortho:   - WBAT LLE  - PT/OT  - Per Ortho: No orthopaedic contraindication to discharge.  - Physiatry eval - possible 4a candidate?    #Hypotension, rule out opioid-induced vs cardiac vs infectious.   - Improved significantly - Normotensive in AM today 9/8/21   - BP was previously 66/46. 1u PRBC before OR. 2 units in OR.   - ordered procal/CRP. TSH.   - Ordered blood cultures.   - TTE: EF 50%. The mid and apical inferior septum and apex are akinetic. The mid anteroseptal  segment and apical inferior segment are hypokinetic. All remaining scored  segments are normal.  - Hold opioids for now.   - Ordered orthostatics.   - Holding ABXs for now.  - EKG: T wave abnormality, consider lateral ischemia    #DM2 #HTN #CKD3 #Diverticulitis, #HFpEF #Paroxysmal Afib   - c/w home meds: atorvastatin, clopidogrel, calcitriol, , evista, folic acid, latanoprost, levothyroxine, losartan, carvededilol   - c/w eliquis 2.5 bid  - cardiac consult     #HTN  - c/w home med(s): furosemide     #DM 2  - c/w home med(s): lantus,    DVT PPX.   - eliquis    GI ppx   - pantoprazole 40mg daily     #I/Os  - intermittent catheterization   - may consider elliott after intermittent cath x3    #Diet  - started soft diet  9/9    #Dispo  - anticipate dc - SNF/rehab  -Per Ortho: Upon discharge, please have patient follow up at 2 weeks post-op with Dr. Parsons at [address] [place] [address]. Please call [phone] to schedule an appointment.

## 2021-09-09 NOTE — PROGRESS NOTE ADULT - SUBJECTIVE AND OBJECTIVE BOX
Patient is a 85y old  Female who presents with a chief complaint of s/p Mechanical Fall, +HT, -LOC      HPI:  85F w/PMHx CAD, CHF, Afib on Eliquis, DM, HTN, colon cancer, CKD stage 3, hx of diverticulitis, presents s/p mechanical fall at home, +HT, -LOC, +AC (Eliquis). Patient was in her kitchen and "turned around too fast" when she fell over and hit her head on a chair. She denies loss of consciousness. She remained down for ~30 min before EMS came to take her into the ED. She is GCS 15, primary survey negative. No external signs of trauma. Complains of LLE pain. Denies scalp, spinal, chest, abdominal pain. ROM of all extremities in tact. C-collar in place. s/p orif left hip fx on 9/6             PHYSICAL EXAM    Vital Signs Last 24 Hrs  T(C): 35.6 (09 Sep 2021 07:48), Max: 36 (08 Sep 2021 15:29)  T(F): 96.1 (09 Sep 2021 07:48), Max: 96.8 (08 Sep 2021 15:29)  HR: 73 (09 Sep 2021 07:48) (71 - 73)  BP: 102/52 (09 Sep 2021 07:48) (102/52 - 141/76)  BP(mean): --  RR: 18 (09 Sep 2021 07:48) (18 - 19)  SpO2: 97% (09 Sep 2021 05:36) (97% - 97%)    Constitutional - NAD  Chest - CTA  Cardiovascular - S1S2+  Abdomen -  Soft  Extremities -  No calf tenderness   Function : transfer with max A                unable to ambulate    acetaminophen   Tablet .. 650 milliGRAM(s) Oral every 6 hours  apixaban 2.5 milliGRAM(s) Oral two times a day  ascorbic acid 500 milliGRAM(s) Oral two times a day  atorvastatin 80 milliGRAM(s) Oral at bedtime  bisacodyl Suppository 10 milliGRAM(s) Rectal daily PRN  calcitriol   Capsule 0.25 MICROGram(s) Oral daily  carvedilol 25 milliGRAM(s) Oral every 12 hours  dextrose 40% Gel 15 Gram(s) Oral once  dextrose 50% Injectable 25 Gram(s) IV Push once  diphenhydrAMINE 25 milliGRAM(s) Oral at bedtime PRN  ferrous    sulfate 325 milliGRAM(s) Oral daily  folic acid 1 milliGRAM(s) Oral daily  glucagon  Injectable 1 milliGRAM(s) IntraMuscular once  insulin lispro (ADMELOG) corrective regimen sliding scale   SubCutaneous every 6 hours  lactated ringers. 500 milliLiter(s) IV Continuous <Continuous>  latanoprost 0.005% Ophthalmic Solution 1 Drop(s) Both EYES at bedtime  levothyroxine 25 MICROGram(s) Oral daily  midodrine.      midodrine. 5 milliGRAM(s) Oral three times a day  morphine IVPB 4 milliGRAM(s) IV Intermittent every 4 hours PRN  multivitamin 1 Tablet(s) Oral daily  ondansetron Injectable 4 milliGRAM(s) IV Push every 6 hours PRN  pantoprazole    Tablet 40 milliGRAM(s) Oral before breakfast  polyethylene glycol 3350 17 Gram(s) Oral daily  raloxifene 60 milliGRAM(s) Oral daily  senna 2 Tablet(s) Oral at bedtime      RECENT LABS/IMAGING                        7.7    17.38 )-----------( 138      ( 08 Sep 2021 12:08 )             22.7     09-08    130<L>  |  96<L>  |  71<HH>  ----------------------------<  149<H>  4.8   |  19  |  2.8<H>    Ca    9.3      08 Sep 2021 06:48  Mg     2.0     09-08    TPro  4.9<L>  /  Alb  3.0<L>  /  TBili  0.3  /  DBili  x   /  AST  30  /  ALT  21  /  AlkPhos  53  09-08

## 2021-09-09 NOTE — PROGRESS NOTE ADULT - ASSESSMENT
ORTHOPAEDIC SURGERY PROGRESS NOTE    Patient seen and examined at bedside this morning.  Pain in LLE controlled. Denies chest pain shortness of breath nausea vomiting     EXAM  LLE  Dressings in place with mild spotting, intact  Compartments soft, compressible  Motor intact ehl/fhl, ta/gs  SILT dp/sp/s/s  Foot wwp, CR <2s    A&P  85F L basi-cervical femoral neck fracture s/p IMN POD3. Doing well post-operatively. No orthopaedic contraindication to discharge.    -WBAT LLE  -pain control  -DVT ppx  -PT/OT    -Upon discharge, please have patient follow up at 2 weeks post-op with Dr. Parsons at [address] [facility]. Please call [phone] to schedule an appointment.

## 2021-09-09 NOTE — PROGRESS NOTE ADULT - ATTENDING COMMENTS
86 YO F with a PMH of CAD, HFpEF, paroxysmal Afib (Apixaban), DM2, HTN, CKD3, and hx of diverticulitis who was admitted to ortho service for eval and surgical correction of left femoral IT fracture s/p mechanical fall at home. While on ortho service, pt experienced episode of hypotension. Transferred to medicine. Ortho/surgical teams co-managing.     Left hip intertrochanteric fracture s/p orif on 9/6  Typical chest pain with elevated trops  Acute blood loss anemia  Acute hypotensive episode after OR   ELOISE , possible pre-renal with CKD stage 3  Chronic HFpEF  Paroxysmal Afib   CAD, /sp PCI        WBAT LLE  PT/OT  Cardiology consult  Typical chest pain with elevated trops- possibly related to CKD/ELOISE and demand. Cardiology consult  Follow procal,TSH.   EKG: T wave abnormality, possible lateral ischemia  Continue DAPT, statin, carvedilol   ELOISE , possible pre-renal with CKD stage 3. Hold losartan. Added midodrine  Continue with Eliquis 2.5 bid  Handoff: Acute inpatient management  required further .

## 2021-09-09 NOTE — PROGRESS NOTE ADULT - ASSESSMENT
Imp : left hip it fx, s/p orif, WBAT  Plan : continue bedside therapy as tolerated           limited therapy tolerance           consider STR

## 2021-09-10 LAB
ALBUMIN SERPL ELPH-MCNC: 2.5 G/DL — LOW (ref 3.5–5.2)
ALBUMIN SERPL ELPH-MCNC: 2.7 G/DL — LOW (ref 3.5–5.2)
ALP SERPL-CCNC: 48 U/L — SIGNIFICANT CHANGE UP (ref 30–115)
ALP SERPL-CCNC: 52 U/L — SIGNIFICANT CHANGE UP (ref 30–115)
ALT FLD-CCNC: 19 U/L — SIGNIFICANT CHANGE UP (ref 0–41)
ALT FLD-CCNC: 22 U/L — SIGNIFICANT CHANGE UP (ref 0–41)
ANION GAP SERPL CALC-SCNC: 8 MMOL/L — SIGNIFICANT CHANGE UP (ref 7–14)
ANION GAP SERPL CALC-SCNC: 8 MMOL/L — SIGNIFICANT CHANGE UP (ref 7–14)
AST SERPL-CCNC: 26 U/L — SIGNIFICANT CHANGE UP (ref 0–41)
AST SERPL-CCNC: 29 U/L — SIGNIFICANT CHANGE UP (ref 0–41)
BASOPHILS # BLD AUTO: 0 K/UL — SIGNIFICANT CHANGE UP (ref 0–0.2)
BASOPHILS # BLD AUTO: 0.01 K/UL — SIGNIFICANT CHANGE UP (ref 0–0.2)
BASOPHILS NFR BLD AUTO: 0 % — SIGNIFICANT CHANGE UP (ref 0–1)
BASOPHILS NFR BLD AUTO: 0.1 % — SIGNIFICANT CHANGE UP (ref 0–1)
BILIRUB SERPL-MCNC: 0.5 MG/DL — SIGNIFICANT CHANGE UP (ref 0.2–1.2)
BILIRUB SERPL-MCNC: 0.5 MG/DL — SIGNIFICANT CHANGE UP (ref 0.2–1.2)
BUN SERPL-MCNC: 65 MG/DL — CRITICAL HIGH (ref 10–20)
BUN SERPL-MCNC: 69 MG/DL — CRITICAL HIGH (ref 10–20)
CALCIUM SERPL-MCNC: 8.6 MG/DL — SIGNIFICANT CHANGE UP (ref 8.5–10.1)
CALCIUM SERPL-MCNC: 8.7 MG/DL — SIGNIFICANT CHANGE UP (ref 8.5–10.1)
CHLORIDE SERPL-SCNC: 104 MMOL/L — SIGNIFICANT CHANGE UP (ref 98–110)
CHLORIDE SERPL-SCNC: 105 MMOL/L — SIGNIFICANT CHANGE UP (ref 98–110)
CO2 SERPL-SCNC: 18 MMOL/L — SIGNIFICANT CHANGE UP (ref 17–32)
CO2 SERPL-SCNC: 19 MMOL/L — SIGNIFICANT CHANGE UP (ref 17–32)
CREAT SERPL-MCNC: 2.1 MG/DL — HIGH (ref 0.7–1.5)
CREAT SERPL-MCNC: 2.2 MG/DL — HIGH (ref 0.7–1.5)
D DIMER BLD IA.RAPID-MCNC: 1007 NG/ML DDU — HIGH (ref 0–230)
EOSINOPHIL # BLD AUTO: 0.07 K/UL — SIGNIFICANT CHANGE UP (ref 0–0.7)
EOSINOPHIL # BLD AUTO: 0.13 K/UL — SIGNIFICANT CHANGE UP (ref 0–0.7)
EOSINOPHIL NFR BLD AUTO: 0.7 % — SIGNIFICANT CHANGE UP (ref 0–8)
EOSINOPHIL NFR BLD AUTO: 1.5 % — SIGNIFICANT CHANGE UP (ref 0–8)
FIBRINOGEN PPP-MCNC: 510 MG/DL — SIGNIFICANT CHANGE UP (ref 204.4–570.6)
GLUCOSE BLDC GLUCOMTR-MCNC: 127 MG/DL — HIGH (ref 70–99)
GLUCOSE BLDC GLUCOMTR-MCNC: 161 MG/DL — HIGH (ref 70–99)
GLUCOSE BLDC GLUCOMTR-MCNC: 164 MG/DL — HIGH (ref 70–99)
GLUCOSE BLDC GLUCOMTR-MCNC: 184 MG/DL — HIGH (ref 70–99)
GLUCOSE BLDC GLUCOMTR-MCNC: 209 MG/DL — HIGH (ref 70–99)
GLUCOSE SERPL-MCNC: 110 MG/DL — HIGH (ref 70–99)
GLUCOSE SERPL-MCNC: 226 MG/DL — HIGH (ref 70–99)
HCT VFR BLD CALC: 16.2 % — LOW (ref 37–47)
HCT VFR BLD CALC: 17 % — LOW (ref 37–47)
HCT VFR BLD CALC: 28.2 % — LOW (ref 37–47)
HGB BLD-MCNC: 5.4 G/DL — CRITICAL LOW (ref 12–16)
HGB BLD-MCNC: 5.8 G/DL — CRITICAL LOW (ref 12–16)
HGB BLD-MCNC: 9.2 G/DL — LOW (ref 12–16)
IMM GRANULOCYTES NFR BLD AUTO: 0.8 % — HIGH (ref 0.1–0.3)
IMM GRANULOCYTES NFR BLD AUTO: 1.5 % — HIGH (ref 0.1–0.3)
LDH SERPL L TO P-CCNC: 195 — SIGNIFICANT CHANGE UP (ref 50–242)
LYMPHOCYTES # BLD AUTO: 1.16 K/UL — LOW (ref 1.2–3.4)
LYMPHOCYTES # BLD AUTO: 1.37 K/UL — SIGNIFICANT CHANGE UP (ref 1.2–3.4)
LYMPHOCYTES # BLD AUTO: 13 % — LOW (ref 20.5–51.1)
LYMPHOCYTES # BLD AUTO: 13.9 % — LOW (ref 20.5–51.1)
MAGNESIUM SERPL-MCNC: 1.8 MG/DL — SIGNIFICANT CHANGE UP (ref 1.8–2.4)
MCHC RBC-ENTMCNC: 27.9 PG — SIGNIFICANT CHANGE UP (ref 27–31)
MCHC RBC-ENTMCNC: 28.1 PG — SIGNIFICANT CHANGE UP (ref 27–31)
MCHC RBC-ENTMCNC: 28.3 PG — SIGNIFICANT CHANGE UP (ref 27–31)
MCHC RBC-ENTMCNC: 32.6 G/DL — SIGNIFICANT CHANGE UP (ref 32–37)
MCHC RBC-ENTMCNC: 33.3 G/DL — SIGNIFICANT CHANGE UP (ref 32–37)
MCHC RBC-ENTMCNC: 34.1 G/DL — SIGNIFICANT CHANGE UP (ref 32–37)
MCV RBC AUTO: 82.9 FL — SIGNIFICANT CHANGE UP (ref 81–99)
MCV RBC AUTO: 84.4 FL — SIGNIFICANT CHANGE UP (ref 81–99)
MCV RBC AUTO: 85.5 FL — SIGNIFICANT CHANGE UP (ref 81–99)
MONOCYTES # BLD AUTO: 0.78 K/UL — HIGH (ref 0.1–0.6)
MONOCYTES # BLD AUTO: 0.81 K/UL — HIGH (ref 0.1–0.6)
MONOCYTES NFR BLD AUTO: 7.9 % — SIGNIFICANT CHANGE UP (ref 1.7–9.3)
MONOCYTES NFR BLD AUTO: 9.1 % — SIGNIFICANT CHANGE UP (ref 1.7–9.3)
NEUTROPHILS # BLD AUTO: 6.76 K/UL — HIGH (ref 1.4–6.5)
NEUTROPHILS # BLD AUTO: 7.45 K/UL — HIGH (ref 1.4–6.5)
NEUTROPHILS NFR BLD AUTO: 75.6 % — HIGH (ref 42.2–75.2)
NEUTROPHILS NFR BLD AUTO: 75.9 % — HIGH (ref 42.2–75.2)
NRBC # BLD: 0 /100 WBCS — SIGNIFICANT CHANGE UP (ref 0–0)
PHOSPHATE SERPL-MCNC: 2.4 MG/DL — SIGNIFICANT CHANGE UP (ref 2.1–4.9)
PLATELET # BLD AUTO: 132 K/UL — SIGNIFICANT CHANGE UP (ref 130–400)
PLATELET # BLD AUTO: 147 K/UL — SIGNIFICANT CHANGE UP (ref 130–400)
PLATELET # BLD AUTO: 160 K/UL — SIGNIFICANT CHANGE UP (ref 130–400)
POTASSIUM SERPL-MCNC: 4.6 MMOL/L — SIGNIFICANT CHANGE UP (ref 3.5–5)
POTASSIUM SERPL-MCNC: 4.8 MMOL/L — SIGNIFICANT CHANGE UP (ref 3.5–5)
POTASSIUM SERPL-SCNC: 4.6 MMOL/L — SIGNIFICANT CHANGE UP (ref 3.5–5)
POTASSIUM SERPL-SCNC: 4.8 MMOL/L — SIGNIFICANT CHANGE UP (ref 3.5–5)
PROT SERPL-MCNC: 4.1 G/DL — LOW (ref 6–8)
PROT SERPL-MCNC: 4.3 G/DL — LOW (ref 6–8)
RBC # BLD: 1.92 M/UL — LOW (ref 4.2–5.4)
RBC # BLD: 2.05 M/UL — LOW (ref 4.2–5.4)
RBC # BLD: 3.3 M/UL — LOW (ref 4.2–5.4)
RBC # FLD: 17.1 % — HIGH (ref 11.5–14.5)
RBC # FLD: 19.9 % — HIGH (ref 11.5–14.5)
RBC # FLD: 20.3 % — HIGH (ref 11.5–14.5)
SODIUM SERPL-SCNC: 131 MMOL/L — LOW (ref 135–146)
SODIUM SERPL-SCNC: 131 MMOL/L — LOW (ref 135–146)
TROPONIN T SERPL-MCNC: 0.17 NG/ML — CRITICAL HIGH
TROPONIN T SERPL-MCNC: 0.18 NG/ML — CRITICAL HIGH
TROPONIN T SERPL-MCNC: 0.22 NG/ML — CRITICAL HIGH
WBC # BLD: 11.64 K/UL — HIGH (ref 4.8–10.8)
WBC # BLD: 8.93 K/UL — SIGNIFICANT CHANGE UP (ref 4.8–10.8)
WBC # BLD: 9.83 K/UL — SIGNIFICANT CHANGE UP (ref 4.8–10.8)
WBC # FLD AUTO: 11.64 K/UL — HIGH (ref 4.8–10.8)
WBC # FLD AUTO: 8.93 K/UL — SIGNIFICANT CHANGE UP (ref 4.8–10.8)
WBC # FLD AUTO: 9.83 K/UL — SIGNIFICANT CHANGE UP (ref 4.8–10.8)

## 2021-09-10 PROCEDURE — 99233 SBSQ HOSP IP/OBS HIGH 50: CPT

## 2021-09-10 PROCEDURE — 74176 CT ABD & PELVIS W/O CONTRAST: CPT | Mod: 26

## 2021-09-10 PROCEDURE — 93010 ELECTROCARDIOGRAM REPORT: CPT

## 2021-09-10 PROCEDURE — 71045 X-RAY EXAM CHEST 1 VIEW: CPT | Mod: 26

## 2021-09-10 PROCEDURE — 73700 CT LOWER EXTREMITY W/O DYE: CPT | Mod: 26,LT

## 2021-09-10 RX ORDER — ONDANSETRON 8 MG/1
4 TABLET, FILM COATED ORAL ONCE
Refills: 0 | Status: COMPLETED | OUTPATIENT
Start: 2021-09-10 | End: 2021-09-10

## 2021-09-10 RX ORDER — PANTOPRAZOLE SODIUM 20 MG/1
40 TABLET, DELAYED RELEASE ORAL ONCE
Refills: 0 | Status: COMPLETED | OUTPATIENT
Start: 2021-09-10 | End: 2021-09-10

## 2021-09-10 RX ADMIN — Medication 650 MILLIGRAM(S): at 17:44

## 2021-09-10 RX ADMIN — ATORVASTATIN CALCIUM 80 MILLIGRAM(S): 80 TABLET, FILM COATED ORAL at 23:06

## 2021-09-10 RX ADMIN — PANTOPRAZOLE SODIUM 40 MILLIGRAM(S): 20 TABLET, DELAYED RELEASE ORAL at 10:33

## 2021-09-10 RX ADMIN — Medication 500 MILLIGRAM(S): at 17:43

## 2021-09-10 RX ADMIN — Medication 1 TABLET(S): at 11:05

## 2021-09-10 RX ADMIN — Medication 650 MILLIGRAM(S): at 23:07

## 2021-09-10 RX ADMIN — Medication 25 MICROGRAM(S): at 05:09

## 2021-09-10 RX ADMIN — Medication 500 MILLIGRAM(S): at 05:08

## 2021-09-10 RX ADMIN — CARVEDILOL PHOSPHATE 25 MILLIGRAM(S): 80 CAPSULE, EXTENDED RELEASE ORAL at 05:08

## 2021-09-10 RX ADMIN — Medication 4: at 17:44

## 2021-09-10 RX ADMIN — Medication 650 MILLIGRAM(S): at 11:06

## 2021-09-10 RX ADMIN — Medication 650 MILLIGRAM(S): at 12:06

## 2021-09-10 RX ADMIN — POLYETHYLENE GLYCOL 3350 17 GRAM(S): 17 POWDER, FOR SOLUTION ORAL at 11:04

## 2021-09-10 RX ADMIN — Medication 325 MILLIGRAM(S): at 11:04

## 2021-09-10 RX ADMIN — ONDANSETRON 4 MILLIGRAM(S): 8 TABLET, FILM COATED ORAL at 10:06

## 2021-09-10 RX ADMIN — Medication 650 MILLIGRAM(S): at 05:58

## 2021-09-10 RX ADMIN — Medication 1 MILLIGRAM(S): at 11:05

## 2021-09-10 RX ADMIN — Medication 2: at 23:05

## 2021-09-10 RX ADMIN — MIDODRINE HYDROCHLORIDE 5 MILLIGRAM(S): 2.5 TABLET ORAL at 17:43

## 2021-09-10 RX ADMIN — APIXABAN 2.5 MILLIGRAM(S): 2.5 TABLET, FILM COATED ORAL at 05:08

## 2021-09-10 RX ADMIN — Medication 2: at 00:29

## 2021-09-10 RX ADMIN — RALOXIFENE HYDROCHLORIDE 60 MILLIGRAM(S): 60 TABLET, COATED ORAL at 11:06

## 2021-09-10 RX ADMIN — CARVEDILOL PHOSPHATE 25 MILLIGRAM(S): 80 CAPSULE, EXTENDED RELEASE ORAL at 17:43

## 2021-09-10 RX ADMIN — Medication 650 MILLIGRAM(S): at 05:07

## 2021-09-10 RX ADMIN — CALCITRIOL 0.25 MICROGRAM(S): 0.5 CAPSULE ORAL at 11:04

## 2021-09-10 RX ADMIN — MIDODRINE HYDROCHLORIDE 5 MILLIGRAM(S): 2.5 TABLET ORAL at 11:04

## 2021-09-10 NOTE — PROGRESS NOTE ADULT - SUBJECTIVE AND OBJECTIVE BOX
Hospital Day:  5d    Subjective:    No acute events overnight.    Chart reviewed, patient seen and examined at bedside in AM. Patient appears comfortable while at rest in bed. No other complaints.     Denies headaches, changes in vision, chest pains, SOB, n/v/d, abd pain, constipation, changes in urination, pain on urination, weakness, fatigue, joint pain or muscle pain.       Past Medical Hx:   CAD (coronary artery disease)    Chronic CHF    Atrial fibrillation    Type 2 diabetes mellitus    Colon cancer    Hypertension    CKD (chronic kidney disease)    History of Clostridium difficile colitis    Diverticulitis      Past Sx:  S/P hysterectomy      Allergies:  No Known Allergies    Current Meds:   Standng Meds:  acetaminophen   Tablet .. 650 milliGRAM(s) Oral every 6 hours  apixaban 2.5 milliGRAM(s) Oral two times a day  ascorbic acid 500 milliGRAM(s) Oral two times a day  atorvastatin 80 milliGRAM(s) Oral at bedtime  calcitriol   Capsule 0.25 MICROGram(s) Oral daily  carvedilol 25 milliGRAM(s) Oral every 12 hours  dextrose 40% Gel 15 Gram(s) Oral once  dextrose 50% Injectable 25 Gram(s) IV Push once  ferrous    sulfate 325 milliGRAM(s) Oral daily  folic acid 1 milliGRAM(s) Oral daily  glucagon  Injectable 1 milliGRAM(s) IntraMuscular once  insulin lispro (ADMELOG) corrective regimen sliding scale   SubCutaneous every 6 hours  lactated ringers. 500 milliLiter(s) (75 mL/Hr) IV Continuous <Continuous>  latanoprost 0.005% Ophthalmic Solution 1 Drop(s) Both EYES at bedtime  levothyroxine 25 MICROGram(s) Oral daily  midodrine.      midodrine. 5 milliGRAM(s) Oral three times a day  multivitamin 1 Tablet(s) Oral daily  pantoprazole    Tablet 40 milliGRAM(s) Oral before breakfast  polyethylene glycol 3350 17 Gram(s) Oral daily  raloxifene 60 milliGRAM(s) Oral daily  senna 2 Tablet(s) Oral at bedtime    PRN Meds:  bisacodyl Suppository 10 milliGRAM(s) Rectal daily PRN If no bowel movement by POD#2  diphenhydrAMINE 25 milliGRAM(s) Oral at bedtime PRN Insomnia  morphine IVPB 4 milliGRAM(s) IV Intermittent every 4 hours PRN Severe Pain (7 - 10)  ondansetron Injectable 4 milliGRAM(s) IV Push every 6 hours PRN Nausea and/or Vomiting    HOME MEDICATIONS:  atorvastatin 80 mg oral tablet:   calcitriol 0.25 mcg oral capsule: 1 cap(s) orally once a day  carvedilol 25 mg oral tablet:   clopidogrel 75 mg oral tablet: 1 tab(s) orally once a day  Eliquis 2.5 mg oral tablet: 1 tab(s) orally 2 times a day  Evista 60 mg oral tablet: 1 tab(s) orally once a day  folic acid 1 mg oral tablet:   furosemide 40 mg oral tablet: 1 tab(s) orally once a day  Lantus: 10 unit(s) subcutaneous once a day (in the morning)  latanoprost 0.005% ophthalmic solution: 1 drop(s) to each affected eye once a day (in the evening)  levothyroxine 25 mcg (0.025 mg) oral tablet: 1 tab(s) orally once a day  losartan 25 mg oral tablet: 1 tab(s) orally once a day      Vital Signs:   T(F): 96.4 (09-10-21 @ 05:03), Max: 96.9 (09-09-21 @ 15:54)  HR: 62 (09-10-21 @ 05:03) (62 - 73)  BP: 139/62 (09-10-21 @ 05:03) (102/52 - 139/62)  RR: 18 (09-10-21 @ 05:03) (18 - 19)  SpO2: 95% (09-10-21 @ 05:03) (95% - 95%)      09-08-21 @ 07:01  -  09-09-21 @ 07:00  --------------------------------------------------------  IN: 240 mL / OUT: 625 mL / NET: -385 mL    09-09-21 @ 07:01  -  09-10-21 @ 06:17  --------------------------------------------------------  IN: 1315 mL / OUT: 850 mL / NET: 465 mL        Physical Exam:   CONSTITUTIONAL: Well-developed; well-nourished; in no acute distress, speaking in full sentences  HEAD: Normocephalic; atraumatic  EYES: PERRL, EOMI, no conjunctival erythema  NECK: Supple; non tender, FROM  CARD: +S1, S2 Regular rate and rhythm.  RESP: CTABL  ABD: soft nontender, nondistended, no rebound, no guarding, no rigidity  EXT: moves all extremities,  No clubbing, cyanosis or edema.   NEURO: Alert, oriented, grossly unremarkable, no focal deficits, cn ii-xii grossly intact  PSYCH: Cooperative, appropriate         Labs:                         7.7    17.38 )-----------( 138      ( 08 Sep 2021 12:08 )             22.7       08 Sep 2021 06:48    130    |  96     |  71     ----------------------------<  149    4.8     |  19     |  2.8      Ca    9.3        08 Sep 2021 06:48  Mg     2.0       08 Sep 2021 06:48    TPro  4.9    /  Alb  3.0    /  TBili  0.3    /  DBili  x      /  AST  30     /  ALT  21     /  AlkPhos  53     08 Sep 2021 06:48              Troponin 0.11, CKMB --, CK -- 09-08-21 @ 16:44  Troponin 0.10, CKMB --, CK -- 09-08-21 @ 12:08  Troponin 0.11, CKMB --, CK -- 09-08-21 @ 06:48              Culture - Blood (collected 09-08-21 @ 16:44)  Source: .Blood None  Preliminary Report (09-10-21 @ 01:02):    No growth to date.        =============    CAPILLARY BLOOD GLUCOSE      POCT Blood Glucose.: 127 mg/dL (10 Sep 2021 06:08)  POCT Blood Glucose.: 184 mg/dL (10 Sep 2021 00:19)  POCT Blood Glucose.: 191 mg/dL (09 Sep 2021 21:08)  POCT Blood Glucose.: 250 mg/dL (09 Sep 2021 16:38)  POCT Blood Glucose.: 157 mg/dL (09 Sep 2021 11:19)    CARDIAC MARKERS ( 08 Sep 2021 16:44 )  x     / 0.11 ng/mL / x     / x     / x      CARDIAC MARKERS ( 08 Sep 2021 12:08 )  x     / 0.10 ng/mL / x     / x     / x      CARDIAC MARKERS ( 08 Sep 2021 06:48 )  x     / 0.11 ng/mL / x     / x     / x          LIVER FUNCTIONS - ( 08 Sep 2021 06:48 )  Alb: 3.0 g/dL / Pro: 4.9 g/dL / ALK PHOS: 53 U/L / ALT: 21 U/L / AST: 30 U/L / GGT: x             Culture - Blood (collected 08 Sep 2021 16:44)  Source: .Blood None  Preliminary Report (10 Sep 2021 01:02):    No growth to date.        Radiology:   IMAGING:    < from: TTE Echo Complete w/o Contrast w/ Doppler (09.08.21 @ 10:42) >  Summary:   1. LV Ejection Fraction by Oro's Method with a biplane EF of 45 %.   2. Mildly decreased global left ventricular systolic function.   3. Mid and apical inferior septum, apex, mid anteroseptal segment, and apical inferior segment are abnormal as described above.   4. Mid to [place] anteroseptal wall thinning.   5. Spectral Doppler shows impaired relaxation pattern of left ventricular myocardial filling (Grade I diastolic dysfunction).   6. Aortic valve thickening with decreased leaflet opening.   7. No evidence of aortic stenosis.   8. Mild to moderate mitral annular calcification.   9. Mild thickening and calcification of the anterior and posterior mitral valve leaflets.  10. Mild mitral valve regurgitation.  11. Mild tricuspid regurgitation.  12. Estimated pulmonary artery systolic pressure is 56.6 mmHg assuming a right atrial pressure of 3 mmHg, which is consistent with moderate pulmonary hypertension.  13. Normal left atrial size.  14. LA volume Index is 28.3 ml/m² ml/m2.  15. Normal right atrial size.  16. In comparison to TTE from 10/6/2019 there is Akinesis and thinning of Mid to apical anteroseptal walls.    PHYSICIAN INTERPRETATION:  Left Ventricle: Global LV systolic function was mildly decreased. Spectral Doppler shows impaired relaxation pattern of left ventricular myocardial filling (Grade I diastolic dysfunction). Mid to [place] anteroseptal wall thinning.      LV Wall Scoring:  The mid and apical inferior septum and apex are akinetic. The mid anteroseptal  segment and apical inferior segment are hypokinetic. All remaining scored  segments are normal.    < end of copied text >      IMAGING:  < end of copied text >  < from: CT Hip No Cont, Left (09.05.21 @ 10:43) >    IMPRESSION:    Acute minimally displaced left trochanteric hip fracture with extension through the greater trochanter, unchanged.    --- End of Report ---      < end of copied text >  < from: Xray Hip 2-3 Views, Left (09.04.21 @ 22:19) >      IMPRESSION:      Left femoral intertrochanteric fracture    Otherwise, no evidence of acute abdominal or pelvic pathology    Above fluid density lesion in the interpolar region of the right kidney measuring up to 2.3 cm. Nonemergent ultrasound may be of benefit for further evaluation.    --- End of Report ---      < end of copied text >  < from: Xray Femur 1 View, Left (09.04.21 @ 22:19) >  FINDINGS/  IMPRESSION:    Left intertrochanteric fracture, better evaluated on referenced CT.    Degenerative changes left knee with chondrocalcinosis.    --- End of Report ---      < end of copied text >  < from: Xray Chest 1 View AP/PA (09.04.21 @ 22:18) >  Impression:    No radiographic evidence of acute cardiopulmonary disease.        --- End of Report ---    < end of copied text >  < from: CT Abdomen and Pelvis No Cont (09.04.21 @ 21:45) >  IMPRESSION:      Left femoral intertrochanteric fracture    Otherwise, no evidence of acute abdominal or pelvic pathology    Above fluid density lesion in the interpolar region of the right kidney measuring up to 2.3 cm. Nonemergent ultrasound may be of benefit for further evaluation.    --- End of Report ---      < end of copied text >  < from: CT Chest No Cont (09.04.21 @ 21:45) >  IMPRESSION:      Left femoral intertrochanteric fracture    Otherwise, no evidence of acute abdominal or pelvic pathology    Above fluid density lesion in the interpolar region of the right kidney measuring up to 2.3 cm. Nonemergent ultrasound may be of benefit for further evaluation.    --- End of Report ---      < end of copied text >      < from: 12 Lead ECG (09.08.21 @ 01:04) >  Diagnosis Line Normal sinus rhythm  Inferior infarct , age undetermined  Possible Anterior infarct , age undetermined  T wave abnormality, consider lateral ischemia  Abnormal ECG    < end of copied text >           Hospital Day:  5d    Subjective:    No acute events overnight.    Chart reviewed, patient seen and examined at bedside in AM. Patient appears comfortable while at rest in bed. No other complaints.     Denies headaches, changes in vision, chest pains, SOB, n/v/d, abd pain, constipation, changes in urination, pain on urination, weakness, fatigue, joint pain or muscle pain.       Past Medical Hx:   CAD (coronary artery disease)    Chronic CHF    Atrial fibrillation    Type 2 diabetes mellitus    Colon cancer    Hypertension    CKD (chronic kidney disease)    History of Clostridium difficile colitis    Diverticulitis      Past Sx:  S/P hysterectomy      Allergies:  No Known Allergies    Current Meds:   Standng Meds:  acetaminophen   Tablet .. 650 milliGRAM(s) Oral every 6 hours  apixaban 2.5 milliGRAM(s) Oral two times a day  ascorbic acid 500 milliGRAM(s) Oral two times a day  atorvastatin 80 milliGRAM(s) Oral at bedtime  calcitriol   Capsule 0.25 MICROGram(s) Oral daily  carvedilol 25 milliGRAM(s) Oral every 12 hours  dextrose 40% Gel 15 Gram(s) Oral once  dextrose 50% Injectable 25 Gram(s) IV Push once  ferrous    sulfate 325 milliGRAM(s) Oral daily  folic acid 1 milliGRAM(s) Oral daily  glucagon  Injectable 1 milliGRAM(s) IntraMuscular once  insulin lispro (ADMELOG) corrective regimen sliding scale   SubCutaneous every 6 hours  lactated ringers. 500 milliLiter(s) (75 mL/Hr) IV Continuous <Continuous>  latanoprost 0.005% Ophthalmic Solution 1 Drop(s) Both EYES at bedtime  levothyroxine 25 MICROGram(s) Oral daily  midodrine.      midodrine. 5 milliGRAM(s) Oral three times a day  multivitamin 1 Tablet(s) Oral daily  pantoprazole    Tablet 40 milliGRAM(s) Oral before breakfast  polyethylene glycol 3350 17 Gram(s) Oral daily  raloxifene 60 milliGRAM(s) Oral daily  senna 2 Tablet(s) Oral at bedtime    PRN Meds:  bisacodyl Suppository 10 milliGRAM(s) Rectal daily PRN If no bowel movement by POD#2  diphenhydrAMINE 25 milliGRAM(s) Oral at bedtime PRN Insomnia  morphine IVPB 4 milliGRAM(s) IV Intermittent every 4 hours PRN Severe Pain (7 - 10)  ondansetron Injectable 4 milliGRAM(s) IV Push every 6 hours PRN Nausea and/or Vomiting    HOME MEDICATIONS:  atorvastatin 80 mg oral tablet:   calcitriol 0.25 mcg oral capsule: 1 cap(s) orally once a day  carvedilol 25 mg oral tablet:   clopidogrel 75 mg oral tablet: 1 tab(s) orally once a day  Eliquis 2.5 mg oral tablet: 1 tab(s) orally 2 times a day  Evista 60 mg oral tablet: 1 tab(s) orally once a day  folic acid 1 mg oral tablet:   furosemide 40 mg oral tablet: 1 tab(s) orally once a day  Lantus: 10 unit(s) subcutaneous once a day (in the morning)  latanoprost 0.005% ophthalmic solution: 1 drop(s) to each affected eye once a day (in the evening)  levothyroxine 25 mcg (0.025 mg) oral tablet: 1 tab(s) orally once a day  losartan 25 mg oral tablet: 1 tab(s) orally once a day      Vital Signs:   T(F): 96.4 (09-10-21 @ 05:03), Max: 96.9 (09-09-21 @ 15:54)  HR: 62 (09-10-21 @ 05:03) (62 - 73)  BP: 139/62 (09-10-21 @ 05:03) (102/52 - 139/62)  RR: 18 (09-10-21 @ 05:03) (18 - 19)  SpO2: 95% (09-10-21 @ 05:03) (95% - 95%)      09-08-21 @ 07:01  -  09-09-21 @ 07:00  --------------------------------------------------------  IN: 240 mL / OUT: 625 mL / NET: -385 mL    09-09-21 @ 07:01  -  09-10-21 @ 06:17  --------------------------------------------------------  IN: 1315 mL / OUT: 850 mL / NET: 465 mL        Physical Exam:   CONSTITUTIONAL: speaking in full sentences  HEAD: Normocephalic; atraumatic  EYES: EOMI, NECK: Supple; non tender,   CARD: +S1, S2 Regular rate and rhythm.  RESP: CTABL  ABD: soft nontender, nondistended, no rebound, no guarding, no rigidity  EXT: No clubbing, cyanosis or edema. Equal sensation bilaterally in upper and lower extremities.   NEURO: Alert, oriented, grossly unremarkable, no focal deficits, cn ii-xii grossly intact  PSYCH: Cooperative        Labs:                         7.7    17.38 )-----------( 138      ( 08 Sep 2021 12:08 )             22.7       08 Sep 2021 06:48    130    |  96     |  71     ----------------------------<  149    4.8     |  19     |  2.8      Ca    9.3        08 Sep 2021 06:48  Mg     2.0       08 Sep 2021 06:48    TPro  4.9    /  Alb  3.0    /  TBili  0.3    /  DBili  x      /  AST  30     /  ALT  21     /  AlkPhos  53     08 Sep 2021 06:48              Troponin 0.11, CKMB --, CK -- 09-08-21 @ 16:44  Troponin 0.10, CKMB --, CK -- 09-08-21 @ 12:08  Troponin 0.11, CKMB --, CK -- 09-08-21 @ 06:48              Culture - Blood (collected 09-08-21 @ 16:44)  Source: .Blood None  Preliminary Report (09-10-21 @ 01:02):    No growth to date.        =============    CAPILLARY BLOOD GLUCOSE      POCT Blood Glucose.: 127 mg/dL (10 Sep 2021 06:08)  POCT Blood Glucose.: 184 mg/dL (10 Sep 2021 00:19)  POCT Blood Glucose.: 191 mg/dL (09 Sep 2021 21:08)  POCT Blood Glucose.: 250 mg/dL (09 Sep 2021 16:38)  POCT Blood Glucose.: 157 mg/dL (09 Sep 2021 11:19)    CARDIAC MARKERS ( 08 Sep 2021 16:44 )  x     / 0.11 ng/mL / x     / x     / x      CARDIAC MARKERS ( 08 Sep 2021 12:08 )  x     / 0.10 ng/mL / x     / x     / x      CARDIAC MARKERS ( 08 Sep 2021 06:48 )  x     / 0.11 ng/mL / x     / x     / x          LIVER FUNCTIONS - ( 08 Sep 2021 06:48 )  Alb: 3.0 g/dL / Pro: 4.9 g/dL / ALK PHOS: 53 U/L / ALT: 21 U/L / AST: 30 U/L / GGT: x             Culture - Blood (collected 08 Sep 2021 16:44)  Source: .Blood None  Preliminary Report (10 Sep 2021 01:02):    No growth to date.        Radiology:   IMAGING:    < from: TTE Echo Complete w/o Contrast w/ Doppler (09.08.21 @ 10:42) >  Summary:   1. LV Ejection Fraction by Oro's Method with a biplane EF of 45 %.   2. Mildly decreased global left ventricular systolic function.   3. Mid and apical inferior septum, apex, mid anteroseptal segment, and apical inferior segment are abnormal as described above.   4. Mid to [place] anteroseptal wall thinning.   5. Spectral Doppler shows impaired relaxation pattern of left ventricular myocardial filling (Grade I diastolic dysfunction).   6. Aortic valve thickening with decreased leaflet opening.   7. No evidence of aortic stenosis.   8. Mild to moderate mitral annular calcification.   9. Mild thickening and calcification of the anterior and posterior mitral valve leaflets.  10. Mild mitral valve regurgitation.  11. Mild tricuspid regurgitation.  12. Estimated pulmonary artery systolic pressure is 56.6 mmHg assuming a right atrial pressure of 3 mmHg, which is consistent with moderate pulmonary hypertension.  13. Normal left atrial size.  14. LA volume Index is 28.3 ml/m² ml/m2.  15. Normal right atrial size.  16. In comparison to TTE from 10/6/2019 there is Akinesis and thinning of Mid to apical anteroseptal walls.    PHYSICIAN INTERPRETATION:  Left Ventricle: Global LV systolic function was mildly decreased. Spectral Doppler shows impaired relaxation pattern of left ventricular myocardial filling (Grade I diastolic dysfunction). Mid to [place] anteroseptal wall thinning.      LV Wall Scoring:  The mid and apical inferior septum and apex are akinetic. The mid anteroseptal  segment and apical inferior segment are hypokinetic. All remaining scored  segments are normal.    < end of copied text >      IMAGING:  < end of copied text >  < from: CT Hip No Cont, Left (09.05.21 @ 10:43) >    IMPRESSION:    Acute minimally displaced left trochanteric hip fracture with extension through the greater trochanter, unchanged.    --- End of Report ---      < end of copied text >  < from: Xray Hip 2-3 Views, Left (09.04.21 @ 22:19) >      IMPRESSION:      Left femoral intertrochanteric fracture    Otherwise, no evidence of acute abdominal or pelvic pathology    Above fluid density lesion in the interpolar region of the right kidney measuring up to 2.3 cm. Nonemergent ultrasound may be of benefit for further evaluation.    --- End of Report ---      < end of copied text >  < from: Xray Femur 1 View, Left (09.04.21 @ 22:19) >  FINDINGS/  IMPRESSION:    Left intertrochanteric fracture, better evaluated on referenced CT.    Degenerative changes left knee with chondrocalcinosis.    --- End of Report ---      < end of copied text >  < from: Xray Chest 1 View AP/PA (09.04.21 @ 22:18) >  Impression:    No radiographic evidence of acute cardiopulmonary disease.        --- End of Report ---    < end of copied text >  < from: CT Abdomen and Pelvis No Cont (09.04.21 @ 21:45) >  IMPRESSION:      Left femoral intertrochanteric fracture    Otherwise, no evidence of acute abdominal or pelvic pathology    Above fluid density lesion in the interpolar region of the right kidney measuring up to 2.3 cm. Nonemergent ultrasound may be of benefit for further evaluation.    --- End of Report ---      < end of copied text >  < from: CT Chest No Cont (09.04.21 @ 21:45) >  IMPRESSION:      Left femoral intertrochanteric fracture    Otherwise, no evidence of acute abdominal or pelvic pathology    Above fluid density lesion in the interpolar region of the right kidney measuring up to 2.3 cm. Nonemergent ultrasound may be of benefit for further evaluation.    --- End of Report ---      < end of copied text >      < from: 12 Lead ECG (09.08.21 @ 01:04) >  Diagnosis Line Normal sinus rhythm  Inferior infarct , age undetermined  Possible Anterior infarct , age undetermined  T wave abnormality, consider lateral ischemia  Abnormal ECG    < end of copied text >           Hospital Day:  5d    Subjective:    No acute events overnight.    Chart reviewed, patient seen and examined at bedside in AM. Patient appears comfortable while at rest in bed. No  complaints.       Past Medical Hx:   CAD (coronary artery disease)    Chronic CHF    Atrial fibrillation    Type 2 diabetes mellitus    Colon cancer    Hypertension    CKD (chronic kidney disease)    History of Clostridium difficile colitis    Diverticulitis      Past Sx:  S/P hysterectomy      Allergies:  No Known Allergies    Current Meds:   Standng Meds:  acetaminophen   Tablet .. 650 milliGRAM(s) Oral every 6 hours  apixaban 2.5 milliGRAM(s) Oral two times a day  ascorbic acid 500 milliGRAM(s) Oral two times a day  atorvastatin 80 milliGRAM(s) Oral at bedtime  calcitriol   Capsule 0.25 MICROGram(s) Oral daily  carvedilol 25 milliGRAM(s) Oral every 12 hours  dextrose 40% Gel 15 Gram(s) Oral once  dextrose 50% Injectable 25 Gram(s) IV Push once  ferrous    sulfate 325 milliGRAM(s) Oral daily  folic acid 1 milliGRAM(s) Oral daily  glucagon  Injectable 1 milliGRAM(s) IntraMuscular once  insulin lispro (ADMELOG) corrective regimen sliding scale   SubCutaneous every 6 hours  lactated ringers. 500 milliLiter(s) (75 mL/Hr) IV Continuous <Continuous>  latanoprost 0.005% Ophthalmic Solution 1 Drop(s) Both EYES at bedtime  levothyroxine 25 MICROGram(s) Oral daily  midodrine.      midodrine. 5 milliGRAM(s) Oral three times a day  multivitamin 1 Tablet(s) Oral daily  pantoprazole    Tablet 40 milliGRAM(s) Oral before breakfast  polyethylene glycol 3350 17 Gram(s) Oral daily  raloxifene 60 milliGRAM(s) Oral daily  senna 2 Tablet(s) Oral at bedtime    PRN Meds:  bisacodyl Suppository 10 milliGRAM(s) Rectal daily PRN If no bowel movement by POD#2  diphenhydrAMINE 25 milliGRAM(s) Oral at bedtime PRN Insomnia  morphine IVPB 4 milliGRAM(s) IV Intermittent every 4 hours PRN Severe Pain (7 - 10)  ondansetron Injectable 4 milliGRAM(s) IV Push every 6 hours PRN Nausea and/or Vomiting    HOME MEDICATIONS:  atorvastatin 80 mg oral tablet:   calcitriol 0.25 mcg oral capsule: 1 cap(s) orally once a day  carvedilol 25 mg oral tablet:   clopidogrel 75 mg oral tablet: 1 tab(s) orally once a day  Eliquis 2.5 mg oral tablet: 1 tab(s) orally 2 times a day  Evista 60 mg oral tablet: 1 tab(s) orally once a day  folic acid 1 mg oral tablet:   furosemide 40 mg oral tablet: 1 tab(s) orally once a day  Lantus: 10 unit(s) subcutaneous once a day (in the morning)  latanoprost 0.005% ophthalmic solution: 1 drop(s) to each affected eye once a day (in the evening)  levothyroxine 25 mcg (0.025 mg) oral tablet: 1 tab(s) orally once a day  losartan 25 mg oral tablet: 1 tab(s) orally once a day      Vital Signs:   T(F): 96.4 (09-10-21 @ 05:03), Max: 96.9 (09-09-21 @ 15:54)  HR: 62 (09-10-21 @ 05:03) (62 - 73)  BP: 139/62 (09-10-21 @ 05:03) (102/52 - 139/62)  RR: 18 (09-10-21 @ 05:03) (18 - 19)  SpO2: 95% (09-10-21 @ 05:03) (95% - 95%)      09-08-21 @ 07:01  -  09-09-21 @ 07:00  --------------------------------------------------------  IN: 240 mL / OUT: 625 mL / NET: -385 mL    09-09-21 @ 07:01  -  09-10-21 @ 06:17  --------------------------------------------------------  IN: 1315 mL / OUT: 850 mL / NET: 465 mL        Physical Exam:   CONSTITUTIONAL: speaking in full sentences  HEAD: Normocephalic; atraumatic  EYES: EOMI, NECK: Supple; non tender,   CARD: +S1, S2 Regular rate and rhythm.  RESP: CTABL  ABD: soft nontender, nondistended, no rebound, no guarding, no rigidity  EXT: No clubbing, cyanosis or edema. Equal sensation bilaterally in upper and lower extremities.   NEURO: Alert, oriented, grossly unremarkable, no focal deficits, cn ii-xii grossly intact  PSYCH: Cooperative        Labs:                         7.7    17.38 )-----------( 138      ( 08 Sep 2021 12:08 )             22.7       08 Sep 2021 06:48    130    |  96     |  71     ----------------------------<  149    4.8     |  19     |  2.8      Ca    9.3        08 Sep 2021 06:48  Mg     2.0       08 Sep 2021 06:48    TPro  4.9    /  Alb  3.0    /  TBili  0.3    /  DBili  x      /  AST  30     /  ALT  21     /  AlkPhos  53     08 Sep 2021 06:48              Troponin 0.11, CKMB --, CK -- 09-08-21 @ 16:44  Troponin 0.10, CKMB --, CK -- 09-08-21 @ 12:08  Troponin 0.11, CKMB --, CK -- 09-08-21 @ 06:48              Culture - Blood (collected 09-08-21 @ 16:44)  Source: .Blood None  Preliminary Report (09-10-21 @ 01:02):    No growth to date.        =============    CAPILLARY BLOOD GLUCOSE      POCT Blood Glucose.: 127 mg/dL (10 Sep 2021 06:08)  POCT Blood Glucose.: 184 mg/dL (10 Sep 2021 00:19)  POCT Blood Glucose.: 191 mg/dL (09 Sep 2021 21:08)  POCT Blood Glucose.: 250 mg/dL (09 Sep 2021 16:38)  POCT Blood Glucose.: 157 mg/dL (09 Sep 2021 11:19)    CARDIAC MARKERS ( 08 Sep 2021 16:44 )  x     / 0.11 ng/mL / x     / x     / x      CARDIAC MARKERS ( 08 Sep 2021 12:08 )  x     / 0.10 ng/mL / x     / x     / x      CARDIAC MARKERS ( 08 Sep 2021 06:48 )  x     / 0.11 ng/mL / x     / x     / x          LIVER FUNCTIONS - ( 08 Sep 2021 06:48 )  Alb: 3.0 g/dL / Pro: 4.9 g/dL / ALK PHOS: 53 U/L / ALT: 21 U/L / AST: 30 U/L / GGT: x             Culture - Blood (collected 08 Sep 2021 16:44)  Source: .Blood None  Preliminary Report (10 Sep 2021 01:02):    No growth to date.        Radiology:   IMAGING:    < from: TTE Echo Complete w/o Contrast w/ Doppler (09.08.21 @ 10:42) >  Summary:   1. LV Ejection Fraction by Oro's Method with a biplane EF of 45 %.   2. Mildly decreased global left ventricular systolic function.   3. Mid and apical inferior septum, apex, mid anteroseptal segment, and apical inferior segment are abnormal as described above.   4. Mid to [place] anteroseptal wall thinning.   5. Spectral Doppler shows impaired relaxation pattern of left ventricular myocardial filling (Grade I diastolic dysfunction).   6. Aortic valve thickening with decreased leaflet opening.   7. No evidence of aortic stenosis.   8. Mild to moderate mitral annular calcification.   9. Mild thickening and calcification of the anterior and posterior mitral valve leaflets.  10. Mild mitral valve regurgitation.  11. Mild tricuspid regurgitation.  12. Estimated pulmonary artery systolic pressure is 56.6 mmHg assuming a right atrial pressure of 3 mmHg, which is consistent with moderate pulmonary hypertension.  13. Normal left atrial size.  14. LA volume Index is 28.3 ml/m² ml/m2.  15. Normal right atrial size.  16. In comparison to TTE from 10/6/2019 there is Akinesis and thinning of Mid to apical anteroseptal walls.    PHYSICIAN INTERPRETATION:  Left Ventricle: Global LV systolic function was mildly decreased. Spectral Doppler shows impaired relaxation pattern of left ventricular myocardial filling (Grade I diastolic dysfunction). Mid to [place] anteroseptal wall thinning.      LV Wall Scoring:  The mid and apical inferior septum and apex are akinetic. The mid anteroseptal  segment and apical inferior segment are hypokinetic. All remaining scored  segments are normal.    < end of copied text >      IMAGING:  < end of copied text >  < from: CT Hip No Cont, Left (09.05.21 @ 10:43) >    IMPRESSION:    Acute minimally displaced left trochanteric hip fracture with extension through the greater trochanter, unchanged.    --- End of Report ---      < end of copied text >  < from: Xray Hip 2-3 Views, Left (09.04.21 @ 22:19) >      IMPRESSION:      Left femoral intertrochanteric fracture    Otherwise, no evidence of acute abdominal or pelvic pathology    Above fluid density lesion in the interpolar region of the right kidney measuring up to 2.3 cm. Nonemergent ultrasound may be of benefit for further evaluation.    --- End of Report ---      < end of copied text >  < from: Xray Femur 1 View, Left (09.04.21 @ 22:19) >  FINDINGS/  IMPRESSION:    Left intertrochanteric fracture, better evaluated on referenced CT.    Degenerative changes left knee with chondrocalcinosis.    --- End of Report ---      < end of copied text >  < from: Xray Chest 1 View AP/PA (09.04.21 @ 22:18) >  Impression:    No radiographic evidence of acute cardiopulmonary disease.        --- End of Report ---    < end of copied text >  < from: CT Abdomen and Pelvis No Cont (09.04.21 @ 21:45) >  IMPRESSION:      Left femoral intertrochanteric fracture    Otherwise, no evidence of acute abdominal or pelvic pathology    Above fluid density lesion in the interpolar region of the right kidney measuring up to 2.3 cm. Nonemergent ultrasound may be of benefit for further evaluation.    --- End of Report ---      < end of copied text >  < from: CT Chest No Cont (09.04.21 @ 21:45) >  IMPRESSION:      Left femoral intertrochanteric fracture    Otherwise, no evidence of acute abdominal or pelvic pathology    Above fluid density lesion in the interpolar region of the right kidney measuring up to 2.3 cm. Nonemergent ultrasound may be of benefit for further evaluation.    --- End of Report ---      < end of copied text >      < from: 12 Lead ECG (09.08.21 @ 01:04) >  Diagnosis Line Normal sinus rhythm  Inferior infarct , age undetermined  Possible Anterior infarct , age undetermined  T wave abnormality, consider lateral ischemia  Abnormal ECG    < end of copied text >           Hospital Day:  5d    Subjective:    No acute events overnight.    Chart reviewed, patient seen and examined at bedside in AM. Patient appears comfortable while at rest in bed. Dressing was changed in AM.     Patient reported some pain in abdomen/chest - patient said that she has difficulty localizing. States that pain is moderate and worse after eating.      Past Medical Hx:   CAD (coronary artery disease)    Chronic CHF    Atrial fibrillation    Type 2 diabetes mellitus    Colon cancer    Hypertension    CKD (chronic kidney disease)    History of Clostridium difficile colitis    Diverticulitis      Past Sx:  S/P hysterectomy      Allergies:  No Known Allergies    Current Meds:   Standng Meds:  acetaminophen   Tablet .. 650 milliGRAM(s) Oral every 6 hours  apixaban 2.5 milliGRAM(s) Oral two times a day  ascorbic acid 500 milliGRAM(s) Oral two times a day  atorvastatin 80 milliGRAM(s) Oral at bedtime  calcitriol   Capsule 0.25 MICROGram(s) Oral daily  carvedilol 25 milliGRAM(s) Oral every 12 hours  dextrose 40% Gel 15 Gram(s) Oral once  dextrose 50% Injectable 25 Gram(s) IV Push once  ferrous    sulfate 325 milliGRAM(s) Oral daily  folic acid 1 milliGRAM(s) Oral daily  glucagon  Injectable 1 milliGRAM(s) IntraMuscular once  insulin lispro (ADMELOG) corrective regimen sliding scale   SubCutaneous every 6 hours  lactated ringers. 500 milliLiter(s) (75 mL/Hr) IV Continuous <Continuous>  latanoprost 0.005% Ophthalmic Solution 1 Drop(s) Both EYES at bedtime  levothyroxine 25 MICROGram(s) Oral daily  midodrine.      midodrine. 5 milliGRAM(s) Oral three times a day  multivitamin 1 Tablet(s) Oral daily  pantoprazole    Tablet 40 milliGRAM(s) Oral before breakfast  polyethylene glycol 3350 17 Gram(s) Oral daily  raloxifene 60 milliGRAM(s) Oral daily  senna 2 Tablet(s) Oral at bedtime    PRN Meds:  bisacodyl Suppository 10 milliGRAM(s) Rectal daily PRN If no bowel movement by POD#2  diphenhydrAMINE 25 milliGRAM(s) Oral at bedtime PRN Insomnia  morphine IVPB 4 milliGRAM(s) IV Intermittent every 4 hours PRN Severe Pain (7 - 10)  ondansetron Injectable 4 milliGRAM(s) IV Push every 6 hours PRN Nausea and/or Vomiting    HOME MEDICATIONS:  atorvastatin 80 mg oral tablet:   calcitriol 0.25 mcg oral capsule: 1 cap(s) orally once a day  carvedilol 25 mg oral tablet:   clopidogrel 75 mg oral tablet: 1 tab(s) orally once a day  Eliquis 2.5 mg oral tablet: 1 tab(s) orally 2 times a day  Evista 60 mg oral tablet: 1 tab(s) orally once a day  folic acid 1 mg oral tablet:   furosemide 40 mg oral tablet: 1 tab(s) orally once a day  Lantus: 10 unit(s) subcutaneous once a day (in the morning)  latanoprost 0.005% ophthalmic solution: 1 drop(s) to each affected eye once a day (in the evening)  levothyroxine 25 mcg (0.025 mg) oral tablet: 1 tab(s) orally once a day  losartan 25 mg oral tablet: 1 tab(s) orally once a day      Vital Signs:   T(F): 96.4 (09-10-21 @ 05:03), Max: 96.9 (09-09-21 @ 15:54)  HR: 62 (09-10-21 @ 05:03) (62 - 73)  BP: 139/62 (09-10-21 @ 05:03) (102/52 - 139/62)  RR: 18 (09-10-21 @ 05:03) (18 - 19)  SpO2: 95% (09-10-21 @ 05:03) (95% - 95%)      09-08-21 @ 07:01  -  09-09-21 @ 07:00  --------------------------------------------------------  IN: 240 mL / OUT: 625 mL / NET: -385 mL    09-09-21 @ 07:01  -  09-10-21 @ 06:17  --------------------------------------------------------  IN: 1315 mL / OUT: 850 mL / NET: 465 mL        Physical Exam:   CONSTITUTIONAL: speaking in full sentences  HEAD: Normocephalic; atraumatic  EYES: EOMI, NECK: Supple; non tender,   CARD: +S1, S2 Regular rate and rhythm.  RESP: CTABL  ABD: soft nontender, nondistended, no rebound, no guarding, no rigidity  EXT: No clubbing, cyanosis or edema. Equal sensation bilaterally in upper and lower extremities.   NEURO: Alert, oriented, grossly unremarkable, no focal deficits, cn ii-xii grossly intact  PSYCH: Cooperative          Labs:                         7.7    17.38 )-----------( 138      ( 08 Sep 2021 12:08 )             22.7       08 Sep 2021 06:48    130    |  96     |  71     ----------------------------<  149    4.8     |  19     |  2.8      Ca    9.3        08 Sep 2021 06:48  Mg     2.0       08 Sep 2021 06:48    TPro  4.9    /  Alb  3.0    /  TBili  0.3    /  DBili  x      /  AST  30     /  ALT  21     /  AlkPhos  53     08 Sep 2021 06:48              Troponin 0.11, CKMB --, CK -- 09-08-21 @ 16:44  Troponin 0.10, CKMB --, CK -- 09-08-21 @ 12:08  Troponin 0.11, CKMB --, CK -- 09-08-21 @ 06:48              Culture - Blood (collected 09-08-21 @ 16:44)  Source: .Blood None  Preliminary Report (09-10-21 @ 01:02):    No growth to date.        =============    CAPILLARY BLOOD GLUCOSE      POCT Blood Glucose.: 127 mg/dL (10 Sep 2021 06:08)  POCT Blood Glucose.: 184 mg/dL (10 Sep 2021 00:19)  POCT Blood Glucose.: 191 mg/dL (09 Sep 2021 21:08)  POCT Blood Glucose.: 250 mg/dL (09 Sep 2021 16:38)  POCT Blood Glucose.: 157 mg/dL (09 Sep 2021 11:19)    CARDIAC MARKERS ( 08 Sep 2021 16:44 )  x     / 0.11 ng/mL / x     / x     / x      CARDIAC MARKERS ( 08 Sep 2021 12:08 )  x     / 0.10 ng/mL / x     / x     / x      CARDIAC MARKERS ( 08 Sep 2021 06:48 )  x     / 0.11 ng/mL / x     / x     / x          LIVER FUNCTIONS - ( 08 Sep 2021 06:48 )  Alb: 3.0 g/dL / Pro: 4.9 g/dL / ALK PHOS: 53 U/L / ALT: 21 U/L / AST: 30 U/L / GGT: x             Culture - Blood (collected 08 Sep 2021 16:44)  Source: .Blood None  Preliminary Report (10 Sep 2021 01:02):    No growth to date.        Radiology:   IMAGING:    < from: TTE Echo Complete w/o Contrast w/ Doppler (09.08.21 @ 10:42) >  Summary:   1. LV Ejection Fraction by Oro's Method with a biplane EF of 45 %.   2. Mildly decreased global left ventricular systolic function.   3. Mid and apical inferior septum, apex, mid anteroseptal segment, and apical inferior segment are abnormal as described above.   4. Mid to [place] anteroseptal wall thinning.   5. Spectral Doppler shows impaired relaxation pattern of left ventricular myocardial filling (Grade I diastolic dysfunction).   6. Aortic valve thickening with decreased leaflet opening.   7. No evidence of aortic stenosis.   8. Mild to moderate mitral annular calcification.   9. Mild thickening and calcification of the anterior and posterior mitral valve leaflets.  10. Mild mitral valve regurgitation.  11. Mild tricuspid regurgitation.  12. Estimated pulmonary artery systolic pressure is 56.6 mmHg assuming a right atrial pressure of 3 mmHg, which is consistent with moderate pulmonary hypertension.  13. Normal left atrial size.  14. LA volume Index is 28.3 ml/m² ml/m2.  15. Normal right atrial size.  16. In comparison to TTE from 10/6/2019 there is Akinesis and thinning of Mid to apical anteroseptal walls.    PHYSICIAN INTERPRETATION:  Left Ventricle: Global LV systolic function was mildly decreased. Spectral Doppler shows impaired relaxation pattern of left ventricular myocardial filling (Grade I diastolic dysfunction). Mid to [place] anteroseptal wall thinning.      LV Wall Scoring:  The mid and apical inferior septum and apex are akinetic. The mid anteroseptal  segment and apical inferior segment are hypokinetic. All remaining scored  segments are normal.    < end of copied text >      IMAGING:  < end of copied text >  < from: CT Hip No Cont, Left (09.05.21 @ 10:43) >    IMPRESSION:    Acute minimally displaced left trochanteric hip fracture with extension through the greater trochanter, unchanged.    --- End of Report ---      < end of copied text >  < from: Xray Hip 2-3 Views, Left (09.04.21 @ 22:19) >      IMPRESSION:      Left femoral intertrochanteric fracture    Otherwise, no evidence of acute abdominal or pelvic pathology    Above fluid density lesion in the interpolar region of the right kidney measuring up to 2.3 cm. Nonemergent ultrasound may be of benefit for further evaluation.    --- End of Report ---      < end of copied text >  < from: Xray Femur 1 View, Left (09.04.21 @ 22:19) >  FINDINGS/  IMPRESSION:    Left intertrochanteric fracture, better evaluated on referenced CT.    Degenerative changes left knee with chondrocalcinosis.    --- End of Report ---      < end of copied text >  < from: Xray Chest 1 View AP/PA (09.04.21 @ 22:18) >  Impression:    No radiographic evidence of acute cardiopulmonary disease.        --- End of Report ---    < end of copied text >  < from: CT Abdomen and Pelvis No Cont (09.04.21 @ 21:45) >  IMPRESSION:      Left femoral intertrochanteric fracture    Otherwise, no evidence of acute abdominal or pelvic pathology    Above fluid density lesion in the interpolar region of the right kidney measuring up to 2.3 cm. Nonemergent ultrasound may be of benefit for further evaluation.    --- End of Report ---      < end of copied text >  < from: CT Chest No Cont (09.04.21 @ 21:45) >  IMPRESSION:      Left femoral intertrochanteric fracture    Otherwise, no evidence of acute abdominal or pelvic pathology    Above fluid density lesion in the interpolar region of the right kidney measuring up to 2.3 cm. Nonemergent ultrasound may be of benefit for further evaluation.    --- End of Report ---      < end of copied text >      < from: 12 Lead ECG (09.08.21 @ 01:04) >  Diagnosis Line Normal sinus rhythm  Inferior infarct , age undetermined  Possible Anterior infarct , age undetermined  T wave abnormality, consider lateral ischemia  Abnormal ECG    < end of copied text >

## 2021-09-10 NOTE — PROGRESS NOTE ADULT - ASSESSMENT
Orthopaedic Surgery Progress Note    SUBJECTIVE  No acute events overnight.   Pt was seen and examined by the orthopedic surgery team during morning rounds. Doing well. Normal Vitals. Tolerating diet. Making adequate urine.   Denies n/v, abdominal pain, diarrhea or any other major complaints.    MEDS  MEDICATIONS  (STANDING):  acetaminophen   Tablet .. 650 milliGRAM(s) Oral every 6 hours  apixaban 2.5 milliGRAM(s) Oral two times a day  ascorbic acid 500 milliGRAM(s) Oral two times a day  atorvastatin 80 milliGRAM(s) Oral at bedtime  calcitriol   Capsule 0.25 MICROGram(s) Oral daily  carvedilol 25 milliGRAM(s) Oral every 12 hours  dextrose 40% Gel 15 Gram(s) Oral once  dextrose 50% Injectable 25 Gram(s) IV Push once  ferrous    sulfate 325 milliGRAM(s) Oral daily  folic acid 1 milliGRAM(s) Oral daily  glucagon  Injectable 1 milliGRAM(s) IntraMuscular once  insulin lispro (ADMELOG) corrective regimen sliding scale   SubCutaneous every 6 hours  lactated ringers. 500 milliLiter(s) (75 mL/Hr) IV Continuous <Continuous>  latanoprost 0.005% Ophthalmic Solution 1 Drop(s) Both EYES at bedtime  levothyroxine 25 MICROGram(s) Oral daily  midodrine.      midodrine. 5 milliGRAM(s) Oral three times a day  multivitamin 1 Tablet(s) Oral daily  pantoprazole    Tablet 40 milliGRAM(s) Oral before breakfast  polyethylene glycol 3350 17 Gram(s) Oral daily  raloxifene 60 milliGRAM(s) Oral daily  senna 2 Tablet(s) Oral at bedtime    MEDICATIONS  (PRN):  bisacodyl Suppository 10 milliGRAM(s) Rectal daily PRN If no bowel movement by POD#2  diphenhydrAMINE 25 milliGRAM(s) Oral at bedtime PRN Insomnia  morphine IVPB 4 milliGRAM(s) IV Intermittent every 4 hours PRN Severe Pain (7 - 10)  ondansetron Injectable 4 milliGRAM(s) IV Push every 6 hours PRN Nausea and/or Vomiting    --------------------------------------    T(C): 35.9 (09-10-21 @ 08:00), Max: 36.1 (09-09-21 @ 15:54)  HR: 67 (09-10-21 @ 08:00) (62 - 69)  BP: 104/52 (09-10-21 @ 08:00) (104/52 - 139/62)  RR: 18 (09-10-21 @ 08:00) (18 - 19)  SpO2: 98% (09-10-21 @ 08:00) (95% - 98%)    P/E:  AOx3, NAD  Nonlabored breathing    LLE  Dressings saturated - removed  - surgical site c/d/i  - new dressing applied   Compartments soft, compressible  Motor intact ehl/fhl, ta/gs  SILT dp/sp/s/s  Foot wwp, CR <2s  --------------------------------------    Labs                        7.7    17.38 )-----------( 138      ( 08 Sep 2021 12:08 )             22.7     --------------------------------------    A/P:   86yo Female L basi-cervical femoral neck fracture s/p IMN POD#4.   Dressings changed today. Doing well post-operatively. No orthopaedic contraindication to discharge.    -WBAT LLE  -pain control  -DVT ppx  -PT/OT    -Upon discharge, please have patient follow up at 2 weeks post-op with Dr. Parsons at [address] [place] [address]. Please call [phone] to schedule an appointment.        Orthopaedic Surgery Progress Note    SUBJECTIVE  No acute events overnight.   Pt was seen and examined by the orthopedic surgery team during morning rounds. Doing well. Normal Vitals. Tolerating diet. Making adequate urine.   Denies n/v, abdominal pain, diarrhea or any other major complaints.    MEDS  MEDICATIONS  (STANDING):  acetaminophen   Tablet .. 650 milliGRAM(s) Oral every 6 hours  apixaban 2.5 milliGRAM(s) Oral two times a day  ascorbic acid 500 milliGRAM(s) Oral two times a day  atorvastatin 80 milliGRAM(s) Oral at bedtime  calcitriol   Capsule 0.25 MICROGram(s) Oral daily  carvedilol 25 milliGRAM(s) Oral every 12 hours  dextrose 40% Gel 15 Gram(s) Oral once  dextrose 50% Injectable 25 Gram(s) IV Push once  ferrous    sulfate 325 milliGRAM(s) Oral daily  folic acid 1 milliGRAM(s) Oral daily  glucagon  Injectable 1 milliGRAM(s) IntraMuscular once  insulin lispro (ADMELOG) corrective regimen sliding scale   SubCutaneous every 6 hours  lactated ringers. 500 milliLiter(s) (75 mL/Hr) IV Continuous <Continuous>  latanoprost 0.005% Ophthalmic Solution 1 Drop(s) Both EYES at bedtime  levothyroxine 25 MICROGram(s) Oral daily  midodrine.      midodrine. 5 milliGRAM(s) Oral three times a day  multivitamin 1 Tablet(s) Oral daily  pantoprazole    Tablet 40 milliGRAM(s) Oral before breakfast  polyethylene glycol 3350 17 Gram(s) Oral daily  raloxifene 60 milliGRAM(s) Oral daily  senna 2 Tablet(s) Oral at bedtime    MEDICATIONS  (PRN):  bisacodyl Suppository 10 milliGRAM(s) Rectal daily PRN If no bowel movement by POD#2  diphenhydrAMINE 25 milliGRAM(s) Oral at bedtime PRN Insomnia  morphine IVPB 4 milliGRAM(s) IV Intermittent every 4 hours PRN Severe Pain (7 - 10)  ondansetron Injectable 4 milliGRAM(s) IV Push every 6 hours PRN Nausea and/or Vomiting    --------------------------------------    T(C): 35.9 (09-10-21 @ 08:00), Max: 36.1 (09-09-21 @ 15:54)  HR: 67 (09-10-21 @ 08:00) (62 - 69)  BP: 104/52 (09-10-21 @ 08:00) (104/52 - 139/62)  RR: 18 (09-10-21 @ 08:00) (18 - 19)  SpO2: 98% (09-10-21 @ 08:00) (95% - 98%)    P/E:  AOx3, NAD  Nonlabored breathing    LLE  Dressings saturated - removed  - surgical site c/d/i  - new dressing applied   Compartments soft, compressible  Motor intact ehl/fhl, ta/gs  SILT dp/sp/s/s  Foot wwp, CR <2s  --------------------------------------    Labs                        7.7    17.38 )-----------( 138      ( 08 Sep 2021 12:08 )             22.7     --------------------------------------    A/P:   86yo Female L basi-cervical femoral neck fracture s/p IMN POD#4.   Dressings changed today. Doing well post-operatively.   Please ensure dry dressings and stable HgB prior to clearance for discharge. Not safe for discharge from an orthopaedic perspective.     -WBAT LLE  -pain control  -DVT ppx  -PT/OT

## 2021-09-10 NOTE — PROGRESS NOTE ADULT - ATTENDING COMMENTS
86 YO F with a PMH of CAD, HFpEF, paroxysmal Afib (Apixaban), DM2, HTN, CKD3, and hx of diverticulitis who was admitted to ortho service for eval and surgical correction of left femoral IT fracture s/p mechanical fall at home. While on ortho service, pt experienced episode of hypotension. Transferred to medicine. Ortho/surgical teams co-managing.     Left hip intertrochanteric fracture s/p orif on 9/6  Typical chest pain with elevated trops  Acute blood loss anemia  Acute hypotensive episode after OR   ELOISE , possible pre-renal with CKD stage 3  Chronic HFpEF  Paroxysmal Afib   CAD, /sp PCI        WBAT LLE  PT/OT-once stable   Cardiology consult pending  Typical chest pain with elevated trops- possibly related to CKD/ELOISE and demand. Cardiology consult  EKG: T wave abnormality, possible lateral ischemia  Continue , statin, carvedilol . Hold DAPT  ELOISE , possible pre-renal with CKD stage 3. Hold losartan. Added midodrine  Hold Eliquis  Handoff: Acute inpatient management  required further . Not stable for discharge  & not cleared from ortho as well

## 2021-09-10 NOTE — PROGRESS NOTE ADULT - ASSESSMENT
84 YO F with a PMH of CAD, HFpEF, paroxysmal Afib (Apixaban), DM2, HTN, CKD3, and hx of diverticulitis who was admitted to ortho service for eval and surgical correction of left femoral IT fracture s/p mechanical fall at home. While on ortho service, pt experienced episode of hypotension. Transferred to medicine. Ortho/surgical teams co-managing.     #left hip intertrochanteric fx, s/p orif on 9/6, wbat as per ortho  - Wound care.   - Fall precautions.  - pain control  Management as per ortho:   - WBAT LLE  - PT/OT  - Per Ortho: No orthopaedic contraindication to discharge.  - Physiatry eval - possible 4a candidate?    #Hypotension, rule out opioid-induced vs cardiac vs infectious.   - Improved significantly - Normotensive in AM today 9/8/21   - BP was previously 66/46. 1u PRBC before OR. 2 units in OR.   - ordered procal/CRP. TSH.   - Ordered blood cultures.   - TTE: EF 50%. The mid and apical inferior septum and apex are akinetic. The mid anteroseptal  segment and apical inferior segment are hypokinetic. All remaining scored  segments are normal.  - Hold opioids for now.   - Ordered orthostatics.   - Holding ABXs for now.  - EKG: T wave abnormality, consider lateral ischemia    #DM2 #HTN #CKD3 #Diverticulitis, #HFpEF #Paroxysmal Afib   - c/w home meds: atorvastatin, clopidogrel, calcitriol, , evista, folic acid, latanoprost, levothyroxine, losartan, carvededilol   - c/w eliquis 2.5 bid  - cardiac consult     #HTN  - c/w home med(s): furosemide     #DM 2  - c/w home med(s): lantus,    DVT PPX.   - eliquis    GI ppx   - pantoprazole 40mg daily     #I/Os  - intermittent catheterization   - may consider elliott after intermittent cath x3    #Diet  - started soft diet  9/9    #Dispo  - anticipate dc - SNF/rehab  -Per Ortho: Upon discharge, please have patient follow up at 2 weeks post-op with Dr. Parsons at [address] [place] [address]. Please call [phone] to schedule an appointment.  84 YO F with a PMH of CAD, HFpEF, paroxysmal Afib (Apixaban), DM2, HTN, CKD3, and hx of diverticulitis who was admitted to ortho service for eval and surgical correction of left femoral IT fracture s/p mechanical fall at home. While on ortho service, pt experienced episode of hypotension. Transferred to medicine. Ortho/surgical teams co-managing.     #left hip intertrochanteric fx, s/p orif on 9/6, wbat as per ortho  - Wound care.   - Fall precautions.  - pain control  Management as per ortho:   - WBAT LLE  - PT/OT  - Per Ortho: No orthopaedic contraindication to discharge.  - Physiatry eval - possible 4a candidate?    #Hemoglobin drop, likely secondary to bleed at surgical site  - Some bleeding at left hip at site of surgical incision in early AM. Ordered dressing change.   - Notified hgb of 5.4 in late AM 9/10.   - Ordered 2 units PRBCs STAT. T+S in.   - f/u CBC at 4pm.   - dc'd eliquis 2.5 bid    Hemoglobin: 5.4 g/dL (09-10 @ 07:00)  Hemoglobin: 7.7 g/dL (09-08 @ 12:08)  Hemoglobin: 8.4 g/dL (09-08 @ 06:48)      #Hypotension, rule out opioid-induced vs cardiac vs infectious.   - Improved significantly - Normotensive in AM today 9/8/21   - BP was previously 66/46. 1u PRBC before OR. 2 units in OR.   - ordered procal/CRP. TSH.   - Ordered blood cultures.   - TTE: EF 50%. The mid and apical inferior septum and apex are akinetic. The mid anteroseptal  segment and apical inferior segment are hypokinetic. All remaining scored  segments are normal.  - Hold opioids for now.   - Ordered orthostatics.   - Holding ABXs for now.  - EKG: T wave abnormality, consider lateral ischemia    #DM2 #HTN #CKD3 #Diverticulitis, #HFpEF #Paroxysmal Afib   - c/w home meds: atorvastatin, clopidogrel, calcitriol, , evista, folic acid, latanoprost, levothyroxine, losartan, carvededilol   -  stopped eliquis 2.5 bid 9/10 b/c bleeding concern  - cardiac consult     #HTN  - c/w home med(s): furosemide     #DM 2  - c/w home med(s): lantus,    DVT PPX.   - eliquis - dc'd    GI ppx   - pantoprazole 40mg daily     #I/Os  - intermittent catheterization   - may consider elliott after intermittent cath x3    #Diet  - started soft diet  9/9    #Dispo  - anticipate dc - SNF/rehab  -Per Ortho: Upon discharge, please have patient follow up at 2 weeks post-op with Dr. Parsons at [address] [place] [address]. Please call [phone] to schedule an appointment.  84 YO F with a PMH of CAD, HFpEF, paroxysmal Afib (Apixaban), DM2, HTN, CKD3, and hx of diverticulitis who was admitted to ortho service for eval and surgical correction of left femoral IT fracture s/p mechanical fall at home. While on ortho service, pt experienced episode of hypotension. Transferred to medicine. Ortho/surgical teams co-managing.     #left hip intertrochanteric fx, s/p orif on 9/6, wbat as per ortho  - Wound care.   - Fall precautions.  - pain control  Management as per ortho:   - WBAT LLE  - PT/OT  - Per Ortho: No orthopaedic contraindication to discharge.  - Physiatry eval - possible 4a     #Acute hemoglobin drop s/p hip surgery, possible bleed 9/10   - Some bleeding at left hip at site of surgical incision in early AM. Dressing was changed.   - Notified hgb of 5.4 in late AM 9/10. Prior Hemoglobin 7.7    - Patient given 2 units PRBCs STAT  - dc'd eliquis 2.5 bid  - Troponins elevated 9/10  - STAT CBC, CMP performed.   - coag disorder workup: fibrinogen, LDH, d-dimer, laptoglobin  - Zofran, protonix for GI discomfort started   - EKG showed 1st degree AV block, consistent with prior EKG.   - Per ortho, wound had small amount of bleeding. Reassessment of wound post-dressing change shows minimal bleeding.   - Ordered CXR - pending  - Ordered CT AP noncon and CT hip noncon to r/o internal bleed.     Hemoglobin: 5.8 g/dL (09-10 @ 09:39)  Hemoglobin: 5.4 g/dL (09-10 @ 07:00)  Hemoglobin: 7.7 g/dL (09-08 @ 12:08)  Hemoglobin: 8.4 g/dL (09-08 @ 06:48)  Hemoglobin: 8.3 g/dL (09-07 @ 05:43)    #Hypotension, rule out opioid-induced vs cardiac vs infectious.   - Improved significantly - Normotensive in AM today 9/8/21   - BP was previously 66/46. 1u PRBC before OR. 2 units in OR.   - ordered procal/CRP. TSH.   - Ordered blood cultures.   - TTE: EF 50%. The mid and apical inferior septum and apex are akinetic. The mid anteroseptal  segment and apical inferior segment are hypokinetic. All remaining scored  segments are normal.  - Hold opioids for now.   - Ordered orthostatics.   - Holding ABXs for now.  - EKG: T wave abnormality, consider lateral ischemia    #DM2 #HTN #CKD3 #Diverticulitis, #HFpEF #Paroxysmal Afib   - c/w home meds: atorvastatin, clopidogrel, calcitriol, , evista, folic acid, latanoprost, levothyroxine, losartan, carvededilol   -  stopped eliquis 2.5 bid 9/10 b/c bleeding concern  - cardiac consult     #HTN  - c/w home med(s): furosemide     #DM 2  - c/w home med(s): lantus,    DVT PPX.   - eliquis - dc'd    GI ppx   - pantoprazole 40mg daily     #I/Os  - intermittent catheterization   - may consider elliott after intermittent cath x3    #Diet  - started soft diet  9/9    #Dispo  - NOT medically stable for discharge  - Possible GEO once stable  -Per Ortho: Upon discharge, please have patient follow up at 2 weeks post-op with Dr. Parsons at [address] [facility]. Please call [phone] to schedule an appointment.  84 YO F with a PMH of CAD, HFpEF, paroxysmal Afib (Apixaban), DM2, HTN, CKD3, and hx of diverticulitis who was admitted to ortho service for eval and surgical correction of left femoral IT fracture s/p mechanical fall at home. While on ortho service, pt experienced episode of hypotension. Transferred to medicine. Ortho/surgical teams co-managing.     #left hip intertrochanteric fx, s/p orif on 9/6, wbat as per ortho  - Wound care.   - Fall precautions.  - pain control  Management as per ortho:   - WBAT LLE  - PT/OT  - Per Ortho: No orthopaedic contraindication to discharge.  - Physiatry eval - possible 4a     #Acute hemoglobin drop s/p hip surgery, possible bleed 9/10   - Some bleeding at left hip at site of surgical incision in early AM. Dressing was changed.   - Notified hgb of 5.4 in late AM 9/10. Prior Hemoglobin 7.7    - Patient given 2 units PRBCs STAT  - dc'd eliquis 2.5 bid  - Troponins elevated 9/10  - STAT CBC, CMP performed.   - coag disorder workup: fibrinogen, LDH, d-dimer, laptoglobin  - Zofran, protonix for GI discomfort started   - EKG showed 1st degree AV block, consistent with prior EKG.   - Per ortho, wound had small amount of bleeding. Reassessment of wound post-dressing change shows minimal bleeding.   - Ordered CXR - pending  - Ordered CT AP noncon and CT hip noncon to r/o internal bleed.     Hemoglobin: 5.8 g/dL (09-10 @ 09:39)  Hemoglobin: 5.4 g/dL (09-10 @ 07:00)  Hemoglobin: 7.7 g/dL (09-08 @ 12:08)  Hemoglobin: 8.4 g/dL (09-08 @ 06:48)  Hemoglobin: 8.3 g/dL (09-07 @ 05:43)    #Hypotension, rule out opioid-induced vs cardiac vs infectious.   - Improved significantly - Normotensive in AM today 9/8/21   - BP was previously 66/46. 1u PRBC before OR. 2 units in OR.   - ordered procal/CRP. TSH.   - Ordered blood cultures.   - TTE: EF 50%. The mid and apical inferior septum and apex are akinetic. The mid anteroseptal  segment and apical inferior segment are hypokinetic. All remaining scored  segments are normal.  - Hold opioids for now.   - Ordered orthostatics.   - Holding ABXs for now.  - EKG: T wave abnormality, consider lateral ischemia    #DM2 #HTN #CKD3 #Diverticulitis, #HFpEF #Paroxysmal Afib   - c/w home meds: atorvastatin, clopidogrel, calcitriol, , evista, folic acid, latanoprost, levothyroxine, losartan, carvededilol   -  stopped eliquis 2.5 bid 9/10 b/c bleeding concern  - cardiac consult     #HTN  - c/w home med(s): furosemide     #DM 2  - c/w home med(s): lantus,    DVT PPX.   - eliquis - dc'd    GI ppx   - pantoprazole 40mg daily     #I/Os  - intermittent catheterization   - may consider elliott after intermittent cath x3    #Diet  - started soft diet  9/9    #Dispo  - NOT medically stable for discharge  - Possible GEO once stable  -Per Ortho: Upon discharge, please have patient follow up at 2 weeks post-op with Dr. Parsons at [address] [facility]. Please call [phone] to schedule an appointment.  84 YO F with a PMH of CAD, HFpEF, paroxysmal Afib (Apixaban), DM2, HTN, CKD3, and hx of diverticulitis who was admitted to ortho service for eval and surgical correction of left femoral IT fracture s/p mechanical fall at home. While on ortho service, pt experienced episode of hypotension. Transferred to medicine. Ortho/surgical teams co-managing.     #left hip intertrochanteric fx, s/p orif on 9/6, wbat as per ortho  - Wound care.   - Fall precautions.  - pain control  Management as per ortho:   - WBAT LLE  - PT/OT  - Per Ortho: No orthopaedic contraindication to discharge.  - Physiatry eval - possible 4a     #Acute hemoglobin drop s/p hip surgery, possible bleed 9/10   - Some bleeding at left hip at site of surgical incision in early AM. Dressing was changed.   - Notified hgb of 5.4 in late AM 9/10. Prior Hemoglobin 7.7    - Patient given 2 units PRBCs STAT  - dc'd eliquis 2.5 bid  - Troponins elevated 9/10  - STAT CBC, CMP performed.   - coag disorder workup: fibrinogen, LDH, d-dimer, laptoglobin  - Zofran, protonix for GI discomfort started   - EKG showed 1st degree AV block, consistent with prior EKG.   - Per ortho, wound had small amount of bleeding. Reassessment of wound post-dressing change shows minimal bleeding.   - Ordered CXR   - Ordered CT AP noncon and CT hip noncon to r/o internal bleed.     Hemoglobin: 5.8 g/dL (09-10 @ 09:39)  Hemoglobin: 5.4 g/dL (09-10 @ 07:00)  Hemoglobin: 7.7 g/dL (09-08 @ 12:08)  Hemoglobin: 8.4 g/dL (09-08 @ 06:48)  Hemoglobin: 8.3 g/dL (09-07 @ 05:43)    #Hypotension, rule out opioid-induced vs cardiac vs infectious.   - Improved significantly - Normotensive in AM today 9/8/21   - BP was previously 66/46. 1u PRBC before OR. 2 units in OR.   - ordered procal/CRP. TSH.   - Ordered blood cultures.   - TTE: EF 50%. The mid and apical inferior septum and apex are akinetic. The mid anteroseptal  segment and apical inferior segment are hypokinetic. All remaining scored  segments are normal.  - Hold opioids for now.   - Ordered orthostatics.   - Holding ABXs for now.  - EKG: T wave abnormality, consider lateral ischemia    #DM2 #HTN #CKD3 #Diverticulitis, #HFpEF #Paroxysmal Afib   - c/w home meds: atorvastatin, clopidogrel, calcitriol, , evista, folic acid, latanoprost, levothyroxine, losartan, carvededilol   -  stopped eliquis 2.5 bid 9/10 b/c bleeding concern  - cardiac consult     #HTN  - c/w home med(s): furosemide     #DM 2  - c/w home med(s): lantus,    DVT PPX.   - eliquis - dc'd    GI ppx   - pantoprazole 40mg daily     #I/Os  - intermittent catheterization   - may consider elliott after intermittent cath x3    #Diet  - started soft diet  9/9    #handoff  - f/u CBC 4pm 9/10/21  - CT AbdPelvis noncon and CT hip noncon    #Dispo  - NOT medically stable for discharge  - Possible GEO once stable  -Per Ortho: Upon discharge, please have patient follow up at 2 weeks post-op with Dr. Parsons at [address] [facility]. Please call [phone] to schedule an appointment.  84 YO F with a PMH of CAD, HFpEF, paroxysmal Afib (Apixaban), DM2, HTN, CKD3, and hx of diverticulitis who was admitted to ortho service for eval and surgical correction of left femoral IT fracture s/p mechanical fall at home. While on ortho service, pt experienced episode of hypotension. Transferred to medicine. Ortho/surgical teams co-managing.     #left hip intertrochanteric fx, s/p orif on 9/6, wbat as per ortho  - Wound care.   - Fall precautions.  - pain control  Management as per ortho:   - WBAT LLE  - PT/OT  - Per Ortho: No orthopaedic contraindication to discharge.  - Physiatry eval - possible 4a     #Acute hemoglobin drop s/p hip surgery, possible bleed 9/10   - Some bleeding at left hip at site of surgical incision in early AM. Dressing was changed.   - Notified hgb of 5.4 in late AM 9/10. Prior Hemoglobin 7.7    - Patient given 2 units PRBCs STAT  - dc'd eliquis 2.5 bid  - Troponins elevated 9/10  - STAT CBC, CMP performed.   - coag disorder workup: fibrinogen, LDH, d-dimer, laptoglobin  - Zofran, protonix for GI discomfort started   - EKG showed 1st degree AV block, consistent with prior EKG.   - Per ortho, wound had small amount of bleeding. Reassessment of wound post-dressing change shows minimal bleeding.   - Ordered CXR   - Ordered CT AP noncon and CT hip noncon to r/o internal bleed.     Hemoglobin: 5.8 g/dL (09-10 @ 09:39)  Hemoglobin: 5.4 g/dL (09-10 @ 07:00)  Hemoglobin: 7.7 g/dL (09-08 @ 12:08)  Hemoglobin: 8.4 g/dL (09-08 @ 06:48)  Hemoglobin: 8.3 g/dL (09-07 @ 05:43)    #Hypotension, rule out opioid-induced vs cardiac vs infectious.   - Improved significantly - Normotensive in AM today 9/8/21   - BP was previously 66/46. 1u PRBC before OR. 2 units in OR.   - ordered procal/CRP. TSH.   - Ordered blood cultures.   - TTE: EF 50%. The mid and apical inferior septum and apex are akinetic. The mid anteroseptal  segment and apical inferior segment are hypokinetic. All remaining scored  segments are normal.  - Hold opioids for now.   - Ordered orthostatics.   - Holding ABXs for now.  - EKG: T wave abnormality, consider lateral ischemia    #DM2 #HTN #CKD3 #Diverticulitis, #HFpEF #Paroxysmal Afib   - c/w home meds: atorvastatin, clopidogrel, calcitriol, , evista, folic acid, latanoprost, levothyroxine, losartan, carvededilol   -  stopped eliquis 2.5 bid 9/10 b/c bleeding concern  - cardiac consult     #HTN  - c/w home med(s): furosemide     #DM 2  - c/w home med(s): lantus,    DVT PPX.   - eliquis - dc'd    GI ppx   - pantoprazole 40mg daily     #I/Os  - intermittent catheterization   - may consider elliott after intermittent cath x3    #Diet  - started soft diet  9/9    #handoff  - f/u CBC 4pm 9/10/21  - 2nd PRBC transfusion should be completed at ~4pm.   - CT AbdPelvis noncon and CT hip noncon - patient did not receive earler today because transfusing at time. Please ensure patient receives CT once transfusion completed.     #Dispo  - NOT medically stable for discharge  - Possible GEO once stable  -Per Ortho: Upon discharge, please have patient follow up at 2 weeks post-op with Dr. Parsons at [address] [facility]. Please call [phone] to schedule an appointment.

## 2021-09-11 LAB
ALBUMIN SERPL ELPH-MCNC: 2.8 G/DL — LOW (ref 3.5–5.2)
ALP SERPL-CCNC: 55 U/L — SIGNIFICANT CHANGE UP (ref 30–115)
ALT FLD-CCNC: 21 U/L — SIGNIFICANT CHANGE UP (ref 0–41)
ANION GAP SERPL CALC-SCNC: 10 MMOL/L — SIGNIFICANT CHANGE UP (ref 7–14)
AST SERPL-CCNC: 26 U/L — SIGNIFICANT CHANGE UP (ref 0–41)
BASOPHILS # BLD AUTO: 0.01 K/UL — SIGNIFICANT CHANGE UP (ref 0–0.2)
BASOPHILS # BLD AUTO: 0.02 K/UL — SIGNIFICANT CHANGE UP (ref 0–0.2)
BASOPHILS NFR BLD AUTO: 0.1 % — SIGNIFICANT CHANGE UP (ref 0–1)
BASOPHILS NFR BLD AUTO: 0.2 % — SIGNIFICANT CHANGE UP (ref 0–1)
BILIRUB SERPL-MCNC: 0.8 MG/DL — SIGNIFICANT CHANGE UP (ref 0.2–1.2)
BLD GP AB SCN SERPL QL: SIGNIFICANT CHANGE UP
BUN SERPL-MCNC: 57 MG/DL — HIGH (ref 10–20)
CALCIUM SERPL-MCNC: 8.8 MG/DL — SIGNIFICANT CHANGE UP (ref 8.5–10.1)
CHLORIDE SERPL-SCNC: 107 MMOL/L — SIGNIFICANT CHANGE UP (ref 98–110)
CO2 SERPL-SCNC: 18 MMOL/L — SIGNIFICANT CHANGE UP (ref 17–32)
CREAT SERPL-MCNC: 1.9 MG/DL — HIGH (ref 0.7–1.5)
EOSINOPHIL # BLD AUTO: 0.11 K/UL — SIGNIFICANT CHANGE UP (ref 0–0.7)
EOSINOPHIL # BLD AUTO: 0.22 K/UL — SIGNIFICANT CHANGE UP (ref 0–0.7)
EOSINOPHIL NFR BLD AUTO: 0.8 % — SIGNIFICANT CHANGE UP (ref 0–8)
EOSINOPHIL NFR BLD AUTO: 2 % — SIGNIFICANT CHANGE UP (ref 0–8)
GLUCOSE BLDC GLUCOMTR-MCNC: 149 MG/DL — HIGH (ref 70–99)
GLUCOSE BLDC GLUCOMTR-MCNC: 154 MG/DL — HIGH (ref 70–99)
GLUCOSE BLDC GLUCOMTR-MCNC: 170 MG/DL — HIGH (ref 70–99)
GLUCOSE BLDC GLUCOMTR-MCNC: 175 MG/DL — HIGH (ref 70–99)
GLUCOSE SERPL-MCNC: 156 MG/DL — HIGH (ref 70–99)
HAPTOGLOB SERPL-MCNC: 224 MG/DL — HIGH (ref 34–200)
HCT VFR BLD CALC: 28.2 % — LOW (ref 37–47)
HCT VFR BLD CALC: 31.3 % — LOW (ref 37–47)
HGB BLD-MCNC: 10.7 G/DL — LOW (ref 12–16)
HGB BLD-MCNC: 9.3 G/DL — LOW (ref 12–16)
IMM GRANULOCYTES NFR BLD AUTO: 1.7 % — HIGH (ref 0.1–0.3)
IMM GRANULOCYTES NFR BLD AUTO: 1.7 % — HIGH (ref 0.1–0.3)
LYMPHOCYTES # BLD AUTO: 1.33 K/UL — SIGNIFICANT CHANGE UP (ref 1.2–3.4)
LYMPHOCYTES # BLD AUTO: 1.39 K/UL — SIGNIFICANT CHANGE UP (ref 1.2–3.4)
LYMPHOCYTES # BLD AUTO: 11.9 % — LOW (ref 20.5–51.1)
LYMPHOCYTES # BLD AUTO: 9.9 % — LOW (ref 20.5–51.1)
MAGNESIUM SERPL-MCNC: 1.9 MG/DL — SIGNIFICANT CHANGE UP (ref 1.8–2.4)
MCHC RBC-ENTMCNC: 27.9 PG — SIGNIFICANT CHANGE UP (ref 27–31)
MCHC RBC-ENTMCNC: 29.2 PG — SIGNIFICANT CHANGE UP (ref 27–31)
MCHC RBC-ENTMCNC: 33 G/DL — SIGNIFICANT CHANGE UP (ref 32–37)
MCHC RBC-ENTMCNC: 34.2 G/DL — SIGNIFICANT CHANGE UP (ref 32–37)
MCV RBC AUTO: 84.7 FL — SIGNIFICANT CHANGE UP (ref 81–99)
MCV RBC AUTO: 85.3 FL — SIGNIFICANT CHANGE UP (ref 81–99)
MONOCYTES # BLD AUTO: 0.99 K/UL — HIGH (ref 0.1–0.6)
MONOCYTES # BLD AUTO: 1.07 K/UL — HIGH (ref 0.1–0.6)
MONOCYTES NFR BLD AUTO: 7.6 % — SIGNIFICANT CHANGE UP (ref 1.7–9.3)
MONOCYTES NFR BLD AUTO: 8.9 % — SIGNIFICANT CHANGE UP (ref 1.7–9.3)
NEUTROPHILS # BLD AUTO: 11.17 K/UL — HIGH (ref 1.4–6.5)
NEUTROPHILS # BLD AUTO: 8.42 K/UL — HIGH (ref 1.4–6.5)
NEUTROPHILS NFR BLD AUTO: 75.3 % — HIGH (ref 42.2–75.2)
NEUTROPHILS NFR BLD AUTO: 79.9 % — HIGH (ref 42.2–75.2)
NRBC # BLD: 0 /100 WBCS — SIGNIFICANT CHANGE UP (ref 0–0)
NRBC # BLD: 0 /100 WBCS — SIGNIFICANT CHANGE UP (ref 0–0)
PHOSPHATE SERPL-MCNC: 2.2 MG/DL — SIGNIFICANT CHANGE UP (ref 2.1–4.9)
PLATELET # BLD AUTO: 154 K/UL — SIGNIFICANT CHANGE UP (ref 130–400)
PLATELET # BLD AUTO: 188 K/UL — SIGNIFICANT CHANGE UP (ref 130–400)
POTASSIUM SERPL-MCNC: 5.2 MMOL/L — HIGH (ref 3.5–5)
POTASSIUM SERPL-SCNC: 5.2 MMOL/L — HIGH (ref 3.5–5)
PROT SERPL-MCNC: 4.6 G/DL — LOW (ref 6–8)
RBC # BLD: 3.33 M/UL — LOW (ref 4.2–5.4)
RBC # BLD: 3.67 M/UL — LOW (ref 4.2–5.4)
RBC # FLD: 18.1 % — HIGH (ref 11.5–14.5)
RBC # FLD: 18.3 % — HIGH (ref 11.5–14.5)
SODIUM SERPL-SCNC: 135 MMOL/L — SIGNIFICANT CHANGE UP (ref 135–146)
TROPONIN T SERPL-MCNC: 0.33 NG/ML — CRITICAL HIGH
WBC # BLD: 11.17 K/UL — HIGH (ref 4.8–10.8)
WBC # BLD: 13.99 K/UL — HIGH (ref 4.8–10.8)
WBC # FLD AUTO: 11.17 K/UL — HIGH (ref 4.8–10.8)
WBC # FLD AUTO: 13.99 K/UL — HIGH (ref 4.8–10.8)

## 2021-09-11 PROCEDURE — 93010 ELECTROCARDIOGRAM REPORT: CPT

## 2021-09-11 PROCEDURE — 99233 SBSQ HOSP IP/OBS HIGH 50: CPT

## 2021-09-11 RX ORDER — PANTOPRAZOLE SODIUM 20 MG/1
40 TABLET, DELAYED RELEASE ORAL EVERY 12 HOURS
Refills: 0 | Status: DISCONTINUED | OUTPATIENT
Start: 2021-09-11 | End: 2021-09-27

## 2021-09-11 RX ORDER — CLOPIDOGREL BISULFATE 75 MG/1
75 TABLET, FILM COATED ORAL DAILY
Refills: 0 | Status: DISCONTINUED | OUTPATIENT
Start: 2021-09-11 | End: 2021-09-13

## 2021-09-11 RX ORDER — SOD SULF/SODIUM/NAHCO3/KCL/PEG
4000 SOLUTION, RECONSTITUTED, ORAL ORAL ONCE
Refills: 0 | Status: COMPLETED | OUTPATIENT
Start: 2021-09-12 | End: 2021-09-12

## 2021-09-11 RX ADMIN — PANTOPRAZOLE SODIUM 40 MILLIGRAM(S): 20 TABLET, DELAYED RELEASE ORAL at 17:41

## 2021-09-11 RX ADMIN — LATANOPROST 1 DROP(S): 0.05 SOLUTION/ DROPS OPHTHALMIC; TOPICAL at 23:39

## 2021-09-11 RX ADMIN — CALCITRIOL 0.25 MICROGRAM(S): 0.5 CAPSULE ORAL at 11:23

## 2021-09-11 RX ADMIN — RALOXIFENE HYDROCHLORIDE 60 MILLIGRAM(S): 60 TABLET, COATED ORAL at 11:23

## 2021-09-11 RX ADMIN — MIDODRINE HYDROCHLORIDE 5 MILLIGRAM(S): 2.5 TABLET ORAL at 11:23

## 2021-09-11 RX ADMIN — CARVEDILOL PHOSPHATE 25 MILLIGRAM(S): 80 CAPSULE, EXTENDED RELEASE ORAL at 06:22

## 2021-09-11 RX ADMIN — Medication 650 MILLIGRAM(S): at 06:23

## 2021-09-11 RX ADMIN — Medication 325 MILLIGRAM(S): at 11:23

## 2021-09-11 RX ADMIN — SENNA PLUS 2 TABLET(S): 8.6 TABLET ORAL at 21:26

## 2021-09-11 RX ADMIN — CARVEDILOL PHOSPHATE 25 MILLIGRAM(S): 80 CAPSULE, EXTENDED RELEASE ORAL at 17:40

## 2021-09-11 RX ADMIN — Medication 650 MILLIGRAM(S): at 17:39

## 2021-09-11 RX ADMIN — POLYETHYLENE GLYCOL 3350 17 GRAM(S): 17 POWDER, FOR SOLUTION ORAL at 11:24

## 2021-09-11 RX ADMIN — CLOPIDOGREL BISULFATE 75 MILLIGRAM(S): 75 TABLET, FILM COATED ORAL at 17:38

## 2021-09-11 RX ADMIN — Medication 500 MILLIGRAM(S): at 06:23

## 2021-09-11 RX ADMIN — Medication 25 MICROGRAM(S): at 06:23

## 2021-09-11 RX ADMIN — Medication 650 MILLIGRAM(S): at 11:23

## 2021-09-11 RX ADMIN — Medication 2: at 11:57

## 2021-09-11 RX ADMIN — PANTOPRAZOLE SODIUM 40 MILLIGRAM(S): 20 TABLET, DELAYED RELEASE ORAL at 08:44

## 2021-09-11 RX ADMIN — Medication 650 MILLIGRAM(S): at 00:21

## 2021-09-11 RX ADMIN — Medication 1 MILLIGRAM(S): at 11:23

## 2021-09-11 RX ADMIN — Medication 650 MILLIGRAM(S): at 17:42

## 2021-09-11 RX ADMIN — ATORVASTATIN CALCIUM 80 MILLIGRAM(S): 80 TABLET, FILM COATED ORAL at 21:26

## 2021-09-11 RX ADMIN — Medication 1 TABLET(S): at 11:23

## 2021-09-11 RX ADMIN — PANTOPRAZOLE SODIUM 40 MILLIGRAM(S): 20 TABLET, DELAYED RELEASE ORAL at 06:22

## 2021-09-11 RX ADMIN — MIDODRINE HYDROCHLORIDE 5 MILLIGRAM(S): 2.5 TABLET ORAL at 17:41

## 2021-09-11 RX ADMIN — Medication 500 MILLIGRAM(S): at 17:39

## 2021-09-11 NOTE — CONSULT NOTE ADULT - SUBJECTIVE AND OBJECTIVE BOX
Gastroenterology Consultation:    Patient is a 85y old  Female who presents with a chief complaint of fall / hip fx (09 Sep 2021 15:01)      Admitted on: 09-05-21  HPI:  85F w/PMHx CAD, CHF, Afib on Eliquis, DM, HTN, colon cancer, CKD stage 3, hx of diverticulitis, presents s/p mechanical fall at home, +HT, -LOC, +AC (Eliquis). Patient was in her kitchen and "turned around too fast" when she fell over and hit her head on a chair. She denies loss of consciousness. She remained down for ~30 min before EMS came to take her into the ED. She is GCS 15, primary survey negative. No external signs of trauma. Complains of LLE pain. Denies scalp, spinal, chest, abdominal pain. ROM of all extremities in tact. C-collar in place.    (05 Sep 2021 00:18)      Prior EGD: nothing in chart, EGD done a long time ago and was normal ( report not available  Prior Colonoscopy: nothing in chart Last colonoscopy done in May ( report not available), was normal according to the patient/.        PAST MEDICAL & SURGICAL HISTORY:  CAD (coronary artery disease)    Chronic CHF    Atrial fibrillation    Type 2 diabetes mellitus    Colon cancer    Hypertension    CKD (chronic kidney disease)  Stage 3    History of Clostridium difficile colitis  s/p fecal transplant    Diverticulitis    S/P hysterectomy        FAMILY HISTORY:  FHx: breast cancer  Mother        Social History:  Tobacco:  Alcohol:  Drugs:    Home Medications:  atorvastatin 80 mg oral tablet:  (05 Sep 2021 00:23)  calcitriol 0.25 mcg oral capsule: 1 cap(s) orally once a day (05 Sep 2021 00:23)  carvedilol 25 mg oral tablet:  (05 Sep 2021 00:23)  clopidogrel 75 mg oral tablet: 1 tab(s) orally once a day (05 Sep 2021 00:23)  Eliquis 2.5 mg oral tablet: 1 tab(s) orally 2 times a day (05 Sep 2021 00:23)  Evista 60 mg oral tablet: 1 tab(s) orally once a day (05 Sep 2021 00:23)  folic acid 1 mg oral tablet:  (05 Sep 2021 00:23)  furosemide 40 mg oral tablet: 1 tab(s) orally once a day (05 Sep 2021 00:23)  Lantus: 10 unit(s) subcutaneous once a day (in the morning) (05 Sep 2021 00:23)  latanoprost 0.005% ophthalmic solution: 1 drop(s) to each affected eye once a day (in the evening) (05 Sep 2021 00:23)  levothyroxine 25 mcg (0.025 mg) oral tablet: 1 tab(s) orally once a day (05 Sep 2021 00:23)  losartan 25 mg oral tablet: 1 tab(s) orally once a day (05 Sep 2021 00:23)    MEDICATIONS  (STANDING):  acetaminophen   Tablet .. 650 milliGRAM(s) Oral every 6 hours  ascorbic acid 500 milliGRAM(s) Oral two times a day  atorvastatin 80 milliGRAM(s) Oral at bedtime  calcitriol   Capsule 0.25 MICROGram(s) Oral daily  carvedilol 25 milliGRAM(s) Oral every 12 hours  dextrose 40% Gel 15 Gram(s) Oral once  dextrose 50% Injectable 25 Gram(s) IV Push once  ferrous    sulfate 325 milliGRAM(s) Oral daily  folic acid 1 milliGRAM(s) Oral daily  glucagon  Injectable 1 milliGRAM(s) IntraMuscular once  insulin lispro (ADMELOG) corrective regimen sliding scale   SubCutaneous every 6 hours  lactated ringers. 500 milliLiter(s) (75 mL/Hr) IV Continuous <Continuous>  latanoprost 0.005% Ophthalmic Solution 1 Drop(s) Both EYES at bedtime  levothyroxine 25 MICROGram(s) Oral daily  midodrine.      midodrine. 5 milliGRAM(s) Oral three times a day  multivitamin 1 Tablet(s) Oral daily  pantoprazole  Injectable 40 milliGRAM(s) IV Push every 12 hours  polyethylene glycol 3350 17 Gram(s) Oral daily  raloxifene 60 milliGRAM(s) Oral daily  senna 2 Tablet(s) Oral at bedtime    MEDICATIONS  (PRN):  bisacodyl Suppository 10 milliGRAM(s) Rectal daily PRN If no bowel movement by POD#2  diphenhydrAMINE 25 milliGRAM(s) Oral at bedtime PRN Insomnia  morphine IVPB 4 milliGRAM(s) IV Intermittent every 4 hours PRN Severe Pain (7 - 10)  ondansetron Injectable 4 milliGRAM(s) IV Push every 6 hours PRN Nausea and/or Vomiting      Allergies  No Known Allergies      Review of Systems:   Constitutional:  No Fever, No Chills  ENT/Mouth:  No Hearing Changes,  No Difficulty Swallowing  Eyes:  No Eye Pain, No Vision Changes  Cardiovascular:  No Chest Pain, No Palpitations  Respiratory:  No Cough, No Dyspnea  Gastrointestinal:  As described in HPI  Musculoskeletal:  No Joint Swelling, No Back Pain  Skin:  No Skin Lesions, No Jaundice  Neuro:  No Syncope, No Dizziness  Heme/Lymph:  No Bruising, No Bleeding.          Physical Examination:  T(C): 35.7 (09-11-21 @ 07:36), Max: 36.3 (09-10-21 @ 15:00)  HR: 75 (09-11-21 @ 07:36) (69 - 75)  BP: 137/63 (09-11-21 @ 07:36) (137/63 - 154/70)  RR: 18 (09-11-21 @ 07:36) (18 - 18)  SpO2: --      09-09-21 @ 07:01  -  09-10-21 @ 07:00  --------------------------------------------------------  IN: 2315 mL / OUT: 1250 mL / NET: 1065 mL    09-10-21 @ 07:01  -  09-11-21 @ 07:00  --------------------------------------------------------  IN: 180 mL / OUT: 600 mL / NET: -420 mL        Constitutional: No acute distress.  Eyes:. Conjunctivae are clear, Sclera is non-icteric.  Ears Nose and Throat: The external ears are normal appearing,  Oral mucosa is pink and moist.  Respiratory:  No signs of respiratory distress. Lung sounds are clear bilaterally.  Cardiovascular:  S1 S2, Regular rate and rhythm.  GI: Abdomen is soft, symmetric, and non-tender without distention. There are no visible lesions or scars. Bowel sounds are present and normoactive in all four quadrants. No masses, hepatomegaly, or splenomegaly are noted.   Neuro: No Tremor, No involuntary movements  Skin: No rashes, No Jaundice.          Data:                        9.2    9.83  )-----------( 147      ( 10 Sep 2021 16:59 )             28.2     Hgb Trend:  9.2  09-10-21 @ 16:59  5.8  09-10-21 @ 09:39  5.4  09-10-21 @ 07:00  7.7  09-08-21 @ 12:08        09-10    131<L>  |  104  |  65<HH>  ----------------------------<  226<H>  4.8   |  19  |  2.1<H>    Ca    8.7      10 Sep 2021 09:39  Phos  2.4     09-10  Mg     1.8     09-10    TPro  4.3<L>  /  Alb  2.7<L>  /  TBili  0.5  /  DBili  x   /  AST  29  /  ALT  22  /  AlkPhos  52  09-10    Liver panel trend:  TBili 0.5   /   AST 29   /   ALT 22   /   AlkP 52   /   Tptn 4.3   /   Alb 2.7    /   DBili --      09-10  TBili 0.5   /   AST 26   /   ALT 19   /   AlkP 48   /   Tptn 4.1   /   Alb 2.5    /   DBili --      09-10  TBili 0.3   /   AST 30   /   ALT 21   /   AlkP 53   /   Tptn 4.9   /   Alb 3.0    /   DBili --      09-08  TBili 0.2   /   AST 13   /   ALT 11   /   AlkP 114   /   Tptn 6.6   /   Alb 4.2    /   DBili --      09-04      PT/INR - ( 10 Sep 2021 09:39 )   PT: 15.60 sec;   INR: 1.36 ratio         PTT - ( 10 Sep 2021 09:39 )  PTT:29.1 sec    Culture - Blood (collected 08 Sep 2021 16:44)  Source: .Blood None  Preliminary Report (10 Sep 2021 01:02):    No growth to date.          Radiology:  CT Abdomen and Pelvis No Cont:   EXAM:  CT ABDOMEN AND PELVIS            PROCEDURE DATE:  09/10/2021            INTERPRETATION:  CLINICAL STATEMENT: recent hip fx s/p surgery, w/ hgb drop, now 5.4. r/o interna  l bleed    TECHNIQUE: Contiguous axial CT images were obtained from the lower chest to the pubic symphysis without intravenous contrast.  Oral contrast was not administered.  Reformatted images in the coronal and sagittal planes were acquired.    COMPARISON CT: 9/8/2021    OTHER STUDIES USED FOR CORRELATION: None.      FINDINGS:    LOWER CHEST: Unchanged.    HEPATOBILIARY: Unchanged.    SPLEEN: Unremarkable.    PANCREAS: Unremarkable.    ADRENAL GLANDS: Unchanged bilateral adrenal gland thickening.    KIDNEYS: Unchanged.    ABDOMINOPELVIC NODES: Unremarkable.    PELVIC ORGANS: Unchanged.    PERITONEUM/MESENTERY/BOWEL: Interval decrease in gaseous distention of the stomach, small and large bowel since 2 days earlier. No free air. Otherwise unchanged. No intra-abdominal hemorrhage or intra-abdominal focal collection seen.    BONES/SOFT TISSUES: Unchanged again noting left intramedullary gamma nail fixation hardware with a partially imaged adjacent intramuscular hematoma involving the femoral musculature.      IMPRESSION:    Since 9/8/2021,    No change in the partially imaged left femoral intramuscular hematoma. No intra-abdominal hemorrhage.    Interval decrease in gaseous distention of the stomach, small and large bowel since 2 days earlier.    --- End of Report ---              MARNIE ARREDONDO MD; Attending Radiologist  This document has been electronically signed. Sep 10 2021  7:17PM (09-10-21 @ 18:30)       Gastroenterology Consultation:    Patient is a 85y old  Female who presents with a chief complaint of fall / hip fx (09 Sep 2021 15:01)      Admitted on: 09-05-21  HPI:  85F w/PMHx CAD, CHF, Afib on Eliquis last dose 9/10 AM, DM, HTN, colon cancer s/p resection in 2016, CKD stage 3, hx of diverticulitis, presents s/p mechanical fall at home admitted on 9/5 s/p left femur fracture repair on 9/6. GI was called for melena and coffee ground emesis. As per patient she had coffee ground emesis on 9/9 PM 2 episodes followed by black tarry stools and abdominal pain intermittently in epigastric region worse with eating. 6/10 in intensity, non radiating. She follows up with Dr. Eliseo LIZ at [facility] last colonoscopy in may was normal. Currently denies any abdominal pain, nausea, vomiting, hematemesis, hematochezia, weight loss.     Prior EGD: nothing in chart, EGD done a long time ago and was normal ( report not available  Prior Colonoscopy: nothing in chart Last colonoscopy done in May ( report not available), was normal according to the patient/.        PAST MEDICAL & SURGICAL HISTORY:  CAD (coronary artery disease)    Chronic CHF    Atrial fibrillation    Type 2 diabetes mellitus    Colon cancer    Hypertension    CKD (chronic kidney disease)  Stage 3    History of Clostridium difficile colitis  s/p fecal transplant    Diverticulitis    S/P hysterectomy        FAMILY HISTORY:  FHx: breast cancer  Mother        Social History:  Tobacco: N  Alcohol: N  Drugs: N    Home Medications:  atorvastatin 80 mg oral tablet:  (05 Sep 2021 00:23)  calcitriol 0.25 mcg oral capsule: 1 cap(s) orally once a day (05 Sep 2021 00:23)  carvedilol 25 mg oral tablet:  (05 Sep 2021 00:23)  clopidogrel 75 mg oral tablet: 1 tab(s) orally once a day (05 Sep 2021 00:23)  Eliquis 2.5 mg oral tablet: 1 tab(s) orally 2 times a day (05 Sep 2021 00:23)  Evista 60 mg oral tablet: 1 tab(s) orally once a day (05 Sep 2021 00:23)  folic acid 1 mg oral tablet:  (05 Sep 2021 00:23)  furosemide 40 mg oral tablet: 1 tab(s) orally once a day (05 Sep 2021 00:23)  Lantus: 10 unit(s) subcutaneous once a day (in the morning) (05 Sep 2021 00:23)  latanoprost 0.005% ophthalmic solution: 1 drop(s) to each affected eye once a day (in the evening) (05 Sep 2021 00:23)  levothyroxine 25 mcg (0.025 mg) oral tablet: 1 tab(s) orally once a day (05 Sep 2021 00:23)  losartan 25 mg oral tablet: 1 tab(s) orally once a day (05 Sep 2021 00:23)    MEDICATIONS  (STANDING):  acetaminophen   Tablet .. 650 milliGRAM(s) Oral every 6 hours  ascorbic acid 500 milliGRAM(s) Oral two times a day  atorvastatin 80 milliGRAM(s) Oral at bedtime  calcitriol   Capsule 0.25 MICROGram(s) Oral daily  carvedilol 25 milliGRAM(s) Oral every 12 hours  dextrose 40% Gel 15 Gram(s) Oral once  dextrose 50% Injectable 25 Gram(s) IV Push once  ferrous    sulfate 325 milliGRAM(s) Oral daily  folic acid 1 milliGRAM(s) Oral daily  glucagon  Injectable 1 milliGRAM(s) IntraMuscular once  insulin lispro (ADMELOG) corrective regimen sliding scale   SubCutaneous every 6 hours  lactated ringers. 500 milliLiter(s) (75 mL/Hr) IV Continuous <Continuous>  latanoprost 0.005% Ophthalmic Solution 1 Drop(s) Both EYES at bedtime  levothyroxine 25 MICROGram(s) Oral daily  midodrine.      midodrine. 5 milliGRAM(s) Oral three times a day  multivitamin 1 Tablet(s) Oral daily  pantoprazole  Injectable 40 milliGRAM(s) IV Push every 12 hours  polyethylene glycol 3350 17 Gram(s) Oral daily  raloxifene 60 milliGRAM(s) Oral daily  senna 2 Tablet(s) Oral at bedtime    MEDICATIONS  (PRN):  bisacodyl Suppository 10 milliGRAM(s) Rectal daily PRN If no bowel movement by POD#2  diphenhydrAMINE 25 milliGRAM(s) Oral at bedtime PRN Insomnia  morphine IVPB 4 milliGRAM(s) IV Intermittent every 4 hours PRN Severe Pain (7 - 10)  ondansetron Injectable 4 milliGRAM(s) IV Push every 6 hours PRN Nausea and/or Vomiting      Allergies  No Known Allergies      Review of Systems:   Constitutional:  No Fever, No Chills  ENT/Mouth:  No Hearing Changes,  No Difficulty Swallowing  Eyes:  No Eye Pain, No Vision Changes  Cardiovascular:  No Chest Pain, No Palpitations  Respiratory:  No Cough, No Dyspnea  Gastrointestinal:  As described in HPI  Musculoskeletal:  No Joint Swelling, No Back Pain  Skin:  No Skin Lesions, No Jaundice  Neuro:  No Syncope, No Dizziness  Heme/Lymph:  No Bruising, No Bleeding.          Physical Examination:  T(C): 35.7 (09-11-21 @ 07:36), Max: 36.3 (09-10-21 @ 15:00)  HR: 75 (09-11-21 @ 07:36) (69 - 75)  BP: 137/63 (09-11-21 @ 07:36) (137/63 - 154/70)  RR: 18 (09-11-21 @ 07:36) (18 - 18)  SpO2: --      09-09-21 @ 07:01  -  09-10-21 @ 07:00  --------------------------------------------------------  IN: 2315 mL / OUT: 1250 mL / NET: 1065 mL    09-10-21 @ 07:01  -  09-11-21 @ 07:00  --------------------------------------------------------  IN: 180 mL / OUT: 600 mL / NET: -420 mL        Constitutional: No acute distress.  Eyes:. Conjunctivae are clear, Sclera is non-icteric.  Ears Nose and Throat: The external ears are normal appearing,  Oral mucosa is pink and moist.  Respiratory:  No signs of respiratory distress. Lung sounds are clear bilaterally.  Cardiovascular:  S1 S2, Regular rate and rhythm.  GI: Abdomen is soft, symmetric, and non-tender without distention. There are no visible lesions or scars. Bowel sounds are present and normoactive in all four quadrants. No masses, hepatomegaly, or splenomegaly are noted.   Neuro: No Tremor, No involuntary movements  Skin: No rashes, No Jaundice.          Data:                        9.2    9.83  )-----------( 147      ( 10 Sep 2021 16:59 )             28.2     Hgb Trend:  9.2  09-10-21 @ 16:59  5.8  09-10-21 @ 09:39  5.4  09-10-21 @ 07:00  7.7  09-08-21 @ 12:08        09-10    131<L>  |  104  |  65<HH>  ----------------------------<  226<H>  4.8   |  19  |  2.1<H>    Ca    8.7      10 Sep 2021 09:39  Phos  2.4     09-10  Mg     1.8     09-10    TPro  4.3<L>  /  Alb  2.7<L>  /  TBili  0.5  /  DBili  x   /  AST  29  /  ALT  22  /  AlkPhos  52  09-10    Liver panel trend:  TBili 0.5   /   AST 29   /   ALT 22   /   AlkP 52   /   Tptn 4.3   /   Alb 2.7    /   DBili --      09-10  TBili 0.5   /   AST 26   /   ALT 19   /   AlkP 48   /   Tptn 4.1   /   Alb 2.5    /   DBili --      09-10  TBili 0.3   /   AST 30   /   ALT 21   /   AlkP 53   /   Tptn 4.9   /   Alb 3.0    /   DBili --      09-08  TBili 0.2   /   AST 13   /   ALT 11   /   AlkP 114   /   Tptn 6.6   /   Alb 4.2    /   DBili --      09-04      PT/INR - ( 10 Sep 2021 09:39 )   PT: 15.60 sec;   INR: 1.36 ratio         PTT - ( 10 Sep 2021 09:39 )  PTT:29.1 sec    Culture - Blood (collected 08 Sep 2021 16:44)  Source: .Blood None  Preliminary Report (10 Sep 2021 01:02):    No growth to date.          Radiology:  CT Abdomen and Pelvis No Cont:   EXAM:  CT ABDOMEN AND PELVIS            PROCEDURE DATE:  09/10/2021            INTERPRETATION:  CLINICAL STATEMENT: recent hip fx s/p surgery, w/ hgb drop, now 5.4. r/o interna  l bleed    TECHNIQUE: Contiguous axial CT images were obtained from the lower chest to the pubic symphysis without intravenous contrast.  Oral contrast was not administered.  Reformatted images in the coronal and sagittal planes were acquired.    COMPARISON CT: 9/8/2021    OTHER STUDIES USED FOR CORRELATION: None.      FINDINGS:    LOWER CHEST: Unchanged.    HEPATOBILIARY: Unchanged.    SPLEEN: Unremarkable.    PANCREAS: Unremarkable.    ADRENAL GLANDS: Unchanged bilateral adrenal gland thickening.    KIDNEYS: Unchanged.    ABDOMINOPELVIC NODES: Unremarkable.    PELVIC ORGANS: Unchanged.    PERITONEUM/MESENTERY/BOWEL: Interval decrease in gaseous distention of the stomach, small and large bowel since 2 days earlier. No free air. Otherwise unchanged. No intra-abdominal hemorrhage or intra-abdominal focal collection seen.    BONES/SOFT TISSUES: Unchanged again noting left intramedullary gamma nail fixation hardware with a partially imaged adjacent intramuscular hematoma involving the femoral musculature.      IMPRESSION:    Since 9/8/2021,    No change in the partially imaged left femoral intramuscular hematoma. No intra-abdominal hemorrhage.    Interval decrease in gaseous distention of the stomach, small and large bowel since 2 days earlier.    --- End of Report ---              MARNIE ARREDONDO MD; Attending Radiologist  This document has been electronically signed. Sep 10 2021  7:17PM (09-10-21 @ 18:30)

## 2021-09-11 NOTE — CHART NOTE - NSCHARTNOTEFT_GEN_A_CORE
pt had an episode of coffee ground emesis along with an episode of black tarry stools, small quantity.   hb yesterday dropped from 8--> 5.4, pt is s/p 2 u of prbc. pending today's CBC.   Last colonoscopy done in May ( report not available), was normal according to the patient/.  EGD done a long time ago and was normal ( report not available).   # Possible Upper GI bleeding, HD stable  Plan:  - NPO  - Pantoprazole 40 IV BID.   - 2 large bore IVs.   -Trend CBC  -keep Hb > 8 given hx of CAD  -keep active type and screen  - GI evaluation. pt had an episode of coffee ground emesis along with an episode of black tarry stools, small quantity.   hb yesterday dropped from 8--> 5.4, pt is s/p 2 u of prbc. pending today's CBC.   Last colonoscopy done in May ( report not available), was normal according to the patient/.  EGD done a long time ago and was normal ( report not available).   # Possible Upper GI bleeding, HD stable  Plan:  - NPO  - Pantoprazole 40 IV BID.   - 2 large bore IVs.   -Trend CBC  -keep Hb > 8 given hx of CAD  -keep active type and screen  - Eliquis was held.   - GI evaluation.

## 2021-09-11 NOTE — CHART NOTE - NSCHARTNOTEFT_GEN_A_CORE
Registered Dietitian Follow-Up     Patient Profile Reviewed                           Yes [x]   No []     Nutrition History Previously Obtained        Yes [x]  No []       Pertinent Subjective Information: The patient is currently NPO     Pertinent Medical Interventions: Presented s/p mechanical fall at home, sustaining +HT, -LOC, +AC (Eliquis), L basi-cervical femoral neck fracture. S/p IMN (). Hospital course is complicated by hematemesis and melena in two bowel movements, PUD vs less likely varices (no h/o alcoholic) vs AVM vs dieulafoy (GI was consulted; an EGD is pending), ELOISE possibly prerenal with Stage 3 CKD, typical chest pain with elevated troponin, possibly related to CKD/ELOISE and demand vs NSTEMI, EKG shows T-wave abnormality, possible lateral ischemia (cardiology consulted) and acute Intramuscular hematoma.      Diet order: () NPO     Anthropometrics:  - Ht: 5'3"  - Wt: 50.7kg  - %wt change  - BMI: 20 (WNL)  - IBW: 52kg     Pertinent Lab Data: () Na-135, K-5.2, CL-107, BUN-57, Cr-1.9, GFR-24, Glucose-156mg/dL, Ca-8.8, H/H-9.3/28.2, WBC-11.17     Pertinent Meds: Lactated Ringers at 75mL/hr, Dulcolax, Golytely, Protonix, Admelog, Miralax, Vitamin C, Lipitor, Coreg, Calcitriol, Ferrous Sulfate, Folic acid, Synthroid, MVI, Zofran, Senna, Evista     Physical Findings:  - Appearance:   - GI function: The patient c/o diarrhea today ()  - Tubes: N/A  - Oral/Mouth cavity:  - Skin: Intact (William Scor-15)     Nutrition Requirements  Weight Used: 50.7kg -Derived from nutrition note ()     Estimated Energy Needs    Continue [x]  Adjust []  Adjusted Energy Recommendations: 1112-1205kcal (MSJ 1.2-1.3AF) -Derived from nutrition note ()      Estimated Protein Needs    Continue [x]  Adjust []  Adjusted Protein Recommendations: 61-64g/day (1.2-1.3 g/kg -- d/t recent sx vs. renal fx noted -- will mon) -Derived from nutrition note ()     Estimated Fluid Needs        Continue [x]  Adjust []  Adjusted Fluid Recommendations: 1mL/kcal or LIP -Derived from nutrition note ()      Nutrient Intake: Currently NPO      [x] Previous Nutrition Diagnosis: Inadequate protein energy intake            [x] Ongoing          [] Resolved    [] No active nutrition diagnosis identified at this time     Nutrition Diagnostic #1  Problem:  Etiology:  Statement:     Nutrition Diagnostic #2  Problem:  Etiology:  Statement:     Nutrition Intervention:  1.Meals and Snacks  2.Medical Food Supplement     Goal/Expected Outcome:  1.Meet >85% estimated nutritional needs in 3 days     Indicator/Monitorin.Diet order, energy intake, nutrition focused physical findings, glucose, renal and anemia profile    Recommendations:  1.Once medically feasible, recommend provide a Clear liquid diet

## 2021-09-11 NOTE — PROGRESS NOTE ADULT - ASSESSMENT
Orthopaedic Surgery Progress Note    S&E at bedside this AM. Pain well-controlled. Notes pain with movement but no pain at rest. No active complaints at this time.      T(C): 36.1 (09-11-21 @ 00:00), Max: 36.3 (09-10-21 @ 15:00)  HR: 70 (09-11-21 @ 06:21) (67 - 71)  BP: 151/69 (09-11-21 @ 06:21) (104/52 - 154/70)  RR: 18 (09-11-21 @ 00:00) (18 - 18)  SpO2: 98% (09-10-21 @ 08:00) (98% - 98%)    P/E:  AOx3, NAD  Nonlabored breathing    LLE  Dressings saturated  Dressing changed during rounds  Surgical sites healing well  SILT sp/dp/t/sural/saph  Firing ta/ehl/fhl/gs  Foot WWP, 2+ DP pulse    Labs                        9.2    9.83  )-----------( 147      ( 10 Sep 2021 16:59 )             28.2     09-10    131<L>  |  104  |  65<HH>  ----------------------------<  226<H>  4.8   |  19  |  2.1<H>    Ca    8.7      10 Sep 2021 09:39  Phos  2.4     09-10  Mg     1.8     09-10    TPro  4.3<L>  /  Alb  2.7<L>  /  TBili  0.5  /  DBili  x   /  AST  29  /  ALT  22  /  AlkPhos  52  09-10    LIVER FUNCTIONS - ( 10 Sep 2021 09:39 )  Alb: 2.7 g/dL / Pro: 4.3 g/dL / ALK PHOS: 52 U/L / ALT: 22 U/L / AST: 29 U/L / GGT: x           PT/INR - ( 10 Sep 2021 09:39 )   PT: 15.60 sec;   INR: 1.36 ratio         PTT - ( 10 Sep 2021 09:39 )  PTT:29.1 sec      A/P:   84yo Female L basi-cervical femoral neck fracture s/p IMN POD#5.     Dressings changed again today  Please ensure dry dressings and stable HgB prior to clearance for discharge. Not safe for discharge from an orthopaedic perspective.     - Hb 9.2 s/p 2UPRBC (up from 5.4)  - Continue to trend Hb and monitor closely  - AC held per primary  -WBAT LLE  -pain control  -DVT ppx  -PT/OT

## 2021-09-11 NOTE — CONSULT NOTE ADULT - ASSESSMENT
85F w/PMHx CAD, CHF, Afib on Eliquis last dose 9/10 AM, DM, HTN, colon cancer s/p resection in 2016, CKD stage 3, hx of diverticulitis, presents s/p mechanical fall at home admitted on 9/5 s/p left femur fracture repair on 9/6. GI was called for melena and coffee ground emesis. As per patient she had coffee ground emesis on 9/9 PM 2 episodes followed by black tarry stools and abdominal pain intermittently in epigastric region worse with eating. 6/10 in intensity, non radiating. She follows up with Dr. Gomes GI at [facility] last colonoscopy in may was normal. Currently denies any abdominal pain, nausea, vomiting, hematemesis, hematochezia, weight loss.     #)Upper GI bleed-Coffee ground emesis (2 episodes on 9/9 PM 2 episodes after that resolved)/melena 2 bowel movements yesterday  DD: PUD vs less likely varices (no h/o alcoholic) vs AVM vs dieulafoy   Hemodynamically stable   Baseline Hb 8-9 admitted with 9.7 total 3 units in hospital admission, major drop in Hb 7.7 to 5.8 s/p 2 units improved to 9.2 stable currently  Last dose of eliquis 9/10 AM  AVA melena    Rec:  Keep NPO if repeat Hb stable can have clear lqiuids  Maintain active type and screen.  Trend Hb q8hrs and Keep it > 8, transfuse PRBC if necessary  Adequate Fluid resuscitation (SBP > 90)  c/w PPI drip  given her creatinine clearance 17 need to hold for 4 days for procedure  will plan for EGD on tuesday however in case of any hemodynamic instability or hematemesis will consider earlier

## 2021-09-11 NOTE — CONSULT NOTE ADULT - ATTENDING COMMENTS
s/p fall   awake and alert   w/u in progress   admit to trauma   pain control   IS
Case reviewed and discussed Agree with HPI/PMH/ PE/ Labs imaging. Agree with assessment and Plan per fellow note. Patient seen and examined.         85F w/PMHx CAD, CHF, Afib on Eliquis last dose 9/10 AM, DM, HTN, colon cancer s/p resection in 2016, CKD stage 3, hx of diverticulitis, presents s/p mechanical fall at home admitted on 9/5 s/p left femur fracture repair on 9/6. GI was called for melena and coffee ground emesis. As per patient she had coffee ground emesis on 9/9 PM 2 episodes followed by black tarry stools and abdominal pain intermittently in epigastric region worse with eating. 6/10 in intensity, non radiating. She follows up with Dr. Gomes GI at [facility] last colonoscopy in may was normal. Currently denies any abdominal pain, nausea, vomiting, hematemesis, hematochezia, weight loss.     #)Upper GI bleed-Coffee ground emesis (2 episodes on 9/9 PM 2 episodes after that resolved)/melena 2 bowel movements yesterday  DD: PUD vs less likely varices (no h/o alcoholic) vs AVM vs dieulafoy   Hemodynamically stable   Baseline Hb 8-9 admitted with 9.7 total 3 units in hospital admission, major drop in Hb 7.7 to 5.8 s/p 2 units improved to 9.2 stable currently  Last dose of eliquis 9/10 AM  AVA melena    Rec:  Keep NPO if repeat Hb stable can have clear lqiuids  Maintain active type and screen.  Trend Hb q8hrs and Keep it > 8, transfuse PRBC if necessary  Adequate Fluid resuscitation (SBP > 90)  c/w PPI drip  given her creatinine clearance 17 need to hold for 4 days for procedure  will plan for EGD on tuesday however in case of any hemodynamic instability or hematemesis will consider earlier
High-risk for perioperative MACE  Resume Plavix and Eliquis when cleared by surgeon    No further cardiac workup is indicated at this time.  There are no current cardiac contraindications that prohibit proceeding with the scheduled surgery/procedure.  This consult serves only as a perioperative cardiac risk stratification and evaluation to predict 30-day cardiac complications risk and mortality.  The decision to proceed with the surgery/procedure is made by the performing physician and the patient.

## 2021-09-11 NOTE — PROGRESS NOTE ADULT - SUBJECTIVE AND OBJECTIVE BOX
Progress Note:  Provider Speciality                            Hospitalist      SEAN SAWYER MRN-[id number] 85y Female     CHIEF PRESENTING COMPLAINT:  Patient is a 85y old  Female who presents with a chief complaint of fall (11 Sep 2021 08:42)        SUBJECTIVE:  Patient was seen and examined at bedside. Reports improvement in  retrosternal pain  . No significant overnight events reported.     HISTORY OF PRESENTING ILLNESS:  HPI:  85F w/PMHx CAD, CHF, Afib on Eliquis, DM, HTN, colon cancer, CKD stage 3, hx of diverticulitis, presents s/p mechanical fall at home, +HT, -LOC, +AC (Eliquis). Patient was in her kitchen and "turned around too fast" when she fell over and hit her head on a chair. She denies loss of consciousness. She remained down for ~30 min before EMS came to take her into the ED. She is GCS 15, primary survey negative. No external signs of trauma. Complains of LLE pain. Denies scalp, spinal, chest, abdominal pain. ROM of all extremities in tact. C-collar in place.    (05 Sep 2021 00:18)        REVIEW OF SYSTEMS:  Patient denies any headache, any vision complaints, runny nose, fever, chills, sore throat. Denies chest pain, shortness of breath, palpitation. Denies nausea, vomiting, abdominal pain, diarrhoea, Denies urinary burning, urgency, frequency, dysuria. Denies weakness in any part of the body or numbness.   At least 10 systems were reviewed in ROS. All systems reviewed  are within normal limits except for the complaints as described in Subjective.    PAST MEDICAL & SURGICAL HISTORY:  PAST MEDICAL & SURGICAL HISTORY:  CAD (coronary artery disease)    Chronic CHF    Atrial fibrillation    Type 2 diabetes mellitus    Colon cancer    Hypertension    CKD (chronic kidney disease)  Stage 3    History of Clostridium difficile colitis  s/p fecal transplant    Diverticulitis    S/P hysterectomy            VITAL SIGNS:  Vital Signs Last 24 Hrs  T(C): 35.7 (11 Sep 2021 07:36), Max: 36.3 (10 Sep 2021 15:00)  T(F): 96.3 (11 Sep 2021 07:36), Max: 97.3 (10 Sep 2021 15:00)  HR: 75 (11 Sep 2021 07:36) (69 - 75)  BP: 137/63 (11 Sep 2021 07:36) (137/63 - 154/70)  BP(mean): --  RR: 18 (11 Sep 2021 07:36) (18 - 18)  SpO2: --          PHYSICAL EXAMINATION:  Not in acute distress  General: No pallor, no icterus  HEENT:   EOMI, no JVD.  Heart: S1+S2 audible  Lungs: bilateral  fair air entry, no wheezing, no crepitations.  Abdomen: Soft, non-tender, non-distended , no  rigidity or guarding.  CNS: Awake alert, CN  grossly intact.  Extremities:  No edema            CONSULTS:  Consultant(s) Notes Reviewed by me.   Care Discussed with Consultants/Other Providers where required.        MEDICATIONS:  MEDICATIONS  (STANDING):  acetaminophen   Tablet .. 650 milliGRAM(s) Oral every 6 hours  ascorbic acid 500 milliGRAM(s) Oral two times a day  atorvastatin 80 milliGRAM(s) Oral at bedtime  calcitriol   Capsule 0.25 MICROGram(s) Oral daily  carvedilol 25 milliGRAM(s) Oral every 12 hours  clopidogrel Tablet 75 milliGRAM(s) Oral daily  dextrose 40% Gel 15 Gram(s) Oral once  dextrose 50% Injectable 25 Gram(s) IV Push once  ferrous    sulfate 325 milliGRAM(s) Oral daily  folic acid 1 milliGRAM(s) Oral daily  glucagon  Injectable 1 milliGRAM(s) IntraMuscular once  insulin lispro (ADMELOG) corrective regimen sliding scale   SubCutaneous every 6 hours  lactated ringers. 500 milliLiter(s) (75 mL/Hr) IV Continuous <Continuous>  latanoprost 0.005% Ophthalmic Solution 1 Drop(s) Both EYES at bedtime  levothyroxine 25 MICROGram(s) Oral daily  midodrine.      midodrine. 5 milliGRAM(s) Oral three times a day  multivitamin 1 Tablet(s) Oral daily  pantoprazole  Injectable 40 milliGRAM(s) IV Push every 12 hours  polyethylene glycol 3350 17 Gram(s) Oral daily  raloxifene 60 milliGRAM(s) Oral daily  senna 2 Tablet(s) Oral at bedtime    MEDICATIONS  (PRN):  bisacodyl Suppository 10 milliGRAM(s) Rectal daily PRN If no bowel movement by POD#2  diphenhydrAMINE 25 milliGRAM(s) Oral at bedtime PRN Insomnia  morphine IVPB 4 milliGRAM(s) IV Intermittent every 4 hours PRN Severe Pain (7 - 10)  ondansetron Injectable 4 milliGRAM(s) IV Push every 6 hours PRN Nausea and/or Vomiting            ASSESSMENT:      86 YO F with a PMH of CAD, HFpEF, paroxysmal Afib (Apixaban), DM2, HTN, CKD3, and hx of diverticulitis who was admitted to ortho service for eval and surgical correction of left femoral IT fracture s/p mechanical fall at home. While on ortho service, pt experienced episode of hypotension. Transferred to medicine. Ortho/surgical teams co-managing.     Left hip intertrochanteric fracture s/p orif on 9/6  Typical chest pain with elevated trops, possible NSTEMI  Acute Intramuscular hematoma  Acute blood loss anemia secondary to trauma, OR & hematoma  Acute hypotensive episode after OR   ELOISE , possible pre-renal with CKD stage 3  Chronic HFpEF  Paroxysmal Afib   CAD s/p PCI      CT Hip-Intramuscular hematoma involving the anterior left femoral musculature, extending from the proximal femur to at least the mid shaft of the femur (partially imaged).  WBAT LLE  PT/OT-once stable   Typical chest pain with elevated trops- possibly related to CKD/ELOISE and demand vs NSTEMI. Cardiology consult  EKG: T wave abnormality, possible lateral ischemia  Continue , statin, carvedilol .Restarted plavix. Holding eliquis  ELOISE , possible pre-renal with CKD stage 3. Hold losartan. Added midodrine  Hold Eliquis until cleared by GI( consulted for hemetemesis)  Handoff: Acute inpatient management  required further . Not stable for discharge  & not cleared from ortho as well.

## 2021-09-12 LAB
ALBUMIN SERPL ELPH-MCNC: 2.8 G/DL — LOW (ref 3.5–5.2)
ALP SERPL-CCNC: 57 U/L — SIGNIFICANT CHANGE UP (ref 30–115)
ALT FLD-CCNC: 25 U/L — SIGNIFICANT CHANGE UP (ref 0–41)
ANION GAP SERPL CALC-SCNC: 8 MMOL/L — SIGNIFICANT CHANGE UP (ref 7–14)
ANION GAP SERPL CALC-SCNC: 9 MMOL/L — SIGNIFICANT CHANGE UP (ref 7–14)
APPEARANCE UR: ABNORMAL
AST SERPL-CCNC: 27 U/L — SIGNIFICANT CHANGE UP (ref 0–41)
BACTERIA # UR AUTO: ABNORMAL
BASOPHILS # BLD AUTO: 0.02 K/UL — SIGNIFICANT CHANGE UP (ref 0–0.2)
BASOPHILS # BLD AUTO: 0.03 K/UL — SIGNIFICANT CHANGE UP (ref 0–0.2)
BASOPHILS NFR BLD AUTO: 0.2 % — SIGNIFICANT CHANGE UP (ref 0–1)
BASOPHILS NFR BLD AUTO: 0.2 % — SIGNIFICANT CHANGE UP (ref 0–1)
BILIRUB SERPL-MCNC: 1 MG/DL — SIGNIFICANT CHANGE UP (ref 0.2–1.2)
BILIRUB UR-MCNC: NEGATIVE — SIGNIFICANT CHANGE UP
BUN SERPL-MCNC: 51 MG/DL — HIGH (ref 10–20)
BUN SERPL-MCNC: 52 MG/DL — HIGH (ref 10–20)
CALCIUM SERPL-MCNC: 9.2 MG/DL — SIGNIFICANT CHANGE UP (ref 8.5–10.1)
CALCIUM SERPL-MCNC: 9.3 MG/DL — SIGNIFICANT CHANGE UP (ref 8.5–10.1)
CHLORIDE SERPL-SCNC: 108 MMOL/L — SIGNIFICANT CHANGE UP (ref 98–110)
CHLORIDE SERPL-SCNC: 111 MMOL/L — HIGH (ref 98–110)
CO2 SERPL-SCNC: 18 MMOL/L — SIGNIFICANT CHANGE UP (ref 17–32)
CO2 SERPL-SCNC: 18 MMOL/L — SIGNIFICANT CHANGE UP (ref 17–32)
COLOR SPEC: ABNORMAL
CREAT SERPL-MCNC: 1.7 MG/DL — HIGH (ref 0.7–1.5)
CREAT SERPL-MCNC: 1.8 MG/DL — HIGH (ref 0.7–1.5)
DIFF PNL FLD: ABNORMAL
EOSINOPHIL # BLD AUTO: 0.15 K/UL — SIGNIFICANT CHANGE UP (ref 0–0.7)
EOSINOPHIL # BLD AUTO: 0.15 K/UL — SIGNIFICANT CHANGE UP (ref 0–0.7)
EOSINOPHIL NFR BLD AUTO: 1.1 % — SIGNIFICANT CHANGE UP (ref 0–8)
EOSINOPHIL NFR BLD AUTO: 1.2 % — SIGNIFICANT CHANGE UP (ref 0–8)
EPI CELLS # UR: 0 /HPF — SIGNIFICANT CHANGE UP (ref 0–5)
GLUCOSE BLDC GLUCOMTR-MCNC: 149 MG/DL — HIGH (ref 70–99)
GLUCOSE BLDC GLUCOMTR-MCNC: 156 MG/DL — HIGH (ref 70–99)
GLUCOSE BLDC GLUCOMTR-MCNC: 161 MG/DL — HIGH (ref 70–99)
GLUCOSE BLDC GLUCOMTR-MCNC: 188 MG/DL — HIGH (ref 70–99)
GLUCOSE BLDC GLUCOMTR-MCNC: 265 MG/DL — HIGH (ref 70–99)
GLUCOSE SERPL-MCNC: 133 MG/DL — HIGH (ref 70–99)
GLUCOSE SERPL-MCNC: 141 MG/DL — HIGH (ref 70–99)
GLUCOSE UR QL: NEGATIVE — SIGNIFICANT CHANGE UP
HCT VFR BLD CALC: 29.9 % — LOW (ref 37–47)
HCT VFR BLD CALC: 31.4 % — LOW (ref 37–47)
HGB BLD-MCNC: 10.3 G/DL — LOW (ref 12–16)
HGB BLD-MCNC: 9.7 G/DL — LOW (ref 12–16)
HYALINE CASTS # UR AUTO: 0 /LPF — SIGNIFICANT CHANGE UP (ref 0–7)
IMM GRANULOCYTES NFR BLD AUTO: 1.4 % — HIGH (ref 0.1–0.3)
IMM GRANULOCYTES NFR BLD AUTO: 1.5 % — HIGH (ref 0.1–0.3)
KETONES UR-MCNC: NEGATIVE — SIGNIFICANT CHANGE UP
LEUKOCYTE ESTERASE UR-ACNC: ABNORMAL
LYMPHOCYTES # BLD AUTO: 1.5 K/UL — SIGNIFICANT CHANGE UP (ref 1.2–3.4)
LYMPHOCYTES # BLD AUTO: 1.53 K/UL — SIGNIFICANT CHANGE UP (ref 1.2–3.4)
LYMPHOCYTES # BLD AUTO: 11.7 % — LOW (ref 20.5–51.1)
LYMPHOCYTES # BLD AUTO: 11.8 % — LOW (ref 20.5–51.1)
MAGNESIUM SERPL-MCNC: 2 MG/DL — SIGNIFICANT CHANGE UP (ref 1.8–2.4)
MCHC RBC-ENTMCNC: 28.1 PG — SIGNIFICANT CHANGE UP (ref 27–31)
MCHC RBC-ENTMCNC: 28.4 PG — SIGNIFICANT CHANGE UP (ref 27–31)
MCHC RBC-ENTMCNC: 32.4 G/DL — SIGNIFICANT CHANGE UP (ref 32–37)
MCHC RBC-ENTMCNC: 32.8 G/DL — SIGNIFICANT CHANGE UP (ref 32–37)
MCV RBC AUTO: 86.5 FL — SIGNIFICANT CHANGE UP (ref 81–99)
MCV RBC AUTO: 86.7 FL — SIGNIFICANT CHANGE UP (ref 81–99)
MONOCYTES # BLD AUTO: 1.02 K/UL — HIGH (ref 0.1–0.6)
MONOCYTES # BLD AUTO: 1.02 K/UL — HIGH (ref 0.1–0.6)
MONOCYTES NFR BLD AUTO: 7.8 % — SIGNIFICANT CHANGE UP (ref 1.7–9.3)
MONOCYTES NFR BLD AUTO: 8 % — SIGNIFICANT CHANGE UP (ref 1.7–9.3)
NEUTROPHILS # BLD AUTO: 10.2 K/UL — HIGH (ref 1.4–6.5)
NEUTROPHILS # BLD AUTO: 9.81 K/UL — HIGH (ref 1.4–6.5)
NEUTROPHILS NFR BLD AUTO: 77.3 % — HIGH (ref 42.2–75.2)
NEUTROPHILS NFR BLD AUTO: 77.8 % — HIGH (ref 42.2–75.2)
NITRITE UR-MCNC: NEGATIVE — SIGNIFICANT CHANGE UP
NRBC # BLD: 0 /100 WBCS — SIGNIFICANT CHANGE UP (ref 0–0)
NRBC # BLD: 0 /100 WBCS — SIGNIFICANT CHANGE UP (ref 0–0)
PH UR: 6.5 — SIGNIFICANT CHANGE UP (ref 5–8)
PHOSPHATE SERPL-MCNC: 2.3 MG/DL — SIGNIFICANT CHANGE UP (ref 2.1–4.9)
PLATELET # BLD AUTO: 198 K/UL — SIGNIFICANT CHANGE UP (ref 130–400)
PLATELET # BLD AUTO: 200 K/UL — SIGNIFICANT CHANGE UP (ref 130–400)
POTASSIUM SERPL-MCNC: 5.2 MMOL/L — HIGH (ref 3.5–5)
POTASSIUM SERPL-MCNC: 5.4 MMOL/L — HIGH (ref 3.5–5)
POTASSIUM SERPL-SCNC: 5.2 MMOL/L — HIGH (ref 3.5–5)
POTASSIUM SERPL-SCNC: 5.4 MMOL/L — HIGH (ref 3.5–5)
PROT SERPL-MCNC: 4.7 G/DL — LOW (ref 6–8)
PROT UR-MCNC: ABNORMAL
RBC # BLD: 3.45 M/UL — LOW (ref 4.2–5.4)
RBC # BLD: 3.63 M/UL — LOW (ref 4.2–5.4)
RBC # FLD: 18.6 % — HIGH (ref 11.5–14.5)
RBC # FLD: 18.9 % — HIGH (ref 11.5–14.5)
RBC CASTS # UR COMP ASSIST: 25 /HPF — HIGH (ref 0–4)
SODIUM SERPL-SCNC: 135 MMOL/L — SIGNIFICANT CHANGE UP (ref 135–146)
SODIUM SERPL-SCNC: 137 MMOL/L — SIGNIFICANT CHANGE UP (ref 135–146)
SP GR SPEC: 1.03 — HIGH (ref 1.01–1.03)
TROPONIN T SERPL-MCNC: 0.37 NG/ML — CRITICAL HIGH
UROBILINOGEN FLD QL: ABNORMAL
WBC # BLD: 12.7 K/UL — HIGH (ref 4.8–10.8)
WBC # BLD: 13.11 K/UL — HIGH (ref 4.8–10.8)
WBC # FLD AUTO: 12.7 K/UL — HIGH (ref 4.8–10.8)
WBC # FLD AUTO: 13.11 K/UL — HIGH (ref 4.8–10.8)
WBC UR QL: 5 /HPF — SIGNIFICANT CHANGE UP (ref 0–5)

## 2021-09-12 PROCEDURE — 93010 ELECTROCARDIOGRAM REPORT: CPT

## 2021-09-12 PROCEDURE — 99233 SBSQ HOSP IP/OBS HIGH 50: CPT

## 2021-09-12 RX ADMIN — LATANOPROST 1 DROP(S): 0.05 SOLUTION/ DROPS OPHTHALMIC; TOPICAL at 22:32

## 2021-09-12 RX ADMIN — Medication 325 MILLIGRAM(S): at 11:57

## 2021-09-12 RX ADMIN — Medication 650 MILLIGRAM(S): at 17:12

## 2021-09-12 RX ADMIN — MIDODRINE HYDROCHLORIDE 5 MILLIGRAM(S): 2.5 TABLET ORAL at 06:16

## 2021-09-12 RX ADMIN — Medication 1 MILLIGRAM(S): at 11:56

## 2021-09-12 RX ADMIN — PANTOPRAZOLE SODIUM 40 MILLIGRAM(S): 20 TABLET, DELAYED RELEASE ORAL at 17:15

## 2021-09-12 RX ADMIN — Medication 2: at 23:04

## 2021-09-12 RX ADMIN — Medication 650 MILLIGRAM(S): at 11:56

## 2021-09-12 RX ADMIN — Medication 500 MILLIGRAM(S): at 06:17

## 2021-09-12 RX ADMIN — POLYETHYLENE GLYCOL 3350 17 GRAM(S): 17 POWDER, FOR SOLUTION ORAL at 11:57

## 2021-09-12 RX ADMIN — CLOPIDOGREL BISULFATE 75 MILLIGRAM(S): 75 TABLET, FILM COATED ORAL at 11:56

## 2021-09-12 RX ADMIN — Medication 650 MILLIGRAM(S): at 06:18

## 2021-09-12 RX ADMIN — CALCITRIOL 0.25 MICROGRAM(S): 0.5 CAPSULE ORAL at 11:57

## 2021-09-12 RX ADMIN — Medication 25 MICROGRAM(S): at 06:18

## 2021-09-12 RX ADMIN — ATORVASTATIN CALCIUM 80 MILLIGRAM(S): 80 TABLET, FILM COATED ORAL at 22:33

## 2021-09-12 RX ADMIN — CARVEDILOL PHOSPHATE 25 MILLIGRAM(S): 80 CAPSULE, EXTENDED RELEASE ORAL at 17:15

## 2021-09-12 RX ADMIN — MIDODRINE HYDROCHLORIDE 5 MILLIGRAM(S): 2.5 TABLET ORAL at 11:56

## 2021-09-12 RX ADMIN — PANTOPRAZOLE SODIUM 40 MILLIGRAM(S): 20 TABLET, DELAYED RELEASE ORAL at 06:18

## 2021-09-12 RX ADMIN — Medication 650 MILLIGRAM(S): at 07:15

## 2021-09-12 RX ADMIN — RALOXIFENE HYDROCHLORIDE 60 MILLIGRAM(S): 60 TABLET, COATED ORAL at 11:56

## 2021-09-12 RX ADMIN — Medication 500 MILLIGRAM(S): at 17:13

## 2021-09-12 RX ADMIN — MIDODRINE HYDROCHLORIDE 5 MILLIGRAM(S): 2.5 TABLET ORAL at 17:15

## 2021-09-12 RX ADMIN — CARVEDILOL PHOSPHATE 25 MILLIGRAM(S): 80 CAPSULE, EXTENDED RELEASE ORAL at 06:18

## 2021-09-12 RX ADMIN — Medication 1 TABLET(S): at 11:56

## 2021-09-12 NOTE — PROGRESS NOTE ADULT - SUBJECTIVE AND OBJECTIVE BOX
Progress Note:  Provider Speciality                            Hospitalist      SEAN SAWYER MRN-[id number] 85y Female     CHIEF PRESENTING COMPLAINT:  Patient is a 85y old  Female who presents with a chief complaint of fall (11 Sep 2021 08:42)        SUBJECTIVE:  Patient was seen and examined at bedside. Reports some  improvement in  retrosternal pain  . No significant overnight events reported.     HISTORY OF PRESENTING ILLNESS:  HPI:  85F w/PMHx CAD, CHF, Afib on Eliquis, DM, HTN, colon cancer, CKD stage 3, hx of diverticulitis, presents s/p mechanical fall at home, +HT, -LOC, +AC (Eliquis). Patient was in her kitchen and "turned around too fast" when she fell over and hit her head on a chair. She denies loss of consciousness. She remained down for ~30 min before EMS came to take her into the ED. She is GCS 15, primary survey negative. No external signs of trauma. Complains of LLE pain. Denies scalp, spinal, chest, abdominal pain. ROM of all extremities in tact. C-collar in place.    (05 Sep 2021 00:18)        REVIEW OF SYSTEMS:  Patient denies any headache, any vision complaints, runny nose, fever, chills, sore throat. Denies chest pain, shortness of breath, palpitation. Denies nausea, vomiting, abdominal pain, diarrhoea, Denies urinary burning, urgency, frequency, dysuria. Denies weakness in any part of the body or numbness.   At least 10 systems were reviewed in ROS. All systems reviewed  are within normal limits except for the complaints as described in Subjective.    PAST MEDICAL & SURGICAL HISTORY:  PAST MEDICAL & SURGICAL HISTORY:  CAD (coronary artery disease)    Chronic CHF    Atrial fibrillation    Type 2 diabetes mellitus    Colon cancer    Hypertension    CKD (chronic kidney disease)  Stage 3    History of Clostridium difficile colitis  s/p fecal transplant    Diverticulitis    S/P hysterectomy            VITAL SIGNS:  Vital Signs Last 24 Hrs  T(C): 36.7 (12 Sep 2021 05:34), Max: 36.7 (12 Sep 2021 05:34)  T(F): 98 (12 Sep 2021 05:34), Max: 98 (12 Sep 2021 05:34)  HR: 101 (12 Sep 2021 05:34) (66 - 101)  BP: 132/74 (12 Sep 2021 05:34) (130/60 - 133/64)  BP(mean): --  RR: 18 (12 Sep 2021 05:34) (18 - 18)  SpO2: 96% (12 Sep 2021 00:24) (96% - 96%)        PHYSICAL EXAMINATION:  Not in acute distress  General: No pallor, no icterus  HEENT:   EOMI, no JVD.  Heart: S1+S2 audible  Lungs: bilateral  fair air entry, no wheezing, no crepitations.  Abdomen: Soft, non-tender, non-distended , no  rigidity or guarding.  CNS: Awake alert, CN  grossly intact.  Extremities:  No edema    , L hip dressing soaked , serosanghinous        CONSULTS:  Consultant(s) Notes Reviewed by me.   Care Discussed with Consultants/Other Providers where required.        MEDICATIONS:  MEDICATIONS  (STANDING):  acetaminophen   Tablet .. 650 milliGRAM(s) Oral every 6 hours  ascorbic acid 500 milliGRAM(s) Oral two times a day  atorvastatin 80 milliGRAM(s) Oral at bedtime  calcitriol   Capsule 0.25 MICROGram(s) Oral daily  carvedilol 25 milliGRAM(s) Oral every 12 hours  clopidogrel Tablet 75 milliGRAM(s) Oral daily  dextrose 40% Gel 15 Gram(s) Oral once  dextrose 50% Injectable 25 Gram(s) IV Push once  ferrous    sulfate 325 milliGRAM(s) Oral daily  folic acid 1 milliGRAM(s) Oral daily  glucagon  Injectable 1 milliGRAM(s) IntraMuscular once  insulin lispro (ADMELOG) corrective regimen sliding scale   SubCutaneous every 6 hours  lactated ringers. 500 milliLiter(s) (75 mL/Hr) IV Continuous <Continuous>  latanoprost 0.005% Ophthalmic Solution 1 Drop(s) Both EYES at bedtime  levothyroxine 25 MICROGram(s) Oral daily  midodrine.      midodrine. 5 milliGRAM(s) Oral three times a day  multivitamin 1 Tablet(s) Oral daily  pantoprazole  Injectable 40 milliGRAM(s) IV Push every 12 hours  polyethylene glycol 3350 17 Gram(s) Oral daily  raloxifene 60 milliGRAM(s) Oral daily  senna 2 Tablet(s) Oral at bedtime    MEDICATIONS  (PRN):  bisacodyl Suppository 10 milliGRAM(s) Rectal daily PRN If no bowel movement by POD#2  diphenhydrAMINE 25 milliGRAM(s) Oral at bedtime PRN Insomnia  morphine IVPB 4 milliGRAM(s) IV Intermittent every 4 hours PRN Severe Pain (7 - 10)  ondansetron Injectable 4 milliGRAM(s) IV Push every 6 hours PRN Nausea and/or Vomiting            ASSESSMENT:      86 YO F with a PMH of CAD, HFpEF, paroxysmal Afib (Apixaban), DM2, HTN, CKD3, and hx of diverticulitis who was admitted to ortho service for eval and surgical correction of left femoral IT fracture s/p mechanical fall at home. While on ortho service, pt experienced episode of hypotension. Transferred to medicine. Ortho/surgical teams co-managing.     Left hip intertrochanteric fracture s/p orif on 9/6  Typical chest pain with elevated trops, possible NSTEMI vs secondary to  demand secondary to Acute anemia & CKD  Acute Intramuscular hematoma post ORIF  Acute blood loss anemia secondary to trauma, OR & hematoma  Acute hypotensive episode after OR   CKD stage 3-4  Chronic HFpEF  Paroxysmal Afib   CAD s/p PCI      CT Hip-Intramuscular hematoma involving the anterior left femoral musculature, extending from the proximal femur to at least the mid shaft of the femur (partially imaged).  WBAT LLE  PT/OT-once stable   Typical chest pain with elevated trops- possibly related to CKD and demand vs NSTEMI. Cardiology consult  EKG: T wave abnormality, possible lateral ischemia  Continue , statin, carvedilol .Restarted plavix. Holding eliquis  CKD stage 3-4. Hold losartan. Added midodrine  Hold Eliquis until cleared by GI( consulted for hemetemesis). Plan for EGD tomorrow. Clears until midnight then NPO  Handoff: Acute inpatient management  required further . Not stable for discharge  & not cleared from ortho as well.         Progress Note:  Provider Speciality                            Hospitalist      SEAN SAWYER MRN-[id number] 85y Female     CHIEF PRESENTING COMPLAINT:  Patient is a 85y old  Female who presents with a chief complaint of fall (11 Sep 2021 08:42)        SUBJECTIVE:  Patient was seen and examined at bedside. Reports some  improvement in  retrosternal pain  . No significant overnight events reported.     HISTORY OF PRESENTING ILLNESS:  HPI:  85F w/PMHx CAD, CHF, Afib on Eliquis, DM, HTN, colon cancer, CKD stage 3, hx of diverticulitis, presents s/p mechanical fall at home, +HT, -LOC, +AC (Eliquis). Patient was in her kitchen and "turned around too fast" when she fell over and hit her head on a chair. She denies loss of consciousness. She remained down for ~30 min before EMS came to take her into the ED. She is GCS 15, primary survey negative. No external signs of trauma. Complains of LLE pain. Denies scalp, spinal, chest, abdominal pain. ROM of all extremities in tact. C-collar in place.    (05 Sep 2021 00:18)        REVIEW OF SYSTEMS:  Patient denies any headache, any vision complaints, runny nose, fever, chills, sore throat. Denies chest pain, shortness of breath, palpitation. Denies nausea, vomiting, abdominal pain, diarrhoea, Denies urinary burning, urgency, frequency, dysuria. Denies weakness in any part of the body or numbness.   At least 10 systems were reviewed in ROS. All systems reviewed  are within normal limits except for the complaints as described in Subjective.    PAST MEDICAL & SURGICAL HISTORY:  PAST MEDICAL & SURGICAL HISTORY:  CAD (coronary artery disease)    Chronic CHF    Atrial fibrillation    Type 2 diabetes mellitus    Colon cancer    Hypertension    CKD (chronic kidney disease)  Stage 3    History of Clostridium difficile colitis  s/p fecal transplant    Diverticulitis    S/P hysterectomy            VITAL SIGNS:  Vital Signs Last 24 Hrs  T(C): 36.7 (12 Sep 2021 05:34), Max: 36.7 (12 Sep 2021 05:34)  T(F): 98 (12 Sep 2021 05:34), Max: 98 (12 Sep 2021 05:34)  HR: 101 (12 Sep 2021 05:34) (66 - 101)  BP: 132/74 (12 Sep 2021 05:34) (130/60 - 133/64)  BP(mean): --  RR: 18 (12 Sep 2021 05:34) (18 - 18)  SpO2: 96% (12 Sep 2021 00:24) (96% - 96%)        PHYSICAL EXAMINATION:  Not in acute distress  General: No pallor, no icterus  HEENT:   EOMI, no JVD.  Heart: S1+S2 audible  Lungs: bilateral  fair air entry, no wheezing, no crepitations.  Abdomen: Soft, non-tender, non-distended , no  rigidity or guarding.  CNS: Awake alert, CN  grossly intact.  Extremities:  No edema    , L hip dressing soaked , serosanghinous        CONSULTS:  Consultant(s) Notes Reviewed by me.   Care Discussed with Consultants/Other Providers where required.        MEDICATIONS:  MEDICATIONS  (STANDING):  acetaminophen   Tablet .. 650 milliGRAM(s) Oral every 6 hours  ascorbic acid 500 milliGRAM(s) Oral two times a day  atorvastatin 80 milliGRAM(s) Oral at bedtime  calcitriol   Capsule 0.25 MICROGram(s) Oral daily  carvedilol 25 milliGRAM(s) Oral every 12 hours  clopidogrel Tablet 75 milliGRAM(s) Oral daily  dextrose 40% Gel 15 Gram(s) Oral once  dextrose 50% Injectable 25 Gram(s) IV Push once  ferrous    sulfate 325 milliGRAM(s) Oral daily  folic acid 1 milliGRAM(s) Oral daily  glucagon  Injectable 1 milliGRAM(s) IntraMuscular once  insulin lispro (ADMELOG) corrective regimen sliding scale   SubCutaneous every 6 hours  lactated ringers. 500 milliLiter(s) (75 mL/Hr) IV Continuous <Continuous>  latanoprost 0.005% Ophthalmic Solution 1 Drop(s) Both EYES at bedtime  levothyroxine 25 MICROGram(s) Oral daily  midodrine.      midodrine. 5 milliGRAM(s) Oral three times a day  multivitamin 1 Tablet(s) Oral daily  pantoprazole  Injectable 40 milliGRAM(s) IV Push every 12 hours  polyethylene glycol 3350 17 Gram(s) Oral daily  raloxifene 60 milliGRAM(s) Oral daily  senna 2 Tablet(s) Oral at bedtime    MEDICATIONS  (PRN):  bisacodyl Suppository 10 milliGRAM(s) Rectal daily PRN If no bowel movement by POD#2  diphenhydrAMINE 25 milliGRAM(s) Oral at bedtime PRN Insomnia  morphine IVPB 4 milliGRAM(s) IV Intermittent every 4 hours PRN Severe Pain (7 - 10)  ondansetron Injectable 4 milliGRAM(s) IV Push every 6 hours PRN Nausea and/or Vomiting            ASSESSMENT:      86 YO F with a PMH of CAD, HFpEF, paroxysmal Afib (Apixaban), DM2, HTN, CKD3, and hx of diverticulitis who was admitted to ortho service for eval and surgical correction of left femoral IT fracture s/p mechanical fall at home. While on ortho service, pt experienced episode of hypotension. Transferred to medicine. Ortho/surgical teams co-managing.     Left hip intertrochanteric fracture s/p orif on 9/6  Typical chest pain with elevated trops, possible NSTEMI vs secondary to  demand secondary to Acute anemia & CKD  Acute Intramuscular hematoma post ORIF  Acute blood loss anemia secondary to trauma, OR & hematoma vs UGIB  Acute hypotensive episode after OR   CKD stage 3-4  Chronic HFpEF  Paroxysmal Afib   CAD s/p PCI    Status post ORID 09/06 with post-op complications including acute anemia & elevated trops  CT Hip-Intramuscular hematoma involving the anterior left femoral musculature, extending from the proximal femur to at least the mid shaft of the femur (partially imaged).  Acute blood loss anemia secondary to trauma, OR & hematoma vs UGIB. Plan for EGD next week to rule out UGIB( reported hemetemsis)  Typical chest pain with elevated trops- possibly related to CKD and demand vs NSTEMI. Cardiology consult  EKG: T wave abnormality, possible lateral ischemia. repeat EKG & trop today  Continue , statin, carvedilol .Restarted plavix. Holding eliquis  CKD stage 3-4. Hold losartan. Added midodrine  Hold Eliquis until cleared by GI( consulted for hemetemesis). Plan for EGD next week. Clears until midnight then NPO  PT/OT-once stable   Handoff: Acute inpatient management  required further . Not stable for discharge  & not cleared from ortho as well, pending EGD.

## 2021-09-12 NOTE — PROGRESS NOTE ADULT - ATTENDING COMMENTS
Seen / examined and above reviewed.    CAD s/p PCI    Presented with hip fracture s/p ORIF.  Complicated by hematoma.    NSTEMI  Chest pain associated with EKG changes (resolved).    No chest pain when evaluated.  Hemodynamics stable.  Risking troponin (mild).  Recent hematemesis pending endoscopy.    Likely Type II NSTEMI in setting of anemia and systemic stress superimposed on CAD.    - Upgrade to CCU for monitoring.  - Monitor Hb / transfuse PRBC as needed.  - Hold anticoagulation.  - ASA if bleeding risk acceptable.  - Follow-up GI.

## 2021-09-12 NOTE — PROGRESS NOTE ADULT - ASSESSMENT
Typical chest pain with elevated trops, possible NSTEMI vs secondary to demand in face of acute anemia & CKD  HFrEF  CKD III-IV  Paroxysmal Afib   CAD s/p PCI    -trop trending up, patient notes relief in chest pain however  -TTE remarkable for new wall motion abnl  -c/w ASA, plavix, carvedilol, atorvastatin  -would not start heparin drip at this point due to hematoma and hematemesis. GI planning EGD this week  -may need cardiac cath  -NPO after MN  -COVID swab STAT, done here not at [facility]     Typical chest pain with elevated trops, possible NSTEMI vs secondary to demand in face of acute anemia & CKD  HFrEF  CKD III-IV  Paroxysmal Afib   CAD s/p PCI    -trop trending up, patient notes relief in chest pain however  -TTE remarkable for new wall motion abnl  -c/w ASA, plavix, carvedilol, atorvastatin  -would not start heparin drip at this point due to hematoma and hematemesis. GI planning EGD this week  -may need cardiac cath. Will need optimization of renal function.        Typical chest pain with elevated trops, possible NSTEMI vs secondary to demand in face of acute anemia & CKD  HFrEF  CKD III-IV  Paroxysmal Afib   CAD s/p PCI    -upgrade to CCU  -trop trending up, patient notes relief in chest pain however  -TTE remarkable for new wall motion abnl  -c/w ASA, plavix, carvedilol, atorvastatin  -would not start heparin drip at this point due to hematoma and hematemesis. GI planning EGD this week  -will need cardiac cath once GI bleed worked up and stabilized

## 2021-09-12 NOTE — PROGRESS NOTE ADULT - ASSESSMENT
Orthopaedic Surgery Progress Note    S&E at bedside this AM. Pain well-controlled. Notes pain with movement but no pain at rest. No active complaints at this time.      ICU Vital Signs Last 24 Hrs  T(C): 36.7 (12 Sep 2021 05:34), Max: 36.7 (12 Sep 2021 05:34)  T(F): 98 (12 Sep 2021 05:34), Max: 98 (12 Sep 2021 05:34)  HR: 101 (12 Sep 2021 05:34) (66 - 101)  BP: 132/74 (12 Sep 2021 05:34) (130/60 - 133/64)  BP(mean): --  ABP: --  ABP(mean): --  RR: 18 (12 Sep 2021 05:34) (18 - 18)  SpO2: 96% (12 Sep 2021 00:24) (96% - 96%)      P/E:  AOx3, NAD  Nonlabored breathing    LLE  Dressings saturated  Dressing changed during rounds  Surgical sites healing well  SILT sp/dp/t/sural/saph  Firing ta/ehl/fhl/gs  Foot WWP, 2+ DP pulse    Labs                        9.2    9.83  )-----------( 147      ( 10 Sep 2021 16:59 )             28.2     09-10    131<L>  |  104  |  65<HH>  ----------------------------<  226<H>  4.8   |  19  |  2.1<H>    Ca    8.7      10 Sep 2021 09:39  Phos  2.4     09-10  Mg     1.8     09-10    TPro  4.3<L>  /  Alb  2.7<L>  /  TBili  0.5  /  DBili  x   /  AST  29  /  ALT  22  /  AlkPhos  52  09-10    LIVER FUNCTIONS - ( 10 Sep 2021 09:39 )  Alb: 2.7 g/dL / Pro: 4.3 g/dL / ALK PHOS: 52 U/L / ALT: 22 U/L / AST: 29 U/L / GGT: x           PT/INR - ( 10 Sep 2021 09:39 )   PT: 15.60 sec;   INR: 1.36 ratio         PTT - ( 10 Sep 2021 09:39 )  PTT:29.1 sec      A/P:   84yo Female L basi-cervical femoral neck fracture s/p IMN POD#6.     Dressings changed again today  Please ensure dry dressings and stable HgB prior to clearance for discharge. Not safe for discharge from an orthopaedic perspective.     - Continue to trend Hb and monitor closely  - AC held per primary  -WBAT LLE  -pain control  -DVT ppx  -PT/OT

## 2021-09-12 NOTE — PROGRESS NOTE ADULT - SUBJECTIVE AND OBJECTIVE BOX
Cardiologist: [facility]    HPI:  85F w/PMHx CAD, CHF, Afib on Eliquis, DM, HTN, colon cancer, CKD stage 3, hx of diverticulitis, presents s/p mechanical fall at home, +HT, -LOC, +AC (Eliquis). Patient was in her kitchen and "turned around too fast" when she fell over and hit her head on a chair. She denies loss of consciousness. She remained down for ~30 min before EMS came to take her into the ED. She is GCS 15, primary survey negative. No external signs of trauma. Complains of LLE pain. Denies scalp, spinal, chest, abdominal pain. ROM of all extremities in tact. C-collar in place.    (05 Sep 2021 00:18)      PAST MEDICAL & SURGICAL HISTORY  CAD (coronary artery disease)    Chronic CHF    Atrial fibrillation    Type 2 diabetes mellitus    Colon cancer    Hypertension    CKD (chronic kidney disease)  Stage 3    History of Clostridium difficile colitis  s/p fecal transplant    Diverticulitis    S/P hysterectomy      FAMILY HISTORY:  FAMILY HISTORY:  FHx: breast cancer  Mother    [ ] no pertinent family history of premature cardiovascular disease in first degree relatives.  Mother:   Father:   Siblings:     SOCIAL HISTORY:  []smoker  []Alcohol  []Drug    ALLERGIES:  No Known Allergies    MEDICATIONS:  MEDICATIONS  (STANDING):  acetaminophen   Tablet .. 650 milliGRAM(s) Oral every 6 hours  ascorbic acid 500 milliGRAM(s) Oral two times a day  atorvastatin 80 milliGRAM(s) Oral at bedtime  bisacodyl 20 milliGRAM(s) Oral once  calcitriol   Capsule 0.25 MICROGram(s) Oral daily  carvedilol 25 milliGRAM(s) Oral every 12 hours  clopidogrel Tablet 75 milliGRAM(s) Oral daily  dextrose 40% Gel 15 Gram(s) Oral once  dextrose 50% Injectable 25 Gram(s) IV Push once  ferrous    sulfate 325 milliGRAM(s) Oral daily  folic acid 1 milliGRAM(s) Oral daily  glucagon  Injectable 1 milliGRAM(s) IntraMuscular once  insulin lispro (ADMELOG) corrective regimen sliding scale   SubCutaneous every 6 hours  lactated ringers. 500 milliLiter(s) (75 mL/Hr) IV Continuous <Continuous>  latanoprost 0.005% Ophthalmic Solution 1 Drop(s) Both EYES at bedtime  levothyroxine 25 MICROGram(s) Oral daily  midodrine. 5 milliGRAM(s) Oral three times a day  midodrine.      multivitamin 1 Tablet(s) Oral daily  pantoprazole  Injectable 40 milliGRAM(s) IV Push every 12 hours  polyethylene glycol 3350 17 Gram(s) Oral daily  polyethylene glycol/electrolyte Solution. 4000 milliLiter(s) Oral once  raloxifene 60 milliGRAM(s) Oral daily  senna 2 Tablet(s) Oral at bedtime    MEDICATIONS  (PRN):  bisacodyl Suppository 10 milliGRAM(s) Rectal daily PRN If no bowel movement by POD#2  diphenhydrAMINE 25 milliGRAM(s) Oral at bedtime PRN Insomnia  morphine IVPB 4 milliGRAM(s) IV Intermittent every 4 hours PRN Severe Pain (7 - 10)  ondansetron Injectable 4 milliGRAM(s) IV Push every 6 hours PRN Nausea and/or Vomiting      HOME MEDICATIONS:  Home Medications:  atorvastatin 80 mg oral tablet:  (05 Sep 2021 00:23)  calcitriol 0.25 mcg oral capsule: 1 cap(s) orally once a day (05 Sep 2021 00:23)  carvedilol 25 mg oral tablet:  (05 Sep 2021 00:23)  clopidogrel 75 mg oral tablet: 1 tab(s) orally once a day (05 Sep 2021 00:23)  Eliquis 2.5 mg oral tablet: 1 tab(s) orally 2 times a day (05 Sep 2021 00:23)  Evista 60 mg oral tablet: 1 tab(s) orally once a day (05 Sep 2021 00:23)  folic acid 1 mg oral tablet:  (05 Sep 2021 00:23)  furosemide 40 mg oral tablet: 1 tab(s) orally once a day (05 Sep 2021 00:23)  Lantus: 10 unit(s) subcutaneous once a day (in the morning) (05 Sep 2021 00:23)  latanoprost 0.005% ophthalmic solution: 1 drop(s) to each affected eye once a day (in the evening) (05 Sep 2021 00:23)  levothyroxine 25 mcg (0.025 mg) oral tablet: 1 tab(s) orally once a day (05 Sep 2021 00:23)  losartan 25 mg oral tablet: 1 tab(s) orally once a day (05 Sep 2021 00:23)      VITALS:   T(F): 96.5 (09-12 @ 13:26), Max: 98 (09-12 @ 05:34)  HR: 65 (09-12 @ 13:26) (62 - 101)  BP: 146/67 (09-12 @ 13:26) (104/52 - 154/70)  BP(mean): 89 (09-10 @ 05:03) (89 - 89)  RR: 17 (09-12 @ 13:26) (17 - 19)  SpO2: 96% (09-12 @ 00:24) (95% - 98%)    I&O's Summary    11 Sep 2021 07:01  -  12 Sep 2021 07:00  --------------------------------------------------------  IN: 375 mL / OUT: 300 mL / NET: 75 mL    12 Sep 2021 07:01  -  12 Sep 2021 14:52  --------------------------------------------------------  IN: 0 mL / OUT: 100 mL / NET: -100 mL    PHYSICAL EXAM:  NEURO: patient is awake , alert and oriented  GEN: Not in acute distress  NECK: no thyroid enlargement, no JVD  LUNGS: Clear to auscultation bilaterally   CARDIOVASCULAR: S1/S2 present, RRR , no murmurs or rubs, no carotid bruits,  + PP bilaterally  ABD: Soft, non-tender, non-distended, +BS  EXT: No RACHEL  SKIN: Intact    LABS:                        10.3   13.11 )-----------( 198      ( 12 Sep 2021 11:00 )             31.4     09-12    137  |  111<H>  |  51<H>  ----------------------------<  141<H>  5.4<H>   |  18  |  1.7<H>    Ca    9.3      12 Sep 2021 11:00  Phos  2.3     09-12  Mg     2.0     09-12    TPro  4.7<L>  /  Alb  2.8<L>  /  TBili  1.0  /  DBili  x   /  AST  27  /  ALT  25  /  AlkPhos  57  09-12      Troponin T, Serum: 0.37 ng/mL *HH* (09-12-21 @ 13:53)    CARDIAC MARKERS ( 12 Sep 2021 13:53 )  x     / 0.37 ng/mL / x     / x     / x      CARDIAC MARKERS ( 11 Sep 2021 11:48 )  x     / 0.33 ng/mL / x     / x     / x      CARDIAC MARKERS ( 10 Sep 2021 16:59 )  x     / 0.22 ng/mL / x     / x     / x          RADIOLOGY:  -CXR:  < from: Xray Chest 1 View- PORTABLE-Urgent (Xray Chest 1 View- PORTABLE-Urgent .) (09.10.21 @ 11:44) >  FINDINGS/  IMPRESSION:    Newbilateral perihilar bandlike opacity/atelectasis. No pneumothorax or pleural effusion.    Stable cardiomediastinal silhouette. Unchanged osseous structures.    < end of copied text >    -TTE:  < from: TTE Echo Complete w/o Contrast w/ Doppler (09.08.21 @ 10:42) >  Summary:   1. LV Ejection Fraction by Oro's Method with a biplane EF of 45 %.   2. Mildly decreased global left ventricular systolic function.   3. Mid and apical inferior septum, apex, mid anteroseptal segment, and apical inferior segment are abnormal as described above.   4. Mid to [place] anteroseptal wall thinning.   5. Spectral Doppler shows impaired relaxation pattern of left ventricular myocardial filling (Grade I diastolic dysfunction).   6. Aortic valve thickening with decreased leaflet opening.   7. No evidence of aortic stenosis.   8. Mild to moderate mitral annular calcification.   9. Mild thickening and calcification of the anterior and posterior mitral valve leaflets.  10. Mild mitral valve regurgitation.  11. Mild tricuspid regurgitation.  12. Estimated pulmonary artery systolic pressure is 56.6 mmHg assuming a right atrial pressure of 3 mmHg, which is consistent with moderate pulmonary hypertension.  13. Normal left atrial size.  14. LA volume Index is 28.3 ml/m² ml/m2.  15. Normal right atrial size.  16. In comparison to TTE from 10/6/2019 there is Akinesis and thinning of Mid to apical anteroseptal walls.    < end of copied text >    -CCTA:    -STRESS TEST:    -CATHETERIZATION:      ECG:  < from: 12 Lead ECG (09.12.21 @ 12:14) >  Ventricular Rate 67 BPM    Atrial Rate 67 BPM    P-R Interval 208 ms    QRS Duration 88 ms    Q-T Interval 440 ms    QTC Calculation(Bazett) 464 ms    P Axis 70 degrees    R Axis -25 degrees    T Axis 156 degrees    Diagnosis Line Sinus rhythm withPremature atrial complexes  Anterior infarct , age undetermined  ST & T wave abnormality, consider lateral ischemia  Abnormal ECG    < end of copied text >    < from: 12 Lead ECG (09.11.21 @ 13:26) >  Ventricular Rate 69 BPM    Atrial Rate 69 BPM    P-R Interval 218 ms    QRS Duration 88 ms    Q-T Interval 436 ms    QTC Calculation(Bazett) 467 ms    P Axis 52 degrees    R Axis -2 degrees    T Axis 160 degrees    Diagnosis Line Sinus rhythm with 1st degree A-V block  ST & T wave abnormality, consider lateral ischemia  Abnormal ECG    < end of copied text >      TELEMETRY EVENTS: sinus rhythm

## 2021-09-13 LAB
ALBUMIN SERPL ELPH-MCNC: 2.7 G/DL — LOW (ref 3.5–5.2)
ALP SERPL-CCNC: 62 U/L — SIGNIFICANT CHANGE UP (ref 30–115)
ALT FLD-CCNC: 24 U/L — SIGNIFICANT CHANGE UP (ref 0–41)
ANION GAP SERPL CALC-SCNC: 10 MMOL/L — SIGNIFICANT CHANGE UP (ref 7–14)
AST SERPL-CCNC: 23 U/L — SIGNIFICANT CHANGE UP (ref 0–41)
BASOPHILS # BLD AUTO: 0.01 K/UL — SIGNIFICANT CHANGE UP (ref 0–0.2)
BASOPHILS NFR BLD AUTO: 0.1 % — SIGNIFICANT CHANGE UP (ref 0–1)
BILIRUB SERPL-MCNC: 1 MG/DL — SIGNIFICANT CHANGE UP (ref 0.2–1.2)
BUN SERPL-MCNC: 47 MG/DL — HIGH (ref 10–20)
C DIFF BY PCR RESULT: POSITIVE
C DIFF TOX GENS STL QL NAA+PROBE: SIGNIFICANT CHANGE UP
CALCIUM SERPL-MCNC: 9.2 MG/DL — SIGNIFICANT CHANGE UP (ref 8.5–10.1)
CHLORIDE SERPL-SCNC: 109 MMOL/L — SIGNIFICANT CHANGE UP (ref 98–110)
CO2 SERPL-SCNC: 18 MMOL/L — SIGNIFICANT CHANGE UP (ref 17–32)
CREAT SERPL-MCNC: 1.6 MG/DL — HIGH (ref 0.7–1.5)
EOSINOPHIL # BLD AUTO: 0.14 K/UL — SIGNIFICANT CHANGE UP (ref 0–0.7)
EOSINOPHIL NFR BLD AUTO: 1 % — SIGNIFICANT CHANGE UP (ref 0–8)
GLUCOSE BLDC GLUCOMTR-MCNC: 185 MG/DL — HIGH (ref 70–99)
GLUCOSE BLDC GLUCOMTR-MCNC: 210 MG/DL — HIGH (ref 70–99)
GLUCOSE BLDC GLUCOMTR-MCNC: 297 MG/DL — HIGH (ref 70–99)
GLUCOSE SERPL-MCNC: 173 MG/DL — HIGH (ref 70–99)
HCT VFR BLD CALC: 29.5 % — LOW (ref 37–47)
HGB BLD-MCNC: 9.7 G/DL — LOW (ref 12–16)
IMM GRANULOCYTES NFR BLD AUTO: 1.3 % — HIGH (ref 0.1–0.3)
LACTATE SERPL-SCNC: 1.8 MMOL/L — SIGNIFICANT CHANGE UP (ref 0.7–2)
LYMPHOCYTES # BLD AUTO: 1.35 K/UL — SIGNIFICANT CHANGE UP (ref 1.2–3.4)
LYMPHOCYTES # BLD AUTO: 9.6 % — LOW (ref 20.5–51.1)
MAGNESIUM SERPL-MCNC: 1.9 MG/DL — SIGNIFICANT CHANGE UP (ref 1.8–2.4)
MCHC RBC-ENTMCNC: 28.4 PG — SIGNIFICANT CHANGE UP (ref 27–31)
MCHC RBC-ENTMCNC: 32.9 G/DL — SIGNIFICANT CHANGE UP (ref 32–37)
MCV RBC AUTO: 86.5 FL — SIGNIFICANT CHANGE UP (ref 81–99)
MONOCYTES # BLD AUTO: 1.26 K/UL — HIGH (ref 0.1–0.6)
MONOCYTES NFR BLD AUTO: 8.9 % — SIGNIFICANT CHANGE UP (ref 1.7–9.3)
NEUTROPHILS # BLD AUTO: 11.18 K/UL — HIGH (ref 1.4–6.5)
NEUTROPHILS NFR BLD AUTO: 79.1 % — HIGH (ref 42.2–75.2)
NRBC # BLD: 0 /100 WBCS — SIGNIFICANT CHANGE UP (ref 0–0)
PHOSPHATE SERPL-MCNC: 2.2 MG/DL — SIGNIFICANT CHANGE UP (ref 2.1–4.9)
PLATELET # BLD AUTO: 225 K/UL — SIGNIFICANT CHANGE UP (ref 130–400)
POTASSIUM SERPL-MCNC: 5.3 MMOL/L — HIGH (ref 3.5–5)
POTASSIUM SERPL-SCNC: 5.3 MMOL/L — HIGH (ref 3.5–5)
PROT SERPL-MCNC: 4.7 G/DL — LOW (ref 6–8)
RBC # BLD: 3.41 M/UL — LOW (ref 4.2–5.4)
RBC # FLD: 18.7 % — HIGH (ref 11.5–14.5)
SARS-COV-2 RNA SPEC QL NAA+PROBE: SIGNIFICANT CHANGE UP
SODIUM SERPL-SCNC: 137 MMOL/L — SIGNIFICANT CHANGE UP (ref 135–146)
TROPONIN T SERPL-MCNC: 0.41 NG/ML — CRITICAL HIGH
TROPONIN T SERPL-MCNC: 0.51 NG/ML — CRITICAL HIGH
WBC # BLD: 14.12 K/UL — HIGH (ref 4.8–10.8)
WBC # FLD AUTO: 14.12 K/UL — HIGH (ref 4.8–10.8)

## 2021-09-13 PROCEDURE — 99291 CRITICAL CARE FIRST HOUR: CPT

## 2021-09-13 PROCEDURE — 99233 SBSQ HOSP IP/OBS HIGH 50: CPT

## 2021-09-13 RX ORDER — CHLORHEXIDINE GLUCONATE 213 G/1000ML
1 SOLUTION TOPICAL
Refills: 0 | Status: DISCONTINUED | OUTPATIENT
Start: 2021-09-13 | End: 2021-09-27

## 2021-09-13 RX ORDER — VANCOMYCIN HCL 1 G
125 VIAL (EA) INTRAVENOUS EVERY 6 HOURS
Refills: 0 | Status: DISCONTINUED | OUTPATIENT
Start: 2021-09-13 | End: 2021-09-14

## 2021-09-13 RX ORDER — CLOPIDOGREL BISULFATE 75 MG/1
75 TABLET, FILM COATED ORAL DAILY
Refills: 0 | Status: DISCONTINUED | OUTPATIENT
Start: 2021-09-14 | End: 2021-09-18

## 2021-09-13 RX ADMIN — CARVEDILOL PHOSPHATE 25 MILLIGRAM(S): 80 CAPSULE, EXTENDED RELEASE ORAL at 06:28

## 2021-09-13 RX ADMIN — Medication 650 MILLIGRAM(S): at 11:25

## 2021-09-13 RX ADMIN — RALOXIFENE HYDROCHLORIDE 60 MILLIGRAM(S): 60 TABLET, COATED ORAL at 14:20

## 2021-09-13 RX ADMIN — CARVEDILOL PHOSPHATE 25 MILLIGRAM(S): 80 CAPSULE, EXTENDED RELEASE ORAL at 17:39

## 2021-09-13 RX ADMIN — Medication 125 MILLIGRAM(S): at 07:16

## 2021-09-13 RX ADMIN — LATANOPROST 1 DROP(S): 0.05 SOLUTION/ DROPS OPHTHALMIC; TOPICAL at 21:28

## 2021-09-13 RX ADMIN — MIDODRINE HYDROCHLORIDE 5 MILLIGRAM(S): 2.5 TABLET ORAL at 06:28

## 2021-09-13 RX ADMIN — Medication 325 MILLIGRAM(S): at 11:24

## 2021-09-13 RX ADMIN — CHLORHEXIDINE GLUCONATE 1 APPLICATION(S): 213 SOLUTION TOPICAL at 06:59

## 2021-09-13 RX ADMIN — PANTOPRAZOLE SODIUM 40 MILLIGRAM(S): 20 TABLET, DELAYED RELEASE ORAL at 06:28

## 2021-09-13 RX ADMIN — Medication 650 MILLIGRAM(S): at 11:45

## 2021-09-13 RX ADMIN — Medication 125 MILLIGRAM(S): at 17:36

## 2021-09-13 RX ADMIN — Medication 125 MILLIGRAM(S): at 15:08

## 2021-09-13 RX ADMIN — Medication 25 MICROGRAM(S): at 06:28

## 2021-09-13 RX ADMIN — Medication 650 MILLIGRAM(S): at 18:00

## 2021-09-13 RX ADMIN — Medication 650 MILLIGRAM(S): at 17:40

## 2021-09-13 RX ADMIN — Medication 650 MILLIGRAM(S): at 06:43

## 2021-09-13 RX ADMIN — Medication 2: at 11:25

## 2021-09-13 RX ADMIN — Medication 4: at 16:44

## 2021-09-13 RX ADMIN — Medication 500 MILLIGRAM(S): at 06:28

## 2021-09-13 RX ADMIN — Medication 500 MILLIGRAM(S): at 17:36

## 2021-09-13 RX ADMIN — Medication 650 MILLIGRAM(S): at 01:15

## 2021-09-13 RX ADMIN — Medication 650 MILLIGRAM(S): at 06:28

## 2021-09-13 RX ADMIN — Medication 1 MILLIGRAM(S): at 11:24

## 2021-09-13 RX ADMIN — PANTOPRAZOLE SODIUM 40 MILLIGRAM(S): 20 TABLET, DELAYED RELEASE ORAL at 17:39

## 2021-09-13 RX ADMIN — ATORVASTATIN CALCIUM 80 MILLIGRAM(S): 80 TABLET, FILM COATED ORAL at 21:12

## 2021-09-13 RX ADMIN — Medication 650 MILLIGRAM(S): at 01:14

## 2021-09-13 RX ADMIN — Medication 1 TABLET(S): at 11:24

## 2021-09-13 RX ADMIN — CALCITRIOL 0.25 MICROGRAM(S): 0.5 CAPSULE ORAL at 11:24

## 2021-09-13 NOTE — PROGRESS NOTE ADULT - ASSESSMENT
85F w/PMHx CAD, CHF, Afib on Eliquis last dose 9/10 AM, DM, HTN, colon cancer s/p resection in 2016 in remission, CKD stage 3, hx of diverticulitis, presents s/p mechanical fall at home admitted on 9/5 s/p left femur fracture repair on 9/6. GI was called for melena, coffee ground emesis and epigastric pain. Then she developed NSTEMI in hospital on DAPT      #)Upper GI bleed-Coffee ground emesis : Resolved  DD: PUD vs less likely varices (no h/o alcoholic) vs AVM vs dieulafoy   Hemodynamically stable   Baseline Hb 8-9 admitted with 9.7 total 3 units in hospital admission, major drop in Hb 7.7 to 5.8 s/p 2 units improved to 9.2 stable currently  Last dose of eliquis 9/10 AM      Rec:  On clear liquids  Maintain active type and screen.  Trend Hb q8hrs and Keep it > 8, transfuse PRBC if necessary  Adequate Fluid resuscitation (SBP > 90)  c/w PPI drip  given her creatinine clearance 17 need to hold for 4 days for procedure  will plan for EGD on Tuesday once cardiology gives a risk stratification  Also ask for clearance from cardiology to hold ASA as of now.      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  No signs of toxic megacolon    Rec:  Oral fidaxomicin 200mg twice daily for 10 days.  Call ID consult.    #Colon cancer s/p resection in 2016:  Last colonoscopy in 2021 May at [facility] and was normal according to the patient     85F w/PMHx CAD, CHF, Afib on Eliquis last dose 9/10 AM, DM, HTN, colon cancer s/p resection in 2016 in remission, CKD stage 3, hx of diverticulitis, presents s/p mechanical fall at home admitted on 9/5 s/p left femur fracture repair on 9/6. GI was called for melena, coffee ground emesis and epigastric pain. Then she developed NSTEMI in hospital on DAPT      #)Upper GI bleed-Coffee ground emesis : Resolved  DD: PUD vs less likely varices (no h/o alcoholic) vs AVM vs dieulafoy   Hemodynamically stable   Baseline Hb 8-9 admitted with 9.7 total 3 units in hospital admission, major drop in Hb 7.7 to 5.8 s/p 2 units improved to 9.2 stable currently  Last dose of eliquis 9/10 AM      Rec:  On clear liquids  Maintain active type and screen.  Trend Hb q8hrs and Keep it > 8, transfuse PRBC if necessary  Adequate Fluid resuscitation (SBP > 90)  c/w PPI drip  given her creatinine clearance 17 need to hold for 4 days for procedure  will plan for EGD on Tuesday 9/14/2021 once cardiology gives a risk stratification  Also ask for clearance from cardiology to hold ASA as of now.      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  No signs of toxic megacolon    Rec:  Oral fidaxomicin 200mg twice daily for 10 days.    #Colon cancer s/p resection in 2016:  Last colonoscopy in 2021 May at [facility] and was normal according to the patient     85F w/PMHx CAD, CHF, Afib on Eliquis last dose 9/10 AM, DM, HTN, colon cancer s/p resection in 2016 in remission, CKD stage 3, hx of diverticulitis, presents s/p mechanical fall at home admitted on 9/5 s/p left femur fracture repair on 9/6. GI was called for melena, coffee ground emesis and epigastric pain. Then she developed NSTEMI in hospital on DAPT      #)Upper GI bleed-Coffee ground emesis : Resolved  DD: PUD vs less likely varices (no h/o alcoholic) vs AVM vs dieulafoy   Hemodynamically stable   Baseline Hb 8-9 admitted with 9.7 total 3 units in hospital admission, major drop in Hb 7.7 to 5.8 s/p 2 units improved to 9.2 stable currently  Last dose of eliquis 9/10 AM      Rec:  On clear liquids  Maintain active type and screen.  Trend Hb q8hrs and Keep it > 8, transfuse PRBC if necessary  Adequate Fluid resuscitation (SBP > 90)  c/w PPI drip  given her creatinine clearance 17 need to hold for 4 days for procedure  will plan for EGD on Tuesday 9/14/2021 if cardiology is willing and giving clearance for the procedure. Otherwise can have conservative management as she stopped bleeding actively.  Also ask for clearance from cardiology to hold ASA as of now.      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  No signs of toxic megacolon    Rec:  Oral fidaxomicin 200mg twice daily for 10 days.    #Colon cancer s/p resection in 2016:  Last colonoscopy in 2021 May at [facility] and was normal according to the patient     85F w/PMHx CAD, CHF, Afib on Eliquis last dose 9/10 AM, DM, HTN, colon cancer s/p resection in 2016 in remission, CKD stage 3, hx of diverticulitis, presents s/p mechanical fall at home admitted on 9/5 s/p left femur fracture repair on 9/6. GI was called for melena, coffee ground emesis and epigastric pain. Then she developed NSTEMI in hospital on DAPT      #)Upper GI bleed-Coffee ground emesis : Resolved  DD: PUD vs less likely varices (no h/o alcoholic) vs AVM vs dieulafoy   Hemodynamically stable   Baseline Hb 8-9 admitted with 9.7 total 3 units in hospital admission, major drop in Hb 7.7 to 5.8 s/p 2 units improved to 9.2 stable currently  Last dose of eliquis 9/10 AM      Rec:  On clear liquids  Maintain active type and screen.  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  No signs of toxic megacolon    Rec:  Oral fidaxomicin 200mg twice daily for 10 days.    #Colon cancer s/p resection in 2016:  Last colonoscopy in 2021 May at [facility] and was normal according to the patient

## 2021-09-13 NOTE — PROGRESS NOTE ADULT - SUBJECTIVE AND OBJECTIVE BOX
Gastroenterology progress note:     Patient is a 85y old  Female who presents with a chief complaint of fall (11 Sep 2021 08:42)       Admitted on: 09-05-21    We are following the patient for melena     Interval History: Patient had two episode of liquid bowel movement overnight and was brown as per nurse. No abdominal pain. She was diagnosed with C diff yesterday and is on C diff.       PAST MEDICAL & SURGICAL HISTORY:  CAD (coronary artery disease)    Chronic CHF    Atrial fibrillation    Type 2 diabetes mellitus    Colon cancer    Hypertension    CKD (chronic kidney disease)  Stage 3    History of Clostridium difficile colitis  s/p fecal transplant    Diverticulitis    S/P hysterectomy          Allergies  No Known Allergies        FH: No significant FH    Social history: NO IV drug abuse    MEDICATIONS  (STANDING):  acetaminophen   Tablet .. 650 milliGRAM(s) Oral every 6 hours  ascorbic acid 500 milliGRAM(s) Oral two times a day  atorvastatin 80 milliGRAM(s) Oral at bedtime  calcitriol   Capsule 0.25 MICROGram(s) Oral daily  carvedilol 25 milliGRAM(s) Oral every 12 hours  chlorhexidine 4% Liquid 1 Application(s) Topical <User Schedule>  dextrose 40% Gel 15 Gram(s) Oral once  dextrose 50% Injectable 25 Gram(s) IV Push once  ferrous    sulfate 325 milliGRAM(s) Oral daily  folic acid 1 milliGRAM(s) Oral daily  glucagon  Injectable 1 milliGRAM(s) IntraMuscular once  insulin lispro (ADMELOG) corrective regimen sliding scale   SubCutaneous every 6 hours  lactated ringers. 500 milliLiter(s) (75 mL/Hr) IV Continuous <Continuous>  latanoprost 0.005% Ophthalmic Solution 1 Drop(s) Both EYES at bedtime  levothyroxine 25 MICROGram(s) Oral daily  multivitamin 1 Tablet(s) Oral daily  pantoprazole  Injectable 40 milliGRAM(s) IV Push every 12 hours  polyethylene glycol 3350 17 Gram(s) Oral daily  raloxifene 60 milliGRAM(s) Oral daily  senna 2 Tablet(s) Oral at bedtime  vancomycin    Solution 125 milliGRAM(s) Oral every 6 hours    MEDICATIONS  (PRN):  bisacodyl Suppository 10 milliGRAM(s) Rectal daily PRN If no bowel movement by POD#2  diphenhydrAMINE 25 milliGRAM(s) Oral at bedtime PRN Insomnia  morphine IVPB 4 milliGRAM(s) IV Intermittent every 4 hours PRN Severe Pain (7 - 10)  ondansetron Injectable 4 milliGRAM(s) IV Push every 6 hours PRN Nausea and/or Vomiting        Review of Systems:   Constitutional: No acute distress  Eyes: No redness or jaundice  Skin: No rash or bruises  Cardiovascular:  No Chest Pain, No Palpitations  Respiratory:  No Cough, No Dyspnea  Gastrointestinal:  As described in HPI  Extremities: hip pain  Neuro: No tremors or seizures      Physical Examination:  T(C): 36.6 (09-13-21 @ 08:00), Max: 36.7 (09-13-21 @ 04:00)  HR: 72 (09-13-21 @ 08:00) (65 - 104)  BP: 139/71 (09-13-21 @ 08:00) (132/60 - 160/73)  RR: 20 (09-13-21 @ 08:00) (16 - 20)  SpO2: 98% (09-13-21 @ 08:00) (98% - 99%)  Weight (kg): 61.3 (09-13-21 @ 00:00)    09-12-21 @ 07:01  -  09-13-21 @ 07:00  --------------------------------------------------------  IN: 590 mL / OUT: 900 mL / NET: -310 mL    09-13-21 @ 07:01  -  09-13-21 @ 09:22  --------------------------------------------------------  IN: 250 mL / OUT: 100 mL / NET: 150 mL      Constitutional: Lying in bed, no acute distress  Neck: No thyromegaly or mass noted.  Respiratory:  No signs of respiratory distress. Lung sounds are clear bilaterally.  Cardiovascular:  S1 S2, Regular rate and rhythm.  Abdominal: Abdomen is soft, symmetric, and non-tender without distention.   Extremities: No pedal edema or cyanosis  Neuro: AAO X 3, no tremors          Data: (reviewed by attending)                        9.7    14.12 )-----------( 225      ( 13 Sep 2021 04:47 )             29.5     Hgb trend:  9.7  09-13-21 @ 04:47  10.3  09-12-21 @ 11:00  9.7  09-12-21 @ 04:30  10.7  09-11-21 @ 16:00  9.3  09-11-21 @ 07:54  9.2  09-10-21 @ 16:59  5.8  09-10-21 @ 09:39        09-13    137  |  109  |  47<H>  ----------------------------<  173<H>  5.3<H>   |  18  |  1.6<H>    Ca    9.2      13 Sep 2021 04:47  Phos  2.2     09-13  Mg     1.9     09-13    TPro  4.7<L>  /  Alb  2.7<L>  /  TBili  1.0  /  DBili  x   /  AST  23  /  ALT  24  /  AlkPhos  62  09-13    Liver panel trend:  TBili 1.0   /   AST 23   /   ALT 24   /   AlkP 62   /   Tptn 4.7   /   Alb 2.7    /   DBili --      09-13  TBili 1.0   /   AST 27   /   ALT 25   /   AlkP 57   /   Tptn 4.7   /   Alb 2.8    /   DBili --      09-12  TBili 0.8   /   AST 26   /   ALT 21   /   AlkP 55   /   Tptn 4.6   /   Alb 2.8    /   DBili --      09-11  TBili 0.5   /   AST 29   /   ALT 22   /   AlkP 52   /   Tptn 4.3   /   Alb 2.7    /   DBili --      09-10  TBili 0.5   /   AST 26   /   ALT 19   /   AlkP 48   /   Tptn 4.1   /   Alb 2.5    /   DBili --      09-10  TBili 0.3   /   AST 30   /   ALT 21   /   AlkP 53   /   Tptn 4.9   /   Alb 3.0    /   DBili --      09-08  TBili 0.2   /   AST 13   /   ALT 11   /   AlkP 114   /   Tptn 6.6   /   Alb 4.2    /   DBili --      09-04             Radiology: (reviewed by attending)

## 2021-09-13 NOTE — CHART NOTE - NSCHARTNOTEFT_GEN_A_CORE
Patient had hypothermia, no elevated or decreased white count, no hypotension, no tachycardia, no decreased or increased respiratory drive.  Patient already received cefepime in the ED,   Patient is given a bear hugger

## 2021-09-13 NOTE — CONSULT NOTE ADULT - SUBJECTIVE AND OBJECTIVE BOX
FREEMANSEAN JI  85y, Female  Allergy: No Known Allergies      All historical available data reviewed.    HPI:  85F w/PMHx CAD, CHF, Afib on Eliquis, DM, HTN, colon cancer, CKD stage 3, hx of diverticulitis, presents s/p mechanical fall at home, +HT, -LOC, +AC (Eliquis). Patient was in her kitchen and "turned around too fast" when she fell over and hit her head on a chair. She denies loss of consciousness. She remained down for ~30 min before EMS came to take her into the ED. She is GCS 15, primary survey negative. No external signs of trauma. Complains of LLE pain. Denies scalp, spinal, chest, abdominal pain. ROM of all extremities in tact. C-collar in place.    (05 Sep 2021 00:18)    FAMILY HISTORY:  FHx: breast cancer  Mother      PAST MEDICAL & SURGICAL HISTORY:  CAD (coronary artery disease)    Chronic CHF    Atrial fibrillation    Type 2 diabetes mellitus    Colon cancer    Hypertension    CKD (chronic kidney disease)  Stage 3    History of Clostridium difficile colitis  s/p fecal transplant    Diverticulitis    S/P hysterectomy          VITALS:  T(F): 96.9, Max: 98 (21 @ 04:00)  HR: 84  BP: 128/101  RR: 25Vital Signs Last 24 Hrs  T(C): 36.1 (13 Sep 2021 12:00), Max: 36.7 (13 Sep 2021 04:00)  T(F): 96.9 (13 Sep 2021 12:00), Max: 98 (13 Sep 2021 04:00)  HR: 84 (13 Sep 2021 14:00) (68 - 104)  BP: 128/101 (13 Sep 2021 14:00) (126/69 - 160/73)  BP(mean): 122 (13 Sep 2021 14:00) (92 - 122)  RR: 25 (13 Sep 2021 14:00) (16 - 25)  SpO2: 97% (13 Sep 2021 14:00) (97% - 99%)    TESTS & MEASUREMENTS:                        9.7    14.12 )-----------( 225      ( 13 Sep 2021 04:47 )             29.5         137  |  109  |  47<H>  ----------------------------<  173<H>  5.3<H>   |  18  |  1.6<H>    Ca    9.2      13 Sep 2021 04:47  Phos  2.2       Mg     1.9         TPro  4.7<L>  /  Alb  2.7<L>  /  TBili  1.0  /  DBili  x   /  AST  23  /  ALT  24  /  AlkPhos  62      LIVER FUNCTIONS - ( 13 Sep 2021 04:47 )  Alb: 2.7 g/dL / Pro: 4.7 g/dL / ALK PHOS: 62 U/L / ALT: 24 U/L / AST: 23 U/L / GGT: x             Culture - Blood (collected 21 @ 16:44)  Source: .Blood None  Preliminary Report (09-10-21 @ 01:02):    No growth to date.      Urinalysis Basic - ( 12 Sep 2021 22:30 )    Color: Lovely / Appearance: Turbid / S.032 / pH: x  Gluc: x / Ketone: Negative  / Bili: Negative / Urobili: 6 mg/dL   Blood: x / Protein: 30 mg/dL / Nitrite: Negative   Leuk Esterase: Large / RBC: 25 /HPF / WBC 5 /HPF   Sq Epi: x / Non Sq Epi: 0 /HPF / Bacteria: Few          RADIOLOGY & ADDITIONAL TESTS:  Personal review of radiological diagnostics performed  Echo and EKG results noted when applicable.     MEDICATIONS:  acetaminophen   Tablet .. 650 milliGRAM(s) Oral every 6 hours  ascorbic acid 500 milliGRAM(s) Oral two times a day  atorvastatin 80 milliGRAM(s) Oral at bedtime  bisacodyl Suppository 10 milliGRAM(s) Rectal daily PRN  calcitriol   Capsule 0.25 MICROGram(s) Oral daily  carvedilol 25 milliGRAM(s) Oral every 12 hours  chlorhexidine 4% Liquid 1 Application(s) Topical <User Schedule>  dextrose 40% Gel 15 Gram(s) Oral once  dextrose 50% Injectable 25 Gram(s) IV Push once  diphenhydrAMINE 25 milliGRAM(s) Oral at bedtime PRN  ferrous    sulfate 325 milliGRAM(s) Oral daily  folic acid 1 milliGRAM(s) Oral daily  glucagon  Injectable 1 milliGRAM(s) IntraMuscular once  insulin lispro (ADMELOG) corrective regimen sliding scale   SubCutaneous every 6 hours  lactated ringers. 500 milliLiter(s) IV Continuous <Continuous>  latanoprost 0.005% Ophthalmic Solution 1 Drop(s) Both EYES at bedtime  levothyroxine 25 MICROGram(s) Oral daily  morphine IVPB 4 milliGRAM(s) IV Intermittent every 4 hours PRN  multivitamin 1 Tablet(s) Oral daily  ondansetron Injectable 4 milliGRAM(s) IV Push every 6 hours PRN  pantoprazole  Injectable 40 milliGRAM(s) IV Push every 12 hours  raloxifene 60 milliGRAM(s) Oral daily  senna 2 Tablet(s) Oral at bedtime  vancomycin    Solution 125 milliGRAM(s) Oral every 6 hours      ANTIBIOTICS:  vancomycin    Solution 125 milliGRAM(s) Oral every 6 hours

## 2021-09-13 NOTE — CONSULT NOTE ADULT - ASSESSMENT
85F w/PMHx CAD, CHF, Afib on Eliquis last dose 9/10 AM, DM, HTN, colon cancer s/p resection in 2016 in remission, CKD stage 3, hx of diverticulitis, presents s/p mechanical fall at home admitted on 9/5 s/p left femur fracture repair on 9/6. GI was called for melena, coffee ground emesis and epigastric pain. Then she developed NSTEMI in hospital on DAPT    IMPRESSION;  CD colitis  WBC 14.1    RECOMMENDATIONS;  Serum lactate level  Po Fidaxomicin 200 mg q12h

## 2021-09-13 NOTE — PROGRESS NOTE ADULT - ASSESSMENT
IMPRESSION:  - Non-ST elevation ACS v/s Type II MI secondary to demand ischemia from acute blood loss anemia  - Acute blood loss anemia (Hg dropped to 5) - Operative site hematoma v/s possible upper GI bleeding (hematemesis?)  - C. 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%  - AF  - CKD III  - DM, HTN  - History of colon cancer    PLAN:    CNS:  - Avoid sedatives    HEENT:   - Oral care    PULMONARY:    - HOB @ 45 degrees    CARDIOVASCULAR:  - CCU monitoring  - Continue plavix for now  - No anticoagulation  - Continue atorvastatin and coreg  - Keep Hg>8    GI:  - Continue pantoprazole 40mg IV bid  - GI consult follow-up  - Possible need for EGD    RENAL:    - Accurate I&O  - Monitor BUN/Cr  - Follow-up lytes.  Correct as needed to keep Mg>2.2 and K>4    INFECTIOUS DISEASE:   - Vancomycin PO for C. Diff  - ID evaluation    HEMATOLOGICAL:    - DVT prophylaxis    ENDOCRINE:    - Follow up FS.  Insulin protocol if needed    MUSCULOSKELETAL:  - Increase activity as tolerated    DISPOSITION:  - Monitor in CCU         IMPRESSION:  - Non-ST elevation ACS v/s Type II MI secondary to demand ischemia from acute blood loss anemia  - Acute blood loss anemia (Hg dropped to 5) - Operative site hematoma v/s possible upper GI bleeding (hematemesis?)  - C. diff colitis on vancomycin  - HFmEF EF 45%  - AF  - CKD III  - DM, HTN  - History of colon cancer    PLAN:    CNS:  - Avoid sedatives    HEENT:   - Oral care    PULMONARY:    - HOB @ 45 degrees    CARDIOVASCULAR:  - CCU monitoring  - Can hold plavix for EGD  - No anticoagulation for possible bleeding  - Continue atorvastatin and coreg  - Keep Hg>8    GI:  - Continue pantoprazole 40mg IV bid  - GI consult follow-up  - Possible need for EGD    RENAL:    - Accurate I&O  - Monitor BUN/Cr  - Follow-up lytes.  Correct as needed to keep Mg>2.2 and K>4    INFECTIOUS DISEASE:   - Vancomycin PO for C. Diff  - ID evaluation    HEMATOLOGICAL:    - DVT prophylaxis    ENDOCRINE:    - Follow up FS.  Insulin protocol if needed    MUSCULOSKELETAL:  - Increase activity as tolerated    DISPOSITION:  - Monitor in CCU         IMPRESSION:  - Non-ST elevation ACS v/s Type II MI secondary to demand ischemia from acute blood loss anemia  - Acute blood loss anemia (Hg dropped to 5) - Operative site hematoma v/s possible upper GI bleeding (hematemesis?)  - C. diff colitis on vancomycin  - HFmEF EF 45%  - AF  - CKD III  - DM, HTN  - History of colon cancer    PLAN:    CNS:  - Avoid sedatives    HEENT:   - Oral care    PULMONARY:    - HOB @ 45 degrees    CARDIOVASCULAR:  - CCU monitoring  - Can hold plavix for EGD  - No anticoagulation for possible bleeding  - Continue atorvastatin and coreg  - Keep Hg>8    GI:  - Continue pantoprazole 40mg IV bid  - GI consult follow-up  - EGD planned for tomorrow  - COVID swab  - NPO after midnight    RENAL:    - Accurate I&O  - Monitor BUN/Cr  - Follow-up lytes.  Correct as needed to keep Mg>2.2 and K>4    INFECTIOUS DISEASE:   - Vancomycin PO for C. Diff  - ID evaluation  - WBC trending up, UA positive    HEMATOLOGICAL:    - DVT prophylaxis    ENDOCRINE:    - Follow up FS.  Insulin protocol if needed    MUSCULOSKELETAL:  - Increase activity as tolerated    DISPOSITION:  - Monitor in CCU         IMPRESSION:  - Non-ST elevation ACS v/s Type II MI secondary to demand ischemia from acute blood loss anemia  - Acute blood loss anemia (Hg dropped to 5) - Operative site hematoma v/s possible upper GI bleeding (hematemesis?)  - C. 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  - elevated risk given recent bleed, NSTEMI    GI:  - Continue pantoprazole 40mg IV bid  - GI follow-up  - EGD planned for tomorrow  - COVID swab  - NPO after midnight    RENAL:    - Accurate I&O  - Monitor BUN/Cr  - Follow-up lytes.  Correct as needed to keep Mg>2.2 and K>4    INFECTIOUS DISEASE:   - Vancomycin PO for C. Diff  - ID evaluation  - WBC trending up, UA positive    HEMATOLOGICAL:    - DVT prophylaxis    ENDOCRINE:    - Follow up FS.  Insulin protocol if needed    MUSCULOSKELETAL:  - Increase activity as tolerated    DISPOSITION:  - Monitor in CCU  - Transfer to [facility] was requested by family, case was presented to CCU attending at [facility] - was not accepted.

## 2021-09-13 NOTE — PROGRESS NOTE ADULT - ASSESSMENT
Orthopaedic Surgery Progress Note    S&E at bedside this AM. Transferred to CCU yesterday for chest pain and elevated troponin. Denies any CP or SOB this morning. Pending EGD by GI for prior episode of coffee ground emesis. Patient now c diff +.      T(C): 36.6 (09-13-21 @ 08:00), Max: 36.7 (09-13-21 @ 04:00)  HR: 72 (09-13-21 @ 08:00) (65 - 104)  BP: 139/71 (09-13-21 @ 08:00) (132/60 - 160/73)  RR: 20 (09-13-21 @ 08:00) (16 - 20)  SpO2: 98% (09-13-21 @ 08:00) (98% - 99%)    P/E:  AOx3, NAD  Nonlabored breathing    LLE  Dressing/splint in place, saturated with serous drainage  Dressing changed  Pelvic binder put in place for compression  SILT sp/dp/t/sural/saph  Firing ta/ehl/fhl/gs  Foot WWP    Labs                        9.7    14.12 )-----------( 225      ( 13 Sep 2021 04:47 )             29.5     09-13    137  |  109  |  47<H>  ----------------------------<  173<H>  5.3<H>   |  18  |  1.6<H>    Ca    9.2      13 Sep 2021 04:47  Phos  2.2     09-13  Mg     1.9     09-13    TPro  4.7<L>  /  Alb  2.7<L>  /  TBili  1.0  /  DBili  x   /  AST  23  /  ALT  24  /  AlkPhos  62  09-13    LIVER FUNCTIONS - ( 13 Sep 2021 04:47 )  Alb: 2.7 g/dL / Pro: 4.7 g/dL / ALK PHOS: 62 U/L / ALT: 24 U/L / AST: 23 U/L / GGT: x             A/P:   84yo Female L basi-cervical femoral neck fracture s/p IMN POD7. Now in CCU for episode of chest pain in setting of elevated troponin yesterday. GI also following for prior episode of coffee ground emesis, planning EGD.    - Dressings changed again today  - Pelvic binder placed for compression over dressing    - Continue to trend Hb and monitor closely  - AC: plavix  -WBAT LLE  -pain control  -DVT ppx  -PT/OT

## 2021-09-13 NOTE — PROGRESS NOTE ADULT - SUBJECTIVE AND OBJECTIVE BOX
SUBJECTIVE:    Patient is a 85y old Female who presents with a chief complaint of fall (11 Sep 2021 08:42)    Currently admitted to medicine with the primary diagnosis of Intertrochanteric fracture of right hip       Today is hospital day 8d. This morning she is resting comfortably in bed and reports no new issues or overnight events.     PAST MEDICAL & SURGICAL HISTORY  CAD (coronary artery disease)    Chronic CHF    Atrial fibrillation    Type 2 diabetes mellitus    Colon cancer    Hypertension    CKD (chronic kidney disease)  Stage 3    History of Clostridium difficile colitis  s/p fecal transplant    Diverticulitis    S/P hysterectomy      SOCIAL HISTORY:  Negative for smoking/alcohol/drug use.     ALLERGIES:  No Known Allergies    MEDICATIONS:  STANDING MEDICATIONS  acetaminophen   Tablet .. 650 milliGRAM(s) Oral every 6 hours  ascorbic acid 500 milliGRAM(s) Oral two times a day  atorvastatin 80 milliGRAM(s) Oral at bedtime  calcitriol   Capsule 0.25 MICROGram(s) Oral daily  carvedilol 25 milliGRAM(s) Oral every 12 hours  chlorhexidine 4% Liquid 1 Application(s) Topical <User Schedule>  clopidogrel Tablet 75 milliGRAM(s) Oral daily  dextrose 40% Gel 15 Gram(s) Oral once  dextrose 50% Injectable 25 Gram(s) IV Push once  ferrous    sulfate 325 milliGRAM(s) Oral daily  folic acid 1 milliGRAM(s) Oral daily  glucagon  Injectable 1 milliGRAM(s) IntraMuscular once  insulin lispro (ADMELOG) corrective regimen sliding scale   SubCutaneous every 6 hours  lactated ringers. 500 milliLiter(s) IV Continuous <Continuous>  latanoprost 0.005% Ophthalmic Solution 1 Drop(s) Both EYES at bedtime  levothyroxine 25 MICROGram(s) Oral daily  midodrine.      midodrine. 5 milliGRAM(s) Oral three times a day  multivitamin 1 Tablet(s) Oral daily  pantoprazole  Injectable 40 milliGRAM(s) IV Push every 12 hours  polyethylene glycol 3350 17 Gram(s) Oral daily  raloxifene 60 milliGRAM(s) Oral daily  senna 2 Tablet(s) Oral at bedtime  vancomycin    Solution 125 milliGRAM(s) Oral every 6 hours    PRN MEDICATIONS  bisacodyl Suppository 10 milliGRAM(s) Rectal daily PRN  diphenhydrAMINE 25 milliGRAM(s) Oral at bedtime PRN  morphine IVPB 4 milliGRAM(s) IV Intermittent every 4 hours PRN  ondansetron Injectable 4 milliGRAM(s) IV Push every 6 hours PRN    VITALS:   T(F): 98  HR: 76  BP: 155/78  RR: 18  SpO2: 98%    LABS:                        9.7    14.12 )-----------( 225      ( 13 Sep 2021 04:47 )             29.5         137  |  109  |  47<H>  ----------------------------<  173<H>  5.3<H>   |  18  |  1.6<H>    Ca    9.2      13 Sep 2021 04:47  Phos  2.2       Mg     1.9         TPro  4.7<L>  /  Alb  2.7<L>  /  TBili  1.0  /  DBili  x   /  AST  23  /  ALT  24  /  AlkPhos  62        Urinalysis Basic - ( 12 Sep 2021 22:30 )    Color: Lovely / Appearance: Turbid / S.032 / pH: x  Gluc: x / Ketone: Negative  / Bili: Negative / Urobili: 6 mg/dL   Blood: x / Protein: 30 mg/dL / Nitrite: Negative   Leuk Esterase: Large / RBC: 25 /HPF / WBC 5 /HPF   Sq Epi: x / Non Sq Epi: 0 /HPF / Bacteria: Few        Troponin T, Serum: 0.41 ng/mL *HH* (21 @ 04:47)  Troponin T, Serum: 0.37 ng/mL *HH* (21 @ 13:53)      CARDIAC MARKERS ( 13 Sep 2021 04:47 )  x     / 0.41 ng/mL / x     / x     / x      CARDIAC MARKERS ( 12 Sep 2021 13:53 )  x     / 0.37 ng/mL / x     / x     / x      CARDIAC MARKERS ( 11 Sep 2021 11:48 )  x     / 0.33 ng/mL / x     / x     / x          RADIOLOGY:    PHYSICAL EXAM:  GEN: No acute distress  LUNGS: Clear to auscultation bilaterally   HEART: S1/S2 present. RRR.   ABD: Soft, non-tender, non-distended. Bowel sounds present  EXT: NC/NC/NE/2+PP/SHARIF  NEURO: AAOX3     SUBJECTIVE:    Patient is a 85y old Female who presents with a chief complaint of fall (11 Sep 2021 08:42)    Currently admitted to CCU with the primary diagnosis of NSTEMI    Today is hospital day 8d. This morning she is resting comfortably in bed. No chest pain currently.  s/p ORIF, complicated by blood loss anemia (possible GI bleeding) and NSTEMI    PAST MEDICAL & SURGICAL HISTORY  CAD (coronary artery disease)    Chronic CHF    Atrial fibrillation    Type 2 diabetes mellitus    Colon cancer    Hypertension    CKD (chronic kidney disease)  Stage 3    History of Clostridium difficile colitis  s/p fecal transplant    Diverticulitis    S/P hysterectomy      SOCIAL HISTORY:  Negative for smoking/alcohol/drug use.     ALLERGIES:  No Known Allergies    MEDICATIONS:  STANDING MEDICATIONS  acetaminophen   Tablet .. 650 milliGRAM(s) Oral every 6 hours  ascorbic acid 500 milliGRAM(s) Oral two times a day  atorvastatin 80 milliGRAM(s) Oral at bedtime  calcitriol   Capsule 0.25 MICROGram(s) Oral daily  carvedilol 25 milliGRAM(s) Oral every 12 hours  chlorhexidine 4% Liquid 1 Application(s) Topical <User Schedule>  clopidogrel Tablet 75 milliGRAM(s) Oral daily  dextrose 40% Gel 15 Gram(s) Oral once  dextrose 50% Injectable 25 Gram(s) IV Push once  ferrous    sulfate 325 milliGRAM(s) Oral daily  folic acid 1 milliGRAM(s) Oral daily  glucagon  Injectable 1 milliGRAM(s) IntraMuscular once  insulin lispro (ADMELOG) corrective regimen sliding scale   SubCutaneous every 6 hours  lactated ringers. 500 milliLiter(s) IV Continuous <Continuous>  latanoprost 0.005% Ophthalmic Solution 1 Drop(s) Both EYES at bedtime  levothyroxine 25 MICROGram(s) Oral daily  midodrine.      midodrine. 5 milliGRAM(s) Oral three times a day  multivitamin 1 Tablet(s) Oral daily  pantoprazole  Injectable 40 milliGRAM(s) IV Push every 12 hours  polyethylene glycol 3350 17 Gram(s) Oral daily  raloxifene 60 milliGRAM(s) Oral daily  senna 2 Tablet(s) Oral at bedtime  vancomycin    Solution 125 milliGRAM(s) Oral every 6 hours    PRN MEDICATIONS  bisacodyl Suppository 10 milliGRAM(s) Rectal daily PRN  diphenhydrAMINE 25 milliGRAM(s) Oral at bedtime PRN  morphine IVPB 4 milliGRAM(s) IV Intermittent every 4 hours PRN  ondansetron Injectable 4 milliGRAM(s) IV Push every 6 hours PRN    VITALS:   T(F): 98  HR: 76  BP: 155/78  RR: 18  SpO2: 98%    LABS:                        9.7    14.12 )-----------( 225      ( 13 Sep 2021 04:47 )             29.5     -    137  |  109  |  47<H>  ----------------------------<  173<H>  5.3<H>   |  18  |  1.6<H>    Ca    9.2      13 Sep 2021 04:47  Phos  2.2       Mg     1.9         TPro  4.7<L>  /  Alb  2.7<L>  /  TBili  1.0  /  DBili  x   /  AST  23  /  ALT  24  /  AlkPhos  62        Urinalysis Basic - ( 12 Sep 2021 22:30 )    Color: Lovely / Appearance: Turbid / S.032 / pH: x  Gluc: x / Ketone: Negative  / Bili: Negative / Urobili: 6 mg/dL   Blood: x / Protein: 30 mg/dL / Nitrite: Negative   Leuk Esterase: Large / RBC: 25 /HPF / WBC 5 /HPF   Sq Epi: x / Non Sq Epi: 0 /HPF / Bacteria: Few        Troponin T, Serum: 0.41 ng/mL *HH* (21 @ 04:47)  Troponin T, Serum: 0.37 ng/mL *HH* (21 @ 13:53)      CARDIAC MARKERS ( 13 Sep 2021 04:47 )  x     / 0.41 ng/mL / x     / x     / x      CARDIAC MARKERS ( 12 Sep 2021 13:53 )  x     / 0.37 ng/mL / x     / x     / x      CARDIAC MARKERS ( 11 Sep 2021 11:48 )  x     / 0.33 ng/mL / x     / x     / x          RADIOLOGY:  CXR: < from: Xray Chest 1 View- PORTABLE-Urgent (Xray Chest 1 View- PORTABLE-Urgent .) (09.10.21 @ 11:44) >  Newbilateral perihilar bandlike opacity/atelectasis. No pneumothorax or pleural effusion.    Stable cardiomediastinal silhouette. Unchanged osseous structures.    < end of copied text >    PHYSICAL EXAM:  GEN: No acute distress  LUNGS: Clear to auscultation bilaterally   HEART: S1/S2 present. RRR.   ABD: Soft, non-tender, non-distended. Bowel sounds present  EXT: NC/NC/NE/2+PP/SHARIF  NEURO: AAOX3     SUBJECTIVE:    Patient is a 85y old Female who presents with a chief complaint of fall (11 Sep 2021 08:42)    Currently admitted to CCU with the primary diagnosis of NSTEMI    Today is hospital day 8d. This morning she is resting comfortably in bed. No chest pain currently.  s/p ORIF, complicated by blood loss anemia (possible GI bleeding) and NSTEMI      PAST MEDICAL & SURGICAL HISTORY  CAD (coronary artery disease)    Chronic CHF    Atrial fibrillation    Type 2 diabetes mellitus    Colon cancer    Hypertension    CKD (chronic kidney disease)  Stage 3    History of Clostridium difficile colitis  s/p fecal transplant    Diverticulitis    S/P hysterectomy      SOCIAL HISTORY:  Negative for smoking/alcohol/drug use.     ALLERGIES:  No Known Allergies    MEDICATIONS:  STANDING MEDICATIONS  acetaminophen   Tablet .. 650 milliGRAM(s) Oral every 6 hours  ascorbic acid 500 milliGRAM(s) Oral two times a day  atorvastatin 80 milliGRAM(s) Oral at bedtime  calcitriol   Capsule 0.25 MICROGram(s) Oral daily  carvedilol 25 milliGRAM(s) Oral every 12 hours  chlorhexidine 4% Liquid 1 Application(s) Topical <User Schedule>  clopidogrel Tablet 75 milliGRAM(s) Oral daily  dextrose 40% Gel 15 Gram(s) Oral once  dextrose 50% Injectable 25 Gram(s) IV Push once  ferrous    sulfate 325 milliGRAM(s) Oral daily  folic acid 1 milliGRAM(s) Oral daily  glucagon  Injectable 1 milliGRAM(s) IntraMuscular once  insulin lispro (ADMELOG) corrective regimen sliding scale   SubCutaneous every 6 hours  lactated ringers. 500 milliLiter(s) IV Continuous <Continuous>  latanoprost 0.005% Ophthalmic Solution 1 Drop(s) Both EYES at bedtime  levothyroxine 25 MICROGram(s) Oral daily  midodrine.      midodrine. 5 milliGRAM(s) Oral three times a day  multivitamin 1 Tablet(s) Oral daily  pantoprazole  Injectable 40 milliGRAM(s) IV Push every 12 hours  polyethylene glycol 3350 17 Gram(s) Oral daily  raloxifene 60 milliGRAM(s) Oral daily  senna 2 Tablet(s) Oral at bedtime  vancomycin    Solution 125 milliGRAM(s) Oral every 6 hours    PRN MEDICATIONS  bisacodyl Suppository 10 milliGRAM(s) Rectal daily PRN  diphenhydrAMINE 25 milliGRAM(s) Oral at bedtime PRN  morphine IVPB 4 milliGRAM(s) IV Intermittent every 4 hours PRN  ondansetron Injectable 4 milliGRAM(s) IV Push every 6 hours PRN    VITALS:   T(F): 98  HR: 76  BP: 155/78  RR: 18  SpO2: 98%    LABS:                        9.7    14.12 )-----------( 225      ( 13 Sep 2021 04:47 )             29.5     -    137  |  109  |  47<H>  ----------------------------<  173<H>  5.3<H>   |  18  |  1.6<H>    Ca    9.2      13 Sep 2021 04:47  Phos  2.2       Mg     1.9         TPro  4.7<L>  /  Alb  2.7<L>  /  TBili  1.0  /  DBili  x   /  AST  23  /  ALT  24  /  AlkPhos  62        Urinalysis Basic - ( 12 Sep 2021 22:30 )    Color: Lovely / Appearance: Turbid / S.032 / pH: x  Gluc: x / Ketone: Negative  / Bili: Negative / Urobili: 6 mg/dL   Blood: x / Protein: 30 mg/dL / Nitrite: Negative   Leuk Esterase: Large / RBC: 25 /HPF / WBC 5 /HPF   Sq Epi: x / Non Sq Epi: 0 /HPF / Bacteria: Few        Troponin T, Serum: 0.41 ng/mL *HH* (21 @ 04:47)  Troponin T, Serum: 0.37 ng/mL *HH* (21 @ 13:53)      CARDIAC MARKERS ( 13 Sep 2021 04:47 )  x     / 0.41 ng/mL / x     / x     / x      CARDIAC MARKERS ( 12 Sep 2021 13:53 )  x     / 0.37 ng/mL / x     / x     / x      CARDIAC MARKERS ( 11 Sep 2021 11:48 )  x     / 0.33 ng/mL / x     / x     / x          RADIOLOGY:  CXR: < from: Xray Chest 1 View- PORTABLE-Urgent (Xray Chest 1 View- PORTABLE-Urgent .) (09.10.21 @ 11:44) >  Newbilateral perihilar bandlike opacity/atelectasis. No pneumothorax or pleural effusion.    Stable cardiomediastinal silhouette. Unchanged osseous structures.    < end of copied text >    PHYSICAL EXAM:  GEN: No acute distress  LUNGS: Clear to auscultation bilaterally   HEART: S1/S2 present. RRR.   ABD: Soft, non-tender, non-distended. Bowel sounds present  EXT: NC/NC/NE/2+PP/SHARIF  NEURO: AAOX3

## 2021-09-14 LAB
ALBUMIN SERPL ELPH-MCNC: 2.6 G/DL — LOW (ref 3.5–5.2)
ALP SERPL-CCNC: 77 U/L — SIGNIFICANT CHANGE UP (ref 30–115)
ALT FLD-CCNC: 20 U/L — SIGNIFICANT CHANGE UP (ref 0–41)
ANION GAP SERPL CALC-SCNC: 11 MMOL/L — SIGNIFICANT CHANGE UP (ref 7–14)
AST SERPL-CCNC: 14 U/L — SIGNIFICANT CHANGE UP (ref 0–41)
BASOPHILS # BLD AUTO: 0.02 K/UL — SIGNIFICANT CHANGE UP (ref 0–0.2)
BASOPHILS NFR BLD AUTO: 0.1 % — SIGNIFICANT CHANGE UP (ref 0–1)
BILIRUB SERPL-MCNC: 0.8 MG/DL — SIGNIFICANT CHANGE UP (ref 0.2–1.2)
BUN SERPL-MCNC: 49 MG/DL — HIGH (ref 10–20)
CALCIUM SERPL-MCNC: 9 MG/DL — SIGNIFICANT CHANGE UP (ref 8.5–10.1)
CHLORIDE SERPL-SCNC: 109 MMOL/L — SIGNIFICANT CHANGE UP (ref 98–110)
CK MB CFR SERPL CALC: 2.6 NG/ML — SIGNIFICANT CHANGE UP (ref 0.6–6.3)
CK SERPL-CCNC: 37 U/L — SIGNIFICANT CHANGE UP (ref 0–225)
CO2 SERPL-SCNC: 18 MMOL/L — SIGNIFICANT CHANGE UP (ref 17–32)
CREAT SERPL-MCNC: 1.7 MG/DL — HIGH (ref 0.7–1.5)
CULTURE RESULTS: SIGNIFICANT CHANGE UP
EOSINOPHIL # BLD AUTO: 0.11 K/UL — SIGNIFICANT CHANGE UP (ref 0–0.7)
EOSINOPHIL NFR BLD AUTO: 0.6 % — SIGNIFICANT CHANGE UP (ref 0–8)
GLUCOSE BLDC GLUCOMTR-MCNC: 159 MG/DL — HIGH (ref 70–99)
GLUCOSE BLDC GLUCOMTR-MCNC: 194 MG/DL — HIGH (ref 70–99)
GLUCOSE BLDC GLUCOMTR-MCNC: 233 MG/DL — HIGH (ref 70–99)
GLUCOSE BLDC GLUCOMTR-MCNC: 272 MG/DL — HIGH (ref 70–99)
GLUCOSE SERPL-MCNC: 157 MG/DL — HIGH (ref 70–99)
HCT VFR BLD CALC: 30.8 % — LOW (ref 37–47)
HGB BLD-MCNC: 9.9 G/DL — LOW (ref 12–16)
IMM GRANULOCYTES NFR BLD AUTO: 1.5 % — HIGH (ref 0.1–0.3)
LACTATE SERPL-SCNC: 1.4 MMOL/L — SIGNIFICANT CHANGE UP (ref 0.7–2)
LYMPHOCYTES # BLD AUTO: 1.76 K/UL — SIGNIFICANT CHANGE UP (ref 1.2–3.4)
LYMPHOCYTES # BLD AUTO: 9.5 % — LOW (ref 20.5–51.1)
MAGNESIUM SERPL-MCNC: 1.9 MG/DL — SIGNIFICANT CHANGE UP (ref 1.8–2.4)
MCHC RBC-ENTMCNC: 28 PG — SIGNIFICANT CHANGE UP (ref 27–31)
MCHC RBC-ENTMCNC: 32.1 G/DL — SIGNIFICANT CHANGE UP (ref 32–37)
MCV RBC AUTO: 87 FL — SIGNIFICANT CHANGE UP (ref 81–99)
MONOCYTES # BLD AUTO: 1.76 K/UL — HIGH (ref 0.1–0.6)
MONOCYTES NFR BLD AUTO: 9.5 % — HIGH (ref 1.7–9.3)
NEUTROPHILS # BLD AUTO: 14.61 K/UL — HIGH (ref 1.4–6.5)
NEUTROPHILS NFR BLD AUTO: 78.8 % — HIGH (ref 42.2–75.2)
NRBC # BLD: 0 /100 WBCS — SIGNIFICANT CHANGE UP (ref 0–0)
PLATELET # BLD AUTO: 238 K/UL — SIGNIFICANT CHANGE UP (ref 130–400)
POTASSIUM SERPL-MCNC: 5 MMOL/L — SIGNIFICANT CHANGE UP (ref 3.5–5)
POTASSIUM SERPL-SCNC: 5 MMOL/L — SIGNIFICANT CHANGE UP (ref 3.5–5)
PROT SERPL-MCNC: 4.7 G/DL — LOW (ref 6–8)
RBC # BLD: 3.54 M/UL — LOW (ref 4.2–5.4)
RBC # FLD: 18.9 % — HIGH (ref 11.5–14.5)
SODIUM SERPL-SCNC: 138 MMOL/L — SIGNIFICANT CHANGE UP (ref 135–146)
SPECIMEN SOURCE: SIGNIFICANT CHANGE UP
TROPONIN T SERPL-MCNC: 0.59 NG/ML — CRITICAL HIGH
WBC # BLD: 18.53 K/UL — HIGH (ref 4.8–10.8)
WBC # FLD AUTO: 18.53 K/UL — HIGH (ref 4.8–10.8)

## 2021-09-14 PROCEDURE — 99291 CRITICAL CARE FIRST HOUR: CPT

## 2021-09-14 PROCEDURE — 71045 X-RAY EXAM CHEST 1 VIEW: CPT | Mod: 26

## 2021-09-14 RX ORDER — APIXABAN 2.5 MG/1
2.5 TABLET, FILM COATED ORAL
Refills: 0 | Status: DISCONTINUED | OUTPATIENT
Start: 2021-09-14 | End: 2021-09-15

## 2021-09-14 RX ORDER — ENOXAPARIN SODIUM 100 MG/ML
30 INJECTION SUBCUTANEOUS DAILY
Refills: 0 | Status: DISCONTINUED | OUTPATIENT
Start: 2021-09-14 | End: 2021-09-14

## 2021-09-14 RX ORDER — FIDAXOMICIN 200 MG/5ML
200 GRANULE, FOR SUSPENSION ORAL
Refills: 0 | Status: DISCONTINUED | OUTPATIENT
Start: 2021-09-14 | End: 2021-09-27

## 2021-09-14 RX ADMIN — Medication 4: at 23:53

## 2021-09-14 RX ADMIN — Medication 650 MILLIGRAM(S): at 05:25

## 2021-09-14 RX ADMIN — Medication 325 MILLIGRAM(S): at 11:56

## 2021-09-14 RX ADMIN — PANTOPRAZOLE SODIUM 40 MILLIGRAM(S): 20 TABLET, DELAYED RELEASE ORAL at 05:25

## 2021-09-14 RX ADMIN — Medication 500 MILLIGRAM(S): at 17:50

## 2021-09-14 RX ADMIN — Medication 650 MILLIGRAM(S): at 06:26

## 2021-09-14 RX ADMIN — Medication 2: at 16:41

## 2021-09-14 RX ADMIN — Medication 650 MILLIGRAM(S): at 00:02

## 2021-09-14 RX ADMIN — Medication 2: at 05:38

## 2021-09-14 RX ADMIN — CLOPIDOGREL BISULFATE 75 MILLIGRAM(S): 75 TABLET, FILM COATED ORAL at 12:01

## 2021-09-14 RX ADMIN — Medication 6: at 12:04

## 2021-09-14 RX ADMIN — Medication 1 TABLET(S): at 11:56

## 2021-09-14 RX ADMIN — CARVEDILOL PHOSPHATE 25 MILLIGRAM(S): 80 CAPSULE, EXTENDED RELEASE ORAL at 17:50

## 2021-09-14 RX ADMIN — CALCITRIOL 0.25 MICROGRAM(S): 0.5 CAPSULE ORAL at 11:56

## 2021-09-14 RX ADMIN — CHLORHEXIDINE GLUCONATE 1 APPLICATION(S): 213 SOLUTION TOPICAL at 05:25

## 2021-09-14 RX ADMIN — Medication 6: at 00:02

## 2021-09-14 RX ADMIN — Medication 125 MILLIGRAM(S): at 05:24

## 2021-09-14 RX ADMIN — ENOXAPARIN SODIUM 30 MILLIGRAM(S): 100 INJECTION SUBCUTANEOUS at 12:00

## 2021-09-14 RX ADMIN — Medication 500 MILLIGRAM(S): at 05:25

## 2021-09-14 RX ADMIN — CARVEDILOL PHOSPHATE 25 MILLIGRAM(S): 80 CAPSULE, EXTENDED RELEASE ORAL at 05:24

## 2021-09-14 RX ADMIN — RALOXIFENE HYDROCHLORIDE 60 MILLIGRAM(S): 60 TABLET, COATED ORAL at 12:00

## 2021-09-14 RX ADMIN — Medication 25 MICROGRAM(S): at 05:25

## 2021-09-14 RX ADMIN — LATANOPROST 1 DROP(S): 0.05 SOLUTION/ DROPS OPHTHALMIC; TOPICAL at 22:11

## 2021-09-14 RX ADMIN — ATORVASTATIN CALCIUM 80 MILLIGRAM(S): 80 TABLET, FILM COATED ORAL at 22:12

## 2021-09-14 RX ADMIN — PANTOPRAZOLE SODIUM 40 MILLIGRAM(S): 20 TABLET, DELAYED RELEASE ORAL at 17:50

## 2021-09-14 RX ADMIN — Medication 650 MILLIGRAM(S): at 17:53

## 2021-09-14 RX ADMIN — Medication 650 MILLIGRAM(S): at 00:45

## 2021-09-14 RX ADMIN — Medication 650 MILLIGRAM(S): at 12:01

## 2021-09-14 RX ADMIN — FIDAXOMICIN 200 MILLIGRAM(S): 200 GRANULE, FOR SUSPENSION ORAL at 18:16

## 2021-09-14 RX ADMIN — Medication 125 MILLIGRAM(S): at 00:02

## 2021-09-14 RX ADMIN — APIXABAN 2.5 MILLIGRAM(S): 2.5 TABLET, FILM COATED ORAL at 17:52

## 2021-09-14 RX ADMIN — Medication 650 MILLIGRAM(S): at 18:30

## 2021-09-14 RX ADMIN — Medication 650 MILLIGRAM(S): at 23:27

## 2021-09-14 RX ADMIN — Medication 650 MILLIGRAM(S): at 12:52

## 2021-09-14 RX ADMIN — Medication 1 MILLIGRAM(S): at 11:56

## 2021-09-14 NOTE — PROGRESS NOTE ADULT - ASSESSMENT
Orthopaedic Surgery Progress Note    S&E at bedside this AM in CCU. Pain well controlled. Placed in pelvic binder yesterday due to drainage over surgical sites. Per nurse pelvic binder was removed sometime last evening due to patient BM. Doing well this morning with no active complaints.      T(C): 35.9 (09-14-21 @ 08:00), Max: 36.6 (09-13-21 @ 16:00)  HR: 96 (09-14-21 @ 10:00) (68 - 112)  BP: 123/67 (09-14-21 @ 10:00) (106/56 - 159/84)  RR: 25 (09-14-21 @ 10:00) (18 - 25)  SpO2: 99% (09-14-21 @ 10:00) (97% - 100%)    P/E:  AOx3, NAD  Nonlabored breathing    LLE  Dressing in place, saturated with serous drainage  Pelvic binder not in place  Dressing removed and aquacell dressing put in place  SILT sp/dp/t/sural/saph  Firing ta/ehl/fhl/gs  Foot WWP    Labs                        9.9    18.53 )-----------( 238      ( 14 Sep 2021 04:49 )             30.8     09-14    138  |  109  |  49<H>  ----------------------------<  157<H>  5.0   |  18  |  1.7<H>    Ca    9.0      14 Sep 2021 04:49  Phos  2.2     09-13  Mg     1.9     09-14    TPro  4.7<L>  /  Alb  2.6<L>  /  TBili  0.8  /  DBili  x   /  AST  14  /  ALT  20  /  AlkPhos  77  09-14    LIVER FUNCTIONS - ( 14 Sep 2021 04:49 )  Alb: 2.6 g/dL / Pro: 4.7 g/dL / ALK PHOS: 77 U/L / ALT: 20 U/L / AST: 14 U/L / GGT: x               A/P:   84yo Female L basi-cervical femoral neck fracture s/p IMN POD8. Now in CCU for NSTEMI. GI also following for prior episode of coffee ground emesis.    - Pelvic binder in place yesterday (removed last evening)  - Still having serous drainage from surgical sites  - Aquacell dressing placed today  - Daily dressing checks / changes prn    - On fidoxamicin for c diff +  - GI deferring EGD as coffee ground emesis has resolved and currently being treated for NSTEMI  - Pending cath with cardiology for NSTEMI  - Son requesting transfer to [facility] (reportedly was not accepted)    - Continue to trend Hb and monitor closely  - AC: plavix, LVX resumed  - WBAT LLE  - pain control  - PT/OT

## 2021-09-14 NOTE — PROGRESS NOTE ADULT - ASSESSMENT
85F w/PMHx CAD, CHF, Afib on Eliquis last dose 9/10 AM, DM, HTN, colon cancer s/p resection in 2016 in remission, CKD stage 3, hx of diverticulitis, presents s/p mechanical fall at home admitted on 9/5 s/p left femur fracture repair on 9/6. GI was called for melena, coffee ground emesis and epigastric pain. Then she developed NSTEMI in hospital on DAPT    IMPRESSION;  CD colitis  Dark stools. GI bleed ? CD related  WBC 14.1>18.5    RECOMMENDATIONS;  GI f/u  Serum lactate level  Po Fidaxomicin 200 mg q12h

## 2021-09-14 NOTE — CHART NOTE - NSCHARTNOTEFT_GEN_A_CORE
85F w/PMHx CAD, CHF, Afib on Eliquis, DM, HTN, colon cancer, CKD stage 3, hx of diverticulitis, presents s/p mechanical fall at home, +HT, -LOC, +AC (Eliquis). Patient was in her kitchen and "turned around too fast" when she fell over and hit her head on a chair. She denies loss of consciousness. She remained down for ~30 min before EMS came to take her into the ED. She is GCS 15, primary survey negative. No external signs of trauma. Complains of LLE pain. Denies scalp, spinal, chest, abdominal pain. ROM of all extremities in tact. Was admitted to ortho service for eval and surgical correction of left femoral IT fracture s/p mechanical fall at home. Pt went to the OR and underwent successful fixation of left hip intertochanteric hip fracture. While on the floors the patient had an episode of melena. As per patient she had coffee ground emesis on 9/9 PM 2 episodes followed by black tarry stools and abdominal pain intermittently in epigastric region worse with eating. 6/10 in intensity, non radiating. GI was consulted and decided that there was questionable GI bleeding. They elected to defer an EGD. The patient was also found to be C. Diff positive and started on Vancomycin. The patient also developed a hematoma at the site of the surgery, Ortho evaluated her and are following her and replacing dressings as needed. The patient had typical chest pain with elevated tropes and was diagnosed with NSTEMI type 2. 85F w/PMHx CAD, CHF, Afib on Eliquis, DM, HTN, colon cancer, CKD stage 3, hx of diverticulitis, presents s/p mechanical fall at home, +HT, -LOC, +AC (Eliquis). Patient was in her kitchen and "turned around too fast" when she fell over and hit her head on a chair. She denies loss of consciousness. She remained down for ~30 min before EMS came to take her into the ED. She is GCS 15, primary survey negative. No external signs of trauma. Complains of LLE pain. Denies scalp, spinal, chest, abdominal pain. ROM of all extremities in tact. Was admitted to ortho service for eval and surgical correction of left femoral IT fracture s/p mechanical fall at home. Pt went to the OR and underwent successful fixation of left hip intertochanteric hip fracture. While on the floors the patient had an episode of melena. As per patient she had coffee ground emesis on 9/9 PM 2 episodes followed by black tarry stools and abdominal pain intermittently in epigastric region worse with eating. 6/10 in intensity, non radiating. GI was consulted and decided that there was questionable GI bleeding. Anticoagulation was stopped. The patient's Hb was 5.4. She was transfused and the hemoglobin improved. The patient had typical chest pain with elevated tropes and was diagnosed with NSTEMI type 2. She was upgraded to the CCU. Due to the possibility of a GI bleed the patient was not cath'd. The patient was also found to be C. Diff positive and started on Vancomycin. The patient also developed a hematoma at the site of the surgery, Ortho evaluated her and are following her and replacing dressings as needed. The patient is now stable and her chest pain has improved. Per discussion with ID the patient was switched to Fidaxomicin. Per Ortho and GI pt is okay to resume DAPT and Eliquis.     FOLLOW UP  - Follow up with Ortho for post-op care and instructions  - Monitor hematoma at surgical site with Ortho  - ID following for c.diff and asymptomatic UTI  - Per ID keep patient only on Fidaxomicin  - Pt does not need other abx  - Per Ortho and GI pt is okay to resume Eliquis  - Per Cardio pt had type II NSTEMI, no intervention performed and not planned at this time  - Patient's family wanted her to be transferred to [facility], case was presented to CCU attending and transfer was refused  - Patient's family is insisting on transferring her to [facility] but no accepting physician as of yet 85F w/PMHx CAD, CHF, Afib on Eliquis, DM, HTN, colon cancer, CKD stage 3, hx of diverticulitis, presents s/p mechanical fall at home, +HT, -LOC, +AC (Eliquis). Patient was in her kitchen and "turned around too fast" when she fell over and hit her head on a chair. She denies loss of consciousness. She remained down for ~30 min before EMS came to take her into the ED. She is GCS 15, primary survey negative. No external signs of trauma. Complains of LLE pain. Denies scalp, spinal, chest, abdominal pain. ROM of all extremities in tact. Was admitted to ortho service for eval and surgical correction of left femoral IT fracture s/p mechanical fall at home. Pt went to the OR and underwent successful fixation of left hip intertochanteric hip fracture. While on the floors the patient had an episode of melena. As per patient she had coffee ground emesis on 9/9 PM 2 episodes followed by black tarry stools and abdominal pain intermittently in epigastric region worse with eating. 6/10 in intensity, non radiating. GI was consulted and decided that there was questionable GI bleeding. Anticoagulation was stopped. The patient's Hb was 5.4. She was transfused and the hemoglobin improved. The patient had typical chest pain with elevated tropes and was diagnosed with NSTEMI type 2. She was upgraded to the CCU. Due to the possibility of a GI bleed the patient was not cath'd. The patient was also found to be C. Diff positive and started on Vancomycin. The patient also developed a hematoma at the site of the surgery, Ortho evaluated her and are following her and replacing dressings as needed. The patient is now stable and her chest pain has improved. Per discussion with ID the patient was switched to Fidaxomicin. Per Ortho and GI pt is okay to resume DAPT and Eliquis.       PHYSICAL EXAM:  GEN: No acute distress  LUNGS: minor crackles  HEART: S1/S2 present. RRR.   ABD: Soft, non-tender, non-distended. Bowel sounds present  EXT: trace edema  NEURO: AAOX3          FOLLOW UP  - Follow up with Ortho for post-op care and instructions  - Monitor hematoma at surgical site with Ortho  - ID following for c.diff and asymptomatic UTI  - Per ID keep patient only on Fidaxomicin  - Pt does not need other abx  - Per Ortho and GI pt is okay to resume Eliquis 2.5 BID as no plan for EGD  - Per Cardio pt had type II NSTEMI, no intervention performed and not planned at this time  - Patient's family wanted her to be transferred to [facility], case was presented to CCU attending and transfer was refused  - Patient's family is insisting on transferring her to [facility] but no accepting physician as of yet  - Follow up with primary cardiologist outpatient  - Follow up with GI outpatient  - Follow up with Ortho outpatient 85F w/PMHx CAD, CHF, Afib on Eliquis, DM, HTN, colon cancer, CKD stage 3, hx of diverticulitis, presents s/p mechanical fall at home, +HT, -LOC, +AC (Eliquis). Patient was in her kitchen and "turned around too fast" when she fell over and hit her head on a chair. She denies loss of consciousness. She remained down for ~30 min before EMS came to take her into the ED. She is GCS 15, primary survey negative. No external signs of trauma. Complains of LLE pain. Denies scalp, spinal, chest, abdominal pain. ROM of all extremities in tact. Was admitted to ortho service for eval and surgical correction of left femoral IT fracture s/p mechanical fall at home. Pt went to the OR and underwent successful fixation of left hip intertochanteric hip fracture. While on the floors the patient had an episode of melena. As per patient she had coffee ground emesis on 9/9 PM 2 episodes followed by black tarry stools and abdominal pain intermittently in epigastric region worse with eating. 6/10 in intensity, non radiating. GI was consulted and decided that there was questionable GI bleeding. Anticoagulation was stopped. The patient's Hb was 5.4. She was transfused and the hemoglobin improved. The patient had typical chest pain with elevated tropes and was diagnosed with NSTEMI type 2. She was upgraded to the CCU. Due to the possibility of a GI bleed the patient was not cath'd. The patient was also found to be C. Diff positive and started on Vancomycin. The patient also developed a hematoma at the site of the surgery, Ortho evaluated her and are following her and replacing dressings as needed. The patient is now stable and her chest pain has improved. Per discussion with ID the patient was switched to Fidaxomicin. Per Ortho and GI pt is okay to resume DAPT and Eliquis.       PHYSICAL EXAM:  GEN: No acute distress  LUNGS: minor crackles  HEART: S1/S2 present. RRR.   ABD: Soft, non-tender, non-distended. Bowel sounds present  EXT: trace edema  NEURO: AAOX3          FOLLOW UP  - Follow up with Ortho for post-op care and instructions  - Monitor hematoma at surgical site with Ortho  - ID following for c.diff and asymptomatic UTI  - Per ID keep patient only on Fidaxomicin  - Pt does not need other abx  - Per Ortho and GI pt is okay to resume Eliquis 2.5 BID as no plan for EGD  - Per Cardio pt had type II NSTEMI, no intervention performed and not planned at this time  - Hold losartan and Lasix at this time due to patient's BP, resume at d/c  - Patient's family wanted her to be transferred to [facility], case was presented to CCU attending and transfer was refused  - Patient's family is insisting on transferring her to [facility] but no accepting physician as of yet  - Follow up with primary cardiologist outpatient  - Follow up with GI outpatient  - Follow up with Ortho outpatient

## 2021-09-14 NOTE — PROGRESS NOTE ADULT - SUBJECTIVE AND OBJECTIVE BOX
SUBJECTIVE:    Patient is a 85y old Female who presents with a chief complaint of Hip fracture (13 Sep 2021 09:21)    Currently admitted to medicine with the primary diagnosis of Intertrochanteric fracture of right hip       Today is hospital day 9d. This morning she is resting comfortably in bed and reports no new issues or overnight events.     PAST MEDICAL & SURGICAL HISTORY  CAD (coronary artery disease)    Chronic CHF    Atrial fibrillation    Type 2 diabetes mellitus    Colon cancer    Hypertension    CKD (chronic kidney disease)  Stage 3    History of Clostridium difficile colitis  s/p fecal transplant    Diverticulitis    S/P hysterectomy      SOCIAL HISTORY:  Negative for smoking/alcohol/drug use.     ALLERGIES:  No Known Allergies    MEDICATIONS:  STANDING MEDICATIONS  acetaminophen   Tablet .. 650 milliGRAM(s) Oral every 6 hours  ascorbic acid 500 milliGRAM(s) Oral two times a day  atorvastatin 80 milliGRAM(s) Oral at bedtime  calcitriol   Capsule 0.25 MICROGram(s) Oral daily  carvedilol 25 milliGRAM(s) Oral every 12 hours  chlorhexidine 4% Liquid 1 Application(s) Topical <User Schedule>  clopidogrel Tablet 75 milliGRAM(s) Oral daily  dextrose 40% Gel 15 Gram(s) Oral once  dextrose 50% Injectable 25 Gram(s) IV Push once  ferrous    sulfate 325 milliGRAM(s) Oral daily  folic acid 1 milliGRAM(s) Oral daily  glucagon  Injectable 1 milliGRAM(s) IntraMuscular once  insulin lispro (ADMELOG) corrective regimen sliding scale   SubCutaneous every 6 hours  latanoprost 0.005% Ophthalmic Solution 1 Drop(s) Both EYES at bedtime  levothyroxine 25 MICROGram(s) Oral daily  multivitamin 1 Tablet(s) Oral daily  pantoprazole  Injectable 40 milliGRAM(s) IV Push every 12 hours  raloxifene 60 milliGRAM(s) Oral daily  senna 2 Tablet(s) Oral at bedtime  vancomycin    Solution 125 milliGRAM(s) Oral every 6 hours    PRN MEDICATIONS  bisacodyl Suppository 10 milliGRAM(s) Rectal daily PRN  diphenhydrAMINE 25 milliGRAM(s) Oral at bedtime PRN  ondansetron Injectable 4 milliGRAM(s) IV Push every 6 hours PRN    VITALS:   T(F): 96.8  HR: 96  BP: 110/56  RR: 20  SpO2: 97%    LABS:                        9.9    18.53 )-----------( 238      ( 14 Sep 2021 04:49 )             30.8     -14    138  |  109  |  49<H>  ----------------------------<  157<H>  5.0   |  18  |  1.7<H>    Ca    9.0      14 Sep 2021 04:49  Phos  2.2     -  Mg     1.9         TPro  4.7<L>  /  Alb  2.6<L>  /  TBili  0.8  /  DBili  x   /  AST  14  /  ALT  20  /  AlkPhos  77        Urinalysis Basic - ( 12 Sep 2021 22:30 )    Color: Lovely / Appearance: Turbid / S.032 / pH: x  Gluc: x / Ketone: Negative  / Bili: Negative / Urobili: 6 mg/dL   Blood: x / Protein: 30 mg/dL / Nitrite: Negative   Leuk Esterase: Large / RBC: 25 /HPF / WBC 5 /HPF   Sq Epi: x / Non Sq Epi: 0 /HPF / Bacteria: Few        Troponin T, Serum: 0.59 ng/mL *HH* (21 @ 04:49)  Lactate, Blood: 1.8 mmol/L (21 @ 20:53)  Troponin T, Serum: 0.51 ng/mL *HH* (21 @ 17:24)      CARDIAC MARKERS ( 14 Sep 2021 04:49 )  x     / 0.59 ng/mL / x     / x     / x      CARDIAC MARKERS ( 13 Sep 2021 17:24 )  x     / 0.51 ng/mL / x     / x     / x      CARDIAC MARKERS ( 13 Sep 2021 04:47 )  x     / 0.41 ng/mL / x     / x     / x      CARDIAC MARKERS ( 12 Sep 2021 13:53 )  x     / 0.37 ng/mL / x     / x     / x          RADIOLOGY:    PHYSICAL EXAM:  GEN: No acute distress  LUNGS: Clear to auscultation bilaterally   HEART: S1/S2 present. RRR.   ABD: Soft, non-tender, non-distended. Bowel sounds present  EXT: NC/NC/NE/2+PP/SHARIF  NEURO: AAOX3     SUBJECTIVE:    Patient is a 85y old Female who presents with a chief complaint of Hip fracture (13 Sep 2021 09:21)    Currently admitted to medicine with the primary diagnosis of Intertrochanteric fracture of right hip       Today is hospital day 9d. This morning she is resting comfortably in bed and reports no new issues or overnight events. Patient denies lightheadedness, fatigue, chest pain, shortness of breath, abdominal pain, nausea, vomiting, and constipation. She reports having diarrhea due c. diff. As per nurse, patient had multiple episode of formed loose stools throughout the night and dark urine.     PAST MEDICAL & SURGICAL HISTORY  CAD (coronary artery disease)    Chronic CHF    Atrial fibrillation    Type 2 diabetes mellitus    Colon cancer    Hypertension    CKD (chronic kidney disease)  Stage 3    History of Clostridium difficile colitis  s/p fecal transplant    Diverticulitis    S/P hysterectomy      SOCIAL HISTORY:  Negative for smoking/alcohol/drug use.     ALLERGIES:  No Known Allergies    MEDICATIONS:  STANDING MEDICATIONS  acetaminophen   Tablet .. 650 milliGRAM(s) Oral every 6 hours  ascorbic acid 500 milliGRAM(s) Oral two times a day  atorvastatin 80 milliGRAM(s) Oral at bedtime  calcitriol   Capsule 0.25 MICROGram(s) Oral daily  carvedilol 25 milliGRAM(s) Oral every 12 hours  chlorhexidine 4% Liquid 1 Application(s) Topical <User Schedule>  clopidogrel Tablet 75 milliGRAM(s) Oral daily  dextrose 40% Gel 15 Gram(s) Oral once  dextrose 50% Injectable 25 Gram(s) IV Push once  ferrous    sulfate 325 milliGRAM(s) Oral daily  folic acid 1 milliGRAM(s) Oral daily  glucagon  Injectable 1 milliGRAM(s) IntraMuscular once  insulin lispro (ADMELOG) corrective regimen sliding scale   SubCutaneous every 6 hours  latanoprost 0.005% Ophthalmic Solution 1 Drop(s) Both EYES at bedtime  levothyroxine 25 MICROGram(s) Oral daily  multivitamin 1 Tablet(s) Oral daily  pantoprazole  Injectable 40 milliGRAM(s) IV Push every 12 hours  raloxifene 60 milliGRAM(s) Oral daily  senna 2 Tablet(s) Oral at bedtime  vancomycin    Solution 125 milliGRAM(s) Oral every 6 hours    PRN MEDICATIONS  bisacodyl Suppository 10 milliGRAM(s) Rectal daily PRN  diphenhydrAMINE 25 milliGRAM(s) Oral at bedtime PRN  ondansetron Injectable 4 milliGRAM(s) IV Push every 6 hours PRN    VITALS:   T(F): 96.8  HR: 96  BP: 110/56  RR: 20  SpO2: 97%    LABS:                        9.9    18.53 )-----------( 238      ( 14 Sep 2021 04:49 )             30.8     -14    138  |  109  |  49<H>  ----------------------------<  157<H>  5.0   |  18  |  1.7<H>    Ca    9.0      14 Sep 2021 04:49  Phos  2.2       Mg     1.9         TPro  4.7<L>  /  Alb  2.6<L>  /  TBili  0.8  /  DBili  x   /  AST  14  /  ALT  20  /  AlkPhos  77        Urinalysis Basic - ( 12 Sep 2021 22:30 )    Color: Lovely / Appearance: Turbid / S.032 / pH: x  Gluc: x / Ketone: Negative  / Bili: Negative / Urobili: 6 mg/dL   Blood: x / Protein: 30 mg/dL / Nitrite: Negative   Leuk Esterase: Large / RBC: 25 /HPF / WBC 5 /HPF   Sq Epi: x / Non Sq Epi: 0 /HPF / Bacteria: Few        Troponin T, Serum: 0.59 ng/mL *HH* (21 @ 04:49)  Lactate, Blood: 1.8 mmol/L (21 @ 20:53)  Troponin T, Serum: 0.51 ng/mL *HH* (21 @ 17:24)      CARDIAC MARKERS ( 14 Sep 2021 04:49 )  x     / 0.59 ng/mL / x     / x     / x      CARDIAC MARKERS ( 13 Sep 2021 17:24 )  x     / 0.51 ng/mL / x     / x     / x      CARDIAC MARKERS ( 13 Sep 2021 04:47 )  x     / 0.41 ng/mL / x     / x     / x      CARDIAC MARKERS ( 12 Sep 2021 13:53 )  x     / 0.37 ng/mL / x     / x     / x          RADIOLOGY:    PHYSICAL EXAM:  GEN: No acute distress  LUNGS: Clear to auscultation bilaterally   HEART: S1/S2 present. RRR.   ABD: Soft, non-tender, non-distended. Bowel sounds present  EXT: NC/NC/NE/2+PP/SHARIF  NEURO: AAOX3     SUBJECTIVE:    Patient is a 85y old Female who presents with a chief complaint of Hip fracture (13 Sep 2021 09:21)    Currently admitted to medicine with the primary diagnosis of Intertrochanteric fracture of right hip       Today is hospital day 9d. This morning she is resting comfortably in bed and reports no new issues or overnight events. Patient denies lightheadedness, fatigue, chest pain, shortness of breath, abdominal pain, nausea, vomiting, and constipation. She reports having diarrhea due c. diff. As per nurse, patient had multiple episode of formed loose stools throughout the night and dark urine.     PAST MEDICAL & SURGICAL HISTORY  CAD (coronary artery disease)    Chronic CHF    Atrial fibrillation    Type 2 diabetes mellitus    Colon cancer    Hypertension    CKD (chronic kidney disease)  Stage 3    History of Clostridium difficile colitis  s/p fecal transplant    Diverticulitis    S/P hysterectomy      SOCIAL HISTORY:  Negative for smoking/alcohol/drug use.     ALLERGIES:  No Known Allergies    MEDICATIONS:  STANDING MEDICATIONS  acetaminophen   Tablet .. 650 milliGRAM(s) Oral every 6 hours  ascorbic acid 500 milliGRAM(s) Oral two times a day  atorvastatin 80 milliGRAM(s) Oral at bedtime  calcitriol   Capsule 0.25 MICROGram(s) Oral daily  carvedilol 25 milliGRAM(s) Oral every 12 hours  chlorhexidine 4% Liquid 1 Application(s) Topical <User Schedule>  clopidogrel Tablet 75 milliGRAM(s) Oral daily  dextrose 40% Gel 15 Gram(s) Oral once  dextrose 50% Injectable 25 Gram(s) IV Push once  ferrous    sulfate 325 milliGRAM(s) Oral daily  folic acid 1 milliGRAM(s) Oral daily  glucagon  Injectable 1 milliGRAM(s) IntraMuscular once  insulin lispro (ADMELOG) corrective regimen sliding scale   SubCutaneous every 6 hours  latanoprost 0.005% Ophthalmic Solution 1 Drop(s) Both EYES at bedtime  levothyroxine 25 MICROGram(s) Oral daily  multivitamin 1 Tablet(s) Oral daily  pantoprazole  Injectable 40 milliGRAM(s) IV Push every 12 hours  raloxifene 60 milliGRAM(s) Oral daily  senna 2 Tablet(s) Oral at bedtime  vancomycin    Solution 125 milliGRAM(s) Oral every 6 hours    PRN MEDICATIONS  bisacodyl Suppository 10 milliGRAM(s) Rectal daily PRN  diphenhydrAMINE 25 milliGRAM(s) Oral at bedtime PRN  ondansetron Injectable 4 milliGRAM(s) IV Push every 6 hours PRN    VITALS:   T(F): 96.8  HR: 96  BP: 110/56  RR: 20  SpO2: 97%    LABS:                        9.9    18.53 )-----------( 238      ( 14 Sep 2021 04:49 )             30.8     -14    138  |  109  |  49<H>  ----------------------------<  157<H>  5.0   |  18  |  1.7<H>    Ca    9.0      14 Sep 2021 04:49  Phos  2.2       Mg     1.9         TPro  4.7<L>  /  Alb  2.6<L>  /  TBili  0.8  /  DBili  x   /  AST  14  /  ALT  20  /  AlkPhos  77        Urinalysis Basic - ( 12 Sep 2021 22:30 )    Color: Lovely / Appearance: Turbid / S.032 / pH: x  Gluc: x / Ketone: Negative  / Bili: Negative / Urobili: 6 mg/dL   Blood: x / Protein: 30 mg/dL / Nitrite: Negative   Leuk Esterase: Large / RBC: 25 /HPF / WBC 5 /HPF   Sq Epi: x / Non Sq Epi: 0 /HPF / Bacteria: Few        Troponin T, Serum: 0.59 ng/mL *HH* (21 @ 04:49)  Lactate, Blood: 1.8 mmol/L (21 @ 20:53)  Troponin T, Serum: 0.51 ng/mL *HH* (21 @ 17:24)      CARDIAC MARKERS ( 14 Sep 2021 04:49 )  x     / 0.59 ng/mL / x     / x     / x      CARDIAC MARKERS ( 13 Sep 2021 17:24 )  x     / 0.51 ng/mL / x     / x     / x      CARDIAC MARKERS ( 13 Sep 2021 04:47 )  x     / 0.41 ng/mL / x     / x     / x      CARDIAC MARKERS ( 12 Sep 2021 13:53 )  x     / 0.37 ng/mL / x     / x     / x          RADIOLOGY:    PHYSICAL EXAM:  GEN: No acute distress  LUNGS: Clear to auscultation bilaterally   HEART: S1/S2 present. Irregular  ABD: Soft, non-tender, non-distended. Bowel sounds present  EXT: no edema  NEURO: AAOX3     SUBJECTIVE:    Patient is a 85y old Female who presents with a chief complaint of Hip fracture (13 Sep 2021 09:21)    Currently admitted to medicine with the primary diagnosis of Intertrochanteric fracture of right hip       Today is hospital day 9d. This morning she is resting comfortably in bed and reports no new issues or overnight events. Patient denies lightheadedness, fatigue, chest pain, shortness of breath, abdominal pain, nausea, vomiting, and constipation. She reports having diarrhea due c. diff. As per nurse, patient had multiple episode of formed loose stools throughout the night and dark urine.       PAST MEDICAL & SURGICAL HISTORY  CAD (coronary artery disease)    Chronic CHF    Atrial fibrillation    Type 2 diabetes mellitus    Colon cancer    Hypertension    CKD (chronic kidney disease)  Stage 3    History of Clostridium difficile colitis  s/p fecal transplant    Diverticulitis    S/P hysterectomy      SOCIAL HISTORY:  Negative for smoking/alcohol/drug use.     ALLERGIES:  No Known Allergies    MEDICATIONS:  STANDING MEDICATIONS  acetaminophen   Tablet .. 650 milliGRAM(s) Oral every 6 hours  ascorbic acid 500 milliGRAM(s) Oral two times a day  atorvastatin 80 milliGRAM(s) Oral at bedtime  calcitriol   Capsule 0.25 MICROGram(s) Oral daily  carvedilol 25 milliGRAM(s) Oral every 12 hours  chlorhexidine 4% Liquid 1 Application(s) Topical <User Schedule>  clopidogrel Tablet 75 milliGRAM(s) Oral daily  dextrose 40% Gel 15 Gram(s) Oral once  dextrose 50% Injectable 25 Gram(s) IV Push once  ferrous    sulfate 325 milliGRAM(s) Oral daily  folic acid 1 milliGRAM(s) Oral daily  glucagon  Injectable 1 milliGRAM(s) IntraMuscular once  insulin lispro (ADMELOG) corrective regimen sliding scale   SubCutaneous every 6 hours  latanoprost 0.005% Ophthalmic Solution 1 Drop(s) Both EYES at bedtime  levothyroxine 25 MICROGram(s) Oral daily  multivitamin 1 Tablet(s) Oral daily  pantoprazole  Injectable 40 milliGRAM(s) IV Push every 12 hours  raloxifene 60 milliGRAM(s) Oral daily  senna 2 Tablet(s) Oral at bedtime  vancomycin    Solution 125 milliGRAM(s) Oral every 6 hours    PRN MEDICATIONS  bisacodyl Suppository 10 milliGRAM(s) Rectal daily PRN  diphenhydrAMINE 25 milliGRAM(s) Oral at bedtime PRN  ondansetron Injectable 4 milliGRAM(s) IV Push every 6 hours PRN    VITALS:   T(F): 96.8  HR: 96  BP: 110/56  RR: 20  SpO2: 97%    LABS:                        9.9    18.53 )-----------( 238      ( 14 Sep 2021 04:49 )             30.8     -14    138  |  109  |  49<H>  ----------------------------<  157<H>  5.0   |  18  |  1.7<H>    Ca    9.0      14 Sep 2021 04:49  Phos  2.2       Mg     1.9         TPro  4.7<L>  /  Alb  2.6<L>  /  TBili  0.8  /  DBili  x   /  AST  14  /  ALT  20  /  AlkPhos  77        Urinalysis Basic - ( 12 Sep 2021 22:30 )    Color: Lovely / Appearance: Turbid / S.032 / pH: x  Gluc: x / Ketone: Negative  / Bili: Negative / Urobili: 6 mg/dL   Blood: x / Protein: 30 mg/dL / Nitrite: Negative   Leuk Esterase: Large / RBC: 25 /HPF / WBC 5 /HPF   Sq Epi: x / Non Sq Epi: 0 /HPF / Bacteria: Few        Troponin T, Serum: 0.59 ng/mL *HH* (21 @ 04:49)  Lactate, Blood: 1.8 mmol/L (21 @ 20:53)  Troponin T, Serum: 0.51 ng/mL *HH* (21 @ 17:24)      CARDIAC MARKERS ( 14 Sep 2021 04:49 )  x     / 0.59 ng/mL / x     / x     / x      CARDIAC MARKERS ( 13 Sep 2021 17:24 )  x     / 0.51 ng/mL / x     / x     / x      CARDIAC MARKERS ( 13 Sep 2021 04:47 )  x     / 0.41 ng/mL / x     / x     / x      CARDIAC MARKERS ( 12 Sep 2021 13:53 )  x     / 0.37 ng/mL / x     / x     / x          RADIOLOGY:      PHYSICAL EXAM:  GEN: No acute distress  LUNGS: Clear to auscultation bilaterally   HEART: S1/S2 present. Irregular  ABD: Soft, non-tender, non-distended. Bowel sounds present  EXT: no edema  NEURO: AAOX3

## 2021-09-14 NOTE — PROGRESS NOTE ADULT - ASSESSMENT
IMPRESSION:  - Non-ST elevation ACS v/s Type II MI secondary to demand ischemia from acute blood loss anemia  - Acute blood loss anemia (Hg dropped to 5) - Operative site hematoma v/s possible upper GI bleeding (hematemesis?)  - C. 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  - elevated risk given recent bleed, NSTEMI    GI:  - Continue pantoprazole 40mg IV bid  - GI follow-up  - EGD planned for tomorrow  - COVID swab  - NPO after midnight    RENAL:    - Accurate I&O  - Monitor BUN/Cr  - Follow-up lytes.  Correct as needed to keep Mg>2.2 and K>4    INFECTIOUS DISEASE:   - Vancomycin PO for C. Diff  - ID evaluation - recommending Fidaxomicin  - WBC trending up, UA positive  - Check lactate level    HEMATOLOGICAL:    - DVT prophylaxis    ENDOCRINE:    - Follow up FS.  Insulin protocol if needed    MUSCULOSKELETAL:  - Increase activity as tolerated    DISPOSITION:  - Monitor in CCU  - Transfer to [facility] was requested by family, case was presented to CCU attending at [facility] - was not accepted.           IMPRESSION:  - Non-ST elevation ACS v/s Type II MI secondary to demand ischemia from acute blood loss anemia  - Acute blood loss anemia (Hg dropped to 5) - Operative site hematoma v/s possible upper GI bleeding (hematemesis?) --> currently stable at 9.9   - C. 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  - elevated risk given recent bleed, NSTEMI    GI:  - Continue pantoprazole 40mg IV bid  - GI follow-up yesterday ---> deferred endoscopy due to no active bleeding     RENAL:    - Accurate I&O  - Monitor BUN/Cr  - Follow-up lytes.  Correct as needed to keep Mg>2.2 and K>4    INFECTIOUS DISEASE:   - Vancomycin PO for C. Diff  - ID evaluation - recommending Fidaxomicin  - WBC trending up, UA positive --> urine cx pending   - Check lactate level    HEMATOLOGICAL:    - DVT prophylaxis    ENDOCRINE:    - Follow up FS.  Insulin protocol if needed    MUSCULOSKELETAL:  - Increase activity as tolerated  - as per ortho, PT consults recommended for ambulation out of bed to chair     DISPOSITION:  - Monitor in CCU  - Transfer to [facility] was requested by family, case was presented to CCU attending at [facility] - was not accepted.           IMPRESSION:  - Non-ST elevation ACS v/s Type II MI secondary to demand ischemia from acute blood loss anemia  - Acute blood loss anemia (Hg dropped to 5) - Operative site hematoma v/s possible upper GI bleeding (hematemesis?) --> currently stable at 9.9   - C. 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%  - AF  - CKD III  - DM, HTN  - History of colon cancer    PLAN:    CNS:  - Avoid sedatives    HEENT:   - Oral care    PULMONARY:    - HOB @ 45 degrees    CARDIOVASCULAR:  - CCU monitoring  - Resume plavix (cleared by GI)  - No anticoagulation for now, resume eliquis when cleared by GI and Ortho  - Continue atorvastatin and coreg  - Keep Hg>8  - no acute cardiac contraindications for  EGD if deemed necessary by GI  - elevated risk given recent bleed, NSTEMI    GI:  - Continue pantoprazole 40mg IV bid  - GI follow-up yesterday ---> deferred endoscopy due to no active bleeding   - Monitor CBC  - Monitor lactate level for C diff colitis  - F/U GI    RENAL:    - Accurate I&O  - Monitor BUN/Cr  - Follow-up lytes.  Correct as needed to keep Mg>2.2 and K>4    INFECTIOUS DISEASE:   - Vancomycin PO for C. Diff  - ID evaluation - recommending to switch vanc to Fidaxomicin 200mg bid  - WBC trending up - Follow-up cultures  - Monitor lactate level    HEMATOLOGICAL:    - DVT prophylaxis    ENDOCRINE:    - Follow up FS.  Insulin protocol if needed    MUSCULOSKELETAL:  - Increase activity as tolerated  - as per ortho, PT consults recommended for ambulation out of bed to chair     DISPOSITION:  - Monitor in CCU - possible downgrade today  - Transfer to [facility] was requested by family, case was presented to CCU attending at [facility] - was not accepted           IMPRESSION:  - Non-ST elevation ACS v/s Type II MI secondary to demand ischemia from acute blood loss anemia  - Acute blood loss anemia (Hg dropped to 5) - Operative site hematoma v/s possible upper GI bleeding (hematemesis?) --> currently stable at 9.9   - C. 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%  - AF  - CKD III  - DM, HTN  - History of colon cancer    PLAN:    CNS:  - Avoid sedatives    HEENT:   - Oral care    PULMONARY:    - HOB @ 45 degrees    CARDIOVASCULAR:  - CCU monitoring  - Resume plavix (cleared by GI)  - No anticoagulation for now, resume eliquis when cleared by GI and Ortho  - Continue atorvastatin and coreg  - Keep Hg>8  - no acute cardiac contraindications for  EGD if deemed necessary by GI  - elevated risk given recent bleed, NSTEMI    GI:  - Continue pantoprazole 40mg IV bid  - GI follow-up yesterday ---> deferred endoscopy due to no active bleeding   - Monitor CBC  - Monitor lactate level for C diff colitis  - F/U GI    RENAL:    - Accurate I&O  - Monitor BUN/Cr  - Follow-up lytes.  Correct as needed to keep Mg>2.2 and K>4    INFECTIOUS DISEASE:   - Vancomycin PO for C. Diff  - ID evaluation - recommending to switch vanc to Fidaxomicin 200mg bid  - WBC trending up - Follow-up cultures  - Monitor lactate level    HEMATOLOGICAL:    - DVT prophylaxis    ENDOCRINE:    - Follow up FS.  Insulin protocol if needed    MUSCULOSKELETAL:  - Increase activity as tolerated  - as per ortho, PT consults recommended for ambulation out of bed to chair     DISPOSITION:    - Monitor in CCU - possible downgrade if clinically better  - Transfer to [facility] was requested by family, case was presented to CCU attending at [facility] - was not accepted

## 2021-09-14 NOTE — PROGRESS NOTE ADULT - SUBJECTIVE AND OBJECTIVE BOX
SEAN SAWYER  85y, Female    All available historical data reviewed    OVERNIGHT EVENTS:  no fevers  no pressors  3 soft black BMs  Primafit with dark urine    ROS:  General: Denies rigors, nightsweats  HEENT: Denies headache, rhinorrhea, sore throat, eye pain  CV: Denies CP, palpitations  PULM: Denies wheezing, hemoptysis  GI: Denies hematemesis, hematochezia, melena  : Denies discharge, hematuria  MSK: Denies arthralgias, myalgias  SKIN: Denies rash, lesions  NEURO: Denies paresthesias, weakness  PSYCH: Denies depression, anxiety    VITALS:  T(F): 96.7, Max: 97.8 (21 @ 16:00)  HR: 68  BP: 106/56  RR: 25Vital Signs Last 24 Hrs  T(C): 35.9 (14 Sep 2021 08:00), Max: 36.6 (13 Sep 2021 16:00)  T(F): 96.7 (14 Sep 2021 08:00), Max: 97.8 (13 Sep 2021 16:00)  HR: 68 (14 Sep 2021 08:00) (68 - 112)  BP: 106/56 (14 Sep 2021 08:00) (106/56 - 159/84)  BP(mean): 77 (14 Sep 2021 08:00) (71 - 123)  RR: 25 (14 Sep 2021 08:00) (18 - 25)  SpO2: 97% (14 Sep 2021 08:00) (97% - 100%)    TESTS & MEASUREMENTS:                        9.9    18.53 )-----------( 238      ( 14 Sep 2021 04:49 )             30.8     -14    138  |  109  |  49<H>  ----------------------------<  157<H>  5.0   |  18  |  1.7<H>    Ca    9.0      14 Sep 2021 04:49  Phos  2.2     09-13  Mg     1.9     -14    TPro  4.7<L>  /  Alb  2.6<L>  /  TBili  0.8  /  DBili  x   /  AST  14  /  ALT  20  /  AlkPhos  77  -14    LIVER FUNCTIONS - ( 14 Sep 2021 04:49 )  Alb: 2.6 g/dL / Pro: 4.7 g/dL / ALK PHOS: 77 U/L / ALT: 20 U/L / AST: 14 U/L / GGT: x             Culture - Blood (collected 21 @ 16:44)  Source: .Blood None  Final Report (21 @ 01:00):    No Growth Final      Urinalysis Basic - ( 12 Sep 2021 22:30 )    Color: Lovely / Appearance: Turbid / S.032 / pH: x  Gluc: x / Ketone: Negative  / Bili: Negative / Urobili: 6 mg/dL   Blood: x / Protein: 30 mg/dL / Nitrite: Negative   Leuk Esterase: Large / RBC: 25 /HPF / WBC 5 /HPF   Sq Epi: x / Non Sq Epi: 0 /HPF / Bacteria: Few          RADIOLOGY & ADDITIONAL TESTS:  Personal review of radiological diagnostics performed  Echo and EKG results noted when applicable.     MEDICATIONS:  acetaminophen   Tablet .. 650 milliGRAM(s) Oral every 6 hours  ascorbic acid 500 milliGRAM(s) Oral two times a day  atorvastatin 80 milliGRAM(s) Oral at bedtime  bisacodyl Suppository 10 milliGRAM(s) Rectal daily PRN  calcitriol   Capsule 0.25 MICROGram(s) Oral daily  carvedilol 25 milliGRAM(s) Oral every 12 hours  chlorhexidine 4% Liquid 1 Application(s) Topical <User Schedule>  clopidogrel Tablet 75 milliGRAM(s) Oral daily  dextrose 40% Gel 15 Gram(s) Oral once  dextrose 50% Injectable 25 Gram(s) IV Push once  diphenhydrAMINE 25 milliGRAM(s) Oral at bedtime PRN  ferrous    sulfate 325 milliGRAM(s) Oral daily  folic acid 1 milliGRAM(s) Oral daily  glucagon  Injectable 1 milliGRAM(s) IntraMuscular once  insulin lispro (ADMELOG) corrective regimen sliding scale   SubCutaneous every 6 hours  latanoprost 0.005% Ophthalmic Solution 1 Drop(s) Both EYES at bedtime  levothyroxine 25 MICROGram(s) Oral daily  multivitamin 1 Tablet(s) Oral daily  ondansetron Injectable 4 milliGRAM(s) IV Push every 6 hours PRN  pantoprazole  Injectable 40 milliGRAM(s) IV Push every 12 hours  raloxifene 60 milliGRAM(s) Oral daily  senna 2 Tablet(s) Oral at bedtime  vancomycin    Solution 125 milliGRAM(s) Oral every 6 hours      ANTIBIOTICS:  vancomycin    Solution 125 milliGRAM(s) Oral every 6 hours

## 2021-09-15 LAB
ALBUMIN SERPL ELPH-MCNC: 2.7 G/DL — LOW (ref 3.5–5.2)
ALP SERPL-CCNC: 93 U/L — SIGNIFICANT CHANGE UP (ref 30–115)
ALT FLD-CCNC: 17 U/L — SIGNIFICANT CHANGE UP (ref 0–41)
ANION GAP SERPL CALC-SCNC: 9 MMOL/L — SIGNIFICANT CHANGE UP (ref 7–14)
AST SERPL-CCNC: 13 U/L — SIGNIFICANT CHANGE UP (ref 0–41)
BASOPHILS # BLD AUTO: 0.01 K/UL — SIGNIFICANT CHANGE UP (ref 0–0.2)
BASOPHILS NFR BLD AUTO: 0.1 % — SIGNIFICANT CHANGE UP (ref 0–1)
BILIRUB SERPL-MCNC: 0.8 MG/DL — SIGNIFICANT CHANGE UP (ref 0.2–1.2)
BUN SERPL-MCNC: 55 MG/DL — HIGH (ref 10–20)
CALCIUM SERPL-MCNC: 9.3 MG/DL — SIGNIFICANT CHANGE UP (ref 8.5–10.1)
CHLORIDE SERPL-SCNC: 108 MMOL/L — SIGNIFICANT CHANGE UP (ref 98–110)
CO2 SERPL-SCNC: 18 MMOL/L — SIGNIFICANT CHANGE UP (ref 17–32)
CREAT SERPL-MCNC: 1.8 MG/DL — HIGH (ref 0.7–1.5)
EOSINOPHIL # BLD AUTO: 0.14 K/UL — SIGNIFICANT CHANGE UP (ref 0–0.7)
EOSINOPHIL NFR BLD AUTO: 0.8 % — SIGNIFICANT CHANGE UP (ref 0–8)
GLUCOSE BLDC GLUCOMTR-MCNC: 211 MG/DL — HIGH (ref 70–99)
GLUCOSE BLDC GLUCOMTR-MCNC: 215 MG/DL — HIGH (ref 70–99)
GLUCOSE BLDC GLUCOMTR-MCNC: 233 MG/DL — HIGH (ref 70–99)
GLUCOSE SERPL-MCNC: 154 MG/DL — HIGH (ref 70–99)
HCT VFR BLD CALC: 30.4 % — LOW (ref 37–47)
HGB BLD-MCNC: 9.9 G/DL — LOW (ref 12–16)
IMM GRANULOCYTES NFR BLD AUTO: 1.1 % — HIGH (ref 0.1–0.3)
LACTATE SERPL-SCNC: 1.1 MMOL/L — SIGNIFICANT CHANGE UP (ref 0.7–2)
LYMPHOCYTES # BLD AUTO: 1.71 K/UL — SIGNIFICANT CHANGE UP (ref 1.2–3.4)
LYMPHOCYTES # BLD AUTO: 10.2 % — LOW (ref 20.5–51.1)
MAGNESIUM SERPL-MCNC: 1.8 MG/DL — SIGNIFICANT CHANGE UP (ref 1.8–2.4)
MCHC RBC-ENTMCNC: 28.3 PG — SIGNIFICANT CHANGE UP (ref 27–31)
MCHC RBC-ENTMCNC: 32.6 G/DL — SIGNIFICANT CHANGE UP (ref 32–37)
MCV RBC AUTO: 86.9 FL — SIGNIFICANT CHANGE UP (ref 81–99)
MONOCYTES # BLD AUTO: 1.43 K/UL — HIGH (ref 0.1–0.6)
MONOCYTES NFR BLD AUTO: 8.5 % — SIGNIFICANT CHANGE UP (ref 1.7–9.3)
NEUTROPHILS # BLD AUTO: 13.36 K/UL — HIGH (ref 1.4–6.5)
NEUTROPHILS NFR BLD AUTO: 79.3 % — HIGH (ref 42.2–75.2)
NRBC # BLD: 0 /100 WBCS — SIGNIFICANT CHANGE UP (ref 0–0)
PLATELET # BLD AUTO: 256 K/UL — SIGNIFICANT CHANGE UP (ref 130–400)
POTASSIUM SERPL-MCNC: 5.2 MMOL/L — HIGH (ref 3.5–5)
POTASSIUM SERPL-SCNC: 5.2 MMOL/L — HIGH (ref 3.5–5)
PROT SERPL-MCNC: 4.8 G/DL — LOW (ref 6–8)
RBC # BLD: 3.5 M/UL — LOW (ref 4.2–5.4)
RBC # FLD: 19.1 % — HIGH (ref 11.5–14.5)
SODIUM SERPL-SCNC: 135 MMOL/L — SIGNIFICANT CHANGE UP (ref 135–146)
WBC # BLD: 16.84 K/UL — HIGH (ref 4.8–10.8)
WBC # FLD AUTO: 16.84 K/UL — HIGH (ref 4.8–10.8)

## 2021-09-15 PROCEDURE — 99233 SBSQ HOSP IP/OBS HIGH 50: CPT

## 2021-09-15 PROCEDURE — 93010 ELECTROCARDIOGRAM REPORT: CPT

## 2021-09-15 RX ORDER — ATORVASTATIN CALCIUM 80 MG/1
40 TABLET, FILM COATED ORAL AT BEDTIME
Refills: 0 | Status: DISCONTINUED | OUTPATIENT
Start: 2021-09-15 | End: 2021-09-27

## 2021-09-15 RX ORDER — HEPARIN SODIUM 5000 [USP'U]/ML
5000 INJECTION INTRAVENOUS; SUBCUTANEOUS EVERY 12 HOURS
Refills: 0 | Status: DISCONTINUED | OUTPATIENT
Start: 2021-09-15 | End: 2021-09-18

## 2021-09-15 RX ORDER — CEFAZOLIN SODIUM 1 G
2000 VIAL (EA) INJECTION EVERY 8 HOURS
Refills: 0 | Status: DISCONTINUED | OUTPATIENT
Start: 2021-09-15 | End: 2021-09-18

## 2021-09-15 RX ADMIN — PANTOPRAZOLE SODIUM 40 MILLIGRAM(S): 20 TABLET, DELAYED RELEASE ORAL at 05:24

## 2021-09-15 RX ADMIN — Medication 1 MILLIGRAM(S): at 11:41

## 2021-09-15 RX ADMIN — PANTOPRAZOLE SODIUM 40 MILLIGRAM(S): 20 TABLET, DELAYED RELEASE ORAL at 17:19

## 2021-09-15 RX ADMIN — APIXABAN 2.5 MILLIGRAM(S): 2.5 TABLET, FILM COATED ORAL at 05:25

## 2021-09-15 RX ADMIN — Medication 100 MILLIGRAM(S): at 21:18

## 2021-09-15 RX ADMIN — ATORVASTATIN CALCIUM 40 MILLIGRAM(S): 80 TABLET, FILM COATED ORAL at 21:16

## 2021-09-15 RX ADMIN — Medication 650 MILLIGRAM(S): at 17:18

## 2021-09-15 RX ADMIN — Medication 650 MILLIGRAM(S): at 11:41

## 2021-09-15 RX ADMIN — Medication 650 MILLIGRAM(S): at 12:20

## 2021-09-15 RX ADMIN — Medication 500 MILLIGRAM(S): at 05:25

## 2021-09-15 RX ADMIN — CARVEDILOL PHOSPHATE 25 MILLIGRAM(S): 80 CAPSULE, EXTENDED RELEASE ORAL at 17:18

## 2021-09-15 RX ADMIN — HEPARIN SODIUM 5000 UNIT(S): 5000 INJECTION INTRAVENOUS; SUBCUTANEOUS at 21:16

## 2021-09-15 RX ADMIN — RALOXIFENE HYDROCHLORIDE 60 MILLIGRAM(S): 60 TABLET, COATED ORAL at 11:40

## 2021-09-15 RX ADMIN — CARVEDILOL PHOSPHATE 25 MILLIGRAM(S): 80 CAPSULE, EXTENDED RELEASE ORAL at 05:25

## 2021-09-15 RX ADMIN — Medication 2: at 06:27

## 2021-09-15 RX ADMIN — LATANOPROST 1 DROP(S): 0.05 SOLUTION/ DROPS OPHTHALMIC; TOPICAL at 21:17

## 2021-09-15 RX ADMIN — FIDAXOMICIN 200 MILLIGRAM(S): 200 GRANULE, FOR SUSPENSION ORAL at 05:25

## 2021-09-15 RX ADMIN — Medication 4: at 17:19

## 2021-09-15 RX ADMIN — Medication 650 MILLIGRAM(S): at 17:35

## 2021-09-15 RX ADMIN — Medication 500 MILLIGRAM(S): at 17:18

## 2021-09-15 RX ADMIN — Medication 650 MILLIGRAM(S): at 05:24

## 2021-09-15 RX ADMIN — CLOPIDOGREL BISULFATE 75 MILLIGRAM(S): 75 TABLET, FILM COATED ORAL at 11:43

## 2021-09-15 RX ADMIN — CALCITRIOL 0.25 MICROGRAM(S): 0.5 CAPSULE ORAL at 11:41

## 2021-09-15 RX ADMIN — Medication 4: at 11:41

## 2021-09-15 RX ADMIN — Medication 325 MILLIGRAM(S): at 11:41

## 2021-09-15 RX ADMIN — CHLORHEXIDINE GLUCONATE 1 APPLICATION(S): 213 SOLUTION TOPICAL at 06:51

## 2021-09-15 RX ADMIN — Medication 1 TABLET(S): at 11:41

## 2021-09-15 RX ADMIN — FIDAXOMICIN 200 MILLIGRAM(S): 200 GRANULE, FOR SUSPENSION ORAL at 18:03

## 2021-09-15 RX ADMIN — Medication 650 MILLIGRAM(S): at 06:00

## 2021-09-15 RX ADMIN — Medication 25 MICROGRAM(S): at 05:24

## 2021-09-15 NOTE — PROGRESS NOTE ADULT - ASSESSMENT
Orthopaedic Surgery Progress Note    S&E at bedside this AM. Pain well controlled. Says she was out of bed to chair yesterday. Doing well with no active complaints. Wound still draining, dressing saturated.      T(C): 36.7 (09-15-21 @ 00:00), Max: 36.7 (09-15-21 @ 00:00)  HR: 98 (09-15-21 @ 06:00) (68 - 112)  BP: 115/74 (09-15-21 @ 06:00) (98/60 - 139/69)  RR: 23 (09-15-21 @ 06:00) (23 - 40)  SpO2: 96% (09-15-21 @ 06:00) (96% - 99%)    P/E:  AOx3, NAD  Nonlabored breathing    LLE  Aquacell dressing saturated  SILT sp/dp/t/sural/saph  Firing ta/ehl/fhl/gs  Foot WWP    Labs                        9.9    16.84 )-----------( 256      ( 15 Sep 2021 05:10 )             30.4     09-15    135  |  108  |  55<H>  ----------------------------<  154<H>  5.2<H>   |  18  |  1.8<H>    Ca    9.3      15 Sep 2021 05:10  Mg     1.8     09-15    TPro  4.8<L>  /  Alb  2.7<L>  /  TBili  0.8  /  DBili  x   /  AST  13  /  ALT  17  /  AlkPhos  93  09-15    LIVER FUNCTIONS - ( 15 Sep 2021 05:10 )  Alb: 2.7 g/dL / Pro: 4.8 g/dL / ALK PHOS: 93 U/L / ALT: 17 U/L / AST: 13 U/L / GGT: x               A/P:   86yo Female L basi-cervical femoral neck fracture s/p IMN POD9. Now in CCU for NSTEMI. GI also following for prior episode of coffee ground emesis.    - Aquacell dressing saturated this morning, pending discussion with attending for placement of possible incisional wound vac  - Daily dressing checks  - Restarted on eliquis  - On fidoxamicin for c diff +  - Continue to trend Hb and monitor closely  - AC: plavix, eliquis  - WBAT LLE  - pain control  - PT/OT         Orthopaedic Surgery Progress Note    S&E at bedside this AM. Pain well controlled. Says she was out of bed to chair yesterday. Doing well with no active complaints. Wound still draining, dressing saturated.      T(C): 36.7 (09-15-21 @ 00:00), Max: 36.7 (09-15-21 @ 00:00)  HR: 98 (09-15-21 @ 06:00) (68 - 112)  BP: 115/74 (09-15-21 @ 06:00) (98/60 - 139/69)  RR: 23 (09-15-21 @ 06:00) (23 - 40)  SpO2: 96% (09-15-21 @ 06:00) (96% - 99%)    P/E:  AOx3, NAD  Nonlabored breathing    LLE  Aquacell dressing saturated  SILT sp/dp/t/sural/saph  Firing ta/ehl/fhl/gs  Foot WWP    Labs                        9.9    16.84 )-----------( 256      ( 15 Sep 2021 05:10 )             30.4     09-15    135  |  108  |  55<H>  ----------------------------<  154<H>  5.2<H>   |  18  |  1.8<H>    Ca    9.3      15 Sep 2021 05:10  Mg     1.8     09-15    TPro  4.8<L>  /  Alb  2.7<L>  /  TBili  0.8  /  DBili  x   /  AST  13  /  ALT  17  /  AlkPhos  93  09-15    LIVER FUNCTIONS - ( 15 Sep 2021 05:10 )  Alb: 2.7 g/dL / Pro: 4.8 g/dL / ALK PHOS: 93 U/L / ALT: 17 U/L / AST: 13 U/L / GGT: x               A/P:   84yo Female L basi-cervical femoral neck fracture s/p IMN POD9. Now in CCU for NSTEMI. GI also following for prior episode of coffee ground emesis.  - Aquacell dressing saturated this morning, will change dressings to 4x4 + ABDs + foam tape + Abdominal binder; please start patient on Ancef 2g q8 for incision site wound infection prevention; please d/c anticoagulation due to excessive persistent draining post op day 9  - On fidoxamicin for c diff +  - Continue to trend Hb and monitor closely  - WBAT LLE  - pain control  - PT/OT

## 2021-09-15 NOTE — PROGRESS NOTE ADULT - ASSESSMENT
IMPRESSION:  - Non-ST elevation ACS v/s Type II MI secondary to demand ischemia from acute blood loss anemia  - Acute blood loss anemia (Hg dropped to 5) - Operative site hematoma v/s possible upper GI bleeding (hematemesis?) --> currently stable at 9.9   - C. 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%  - AF  - CKD III  - DM, HTN  - History of colon cancer    PLAN:    CNS:  - Avoid sedatives    HEENT:   - Oral care    PULMONARY:    - HOB @ 45 degrees    CARDIOVASCULAR:  - CCU monitoring  - Resume plavix (cleared by GI)  - Resume low dose eliquis 2.5mg bid for AF (no plan for EGD)  - Continue atorvastatin and coreg  - Keep Hg>8  - no acute cardiac contraindications for  EGD if deemed necessary by GI  - elevated risk given recent bleed, NSTEMI    GI:  - Continue pantoprazole 40mg IV bid  - GI follow-up ---> deferred endoscopy due to no active bleeding   - Monitor CBC  - Monitor lactate level for C diff colitis  - F/U GI    RENAL:    - Accurate I&O  - Monitor BUN/Cr  - Follow-up lytes.  Correct as needed to keep Mg>2.2 and K>4    INFECTIOUS DISEASE:   - Fidaxomicin for C. diff per ID  - F/U ID  - WBC trending down - Follow-up cultures  - Monitor lactate level (improving)    HEMATOLOGICAL:    - DVT prophylaxis    ENDOCRINE:    - Follow up FS.  Insulin protocol if needed    MUSCULOSKELETAL:  - Increase activity as tolerated  - as per ortho, PT consults recommended for ambulation out of bed to chair     DISPOSITION:  - Downgrade to telemetry  - F/U ortho for hip fracture  - F/U GI for GI bleed  - F/U ID for C. diff  - F/U primary cardiologist           IMPRESSION:  - Non-ST elevation ACS v/s Type II MI secondary to demand ischemia from acute blood loss anemia  - Acute blood loss anemia (Hg dropped to 5) - Operative site hematoma v/s possible upper GI bleeding (hematemesis?) --> currently stable at 9.9   - C. 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%  - AF  - CKD III  - DM, HTN  - History of colon cancer    PLAN:    CNS:  - Avoid sedatives    HEENT:   - Oral care    PULMONARY:    - HOB @ 45 degrees    CARDIOVASCULAR:  - CCU monitoring  - Resume plavix (cleared by GI)  - Resume low dose eliquis 2.5mg bid for AF (no plan for EGD)  - Continue atorvastatin and coreg  - Keep Hg>8  - no acute cardiac contraindications for  EGD if deemed necessary by GI  - elevated risk given recent bleed, NSTEMI  - Keep lasix and losartan on hold for now (in context of C.diff infection and diarrhea), resume when acute illness resolves    GI:  - Continue pantoprazole 40mg IV bid  - GI follow-up ---> deferred endoscopy due to no active bleeding   - Monitor CBC  - Monitor lactate level for C diff colitis  - F/U GI    RENAL:    - Accurate I&O  - Monitor BUN/Cr  - Follow-up lytes.  Correct as needed to keep Mg>2.2 and K>4    INFECTIOUS DISEASE:   - Fidaxomicin for C. diff per ID  - F/U ID  - WBC trending down - Follow-up cultures  - Monitor lactate level (improving)    HEMATOLOGICAL:    - DVT prophylaxis    ENDOCRINE:    - Follow up FS.  Insulin protocol if needed    MUSCULOSKELETAL:  - Increase activity as tolerated  - as per ortho, PT consults recommended for ambulation out of bed to chair     DISPOSITION:  - Downgrade to telemetry  - F/U ortho for hip fracture  - F/U GI for GI bleed  - F/U ID for C. diff  - F/U primary cardiologist           IMPRESSION:    - Non-ST elevation ACS v/s Type II MI secondary to demand ischemia from acute blood loss anemia  - Acute blood loss anemia (Hg dropped to 5) - Operative site hematoma v/s possible upper GI bleeding (hematemesis?) --> currently stable at 9.9   - C. 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%  - AF  - CKD III  - DM, HTN  - History of colon cancer    PLAN:    CNS:  - Avoid sedatives    HEENT:   - Oral care    PULMONARY:    - HOB @ 45 degrees    CARDIOVASCULAR:  - CCU monitoring  - Resume plavix (cleared by GI)  - Resume low dose eliquis 2.5mg bid for AF (no plan for EGD)  - Continue atorvastatin and coreg  - Keep Hg>8  - no acute cardiac contraindications for  EGD if deemed necessary by GI  - elevated risk given recent bleed, NSTEMI  - Keep lasix and losartan on hold for now (in context of C.diff infection and diarrhea), resume when acute illness resolves    GI:  - Continue pantoprazole 40mg IV bid  - GI follow-up ---> deferred endoscopy due to no active bleeding   - Monitor CBC  - Monitor lactate level for C diff colitis  - F/U GI    RENAL:    - Accurate I&O  - Monitor BUN/Cr  - Follow-up lytes.  Correct as needed to keep Mg>2.2 and K>4    INFECTIOUS DISEASE:   - Fidaxomicin for C. diff per ID  - F/U ID  - WBC trending down - Follow-up cultures  - Monitor lactate level (improving)    HEMATOLOGICAL:    - DVT prophylaxis    ENDOCRINE:    - Follow up FS.  Insulin protocol if needed    MUSCULOSKELETAL:  - Increase activity as tolerated  - as per ortho, PT consults recommended for ambulation out of bed to chair     DISPOSITION:  - Downgrade to telemetry  - F/U ortho for hip fracture  - F/U GI for GI bleed  - F/U ID for C. diff  - F/U primary cardiologist

## 2021-09-15 NOTE — PROGRESS NOTE ADULT - SUBJECTIVE AND OBJECTIVE BOX
SUBJECTIVE:    Patient is a 85y old Female who presents with a chief complaint of NSTEMI (14 Sep 2021 07:08)    Currently admitted to medicine with the primary diagnosis of Intertrochanteric fracture of right hip       Today is hospital day 10d. This morning she is resting comfortably in bed and reports no new issues or overnight events.     PAST MEDICAL & SURGICAL HISTORY  CAD (coronary artery disease)    Chronic CHF    Atrial fibrillation    Type 2 diabetes mellitus    Colon cancer    Hypertension    CKD (chronic kidney disease)  Stage 3    History of Clostridium difficile colitis  s/p fecal transplant    Diverticulitis    S/P hysterectomy      SOCIAL HISTORY:  Negative for smoking/alcohol/drug use.     ALLERGIES:  No Known Allergies    MEDICATIONS:  STANDING MEDICATIONS  acetaminophen   Tablet .. 650 milliGRAM(s) Oral every 6 hours  apixaban 2.5 milliGRAM(s) Oral two times a day  ascorbic acid 500 milliGRAM(s) Oral two times a day  atorvastatin 80 milliGRAM(s) Oral at bedtime  calcitriol   Capsule 0.25 MICROGram(s) Oral daily  carvedilol 25 milliGRAM(s) Oral every 12 hours  chlorhexidine 4% Liquid 1 Application(s) Topical <User Schedule>  clopidogrel Tablet 75 milliGRAM(s) Oral daily  dextrose 40% Gel 15 Gram(s) Oral once  dextrose 50% Injectable 25 Gram(s) IV Push once  ferrous    sulfate 325 milliGRAM(s) Oral daily  fidaxomicin 200 milliGRAM(s) Oral two times a day  folic acid 1 milliGRAM(s) Oral daily  glucagon  Injectable 1 milliGRAM(s) IntraMuscular once  insulin lispro (ADMELOG) corrective regimen sliding scale   SubCutaneous every 6 hours  latanoprost 0.005% Ophthalmic Solution 1 Drop(s) Both EYES at bedtime  levothyroxine 25 MICROGram(s) Oral daily  multivitamin 1 Tablet(s) Oral daily  pantoprazole  Injectable 40 milliGRAM(s) IV Push every 12 hours  raloxifene 60 milliGRAM(s) Oral daily  senna 2 Tablet(s) Oral at bedtime    PRN MEDICATIONS  bisacodyl Suppository 10 milliGRAM(s) Rectal daily PRN  diphenhydrAMINE 25 milliGRAM(s) Oral at bedtime PRN  ondansetron Injectable 4 milliGRAM(s) IV Push every 6 hours PRN    VITALS:   T(F): 98  HR: 96  BP: 112/62  RR: 24  SpO2: 97%    LABS:                        9.9    16.84 )-----------( 256      ( 15 Sep 2021 05:10 )             30.4     09-15    135  |  108  |  55<H>  ----------------------------<  154<H>  5.2<H>   |  18  |  1.8<H>    Ca    9.3      15 Sep 2021 05:10  Mg     1.8     09-15    TPro  4.8<L>  /  Alb  2.7<L>  /  TBili  0.8  /  DBili  x   /  AST  13  /  ALT  17  /  AlkPhos  93  09-15          Lactate, Blood: 1.4 mmol/L (09-14-21 @ 16:34)  Creatine Kinase, Serum: 37 U/L (09-14-21 @ 08:48)      CARDIAC MARKERS ( 14 Sep 2021 08:48 )  x     / x     / 37 U/L / x     / 2.6 ng/mL  CARDIAC MARKERS ( 14 Sep 2021 04:49 )  x     / 0.59 ng/mL / x     / x     / x      CARDIAC MARKERS ( 13 Sep 2021 17:24 )  x     / 0.51 ng/mL / x     / x     / x          RADIOLOGY:    PHYSICAL EXAM:  GEN: No acute distress  LUNGS: Clear to auscultation bilaterally   HEART: S1/S2 present. RRR.   ABD: Soft, non-tender, non-distended. Bowel sounds present  EXT: NC/NC/NE/2+PP/SHARIF  NEURO: AAOX3     SUBJECTIVE:    Patient is a 85y old Female who presents with a chief complaint of NSTEMI (14 Sep 2021 07:08)    Currently admitted to medicine with the primary diagnosis of Intertrochanteric fracture of right hip       Today is hospital day 10d. This morning she is resting comfortably in bed and reports no new issues or overnight events.     PAST MEDICAL & SURGICAL HISTORY  CAD (coronary artery disease)    Chronic CHF    Atrial fibrillation    Type 2 diabetes mellitus    Colon cancer    Hypertension    CKD (chronic kidney disease)  Stage 3    History of Clostridium difficile colitis  s/p fecal transplant    Diverticulitis    S/P hysterectomy      SOCIAL HISTORY:  Negative for smoking/alcohol/drug use.     ALLERGIES:  No Known Allergies    MEDICATIONS:  STANDING MEDICATIONS  acetaminophen   Tablet .. 650 milliGRAM(s) Oral every 6 hours  apixaban 2.5 milliGRAM(s) Oral two times a day  ascorbic acid 500 milliGRAM(s) Oral two times a day  atorvastatin 80 milliGRAM(s) Oral at bedtime  calcitriol   Capsule 0.25 MICROGram(s) Oral daily  carvedilol 25 milliGRAM(s) Oral every 12 hours  chlorhexidine 4% Liquid 1 Application(s) Topical <User Schedule>  clopidogrel Tablet 75 milliGRAM(s) Oral daily  dextrose 40% Gel 15 Gram(s) Oral once  dextrose 50% Injectable 25 Gram(s) IV Push once  ferrous    sulfate 325 milliGRAM(s) Oral daily  fidaxomicin 200 milliGRAM(s) Oral two times a day  folic acid 1 milliGRAM(s) Oral daily  glucagon  Injectable 1 milliGRAM(s) IntraMuscular once  insulin lispro (ADMELOG) corrective regimen sliding scale   SubCutaneous every 6 hours  latanoprost 0.005% Ophthalmic Solution 1 Drop(s) Both EYES at bedtime  levothyroxine 25 MICROGram(s) Oral daily  multivitamin 1 Tablet(s) Oral daily  pantoprazole  Injectable 40 milliGRAM(s) IV Push every 12 hours  raloxifene 60 milliGRAM(s) Oral daily  senna 2 Tablet(s) Oral at bedtime    PRN MEDICATIONS  bisacodyl Suppository 10 milliGRAM(s) Rectal daily PRN  diphenhydrAMINE 25 milliGRAM(s) Oral at bedtime PRN  ondansetron Injectable 4 milliGRAM(s) IV Push every 6 hours PRN    VITALS:   T(F): 98  HR: 96  BP: 112/62  RR: 24  SpO2: 97%    LABS:                        9.9    16.84 )-----------( 256      ( 15 Sep 2021 05:10 )             30.4     09-15    135  |  108  |  55<H>  ----------------------------<  154<H>  5.2<H>   |  18  |  1.8<H>    Ca    9.3      15 Sep 2021 05:10  Mg     1.8     09-15    TPro  4.8<L>  /  Alb  2.7<L>  /  TBili  0.8  /  DBili  x   /  AST  13  /  ALT  17  /  AlkPhos  93  09-15          Lactate, Blood: 1.4 mmol/L (09-14-21 @ 16:34)  Creatine Kinase, Serum: 37 U/L (09-14-21 @ 08:48)      CARDIAC MARKERS ( 14 Sep 2021 08:48 )  x     / x     / 37 U/L / x     / 2.6 ng/mL  CARDIAC MARKERS ( 14 Sep 2021 04:49 )  x     / 0.59 ng/mL / x     / x     / x      CARDIAC MARKERS ( 13 Sep 2021 17:24 )  x     / 0.51 ng/mL / x     / x     / x          RADIOLOGY:    PHYSICAL EXAM:  GEN: No acute distress  LUNGS: minor crackles  HEART: S1/S2 present. RRR.   ABD: Soft, non-tender, non-distended. Bowel sounds present  EXT: trace edema  NEURO: AAOX3     SUBJECTIVE:      Patient is a 85y old Female who presents with a chief complaint of NSTEMI (14 Sep 2021 07:08)    Currently admitted to medicine with the primary diagnosis of Intertrochanteric fracture of right hip       Today is hospital day 10d. This morning she is resting comfortably in bed and reports no new issues or overnight events.     PAST MEDICAL & SURGICAL HISTORY  CAD (coronary artery disease)    Chronic CHF    Atrial fibrillation    Type 2 diabetes mellitus    Colon cancer    Hypertension    CKD (chronic kidney disease)  Stage 3    History of Clostridium difficile colitis  s/p fecal transplant    Diverticulitis    S/P hysterectomy      SOCIAL HISTORY:  Negative for smoking/alcohol/drug use.     ALLERGIES:  No Known Allergies    MEDICATIONS:  STANDING MEDICATIONS  acetaminophen   Tablet .. 650 milliGRAM(s) Oral every 6 hours  apixaban 2.5 milliGRAM(s) Oral two times a day  ascorbic acid 500 milliGRAM(s) Oral two times a day  atorvastatin 80 milliGRAM(s) Oral at bedtime  calcitriol   Capsule 0.25 MICROGram(s) Oral daily  carvedilol 25 milliGRAM(s) Oral every 12 hours  chlorhexidine 4% Liquid 1 Application(s) Topical <User Schedule>  clopidogrel Tablet 75 milliGRAM(s) Oral daily  dextrose 40% Gel 15 Gram(s) Oral once  dextrose 50% Injectable 25 Gram(s) IV Push once  ferrous    sulfate 325 milliGRAM(s) Oral daily  fidaxomicin 200 milliGRAM(s) Oral two times a day  folic acid 1 milliGRAM(s) Oral daily  glucagon  Injectable 1 milliGRAM(s) IntraMuscular once  insulin lispro (ADMELOG) corrective regimen sliding scale   SubCutaneous every 6 hours  latanoprost 0.005% Ophthalmic Solution 1 Drop(s) Both EYES at bedtime  levothyroxine 25 MICROGram(s) Oral daily  multivitamin 1 Tablet(s) Oral daily  pantoprazole  Injectable 40 milliGRAM(s) IV Push every 12 hours  raloxifene 60 milliGRAM(s) Oral daily  senna 2 Tablet(s) Oral at bedtime    PRN MEDICATIONS  bisacodyl Suppository 10 milliGRAM(s) Rectal daily PRN  diphenhydrAMINE 25 milliGRAM(s) Oral at bedtime PRN  ondansetron Injectable 4 milliGRAM(s) IV Push every 6 hours PRN    VITALS:   T(F): 98  HR: 96  BP: 112/62  RR: 24  SpO2: 97%    LABS:                        9.9    16.84 )-----------( 256      ( 15 Sep 2021 05:10 )             30.4     09-15    135  |  108  |  55<H>  ----------------------------<  154<H>  5.2<H>   |  18  |  1.8<H>    Ca    9.3      15 Sep 2021 05:10  Mg     1.8     09-15    TPro  4.8<L>  /  Alb  2.7<L>  /  TBili  0.8  /  DBili  x   /  AST  13  /  ALT  17  /  AlkPhos  93  09-15          Lactate, Blood: 1.4 mmol/L (09-14-21 @ 16:34)  Creatine Kinase, Serum: 37 U/L (09-14-21 @ 08:48)      CARDIAC MARKERS ( 14 Sep 2021 08:48 )  x     / x     / 37 U/L / x     / 2.6 ng/mL  CARDIAC MARKERS ( 14 Sep 2021 04:49 )  x     / 0.59 ng/mL / x     / x     / x      CARDIAC MARKERS ( 13 Sep 2021 17:24 )  x     / 0.51 ng/mL / x     / x     / x          RADIOLOGY:    PHYSICAL EXAM:  GEN: No acute distress  LUNGS: minor crackles  HEART: S1/S2 present. RRR.   ABD: Soft, non-tender, non-distended. Bowel sounds present  EXT: trace edema  NEURO: AAOX3

## 2021-09-16 LAB
ALBUMIN SERPL ELPH-MCNC: 2.7 G/DL — LOW (ref 3.5–5.2)
ALP SERPL-CCNC: 126 U/L — HIGH (ref 30–115)
ALT FLD-CCNC: 15 U/L — SIGNIFICANT CHANGE UP (ref 0–41)
ANION GAP SERPL CALC-SCNC: 10 MMOL/L — SIGNIFICANT CHANGE UP (ref 7–14)
AST SERPL-CCNC: 13 U/L — SIGNIFICANT CHANGE UP (ref 0–41)
BASOPHILS # BLD AUTO: 0.03 K/UL — SIGNIFICANT CHANGE UP (ref 0–0.2)
BASOPHILS NFR BLD AUTO: 0.2 % — SIGNIFICANT CHANGE UP (ref 0–1)
BILIRUB SERPL-MCNC: 0.5 MG/DL — SIGNIFICANT CHANGE UP (ref 0.2–1.2)
BUN SERPL-MCNC: 65 MG/DL — CRITICAL HIGH (ref 10–20)
CALCIUM SERPL-MCNC: 9.4 MG/DL — SIGNIFICANT CHANGE UP (ref 8.5–10.1)
CHLORIDE SERPL-SCNC: 107 MMOL/L — SIGNIFICANT CHANGE UP (ref 98–110)
CO2 SERPL-SCNC: 17 MMOL/L — SIGNIFICANT CHANGE UP (ref 17–32)
CREAT SERPL-MCNC: 2 MG/DL — HIGH (ref 0.7–1.5)
CULTURE RESULTS: SIGNIFICANT CHANGE UP
EOSINOPHIL # BLD AUTO: 0.11 K/UL — SIGNIFICANT CHANGE UP (ref 0–0.7)
EOSINOPHIL NFR BLD AUTO: 0.6 % — SIGNIFICANT CHANGE UP (ref 0–8)
GLUCOSE BLDC GLUCOMTR-MCNC: 167 MG/DL — HIGH (ref 70–99)
GLUCOSE BLDC GLUCOMTR-MCNC: 185 MG/DL — HIGH (ref 70–99)
GLUCOSE BLDC GLUCOMTR-MCNC: 190 MG/DL — HIGH (ref 70–99)
GLUCOSE BLDC GLUCOMTR-MCNC: 207 MG/DL — HIGH (ref 70–99)
GLUCOSE SERPL-MCNC: 194 MG/DL — HIGH (ref 70–99)
HCT VFR BLD CALC: 29.4 % — LOW (ref 37–47)
HGB BLD-MCNC: 9.6 G/DL — LOW (ref 12–16)
IMM GRANULOCYTES NFR BLD AUTO: 0.9 % — HIGH (ref 0.1–0.3)
LYMPHOCYTES # BLD AUTO: 1.48 K/UL — SIGNIFICANT CHANGE UP (ref 1.2–3.4)
LYMPHOCYTES # BLD AUTO: 8.6 % — LOW (ref 20.5–51.1)
MAGNESIUM SERPL-MCNC: 1.8 MG/DL — SIGNIFICANT CHANGE UP (ref 1.8–2.4)
MCHC RBC-ENTMCNC: 28.5 PG — SIGNIFICANT CHANGE UP (ref 27–31)
MCHC RBC-ENTMCNC: 32.7 G/DL — SIGNIFICANT CHANGE UP (ref 32–37)
MCV RBC AUTO: 87.2 FL — SIGNIFICANT CHANGE UP (ref 81–99)
MONOCYTES # BLD AUTO: 1.33 K/UL — HIGH (ref 0.1–0.6)
MONOCYTES NFR BLD AUTO: 7.7 % — SIGNIFICANT CHANGE UP (ref 1.7–9.3)
NEUTROPHILS # BLD AUTO: 14.15 K/UL — HIGH (ref 1.4–6.5)
NEUTROPHILS NFR BLD AUTO: 82 % — HIGH (ref 42.2–75.2)
NRBC # BLD: 0 /100 WBCS — SIGNIFICANT CHANGE UP (ref 0–0)
PLATELET # BLD AUTO: 270 K/UL — SIGNIFICANT CHANGE UP (ref 130–400)
POTASSIUM SERPL-MCNC: 5.6 MMOL/L — HIGH (ref 3.5–5)
POTASSIUM SERPL-SCNC: 5.6 MMOL/L — HIGH (ref 3.5–5)
PROT SERPL-MCNC: 4.8 G/DL — LOW (ref 6–8)
RBC # BLD: 3.37 M/UL — LOW (ref 4.2–5.4)
RBC # FLD: 19.1 % — HIGH (ref 11.5–14.5)
SODIUM SERPL-SCNC: 134 MMOL/L — LOW (ref 135–146)
SPECIMEN SOURCE: SIGNIFICANT CHANGE UP
TROPONIN T SERPL-MCNC: 0.5 NG/ML — CRITICAL HIGH
WBC # BLD: 17.26 K/UL — HIGH (ref 4.8–10.8)
WBC # FLD AUTO: 17.26 K/UL — HIGH (ref 4.8–10.8)

## 2021-09-16 PROCEDURE — 99232 SBSQ HOSP IP/OBS MODERATE 35: CPT

## 2021-09-16 RX ORDER — SODIUM CHLORIDE 9 MG/ML
1000 INJECTION INTRAMUSCULAR; INTRAVENOUS; SUBCUTANEOUS
Refills: 0 | Status: DISCONTINUED | OUTPATIENT
Start: 2021-09-16 | End: 2021-09-27

## 2021-09-16 RX ORDER — INSULIN GLARGINE 100 [IU]/ML
10 INJECTION, SOLUTION SUBCUTANEOUS AT BEDTIME
Refills: 0 | Status: DISCONTINUED | OUTPATIENT
Start: 2021-09-16 | End: 2021-09-27

## 2021-09-16 RX ORDER — SODIUM CHLORIDE 9 MG/ML
1000 INJECTION, SOLUTION INTRAVENOUS
Refills: 0 | Status: DISCONTINUED | OUTPATIENT
Start: 2021-09-16 | End: 2021-09-27

## 2021-09-16 RX ORDER — INSULIN LISPRO 100/ML
3 VIAL (ML) SUBCUTANEOUS
Refills: 0 | Status: DISCONTINUED | OUTPATIENT
Start: 2021-09-16 | End: 2021-09-27

## 2021-09-16 RX ORDER — MAGNESIUM SULFATE 500 MG/ML
2 VIAL (ML) INJECTION ONCE
Refills: 0 | Status: COMPLETED | OUTPATIENT
Start: 2021-09-16 | End: 2021-09-16

## 2021-09-16 RX ADMIN — Medication 650 MILLIGRAM(S): at 11:12

## 2021-09-16 RX ADMIN — Medication 650 MILLIGRAM(S): at 17:04

## 2021-09-16 RX ADMIN — Medication 325 MILLIGRAM(S): at 11:41

## 2021-09-16 RX ADMIN — HEPARIN SODIUM 5000 UNIT(S): 5000 INJECTION INTRAVENOUS; SUBCUTANEOUS at 17:05

## 2021-09-16 RX ADMIN — CHLORHEXIDINE GLUCONATE 1 APPLICATION(S): 213 SOLUTION TOPICAL at 05:16

## 2021-09-16 RX ADMIN — Medication 650 MILLIGRAM(S): at 00:09

## 2021-09-16 RX ADMIN — RALOXIFENE HYDROCHLORIDE 60 MILLIGRAM(S): 60 TABLET, COATED ORAL at 11:41

## 2021-09-16 RX ADMIN — Medication 25 MICROGRAM(S): at 05:19

## 2021-09-16 RX ADMIN — Medication 650 MILLIGRAM(S): at 05:19

## 2021-09-16 RX ADMIN — ATORVASTATIN CALCIUM 40 MILLIGRAM(S): 80 TABLET, FILM COATED ORAL at 21:13

## 2021-09-16 RX ADMIN — Medication 100 MILLIGRAM(S): at 14:57

## 2021-09-16 RX ADMIN — Medication 4: at 06:25

## 2021-09-16 RX ADMIN — Medication 2: at 21:14

## 2021-09-16 RX ADMIN — LATANOPROST 1 DROP(S): 0.05 SOLUTION/ DROPS OPHTHALMIC; TOPICAL at 21:13

## 2021-09-16 RX ADMIN — Medication 1 MILLIGRAM(S): at 11:41

## 2021-09-16 RX ADMIN — HEPARIN SODIUM 5000 UNIT(S): 5000 INJECTION INTRAVENOUS; SUBCUTANEOUS at 05:17

## 2021-09-16 RX ADMIN — PANTOPRAZOLE SODIUM 40 MILLIGRAM(S): 20 TABLET, DELAYED RELEASE ORAL at 05:19

## 2021-09-16 RX ADMIN — FIDAXOMICIN 200 MILLIGRAM(S): 200 GRANULE, FOR SUSPENSION ORAL at 17:04

## 2021-09-16 RX ADMIN — Medication 100 MILLIGRAM(S): at 05:18

## 2021-09-16 RX ADMIN — Medication 2: at 11:39

## 2021-09-16 RX ADMIN — Medication 650 MILLIGRAM(S): at 11:42

## 2021-09-16 RX ADMIN — SODIUM CHLORIDE 75 MILLILITER(S): 9 INJECTION INTRAMUSCULAR; INTRAVENOUS; SUBCUTANEOUS at 08:46

## 2021-09-16 RX ADMIN — Medication 1 TABLET(S): at 11:41

## 2021-09-16 RX ADMIN — CARVEDILOL PHOSPHATE 25 MILLIGRAM(S): 80 CAPSULE, EXTENDED RELEASE ORAL at 05:19

## 2021-09-16 RX ADMIN — Medication 3 UNIT(S): at 17:05

## 2021-09-16 RX ADMIN — Medication 100 MILLIGRAM(S): at 21:13

## 2021-09-16 RX ADMIN — PANTOPRAZOLE SODIUM 40 MILLIGRAM(S): 20 TABLET, DELAYED RELEASE ORAL at 17:05

## 2021-09-16 RX ADMIN — Medication 2: at 17:06

## 2021-09-16 RX ADMIN — Medication 650 MILLIGRAM(S): at 06:25

## 2021-09-16 RX ADMIN — FIDAXOMICIN 200 MILLIGRAM(S): 200 GRANULE, FOR SUSPENSION ORAL at 05:19

## 2021-09-16 RX ADMIN — CLOPIDOGREL BISULFATE 75 MILLIGRAM(S): 75 TABLET, FILM COATED ORAL at 11:41

## 2021-09-16 RX ADMIN — INSULIN GLARGINE 10 UNIT(S): 100 INJECTION, SOLUTION SUBCUTANEOUS at 21:13

## 2021-09-16 RX ADMIN — Medication 500 MILLIGRAM(S): at 05:19

## 2021-09-16 RX ADMIN — Medication 25 GRAM(S): at 08:34

## 2021-09-16 RX ADMIN — Medication 3 UNIT(S): at 11:39

## 2021-09-16 RX ADMIN — Medication 650 MILLIGRAM(S): at 17:34

## 2021-09-16 RX ADMIN — CALCITRIOL 0.25 MICROGRAM(S): 0.5 CAPSULE ORAL at 11:42

## 2021-09-16 RX ADMIN — Medication 500 MILLIGRAM(S): at 17:04

## 2021-09-16 RX ADMIN — CARVEDILOL PHOSPHATE 25 MILLIGRAM(S): 80 CAPSULE, EXTENDED RELEASE ORAL at 17:04

## 2021-09-16 NOTE — PROGRESS NOTE ADULT - SUBJECTIVE AND OBJECTIVE BOX
POD#10 S/P ORIF HIP  T(C): 36.8 (09-16-21 @ 08:00), Max: 37 (09-16-21 @ 06:00)  HR: 74 (09-16-21 @ 08:00) (74 - 108)  BP: 115/62 (09-16-21 @ 08:00) (109/56 - 141/83)  RR: 20 (09-16-21 @ 08:00) (20 - 38)  SpO2: 100% (09-16-21 @ 08:00) (98% - 100%)                        9.6    17.26 )-----------( 270      ( 16 Sep 2021 04:50 )             29.4     09-16    134<L>  |  107  |  65<HH>  ----------------------------<  194<H>  5.6<H>   |  17  |  2.0<H>    Ca    9.4      16 Sep 2021 04:50  Mg     1.8     09-16    TPro  4.8<L>  /  Alb  2.7<L>  /  TBili  0.5  /  DBili  x   /  AST  13  /  ALT  15  /  AlkPhos  126<H>  09-16      PTS PAIN IS CONTROLLED   WOUND  DRESSING CHANGED COPIOUS SEROUS DRAINAGE, THE BINDER CONTINUES TO BE SOILED AND REMOVED SECONDARY TO CDIFF SO NOT AS EFFECTIVE AS WE WOULD LIKE .   PREVENA WOUND VAC PLACED 9/16  N/V/I  ABX ANCEF 48HRS  PROPHYLACTIC NO EVIDENCE OF SURGICAL SITE INFECTION AT THIS TIME.  DVT MEDS  HEP SQ ACTIVE    PLVX ACTIVE   MECHANICAL PROPHYLAXIS IN PLACE   DISCUSSED ANCEF WITH ID THEY FEEL THE LEUKOCYTOSIS IS FROM THE CDIFF THEY AGREE NO EVIDENCE OF SURGICAL SITE INFECTION ,   REHAB  OOB WBAT

## 2021-09-16 NOTE — CHART NOTE - NSCHARTNOTEFT_GEN_A_CORE
85F w/PMHx CAD, CHF, Afib on Eliquis, DM, HTN, colon cancer, CKD stage 3, hx of diverticulitis, presents s/p mechanical fall at home, +HT, -LOC, +AC (Eliquis). Patient was in her kitchen and "turned around too fast" when she fell over and hit her head on a chair. She denies loss of consciousness. She remained down for ~30 min before EMS came to take her into the ED. She is GCS 15, primary survey negative. No external signs of trauma. Complains of LLE pain. Denies scalp, spinal, chest, abdominal pain. ROM of all extremities in tact. Was admitted to ortho service for eval and surgical correction of left femoral IT fracture s/p mechanical fall at home. Pt went to the OR and underwent successful fixation of left hip intertochanteric hip fracture. While on the floors the patient had an episode of melena. As per patient she had coffee ground emesis on 9/9 PM 2 episodes followed by black tarry stools and abdominal pain intermittently in epigastric region worse with eating. 6/10 in intensity, non radiating. GI was consulted and decided that there was questionable GI bleeding. Anticoagulation was stopped. The patient's Hb was 5.4. She was transfused and the hemoglobin improved. The patient had typical chest pain with elevated tropes and was diagnosed with NSTEMI type 2. She was upgraded to the CCU. Due to the possibility of a GI bleed the patient was not cath'd. The patient was also found to be C. Diff positive and started on Vancomycin. The patient also developed a hematoma at the site of the surgery, Ortho evaluated her and are following her and replacing dressings as needed. The patient is now stable and her chest pain has improved. Per discussion with ID the patient was switched to Fidaxomicin. Due to continued drainage from surgical site will hold Eliquis for one week. Resume once drainage has ceased. Per ortho recommendation will also begin Ancef 2g q8. The patient is still having diarrhea though it has improved. Last episode of diarrhea was at 8 pm on 9/15. Patient is required to be on C. Diff precautions until she goes 48 hours without an episode of diarrhea.       PHYSICAL EXAM:  GEN: No acute distress  LUNGS: minor crackles  HEART: S1/S2 present. RRR.   ABD: Soft, non-tender, non-distended. Bowel sounds present  EXT: trace edema, drainage from surgical site, dressings applied  NEURO: [email or web address]          FOLLOW UP    Ortho  - Follow up with Ortho for post-op care and instructions  - Monitor hematoma at surgical site with Ortho  - Patient still having drainage, hold Eliquis for one week, can resume when cleared by Ortho  - Per Ortho start pt on Ancef 2g q8   - Follow up with Ortho outpatient    ID  - ID following for c.diff and asymptomatic UTI  - Per ID treat C. Diff with Fidaxomicin  - Need patient to be >48 hours without an episode of diarrhea to remove C. diff precautions    Cardio  - Per Cardio pt had type II NSTEMI, no intervention performed and not planned at this time  - Hold losartan and Lasix at this time due to patient's BP, resume at d/c  - Patient's family wanted her to be transferred to [facility], case was presented to CCU attending and transfer was refused  - Patient's family is insisting on transferring her to [facility] but no accepting physician   - Does not need telemetry anymore, can downgrade to medical floors  - Follow up with primary cardiologist outpatient    GI  - Patient had questionable GI bleed  - Hb stable now   - GI decided not to scope patient inpatient   - Cleared to resume Eliquis once Ortho has cleared  - Follow up with GI outpatient 85F w/PMHx CAD, CHF, Afib on Eliquis, DM, HTN, colon cancer, CKD stage 3, hx of diverticulitis, presents s/p mechanical fall at home, +HT, -LOC, +AC (Eliquis). Patient was in her kitchen and "turned around too fast" when she fell over and hit her head on a chair. She denies loss of consciousness. She remained down for ~30 min before EMS came to take her into the ED. She is GCS 15, primary survey negative. No external signs of trauma. Complains of LLE pain. Denies scalp, spinal, chest, abdominal pain. ROM of all extremities in tact. Was admitted to ortho service for eval and surgical correction of left femoral IT fracture s/p mechanical fall at home. Pt went to the OR and underwent successful fixation of left hip intertochanteric hip fracture.     While on the floors the patient had an episode of melena. As per patient she had coffee ground emesis on 9/9 PM 2 episodes followed by black tarry stools and abdominal pain intermittently in epigastric region worse with eating. 6/10 in intensity, non radiating. GI was consulted and decided that there was questionable GI bleeding. Anticoagulation was stopped. The patient's Hb was 5.4. She was transfused and the hemoglobin improved. The patient had typical chest pain with elevated tropes and was diagnosed with NSTEMI type 2. She was upgraded to the CCU. Due to the possibility of a GI bleed the patient was not cath'd. The patient was also found to be C. Diff positive and started on Vancomycin. The patient also developed a hematoma at the site of the surgery, Ortho evaluated her and are following her and replacing dressings as needed. The patient is now stable and her chest pain has improved. Per discussion with ID the patient was switched to Fidaxomicin. Due to continued drainage from surgical site will hold Eliquis for one week. Resume once drainage has ceased. Per ortho recommendation will also begin Ancef 2g q8. The patient is still having diarrhea though it has improved. Last episode of diarrhea was at 8 pm on 9/15. Patient is required to be on C. Diff precautions until she goes 48 hours without an episode of diarrhea.       PHYSICAL EXAM:  GEN: No acute distress  LUNGS: minor crackles  HEART: S1/S2 present. RRR.   ABD: Soft, non-tender, non-distended. Bowel sounds present  EXT: trace edema, drainage from surgical site, dressings applied  NEURO: [email or web address]          FOLLOW UP    Ortho  - Follow up with Ortho for post-op care and instructions  - Monitor hematoma at surgical site with Ortho  - Patient still having drainage, hold Eliquis for one week, can resume when cleared by Ortho  - Per Ortho start pt on Ancef 2g q8   - Follow up with Ortho outpatient    ID  - ID following for c.diff and asymptomatic UTI  - Per ID treat C. Diff with Fidaxomicin  - On oral Fidaxomicin 200 mg q12h for 10 days then switch to oral Vancomycin 125 mg q12h for 3 more weeks  - Need patient to be >48 hours without an episode of diarrhea to remove C. diff precautions    Cardio  - Per Cardio pt had type II NSTEMI, no intervention performed and not planned at this time  - Hold losartan and Lasix at this time due to patient's BP, resume at d/c  - Patient's family wanted her to be transferred to [facility], case was presented to CCU attending and transfer was refused  - Patient's family is insisting on transferring her to [facility] but no accepting physician   - Does not need telemetry anymore, can downgrade to medical floors  - Follow up with primary cardiologist outpatient    GI  - Patient had questionable GI bleed  - Hb stable now   - GI decided not to scope patient inpatient   - Cleared to resume Eliquis once Ortho has cleared  - Follow up with GI outpatient 85F w/PMHx CAD, CHF, Afib on Eliquis, DM, HTN, colon cancer, CKD stage 3, hx of diverticulitis, presents s/p mechanical fall at home, +HT, -LOC, +AC (Eliquis). Patient was in her kitchen and "turned around too fast" when she fell over and hit her head on a chair. She denies loss of consciousness. She remained down for ~30 min before EMS came to take her into the ED. She is GCS 15, primary survey negative. No external signs of trauma. Complains of LLE pain. Denies scalp, spinal, chest, abdominal pain. ROM of all extremities in tact. Was admitted to ortho service for eval and surgical correction of left femoral IT fracture s/p mechanical fall at home. Pt went to the OR and underwent successful fixation of left hip intertochanteric hip fracture.     While on the floors the patient had an episode of melena. As per patient she had coffee ground emesis on 9/9 PM 2 episodes followed by black tarry stools and abdominal pain intermittently in epigastric region worse with eating. 6/10 in intensity, non radiating. GI was consulted and decided that there was questionable GI bleeding. Anticoagulation was stopped. The patient's Hb was 5.4. She was transfused and the hemoglobin improved. The patient had typical chest pain with elevated tropes and was diagnosed with NSTEMI type 2. She was upgraded to the CCU. Due to the possibility of a GI bleed the patient was not cath'd. The patient was also found to be C. Diff positive and started on Vancomycin. The patient also developed a hematoma at the site of the surgery, Ortho evaluated her and are following her and replacing dressings as needed. The patient is now stable and her chest pain has improved. Per discussion with ID the patient was switched to Fidaxomicin. Due to continued drainage from surgical site will hold Eliquis for one week. Resume once drainage has ceased. Per ortho recommendation will also begin Ancef 2g q8. The patient is still having diarrhea though it has improved. Last episode of diarrhea was at 8 pm on 9/15. Patient is required to be on C. Diff precautions until she goes 48 hours without an episode of diarrhea.       PHYSICAL EXAM:  GEN: No acute distress  LUNGS: minor crackles  HEART: S1/S2 present. RRR.   ABD: Soft, non-tender, non-distended. Bowel sounds present  EXT: trace edema, drainage from surgical site, dressings applied  NEURO: [email or web address]          FOLLOW UP    Ortho  - Follow up with Ortho for post-op care and instructions  - Monitor hematoma at surgical site with Ortho  - Patient still having drainage, hold Eliquis for one week, can resume when cleared by Ortho  - Per Ortho start pt on Ancef 2g q8   - Follow up with Ortho outpatient    ID  - ID following for c.diff and asymptomatic UTI  - Per ID treat C. Diff with Fidaxomicin  - On oral Fidaxomicin 200 mg q12h for 10 days (started 9/14) then switch to oral Vancomycin 125 mg q12h for 3 more weeks  - Need patient to be >48 hours without an episode of diarrhea to remove C. diff precautions    Cardio  - Per Cardio pt had type II NSTEMI, no intervention performed and not planned at this time  - Hold losartan and Lasix at this time due to patient's BP, resume at d/c  - Patient's family wanted her to be transferred to [facility], case was presented to CCU attending and transfer was refused  - Patient's family is insisting on transferring her to [facility] but no accepting physician   - Does not need telemetry anymore, can downgrade to medical floors  - Follow up with primary cardiologist outpatient    GI  - Patient had questionable GI bleed  - Hb stable now   - GI decided not to scope patient inpatient   - Cleared to resume Eliquis once Ortho has cleared  - Follow up with GI outpatient

## 2021-09-16 NOTE — PROGRESS NOTE ADULT - ASSESSMENT
IMPRESSION:    - Non-ST elevation ACS v/s Type II MI secondary to demand ischemia from acute blood loss anemia  - Acute blood loss anemia (Hg dropped to 5) - Operative site hematoma v/s possible upper GI bleeding (hematemesis?) --> currently stable at 9.9   - C. 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%  - AF  - CKD III  - DM, HTN  - History of colon cancer    PLAN:    CNS:  - Avoid sedatives    HEENT:   - Oral care    PULMONARY:    - HOB @ 45 degrees    CARDIOVASCULAR:  - CCU monitoring  - Resume plavix (cleared by GI)  - Hold Eliquis per ortho  - Continue atorvastatin and coreg  - Keep Hg>8  - Keep lasix and losartan on hold for now (in context of C.diff infection and diarrhea), resume when acute illness resolves    GI:  - Continue pantoprazole 40mg IV bid  - GI follow-up ---> deferred endoscopy due to no active bleeding   - Monitor CBC  - Monitor lactate level for C diff colitis  - F/U GI    RENAL:    - Accurate I&O  - Monitor BUN/Cr  - Follow-up lytes.  Correct as needed to keep Mg>2.2 and K>4    INFECTIOUS DISEASE:   - Fidaxomicin for C. diff per ID  - F/U ID  - WBC trending down - Follow-up cultures  - Monitor lactate level (improving)    HEMATOLOGICAL:    - DVT prophylaxis    ENDOCRINE:    - Follow up FS.  Insulin protocol if needed    MUSCULOSKELETAL:  - Increase activity as tolerated  - as per ortho, PT consults recommended for ambulation out of bed to chair     DISPOSITION:  - Downgrade to telemetry  - F/U ortho for hip fracture  - F/U GI for GI bleed  - F/U ID for C. diff  - F/U primary cardiologist           IMPRESSION:    - Non-ST elevation ACS v/s Type II MI secondary to demand ischemia from acute blood loss anemia  - Acute blood loss anemia (Hg dropped to 5) - Operative site hematoma v/s possible upper GI bleeding (hematemesis?) --> currently stable at 9.9   - C. 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%  - AF  - CKD III  - DM, HTN  - History of colon cancer    PLAN:    CNS:  - Avoid sedatives    HEENT:   - Oral care    PULMONARY:    - HOB @ 45 degrees    CARDIOVASCULAR:  - CCU monitoring  - Resume plavix (cleared by GI)  - Hold Eliquis per ortho  - Continue atorvastatin and coreg  - Keep Hg>8  - Keep lasix and losartan on hold for now (in context of C.diff infection and diarrhea), resume when acute illness resolves    GI:  - Continue pantoprazole 40mg IV bid  - GI follow-up ---> deferred endoscopy due to no active bleeding   - Monitor CBC  - Monitor lactate level for C diff colitis  - F/U GI    RENAL:    - Accurate I&O  - Monitor BUN/Cr  - Follow-up lytes.  Correct as needed to keep Mg>2.2 and K>4    INFECTIOUS DISEASE:   - Fidaxomicin for C. diff per ID  - F/U ID  - WBC trending down - Follow-up cultures  - Monitor lactate level (improving)    HEMATOLOGICAL:    - DVT prophylaxis    ENDOCRINE:    - Follow up FS.  Insulin protocol if needed    MUSCULOSKELETAL:  - Increase activity as tolerated  - as per ortho, PT consults recommended for ambulation out of bed to chair     DISPOSITION:  - Downgrade to floor  - F/U ortho for hip fracture  - F/U GI for GI bleed  - F/U ID for C. diff  - F/U primary cardiologist           IMPRESSION:  - Non-ST elevation ACS v/s Type II MI secondary to demand ischemia from acute blood loss anemia  - Acute blood loss anemia (Hg dropped to 5) - Operative site hematoma v/s possible upper GI bleeding (hematemesis?) --> currently stable at 9.9   - C. 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%  - AF  - CKD III  - DM, HTN  - History of colon cancer    PLAN:    CNS:  - Avoid sedatives    HEENT:   - Oral care    PULMONARY:    - HOB @ 45 degrees    CARDIOVASCULAR:  - CCU monitoring  - Resume plavix (cleared by GI)  - Hold Eliquis per ortho  - Continue atorvastatin and coreg  - Keep Hg>8  - Keep lasix and losartan on hold for now (in context of C.diff infection and diarrhea), resume when acute illness resolves    GI:  - Continue pantoprazole 40mg IV bid  - GI follow-up ---> deferred endoscopy due to no active bleeding   - Monitor CBC  - Monitor lactate level for C diff colitis  - F/U GI    RENAL:    - Accurate I&O  - Monitor BUN/Cr  - Follow-up lytes.  Correct as needed to keep Mg>2.2 and K>4    INFECTIOUS DISEASE:   - Fidaxomicin for C. diff per ID  - F/U ID  - WBC trending down - Follow-up cultures  - Monitor lactate level (improving)    HEMATOLOGICAL:    - DVT prophylaxis    ENDOCRINE:    - Follow up FS.  Insulin protocol if needed    MUSCULOSKELETAL:  - Increase activity as tolerated  - as per ortho, PT consults recommended for ambulation out of bed to chair     DISPOSITION:  - Downgrade to floor  - F/U ortho for hip fracture  - F/U GI for GI bleed  - F/U ID for C. diff  - F/U primary cardiologist

## 2021-09-16 NOTE — PROGRESS NOTE ADULT - ASSESSMENT
· Assessment	  85F w/PMHx CAD, CHF, Afib on Eliquis last dose 9/10 AM, DM, HTN, colon cancer s/p resection in 2016 in remission, CKD stage 3, hx of diverticulitis, presents s/p mechanical fall at home admitted on 9/5 s/p left femur fracture repair on 9/6. GI was called for melena, coffee ground emesis and epigastric pain. Then she developed NSTEMI in hospital on DAPT    IMPRESSION;  CD colitis: resolving  Dark stools. GI bleed ? CD related  WBC 14.1>18.5  Left hip with serous drainage : no abscess/cellulitis/infection. Given the significant edema LE some drainage from recent Sx is expected    RECOMMENDATIONS;  Po Fidaxomicin 200 mg q12h for 10 days and then po vancomycin 125 mg q12h for 3 more weeks  recall prn please

## 2021-09-16 NOTE — PROGRESS NOTE ADULT - SUBJECTIVE AND OBJECTIVE BOX
SEAN SAWYER  85y, Female    All available historical data reviewed    OVERNIGHT EVENTS:  no fevers  soft BM  no abdominal pain  no pain left hip  no pressors    ROS:  General: Denies rigors, nightsweats  HEENT: Denies headache, rhinorrhea, sore throat, eye pain  CV: Denies CP, palpitations  PULM: Denies wheezing, hemoptysis  GI: Denies hematemesis, hematochezia, melena  : Denies discharge, hematuria  MSK: Denies arthralgias, myalgias  SKIN: Denies rash, lesions  NEURO: Denies paresthesias, weakness  PSYCH: Denies depression, anxiety    VITALS:  T(F): 98.2, Max: 98.6 (09-16-21 @ 06:00)  HR: 74  BP: 115/62  RR: 20Vital Signs Last 24 Hrs  T(C): 36.8 (16 Sep 2021 08:00), Max: 37 (16 Sep 2021 06:00)  T(F): 98.2 (16 Sep 2021 08:00), Max: 98.6 (16 Sep 2021 06:00)  HR: 74 (16 Sep 2021 08:00) (74 - 108)  BP: 115/62 (16 Sep 2021 08:00) (109/56 - 141/83)  BP(mean): 89 (16 Sep 2021 08:00) (71 - 125)  RR: 20 (16 Sep 2021 08:00) (20 - 38)  SpO2: 100% (16 Sep 2021 08:00) (98% - 100%)    TESTS & MEASUREMENTS:                        9.6    17.26 )-----------( 270      ( 16 Sep 2021 04:50 )             29.4     09-16    134<L>  |  107  |  65<HH>  ----------------------------<  194<H>  5.6<H>   |  17  |  2.0<H>    Ca    9.4      16 Sep 2021 04:50  Mg     1.8     09-16    TPro  4.8<L>  /  Alb  2.7<L>  /  TBili  0.5  /  DBili  x   /  AST  13  /  ALT  15  /  AlkPhos  126<H>  09-16    LIVER FUNCTIONS - ( 16 Sep 2021 04:50 )  Alb: 2.7 g/dL / Pro: 4.8 g/dL / ALK PHOS: 126 U/L / ALT: 15 U/L / AST: 13 U/L / GGT: x                   RADIOLOGY & ADDITIONAL TESTS:  Personal review of radiological diagnostics performed  Echo and EKG results noted when applicable.     MEDICATIONS:  acetaminophen   Tablet .. 650 milliGRAM(s) Oral every 6 hours  ascorbic acid 500 milliGRAM(s) Oral two times a day  atorvastatin 40 milliGRAM(s) Oral at bedtime  bisacodyl Suppository 10 milliGRAM(s) Rectal daily PRN  calcitriol   Capsule 0.25 MICROGram(s) Oral daily  carvedilol 25 milliGRAM(s) Oral every 12 hours  ceFAZolin   IVPB 2000 milliGRAM(s) IV Intermittent every 8 hours  chlorhexidine 4% Liquid 1 Application(s) Topical <User Schedule>  clopidogrel Tablet 75 milliGRAM(s) Oral daily  dextrose 40% Gel 15 Gram(s) Oral once  dextrose 5%. 1000 milliLiter(s) IV Continuous <Continuous>  dextrose 5%. 1000 milliLiter(s) IV Continuous <Continuous>  dextrose 50% Injectable 25 Gram(s) IV Push once  diphenhydrAMINE 25 milliGRAM(s) Oral at bedtime PRN  ferrous    sulfate 325 milliGRAM(s) Oral daily  fidaxomicin 200 milliGRAM(s) Oral two times a day  folic acid 1 milliGRAM(s) Oral daily  glucagon  Injectable 1 milliGRAM(s) IntraMuscular once  heparin   Injectable 5000 Unit(s) SubCutaneous every 12 hours  insulin glargine Injectable (LANTUS) 10 Unit(s) SubCutaneous at bedtime  insulin lispro (ADMELOG) corrective regimen sliding scale   SubCutaneous every 6 hours  insulin lispro Injectable (ADMELOG) 3 Unit(s) SubCutaneous three times a day before meals  latanoprost 0.005% Ophthalmic Solution 1 Drop(s) Both EYES at bedtime  levothyroxine 25 MICROGram(s) Oral daily  multivitamin 1 Tablet(s) Oral daily  ondansetron Injectable 4 milliGRAM(s) IV Push every 6 hours PRN  pantoprazole  Injectable 40 milliGRAM(s) IV Push every 12 hours  raloxifene 60 milliGRAM(s) Oral daily  senna 2 Tablet(s) Oral at bedtime  sodium chloride 0.9%. 1000 milliLiter(s) IV Continuous <Continuous>      ANTIBIOTICS:  ceFAZolin   IVPB 2000 milliGRAM(s) IV Intermittent every 8 hours  fidaxomicin 200 milliGRAM(s) Oral two times a day

## 2021-09-16 NOTE — PROGRESS NOTE ADULT - SUBJECTIVE AND OBJECTIVE BOX
SUBJECTIVE:    Patient is a 85y old Female who presents with a chief complaint of fall (15 Sep 2021 06:06)    Currently admitted to medicine with the primary diagnosis of Intertrochanteric fracture of right hip       Today is hospital day 11d. This morning she is resting comfortably in bed and reports no new issues or overnight events.     PAST MEDICAL & SURGICAL HISTORY  CAD (coronary artery disease)    Chronic CHF    Atrial fibrillation    Type 2 diabetes mellitus    Colon cancer    Hypertension    CKD (chronic kidney disease)  Stage 3    History of Clostridium difficile colitis  s/p fecal transplant    Diverticulitis    S/P hysterectomy      SOCIAL HISTORY:  Negative for smoking/alcohol/drug use.     ALLERGIES:  No Known Allergies    MEDICATIONS:  STANDING MEDICATIONS  acetaminophen   Tablet .. 650 milliGRAM(s) Oral every 6 hours  ascorbic acid 500 milliGRAM(s) Oral two times a day  atorvastatin 40 milliGRAM(s) Oral at bedtime  calcitriol   Capsule 0.25 MICROGram(s) Oral daily  carvedilol 25 milliGRAM(s) Oral every 12 hours  ceFAZolin   IVPB 2000 milliGRAM(s) IV Intermittent every 8 hours  chlorhexidine 4% Liquid 1 Application(s) Topical <User Schedule>  clopidogrel Tablet 75 milliGRAM(s) Oral daily  dextrose 40% Gel 15 Gram(s) Oral once  dextrose 50% Injectable 25 Gram(s) IV Push once  ferrous    sulfate 325 milliGRAM(s) Oral daily  fidaxomicin 200 milliGRAM(s) Oral two times a day  folic acid 1 milliGRAM(s) Oral daily  glucagon  Injectable 1 milliGRAM(s) IntraMuscular once  heparin   Injectable 5000 Unit(s) SubCutaneous every 12 hours  insulin lispro (ADMELOG) corrective regimen sliding scale   SubCutaneous every 6 hours  latanoprost 0.005% Ophthalmic Solution 1 Drop(s) Both EYES at bedtime  levothyroxine 25 MICROGram(s) Oral daily  multivitamin 1 Tablet(s) Oral daily  pantoprazole  Injectable 40 milliGRAM(s) IV Push every 12 hours  raloxifene 60 milliGRAM(s) Oral daily  senna 2 Tablet(s) Oral at bedtime    PRN MEDICATIONS  bisacodyl Suppository 10 milliGRAM(s) Rectal daily PRN  diphenhydrAMINE 25 milliGRAM(s) Oral at bedtime PRN  ondansetron Injectable 4 milliGRAM(s) IV Push every 6 hours PRN    VITALS:   T(F): 98.6  HR: 80  BP: 109/56  RR: 20  SpO2: 98%    LABS:                        9.6    17.26 )-----------( 270      ( 16 Sep 2021 04:50 )             29.4     09-16    134<L>  |  107  |  65<HH>  ----------------------------<  194<H>  5.6<H>   |  17  |  2.0<H>    Ca    9.4      16 Sep 2021 04:50  Mg     1.8     09-16    TPro  4.8<L>  /  Alb  2.7<L>  /  TBili  0.5  /  DBili  x   /  AST  13  /  ALT  15  /  AlkPhos  126<H>  09-16          Troponin T, Serum: 0.50 ng/mL *HH* (09-16-21 @ 04:50)      CARDIAC MARKERS ( 16 Sep 2021 04:50 )  x     / 0.50 ng/mL / x     / x     / x      CARDIAC MARKERS ( 14 Sep 2021 08:48 )  x     / x     / 37 U/L / x     / 2.6 ng/mL      RADIOLOGY:    PHYSICAL EXAM:  GEN: No acute distress  LUNGS: Clear to auscultation bilaterally   HEART: S1/S2 present. RRR.   ABD: Soft, non-tender, non-distended. Bowel sounds present  EXT: NC/NC/NE/2+PP/SHARIF  NEURO: AAOX3     SUBJECTIVE:      Patient is a 85y old Female who presents with a chief complaint of fall (15 Sep 2021 06:06)    Currently admitted to medicine with the primary diagnosis of Intertrochanteric fracture of right hip       Today is hospital day 11d. This morning she is resting comfortably in bed and reports no new issues or overnight events.     PAST MEDICAL & SURGICAL HISTORY  CAD (coronary artery disease)    Chronic CHF    Atrial fibrillation    Type 2 diabetes mellitus    Colon cancer    Hypertension    CKD (chronic kidney disease)  Stage 3    History of Clostridium difficile colitis  s/p fecal transplant    Diverticulitis    S/P hysterectomy      SOCIAL HISTORY:  Negative for smoking/alcohol/drug use.     ALLERGIES:  No Known Allergies    MEDICATIONS:  STANDING MEDICATIONS  acetaminophen   Tablet .. 650 milliGRAM(s) Oral every 6 hours  ascorbic acid 500 milliGRAM(s) Oral two times a day  atorvastatin 40 milliGRAM(s) Oral at bedtime  calcitriol   Capsule 0.25 MICROGram(s) Oral daily  carvedilol 25 milliGRAM(s) Oral every 12 hours  ceFAZolin   IVPB 2000 milliGRAM(s) IV Intermittent every 8 hours  chlorhexidine 4% Liquid 1 Application(s) Topical <User Schedule>  clopidogrel Tablet 75 milliGRAM(s) Oral daily  dextrose 40% Gel 15 Gram(s) Oral once  dextrose 50% Injectable 25 Gram(s) IV Push once  ferrous    sulfate 325 milliGRAM(s) Oral daily  fidaxomicin 200 milliGRAM(s) Oral two times a day  folic acid 1 milliGRAM(s) Oral daily  glucagon  Injectable 1 milliGRAM(s) IntraMuscular once  heparin   Injectable 5000 Unit(s) SubCutaneous every 12 hours  insulin lispro (ADMELOG) corrective regimen sliding scale   SubCutaneous every 6 hours  latanoprost 0.005% Ophthalmic Solution 1 Drop(s) Both EYES at bedtime  levothyroxine 25 MICROGram(s) Oral daily  multivitamin 1 Tablet(s) Oral daily  pantoprazole  Injectable 40 milliGRAM(s) IV Push every 12 hours  raloxifene 60 milliGRAM(s) Oral daily  senna 2 Tablet(s) Oral at bedtime    PRN MEDICATIONS  bisacodyl Suppository 10 milliGRAM(s) Rectal daily PRN  diphenhydrAMINE 25 milliGRAM(s) Oral at bedtime PRN  ondansetron Injectable 4 milliGRAM(s) IV Push every 6 hours PRN    VITALS:   T(F): 98.6  HR: 80  BP: 109/56  RR: 20  SpO2: 98%    LABS:                        9.6    17.26 )-----------( 270      ( 16 Sep 2021 04:50 )             29.4     09-16    134<L>  |  107  |  65<HH>  ----------------------------<  194<H>  5.6<H>   |  17  |  2.0<H>    Ca    9.4      16 Sep 2021 04:50  Mg     1.8     09-16    TPro  4.8<L>  /  Alb  2.7<L>  /  TBili  0.5  /  DBili  x   /  AST  13  /  ALT  15  /  AlkPhos  126<H>  09-16          Troponin T, Serum: 0.50 ng/mL *HH* (09-16-21 @ 04:50)      CARDIAC MARKERS ( 16 Sep 2021 04:50 )  x     / 0.50 ng/mL / x     / x     / x      CARDIAC MARKERS ( 14 Sep 2021 08:48 )  x     / x     / 37 U/L / x     / 2.6 ng/mL      RADIOLOGY:    PHYSICAL EXAM:  GEN: No acute distress  LUNGS: Clear to auscultation bilaterally   HEART: S1/S2 present. RRR.   ABD: Soft, non-tender, non-distended. Bowel sounds present  EXT: NC/NC/NE/2+PP/SHARIF  NEURO: AAOX3     SUBJECTIVE:      Patient is a 85y old Female who presents with a chief complaint of fall (15 Sep 2021 06:06)    Currently admitted to medicine with the primary diagnosis of Intertrochanteric fracture of right hip       Today is hospital day 11d. This morning she is resting comfortably in bed and reports no new issues or overnight events. One episode of loose stools yesterday    PAST MEDICAL & SURGICAL HISTORY  CAD (coronary artery disease)    Chronic CHF    Atrial fibrillation    Type 2 diabetes mellitus    Colon cancer    Hypertension    CKD (chronic kidney disease)  Stage 3    History of Clostridium difficile colitis  s/p fecal transplant    Diverticulitis    S/P hysterectomy      SOCIAL HISTORY:  Negative for smoking/alcohol/drug use.     ALLERGIES:  No Known Allergies    MEDICATIONS:  STANDING MEDICATIONS  acetaminophen   Tablet .. 650 milliGRAM(s) Oral every 6 hours  ascorbic acid 500 milliGRAM(s) Oral two times a day  atorvastatin 40 milliGRAM(s) Oral at bedtime  calcitriol   Capsule 0.25 MICROGram(s) Oral daily  carvedilol 25 milliGRAM(s) Oral every 12 hours  ceFAZolin   IVPB 2000 milliGRAM(s) IV Intermittent every 8 hours  chlorhexidine 4% Liquid 1 Application(s) Topical <User Schedule>  clopidogrel Tablet 75 milliGRAM(s) Oral daily  dextrose 40% Gel 15 Gram(s) Oral once  dextrose 50% Injectable 25 Gram(s) IV Push once  ferrous    sulfate 325 milliGRAM(s) Oral daily  fidaxomicin 200 milliGRAM(s) Oral two times a day  folic acid 1 milliGRAM(s) Oral daily  glucagon  Injectable 1 milliGRAM(s) IntraMuscular once  heparin   Injectable 5000 Unit(s) SubCutaneous every 12 hours  insulin lispro (ADMELOG) corrective regimen sliding scale   SubCutaneous every 6 hours  latanoprost 0.005% Ophthalmic Solution 1 Drop(s) Both EYES at bedtime  levothyroxine 25 MICROGram(s) Oral daily  multivitamin 1 Tablet(s) Oral daily  pantoprazole  Injectable 40 milliGRAM(s) IV Push every 12 hours  raloxifene 60 milliGRAM(s) Oral daily  senna 2 Tablet(s) Oral at bedtime    PRN MEDICATIONS  bisacodyl Suppository 10 milliGRAM(s) Rectal daily PRN  diphenhydrAMINE 25 milliGRAM(s) Oral at bedtime PRN  ondansetron Injectable 4 milliGRAM(s) IV Push every 6 hours PRN    VITALS:   T(F): 98.6  HR: 80  BP: 109/56  RR: 20  SpO2: 98%    LABS:                        9.6    17.26 )-----------( 270      ( 16 Sep 2021 04:50 )             29.4     09-16    134<L>  |  107  |  65<HH>  ----------------------------<  194<H>  5.6<H>   |  17  |  2.0<H>    Ca    9.4      16 Sep 2021 04:50  Mg     1.8     09-16    TPro  4.8<L>  /  Alb  2.7<L>  /  TBili  0.5  /  DBili  x   /  AST  13  /  ALT  15  /  AlkPhos  126<H>  09-16          Troponin T, Serum: 0.50 ng/mL *HH* (09-16-21 @ 04:50)      CARDIAC MARKERS ( 16 Sep 2021 04:50 )  x     / 0.50 ng/mL / x     / x     / x      CARDIAC MARKERS ( 14 Sep 2021 08:48 )  x     / x     / 37 U/L / x     / 2.6 ng/mL    PHYSICAL EXAM:  GEN: No acute distress  LUNGS: Clear to auscultation bilaterally   HEART: S1/S2 present. Irregular.   ABD: Soft, non-tender, non-distended. Bowel sounds present  EXT: no swelling  NEURO: AAOX3

## 2021-09-17 LAB
ALBUMIN SERPL ELPH-MCNC: 2.6 G/DL — LOW (ref 3.5–5.2)
ALP SERPL-CCNC: 107 U/L — SIGNIFICANT CHANGE UP (ref 30–115)
ALT FLD-CCNC: <5 U/L — SIGNIFICANT CHANGE UP (ref 0–41)
ANION GAP SERPL CALC-SCNC: 11 MMOL/L — SIGNIFICANT CHANGE UP (ref 7–14)
AST SERPL-CCNC: 13 U/L — SIGNIFICANT CHANGE UP (ref 0–41)
BASOPHILS # BLD AUTO: 0.01 K/UL — SIGNIFICANT CHANGE UP (ref 0–0.2)
BASOPHILS NFR BLD AUTO: 0.1 % — SIGNIFICANT CHANGE UP (ref 0–1)
BILIRUB SERPL-MCNC: 0.3 MG/DL — SIGNIFICANT CHANGE UP (ref 0.2–1.2)
BUN SERPL-MCNC: 68 MG/DL — CRITICAL HIGH (ref 10–20)
CALCIUM SERPL-MCNC: 9.5 MG/DL — SIGNIFICANT CHANGE UP (ref 8.5–10.1)
CHLORIDE SERPL-SCNC: 104 MMOL/L — SIGNIFICANT CHANGE UP (ref 98–110)
CO2 SERPL-SCNC: 17 MMOL/L — SIGNIFICANT CHANGE UP (ref 17–32)
CREAT SERPL-MCNC: 2.5 MG/DL — HIGH (ref 0.7–1.5)
EOSINOPHIL # BLD AUTO: 0.03 K/UL — SIGNIFICANT CHANGE UP (ref 0–0.7)
EOSINOPHIL NFR BLD AUTO: 0.2 % — SIGNIFICANT CHANGE UP (ref 0–8)
GLUCOSE BLDC GLUCOMTR-MCNC: 150 MG/DL — HIGH (ref 70–99)
GLUCOSE BLDC GLUCOMTR-MCNC: 188 MG/DL — HIGH (ref 70–99)
GLUCOSE BLDC GLUCOMTR-MCNC: 190 MG/DL — HIGH (ref 70–99)
GLUCOSE BLDC GLUCOMTR-MCNC: 198 MG/DL — HIGH (ref 70–99)
GLUCOSE SERPL-MCNC: 169 MG/DL — HIGH (ref 70–99)
HCT VFR BLD CALC: 30.7 % — LOW (ref 37–47)
HGB BLD-MCNC: 9.9 G/DL — LOW (ref 12–16)
IMM GRANULOCYTES NFR BLD AUTO: 0.9 % — HIGH (ref 0.1–0.3)
LYMPHOCYTES # BLD AUTO: 1.06 K/UL — LOW (ref 1.2–3.4)
LYMPHOCYTES # BLD AUTO: 7.3 % — LOW (ref 20.5–51.1)
MAGNESIUM SERPL-MCNC: 2.2 MG/DL — SIGNIFICANT CHANGE UP (ref 1.8–2.4)
MCHC RBC-ENTMCNC: 28.3 PG — SIGNIFICANT CHANGE UP (ref 27–31)
MCHC RBC-ENTMCNC: 32.2 G/DL — SIGNIFICANT CHANGE UP (ref 32–37)
MCV RBC AUTO: 87.7 FL — SIGNIFICANT CHANGE UP (ref 81–99)
MONOCYTES # BLD AUTO: 1.03 K/UL — HIGH (ref 0.1–0.6)
MONOCYTES NFR BLD AUTO: 7.1 % — SIGNIFICANT CHANGE UP (ref 1.7–9.3)
NEUTROPHILS # BLD AUTO: 12.26 K/UL — HIGH (ref 1.4–6.5)
NEUTROPHILS NFR BLD AUTO: 84.4 % — HIGH (ref 42.2–75.2)
NRBC # BLD: 0 /100 WBCS — SIGNIFICANT CHANGE UP (ref 0–0)
PLATELET # BLD AUTO: 311 K/UL — SIGNIFICANT CHANGE UP (ref 130–400)
POTASSIUM SERPL-MCNC: 5.9 MMOL/L — HIGH (ref 3.5–5)
POTASSIUM SERPL-SCNC: 5.9 MMOL/L — HIGH (ref 3.5–5)
PROT SERPL-MCNC: 4.8 G/DL — LOW (ref 6–8)
RBC # BLD: 3.5 M/UL — LOW (ref 4.2–5.4)
RBC # FLD: 19.3 % — HIGH (ref 11.5–14.5)
SODIUM SERPL-SCNC: 132 MMOL/L — LOW (ref 135–146)
WBC # BLD: 14.52 K/UL — HIGH (ref 4.8–10.8)
WBC # FLD AUTO: 14.52 K/UL — HIGH (ref 4.8–10.8)

## 2021-09-17 PROCEDURE — 99233 SBSQ HOSP IP/OBS HIGH 50: CPT

## 2021-09-17 RX ORDER — SODIUM ZIRCONIUM CYCLOSILICATE 10 G/10G
5 POWDER, FOR SUSPENSION ORAL
Refills: 0 | Status: DISCONTINUED | OUTPATIENT
Start: 2021-09-17 | End: 2021-09-18

## 2021-09-17 RX ORDER — SODIUM ZIRCONIUM CYCLOSILICATE 10 G/10G
5 POWDER, FOR SUSPENSION ORAL ONCE
Refills: 0 | Status: COMPLETED | OUTPATIENT
Start: 2021-09-17 | End: 2021-09-17

## 2021-09-17 RX ORDER — LANOLIN ALCOHOL/MO/W.PET/CERES
5 CREAM (GRAM) TOPICAL AT BEDTIME
Refills: 0 | Status: DISCONTINUED | OUTPATIENT
Start: 2021-09-17 | End: 2021-09-27

## 2021-09-17 RX ADMIN — CARVEDILOL PHOSPHATE 25 MILLIGRAM(S): 80 CAPSULE, EXTENDED RELEASE ORAL at 17:40

## 2021-09-17 RX ADMIN — LATANOPROST 1 DROP(S): 0.05 SOLUTION/ DROPS OPHTHALMIC; TOPICAL at 22:34

## 2021-09-17 RX ADMIN — Medication 3 UNIT(S): at 18:13

## 2021-09-17 RX ADMIN — Medication 1 TABLET(S): at 12:30

## 2021-09-17 RX ADMIN — INSULIN GLARGINE 10 UNIT(S): 100 INJECTION, SOLUTION SUBCUTANEOUS at 21:34

## 2021-09-17 RX ADMIN — Medication 500 MILLIGRAM(S): at 17:40

## 2021-09-17 RX ADMIN — CALCITRIOL 0.25 MICROGRAM(S): 0.5 CAPSULE ORAL at 12:30

## 2021-09-17 RX ADMIN — CLOPIDOGREL BISULFATE 75 MILLIGRAM(S): 75 TABLET, FILM COATED ORAL at 12:29

## 2021-09-17 RX ADMIN — HEPARIN SODIUM 5000 UNIT(S): 5000 INJECTION INTRAVENOUS; SUBCUTANEOUS at 06:15

## 2021-09-17 RX ADMIN — HEPARIN SODIUM 5000 UNIT(S): 5000 INJECTION INTRAVENOUS; SUBCUTANEOUS at 17:41

## 2021-09-17 RX ADMIN — Medication 500 MILLIGRAM(S): at 06:14

## 2021-09-17 RX ADMIN — PANTOPRAZOLE SODIUM 40 MILLIGRAM(S): 20 TABLET, DELAYED RELEASE ORAL at 17:41

## 2021-09-17 RX ADMIN — Medication 3 UNIT(S): at 08:06

## 2021-09-17 RX ADMIN — ATORVASTATIN CALCIUM 40 MILLIGRAM(S): 80 TABLET, FILM COATED ORAL at 22:31

## 2021-09-17 RX ADMIN — PANTOPRAZOLE SODIUM 40 MILLIGRAM(S): 20 TABLET, DELAYED RELEASE ORAL at 06:14

## 2021-09-17 RX ADMIN — CARVEDILOL PHOSPHATE 25 MILLIGRAM(S): 80 CAPSULE, EXTENDED RELEASE ORAL at 06:14

## 2021-09-17 RX ADMIN — Medication 325 MILLIGRAM(S): at 12:31

## 2021-09-17 RX ADMIN — Medication 100 MILLIGRAM(S): at 14:24

## 2021-09-17 RX ADMIN — FIDAXOMICIN 200 MILLIGRAM(S): 200 GRANULE, FOR SUSPENSION ORAL at 17:40

## 2021-09-17 RX ADMIN — Medication 1 MILLIGRAM(S): at 12:29

## 2021-09-17 RX ADMIN — Medication 3 UNIT(S): at 12:32

## 2021-09-17 RX ADMIN — Medication 100 MILLIGRAM(S): at 06:16

## 2021-09-17 RX ADMIN — RALOXIFENE HYDROCHLORIDE 60 MILLIGRAM(S): 60 TABLET, COATED ORAL at 12:29

## 2021-09-17 RX ADMIN — Medication 325 MILLIGRAM(S): at 14:24

## 2021-09-17 RX ADMIN — Medication 100 MILLIGRAM(S): at 22:33

## 2021-09-17 RX ADMIN — SODIUM ZIRCONIUM CYCLOSILICATE 5 GRAM(S): 10 POWDER, FOR SUSPENSION ORAL at 14:23

## 2021-09-17 RX ADMIN — CHLORHEXIDINE GLUCONATE 1 APPLICATION(S): 213 SOLUTION TOPICAL at 08:07

## 2021-09-17 RX ADMIN — Medication 25 MICROGRAM(S): at 06:13

## 2021-09-17 RX ADMIN — Medication 2: at 06:14

## 2021-09-17 RX ADMIN — Medication 2: at 12:31

## 2021-09-17 RX ADMIN — FIDAXOMICIN 200 MILLIGRAM(S): 200 GRANULE, FOR SUSPENSION ORAL at 06:16

## 2021-09-17 RX ADMIN — Medication 650 MILLIGRAM(S): at 17:40

## 2021-09-17 NOTE — PROGRESS NOTE ADULT - ASSESSMENT
IMPRESSION:  #Non-ST elevation ACS v/s Type II MI secondary to demand ischemia from acute blood loss anemia  - Acute blood loss anemia (Hg dropped to 5)   - Operative site hematoma v/s possible upper GI bleeding (hematemesis?) --> currently stable at 9.9   - PREVENA WOUND VAC PLACED 9/16, finish prophylactic Ancef dose today as per ID and ortho  - GI follow-up ---> deferred endoscopy due to no active bleeding   - Resume plavix (cleared by GI)  - Keep Hg>8  - Continue pantoprazole 40mg IV bid  - Monitor CBC  - F/U GI  - Continue atorvastatin and coreg    # C. diff colitis   # History of colon cancer  - on Fidaxomicin (started 9/14)  - appreciate ID recommendations: Po Fidaxomicin 200 mg q12h for 10 days and then po vancomycin 125 mg q12h for 3 more weeks  - WBC trending down   - blood cx: NGTD  - urine cx: contaminated  - Monitor lactate level (improving, WNL)    #Atrial fib  - Hold Eliquis per ortho    #CKD III  - Accurate I&O  - Monitor BUN/Cr  - Follow-up lytes.  Correct as needed to keep Mg>2.2 and K>4  - K+ 5.9, 5 mg Lokelma ordered    # DM, HTN  # HFmEF EF 45%  - Keep lasix and losartan on hold for now (in context of C.diff infection and diarrhea), resume when acute illness resolves      CNS:  - Avoid sedatives    HEENT:   - Oral care    PULMONARY:    - HOB @ 45 degrees    HEMATOLOGICAL:    - DVT prophylaxis    ENDOCRINE:    - Follow up FS.  Insulin protocol if needed    MUSCULOSKELETAL:  - Increase activity as tolerated  - as per ortho, PT consults recommended for ambulation out of bed to chair     DISPOSITION:  - acute  - F/U primary cardiologist

## 2021-09-17 NOTE — CONSULT NOTE ADULT - SUBJECTIVE AND OBJECTIVE BOX
Pt is a 84yo Female with PMH of CAD, Chronic CHF, A. Fib, TYpe 2 DM, Colon Ca, HTN, h/o C. Diff, CKD, Fibroids s/p Hysterectomy admitted s/p Fall with left hip fx  s/p ORIF with Intramedullary nail placement POD#11 Urology called for urinary retention with difficult Das catheter placement. Pt. seen and examined at bedside, distended urinary bladder Pt. reports she has similar as at home symptoms of frequency, nocturia 2/night, Pt. reports hx of post- op urinary retention. Pt. states Das catheter was removed, but she had not f/u with urology. Pt. denies fever, chills, NV, SOB.   BS by RN with 842 cc.  In  sterile technique 14 Fr Coude catheter placed, 10 cc Das catheter balloon inflated 1300 of yellow slightly cloudy by the end of drainage urine drained. Pt. tollerated procedure well, left in NAD.     PAST MEDICAL & SURGICAL HISTORY:  CAD (coronary artery disease)    Chronic CHF    Atrial fibrillation    Type 2 diabetes mellitus    Colon cancer    Hypertension    CKD (chronic kidney disease)  Stage 3    History of Clostridium difficile colitis  s/p fecal transplant    Diverticulitis    S/P hysterectomy        MEDICATIONS  (STANDING):  acetaminophen   Tablet .. 650 milliGRAM(s) Oral every 6 hours  ascorbic acid 500 milliGRAM(s) Oral two times a day  atorvastatin 40 milliGRAM(s) Oral at bedtime  calcitriol   Capsule 0.25 MICROGram(s) Oral daily  carvedilol 25 milliGRAM(s) Oral every 12 hours  ceFAZolin   IVPB 2000 milliGRAM(s) IV Intermittent every 8 hours  chlorhexidine 4% Liquid 1 Application(s) Topical <User Schedule>  clopidogrel Tablet 75 milliGRAM(s) Oral daily  dextrose 40% Gel 15 Gram(s) Oral once  dextrose 5%. 1000 milliLiter(s) (50 mL/Hr) IV Continuous <Continuous>  dextrose 5%. 1000 milliLiter(s) (100 mL/Hr) IV Continuous <Continuous>  dextrose 50% Injectable 25 Gram(s) IV Push once  ferrous    sulfate 325 milliGRAM(s) Oral daily  fidaxomicin 200 milliGRAM(s) Oral two times a day  folic acid 1 milliGRAM(s) Oral daily  glucagon  Injectable 1 milliGRAM(s) IntraMuscular once  heparin   Injectable 5000 Unit(s) SubCutaneous every 12 hours  insulin glargine Injectable (LANTUS) 10 Unit(s) SubCutaneous at bedtime  insulin lispro (ADMELOG) corrective regimen sliding scale   SubCutaneous every 6 hours  insulin lispro Injectable (ADMELOG) 3 Unit(s) SubCutaneous three times a day before meals  latanoprost 0.005% Ophthalmic Solution 1 Drop(s) Both EYES at bedtime  levothyroxine 25 MICROGram(s) Oral daily  multivitamin 1 Tablet(s) Oral daily  pantoprazole  Injectable 40 milliGRAM(s) IV Push every 12 hours  raloxifene 60 milliGRAM(s) Oral daily  sodium chloride 0.9%. 1000 milliLiter(s) (75 mL/Hr) IV Continuous <Continuous>  sodium zirconium cyclosilicate 5 Gram(s) Oral two times a day    MEDICATIONS  (PRN):  diphenhydrAMINE 25 milliGRAM(s) Oral at bedtime PRN Insomnia  melatonin 5 milliGRAM(s) Oral at bedtime PRN Insomnia  ondansetron Injectable 4 milliGRAM(s) IV Push every 6 hours PRN Nausea and/or Vomiting      Allergies: NKDA       SOCIAL HISTORY: No illicit drug use    FAMILY HISTORY:  FHx: breast cancer  Mother        REVIEW OF SYSTEMS   [x] A ten-point review of systems was otherwise negative except as noted.       Vital Signs Last 24 Hrs  T(C): 35.7 (17 Sep 2021 16:00), Max: 36.3 (17 Sep 2021 00:00)  T(F): 96.3 (17 Sep 2021 16:00), Max: 97.3 (17 Sep 2021 00:00)  HR: 85 (17 Sep 2021 16:00) (72 - 85)  BP: 121/72 (17 Sep 2021 16:00) (121/72 - 138/79)  BP(mean): 84 (16 Sep 2021 20:00) (84 - 97)  RR: 18 (17 Sep 2021 16:00) (18 - 30)  SpO2: 99% (16 Sep 2021 20:00) (99% - 99%)    PHYSICAL EXAM:    GEN: NAD, well-developed, awake and alert.  SKIN: Good color, non diaphoretic.  HEENT: NC/AT.  RESP: No dyspnea, non-labored breathing. No use of accessory muscles.  CARDIO: +S1/S2  ABDO: Soft,  distended  and palpable bladder, +suprapubic tenderness on palpation.   BACK: No CVAT B/L  : + atrophic vaginal vault, urethra retracted . in sterile technique 14 Fr Coude catheter placed, 10 cc Das catheter balloon inflated, draining 1300 UO initially clear yellow than slightly cloudy urine.     I&O's Summary    16 Sep 2021 07:01  -  17 Sep 2021 07:00  --------------------------------------------------------  IN: 1150 mL / OUT: 400 mL / NET: 750 mL    17 Sep 2021 07:01  -  17 Sep 2021 17:40  --------------------------------------------------------  IN: 0 mL / OUT: 1300 mL / NET: -1300 mL        LABS:                        9.9    14.52 )-----------( 311      ( 17 Sep 2021 04:30 )             30.7     09-17    132<L>  |  104  |  68<HH>  ----------------------------<  169<H>  5.9<H>   |  17  |  2.5<H>    Ca    9.5      17 Sep 2021 04:30  Mg     2.2     09-17    TPro  4.8<L>  /  Alb  2.6<L>  /  TBili  0.3  /  DBili  x   /  AST  13  /  ALT  <5  /  AlkPhos  107  09-17      Urinalysis (09.12.21 @ 22:30)    Glucose Qualitative, Urine: Negative    Blood, Urine: Large    pH Urine: 6.5    Color: Lovely    Urine Appearance: Turbid    Bilirubin: Negative    Ketone - Urine: Negative    Specific Gravity: 1.032    Protein, Urine: 30 mg/dL    Urobilinogen: 6 mg/dL    Nitrite: Negative    Leukocyte Esterase Concentration: Large      Urine Microscopic-Add On (NC) (09.12.21 @ 22:30)    Bacteria: Few    Epithelial Cells: 0 /HPF    Red Blood Cell - Urine: 25 /HPF    White Blood Cell - Urine: 5 /HPF    Hyaline Casts: 0 /LPF      Culture - Urine (09.14.21 @ 09:58)    Specimen Source: Catheterized Catheterized    Culture Results:   >=3 organisms. Probable collection contamination.      RADIOLOGY & ADDITIONAL STUDIES:    EXAM:  CT ABDOMEN AND PELVIS        EXAM:  XR HIP 2-3V LT        EXAM:  CT CHEST            PROCEDURE DATE:  09/04/2021            INTERPRETATION:  CLINICAL STATEMENT: Trauma    TECHNIQUE: Contiguous axial CT images were obtained from the thoracic inlet to the pubic symphysis without intravenous contrast.  Oral contrast was not administered.  Reformatted images in the coronal and sagittal planes were acquired.    3 x-ray images with attention to the pelvis and left hip    COMPARISON CT: None.    OTHER STUDIES USED FOR CORRELATION: None.      FINDINGS:    CHEST:    LUNGS/PLEURA/AIRWAYS: Bilateral dependent atelectatic changes. No pleural effusion or consolidation. No evidence of pneumothorax.    MEDIASTINUM/THORACIC NODES: Unremarkable.    HEART/GREAT VESSELS: No cardiomegaly. Extensive coronary atherosclerosis. Aortic atherosclerosis. Aorta and pulmonary arteries are normal in caliber.      ABDOMEN/PELVIS:    Evaluation of solid organs is suboptimal without intravenous contrast as well as arms positioned adjacent to the torso.    < from: CT Abdomen and Pelvis No Cont (09.10.21 @ 18:30) >  EXAM:  CT ABDOMEN AND PELVIS            PROCEDURE DATE:  09/10/2021            INTERPRETATION:  CLINICAL STATEMENT: recent hip fx s/p surgery, w/ hgb drop, now 5.4. r/o interna  l bleed    TECHNIQUE: Contiguous axial CT images were obtained from the lower chest to the pubic symphysis without intravenous contrast.  Oral contrast was not administered.  Reformatted images in the coronal and sagittal planes were acquired.    COMPARISON CT: 9/8/2021    OTHER STUDIES USED FOR CORRELATION: None.      FINDINGS:    LOWER CHEST: Unchanged.    HEPATOBILIARY: Unchanged.    SPLEEN: Unremarkable.    PANCREAS: Unremarkable.    ADRENAL GLANDS: Unchanged bilateral adrenal gland thickening.    KIDNEYS: Unchanged.    ABDOMINOPELVIC NODES: Unremarkable.    PELVIC ORGANS: Unchanged.    PERITONEUM/MESENTERY/BOWEL: Interval decrease in gaseous distention of the stomach, small and large bowel since 2 days earlier. No free air. Otherwise unchanged. No intra-abdominal hemorrhage or intra-abdominal focal collection seen.    BONES/SOFT TISSUES: Unchanged again noting left intramedullary gamma nail fixation hardware with a partially imaged adjacent intramuscular hematoma involving the femoral musculature.      IMPRESSION:    Since 9/8/2021,    No change in the partially imaged left femoral intramuscular hematoma. No intra-abdominal hemorrhage.    Interval decrease in gaseous distention of the stomach, small and large bowel since 2 days earlier.    --- End of Report ---              MARNIE ARREDONDO MD; Attending Radiologist  This document has been electronically signed. Sep 10 2021  7:17PM    < end of copied text >  HEPATOBILIARY: Possible cholelithiasis.    SPLEEN: Unremarkable.    PANCREAS: Apparent hypodensity in the central aspect of the pancreatic head measuring up to 8 mm (2/62).    ADRENAL GLANDS: Mild bilateraldiffuse adrenal thickening.    KIDNEYS: No hydroureter or hydronephrosis. Bilateral nonspecific perinephric stranding. Right-sided renal cysts. Above fluid density lesion in the interpolar region of the right kidney measuring up to 2.3 cm    ABDOMINOPELVIC NODES: Unremarkable.    PELVIC ORGANS: Post hysterectomy    PERITONEUM/MESENTERY/BOWEL: Post right hemicolectomy. Colonic diverticulosis without definite evidence of diverticulitis. No evidence of bowel obstruction or pneumoperitoneum.    BONES/SOFT TISSUES: Left intertrochanteric fracture extending through the greater trochanter. Degenerative changes of the thoracic and lumbar spine with mild lumbar dextroscoliosis    OTHER: Extensive atherosclerosis.    Pelvis/hip radiograph:    There is a left intertrochanteric fracture with extension through the left greater trochanter. There are minimal degenerative changes of both hips. Osteopenia is noted.      IMPRESSION:      Left femoral intertrochanteric fracture    Otherwise, no evidence of acute abdominal or pelvic pathology    Above fluid density lesion in the interpolar region of the right kidney measuring up to 2.3 cm. Nonemergent ultrasound may be of benefit for further evaluation.    --- End of Report ---        JM SWIFT MD; Attending Radiologist  This document has been electronically signed. Sep  4 2021 11:36PM

## 2021-09-17 NOTE — CONSULT NOTE ADULT - CONSULT REQUESTED DATE/TIME
04-Sep-2021 21:00
11-Sep-2021 08:42
17-Sep-2021 17:40
05-Sep-2021 10:27
05-Sep-2021 23:45
07-Sep-2021 15:54
13-Sep-2021 15:43
04-Sep-2021 20:08

## 2021-09-17 NOTE — PROGRESS NOTE ADULT - SUBJECTIVE AND OBJECTIVE BOX
HPI  Patient is a 85y old Female who presents with a chief complaint of hip fx n stemi (16 Sep 2021 10:38)    Currently admitted to medicine with the primary diagnosis of Intertrochanteric fracture of right hip       Today is hospital day 12d.     INTERVAL HPI / OVERNIGHT EVENTS:  Patient was examined and seen at bedside. This morning she is resting comfortably in bed and reports no new issues or overnight events. She endorses continuing diarrhea (RN will re-check, did not receive signout of diarrhea).     ROS: Otherwise unremarkable     PAST MEDICAL & SURGICAL HISTORY  CAD (coronary artery disease)    Chronic CHF    Atrial fibrillation    Type 2 diabetes mellitus    Colon cancer    Hypertension    CKD (chronic kidney disease)  Stage 3    History of Clostridium difficile colitis  s/p fecal transplant    Diverticulitis    S/P hysterectomy      ALLERGIES  No Known Allergies    MEDICATIONS  STANDING MEDICATIONS  acetaminophen   Tablet .. 650 milliGRAM(s) Oral every 6 hours  ascorbic acid 500 milliGRAM(s) Oral two times a day  atorvastatin 40 milliGRAM(s) Oral at bedtime  calcitriol   Capsule 0.25 MICROGram(s) Oral daily  carvedilol 25 milliGRAM(s) Oral every 12 hours  ceFAZolin   IVPB 2000 milliGRAM(s) IV Intermittent every 8 hours  chlorhexidine 4% Liquid 1 Application(s) Topical <User Schedule>  clopidogrel Tablet 75 milliGRAM(s) Oral daily  dextrose 40% Gel 15 Gram(s) Oral once  dextrose 5%. 1000 milliLiter(s) IV Continuous <Continuous>  dextrose 5%. 1000 milliLiter(s) IV Continuous <Continuous>  dextrose 50% Injectable 25 Gram(s) IV Push once  ferrous    sulfate 325 milliGRAM(s) Oral daily  fidaxomicin 200 milliGRAM(s) Oral two times a day  folic acid 1 milliGRAM(s) Oral daily  glucagon  Injectable 1 milliGRAM(s) IntraMuscular once  heparin   Injectable 5000 Unit(s) SubCutaneous every 12 hours  insulin glargine Injectable (LANTUS) 10 Unit(s) SubCutaneous at bedtime  insulin lispro (ADMELOG) corrective regimen sliding scale   SubCutaneous every 6 hours  insulin lispro Injectable (ADMELOG) 3 Unit(s) SubCutaneous three times a day before meals  latanoprost 0.005% Ophthalmic Solution 1 Drop(s) Both EYES at bedtime  levothyroxine 25 MICROGram(s) Oral daily  multivitamin 1 Tablet(s) Oral daily  pantoprazole  Injectable 40 milliGRAM(s) IV Push every 12 hours  raloxifene 60 milliGRAM(s) Oral daily  senna 2 Tablet(s) Oral at bedtime  sodium chloride 0.9%. 1000 milliLiter(s) IV Continuous <Continuous>    PRN MEDICATIONS  bisacodyl Suppository 10 milliGRAM(s) Rectal daily PRN  diphenhydrAMINE 25 milliGRAM(s) Oral at bedtime PRN  melatonin 5 milliGRAM(s) Oral at bedtime PRN  ondansetron Injectable 4 milliGRAM(s) IV Push every 6 hours PRN    VITALS:  T(F): 97.3  HR: 80  BP: 121/73  RR: 18  SpO2: 99%    PHYSICAL EXAM  GEN: NAD, Resting comfortably in bed  PULM: Clear to auscultation bilaterally, No wheezes  CVS: Regular rate and rhythm, S1-S2, no murmurs  ABD: Soft, non-tender, non-distended, no guarding  EXT: SCDs on, b/l feet edematous with prominent hammertoes R > L; drains on L hip draining yellow serous fluid  NEURO: A&Ox3, no focal deficits    LABS                        9.9    14.52 )-----------( 311      ( 17 Sep 2021 04:30 )             30.7     09-17    132<L>  |  104  |  68<HH>  ----------------------------<  169<H>  5.9<H>   |  17  |  2.5<H>    Ca    9.5      17 Sep 2021 04:30  Mg     2.2     09-17    TPro  4.8<L>  /  Alb  2.6<L>  /  TBili  0.3  /  DBili  x   /  AST  13  /  ALT  <5  /  AlkPhos  107  09-17              CARDIAC MARKERS ( 16 Sep 2021 04:50 )  x     / 0.50 ng/mL / x     / x     / x          RADIOLOGY

## 2021-09-17 NOTE — CONSULT NOTE ADULT - CONSULT REASON
S/p mechanical fall, +HT, -LOC, +AC (Eliquis)
tamika
HIP FX
Preoperative risk assessment
Pre-op medical eval for L hip fracture repair
CD colitis
Urinary retention, difficult Das catheter placement
left hip fx

## 2021-09-17 NOTE — CONSULT NOTE ADULT - ASSESSMENT
85 y.o F with urinary retention,  cc, Difficult Das catheter placement 2/2 vaginal atrophy   CT A/P finding No hydroureter or hydronephrosis. Bilateral nonspecific perinephric stranding. Right-sided renal cysts. Above fluid density lesion in the interpolar region of the right kidney measuring up to 2.3 cm      Plan:  No acute  intervention needed at this time.   Cont Das catheter drain to gravity. Keep vaginal area clean.   Start Flomax   Strict I&O   TOV in 3-4 days if inpatient.    If Pt. awaits d/c can F/U with Dr. Khan in 1 week for TOV, UDS, Cystoscopy.   Outpatient work up for incidental CT A/P finding of Right kidney lesion.

## 2021-09-17 NOTE — PROGRESS NOTE ADULT - SUBJECTIVE AND OBJECTIVE BOX
ORTHOPEDIC SURGERY PROGRESS NOTE     POD#11 S/P ORIF HIP    Vital Signs Last 24 Hrs  T(C): 36.3 (17 Sep 2021 00:00), Max: 36.3 (17 Sep 2021 00:00)  T(F): 97.3 (17 Sep 2021 00:00), Max: 97.3 (17 Sep 2021 00:00)  HR: 80 (17 Sep 2021 00:00) (72 - 80)  BP: 121/73 (17 Sep 2021 00:00) (121/73 - 138/79)  BP(mean): 84 (16 Sep 2021 20:00) (84 - 98)  RR: 18 (17 Sep 2021 00:00) (18 - 30)  SpO2: 99% (16 Sep 2021 20:00) (99% - 100%)                                   9.9    14.52 )-----------( 311      ( 17 Sep 2021 04:30 )             30.7   09-17    132<L>  |  104  |  68<HH>  ----------------------------<  169<H>  5.9<H>   |  17  |  2.5<H>    Ca    9.5      17 Sep 2021 04:30  Mg     2.2     09-17    TPro  4.8<L>  /  Alb  2.6<L>  /  TBili  0.3  /  DBili  x   /  AST  13  /  ALT  <5  /  AlkPhos  107  09-17    A/P:  -Prevena wound vac placed yesterday on proximal and distal incision sites; both cannisters full today to max capacity; emptied and replaced this morning; continuing to monitor drainage.   -Abdominal binder removed 9/16 due to being constantly soiled asa  result of patients c.diff   -ancef 48 hours for prophylactic surgical site infection; STOP ANCEF TOMORROW 9/18 AFTER 9PM DOSE.   -dvt ppx--> hep sq, plavix  -PT/OT  -WBAT, OOB  -ortho following

## 2021-09-17 NOTE — PROGRESS NOTE ADULT - ATTENDING COMMENTS
# NSTEMi-- restarted plavix-- concern for operative site hematoma.-- extending into muscles.    # Jordy worsening-- patient has urinary retention-- bladder scan showed 850cc-- elliott to drainage  # Lt intertrochanteric  fx-- patient has drains and will get wash out on monday  # C diff-- still has diarrhea.  # A fib-- eliquis is held  # Hyperkalemia-- on [facility]    ortho will take her to OR on monday

## 2021-09-17 NOTE — CONSULT NOTE ADULT - PROVIDER SPECIALTY LIST ADULT
Physiatry
Urology
Cardiology
Gastroenterology
Orthopedics
Trauma Surgery
Hospitalist
Infectious Disease

## 2021-09-18 LAB
ALBUMIN SERPL ELPH-MCNC: 2.4 G/DL — LOW (ref 3.5–5.2)
ALP SERPL-CCNC: 112 U/L — SIGNIFICANT CHANGE UP (ref 30–115)
ALT FLD-CCNC: <5 U/L — SIGNIFICANT CHANGE UP (ref 0–41)
ANION GAP SERPL CALC-SCNC: 11 MMOL/L — SIGNIFICANT CHANGE UP (ref 7–14)
AST SERPL-CCNC: 19 U/L — SIGNIFICANT CHANGE UP (ref 0–41)
BILIRUB SERPL-MCNC: 0.4 MG/DL — SIGNIFICANT CHANGE UP (ref 0.2–1.2)
BUN SERPL-MCNC: 71 MG/DL — CRITICAL HIGH (ref 10–20)
CALCIUM SERPL-MCNC: 9.3 MG/DL — SIGNIFICANT CHANGE UP (ref 8.5–10.1)
CHLORIDE SERPL-SCNC: 106 MMOL/L — SIGNIFICANT CHANGE UP (ref 98–110)
CO2 SERPL-SCNC: 16 MMOL/L — LOW (ref 17–32)
CREAT SERPL-MCNC: 2 MG/DL — HIGH (ref 0.7–1.5)
GLUCOSE BLDC GLUCOMTR-MCNC: 135 MG/DL — HIGH (ref 70–99)
GLUCOSE BLDC GLUCOMTR-MCNC: 136 MG/DL — HIGH (ref 70–99)
GLUCOSE BLDC GLUCOMTR-MCNC: 165 MG/DL — HIGH (ref 70–99)
GLUCOSE BLDC GLUCOMTR-MCNC: 171 MG/DL — HIGH (ref 70–99)
GLUCOSE BLDC GLUCOMTR-MCNC: 188 MG/DL — HIGH (ref 70–99)
GLUCOSE BLDC GLUCOMTR-MCNC: 289 MG/DL — HIGH (ref 70–99)
GLUCOSE SERPL-MCNC: 152 MG/DL — HIGH (ref 70–99)
HCT VFR BLD CALC: 26.4 % — LOW (ref 37–47)
HGB BLD-MCNC: 8.6 G/DL — LOW (ref 12–16)
MAGNESIUM SERPL-MCNC: 2 MG/DL — SIGNIFICANT CHANGE UP (ref 1.8–2.4)
MCHC RBC-ENTMCNC: 28.5 PG — SIGNIFICANT CHANGE UP (ref 27–31)
MCHC RBC-ENTMCNC: 32.6 G/DL — SIGNIFICANT CHANGE UP (ref 32–37)
MCV RBC AUTO: 87.4 FL — SIGNIFICANT CHANGE UP (ref 81–99)
NRBC # BLD: 0 /100 WBCS — SIGNIFICANT CHANGE UP (ref 0–0)
PLATELET # BLD AUTO: 295 K/UL — SIGNIFICANT CHANGE UP (ref 130–400)
POTASSIUM SERPL-MCNC: 5.3 MMOL/L — HIGH (ref 3.5–5)
POTASSIUM SERPL-SCNC: 5.3 MMOL/L — HIGH (ref 3.5–5)
PROT SERPL-MCNC: 4.5 G/DL — LOW (ref 6–8)
RBC # BLD: 3.02 M/UL — LOW (ref 4.2–5.4)
RBC # FLD: 18.9 % — HIGH (ref 11.5–14.5)
SODIUM SERPL-SCNC: 133 MMOL/L — LOW (ref 135–146)
TROPONIN T SERPL-MCNC: 0.21 NG/ML — CRITICAL HIGH
WBC # BLD: 11.8 K/UL — HIGH (ref 4.8–10.8)
WBC # FLD AUTO: 11.8 K/UL — HIGH (ref 4.8–10.8)

## 2021-09-18 PROCEDURE — 76775 US EXAM ABDO BACK WALL LIM: CPT | Mod: 26

## 2021-09-18 PROCEDURE — 93010 ELECTROCARDIOGRAM REPORT: CPT

## 2021-09-18 PROCEDURE — 99221 1ST HOSP IP/OBS SF/LOW 40: CPT

## 2021-09-18 PROCEDURE — 99233 SBSQ HOSP IP/OBS HIGH 50: CPT

## 2021-09-18 RX ORDER — ASPIRIN/CALCIUM CARB/MAGNESIUM 324 MG
81 TABLET ORAL DAILY
Refills: 0 | Status: DISCONTINUED | OUTPATIENT
Start: 2021-09-18 | End: 2021-09-22

## 2021-09-18 RX ORDER — SODIUM ZIRCONIUM CYCLOSILICATE 10 G/10G
5 POWDER, FOR SUSPENSION ORAL
Refills: 0 | Status: COMPLETED | OUTPATIENT
Start: 2021-09-18 | End: 2021-09-19

## 2021-09-18 RX ADMIN — PANTOPRAZOLE SODIUM 40 MILLIGRAM(S): 20 TABLET, DELAYED RELEASE ORAL at 05:46

## 2021-09-18 RX ADMIN — FIDAXOMICIN 200 MILLIGRAM(S): 200 GRANULE, FOR SUSPENSION ORAL at 18:23

## 2021-09-18 RX ADMIN — Medication 81 MILLIGRAM(S): at 11:37

## 2021-09-18 RX ADMIN — Medication 2: at 18:23

## 2021-09-18 RX ADMIN — Medication 650 MILLIGRAM(S): at 18:28

## 2021-09-18 RX ADMIN — Medication 650 MILLIGRAM(S): at 11:35

## 2021-09-18 RX ADMIN — Medication 6: at 12:27

## 2021-09-18 RX ADMIN — Medication 650 MILLIGRAM(S): at 18:23

## 2021-09-18 RX ADMIN — SODIUM ZIRCONIUM CYCLOSILICATE 5 GRAM(S): 10 POWDER, FOR SUSPENSION ORAL at 18:24

## 2021-09-18 RX ADMIN — RALOXIFENE HYDROCHLORIDE 60 MILLIGRAM(S): 60 TABLET, COATED ORAL at 11:34

## 2021-09-18 RX ADMIN — Medication 325 MILLIGRAM(S): at 11:34

## 2021-09-18 RX ADMIN — Medication 3 UNIT(S): at 18:28

## 2021-09-18 RX ADMIN — Medication 3 UNIT(S): at 12:26

## 2021-09-18 RX ADMIN — SODIUM ZIRCONIUM CYCLOSILICATE 5 GRAM(S): 10 POWDER, FOR SUSPENSION ORAL at 05:46

## 2021-09-18 RX ADMIN — Medication 1 TABLET(S): at 11:34

## 2021-09-18 RX ADMIN — ATORVASTATIN CALCIUM 40 MILLIGRAM(S): 80 TABLET, FILM COATED ORAL at 22:52

## 2021-09-18 RX ADMIN — Medication 500 MILLIGRAM(S): at 18:22

## 2021-09-18 RX ADMIN — FIDAXOMICIN 200 MILLIGRAM(S): 200 GRANULE, FOR SUSPENSION ORAL at 05:45

## 2021-09-18 RX ADMIN — CARVEDILOL PHOSPHATE 25 MILLIGRAM(S): 80 CAPSULE, EXTENDED RELEASE ORAL at 05:45

## 2021-09-18 RX ADMIN — CARVEDILOL PHOSPHATE 25 MILLIGRAM(S): 80 CAPSULE, EXTENDED RELEASE ORAL at 18:23

## 2021-09-18 RX ADMIN — Medication 100 MILLIGRAM(S): at 05:53

## 2021-09-18 RX ADMIN — INSULIN GLARGINE 10 UNIT(S): 100 INJECTION, SOLUTION SUBCUTANEOUS at 22:52

## 2021-09-18 RX ADMIN — CHLORHEXIDINE GLUCONATE 1 APPLICATION(S): 213 SOLUTION TOPICAL at 07:45

## 2021-09-18 RX ADMIN — Medication 500 MILLIGRAM(S): at 05:43

## 2021-09-18 RX ADMIN — LATANOPROST 1 DROP(S): 0.05 SOLUTION/ DROPS OPHTHALMIC; TOPICAL at 22:52

## 2021-09-18 RX ADMIN — CALCITRIOL 0.25 MICROGRAM(S): 0.5 CAPSULE ORAL at 11:35

## 2021-09-18 RX ADMIN — Medication 2: at 01:00

## 2021-09-18 RX ADMIN — HEPARIN SODIUM 5000 UNIT(S): 5000 INJECTION INTRAVENOUS; SUBCUTANEOUS at 05:48

## 2021-09-18 RX ADMIN — Medication 25 MICROGRAM(S): at 05:45

## 2021-09-18 RX ADMIN — Medication 3 UNIT(S): at 07:55

## 2021-09-18 RX ADMIN — Medication 650 MILLIGRAM(S): at 00:59

## 2021-09-18 RX ADMIN — Medication 1 MILLIGRAM(S): at 11:34

## 2021-09-18 RX ADMIN — Medication 650 MILLIGRAM(S): at 12:06

## 2021-09-18 RX ADMIN — Medication 650 MILLIGRAM(S): at 05:44

## 2021-09-18 RX ADMIN — Medication 650 MILLIGRAM(S): at 01:38

## 2021-09-18 RX ADMIN — PANTOPRAZOLE SODIUM 40 MILLIGRAM(S): 20 TABLET, DELAYED RELEASE ORAL at 18:23

## 2021-09-18 NOTE — PROGRESS NOTE ADULT - SUBJECTIVE AND OBJECTIVE BOX
SUBJECTIVE:    Patient is a 85y old Female who presents with a chief complaint of hip fx n stemi (17 Sep 2021 11:04)    Currently admitted to medicine with the primary diagnosis of Intertrochanteric fracture of right hip       Today is hospital day 13d.     PAST MEDICAL & SURGICAL HISTORY  CAD (coronary artery disease)    Chronic CHF    Atrial fibrillation    Type 2 diabetes mellitus    Colon cancer    Hypertension    CKD (chronic kidney disease)  Stage 3    History of Clostridium difficile colitis  s/p fecal transplant    Diverticulitis    S/P hysterectomy      ALLERGIES:  No Known Allergies    MEDICATIONS:  STANDING MEDICATIONS  acetaminophen   Tablet .. 650 milliGRAM(s) Oral every 6 hours  ascorbic acid 500 milliGRAM(s) Oral two times a day  aspirin  chewable 81 milliGRAM(s) Oral daily  atorvastatin 40 milliGRAM(s) Oral at bedtime  calcitriol   Capsule 0.25 MICROGram(s) Oral daily  carvedilol 25 milliGRAM(s) Oral every 12 hours  chlorhexidine 4% Liquid 1 Application(s) Topical <User Schedule>  dextrose 40% Gel 15 Gram(s) Oral once  dextrose 5%. 1000 milliLiter(s) IV Continuous <Continuous>  dextrose 5%. 1000 milliLiter(s) IV Continuous <Continuous>  dextrose 50% Injectable 25 Gram(s) IV Push once  ferrous    sulfate 325 milliGRAM(s) Oral daily  fidaxomicin 200 milliGRAM(s) Oral two times a day  folic acid 1 milliGRAM(s) Oral daily  glucagon  Injectable 1 milliGRAM(s) IntraMuscular once  insulin glargine Injectable (LANTUS) 10 Unit(s) SubCutaneous at bedtime  insulin lispro (ADMELOG) corrective regimen sliding scale   SubCutaneous every 6 hours  insulin lispro Injectable (ADMELOG) 3 Unit(s) SubCutaneous three times a day before meals  latanoprost 0.005% Ophthalmic Solution 1 Drop(s) Both EYES at bedtime  levothyroxine 25 MICROGram(s) Oral daily  multivitamin 1 Tablet(s) Oral daily  pantoprazole  Injectable 40 milliGRAM(s) IV Push every 12 hours  raloxifene 60 milliGRAM(s) Oral daily  sodium chloride 0.9%. 1000 milliLiter(s) IV Continuous <Continuous>  sodium zirconium cyclosilicate 5 Gram(s) Oral two times a day    PRN MEDICATIONS  diphenhydrAMINE 25 milliGRAM(s) Oral at bedtime PRN  melatonin 5 milliGRAM(s) Oral at bedtime PRN  ondansetron Injectable 4 milliGRAM(s) IV Push every 6 hours PRN    VITALS:   T(F): 96.7  HR: 88  BP: 141/68  RR: 18  SpO2: --    LABS:                        8.6    11.80 )-----------( 295      ( 18 Sep 2021 09:16 )             26.4     09-18    133<L>  |  106  |  71<HH>  ----------------------------<  152<H>  5.3<H>   |  16<L>  |  2.0<H>    Ca    9.3      18 Sep 2021 09:16  Mg     2.0     09-18    TPro  4.5<L>  /  Alb  2.4<L>  /  TBili  0.4  /  DBili  x   /  AST  19  /  ALT  <5  /  AlkPhos  112  09-18                  RADIOLOGY:    PHYSICAL EXAM:  GEN: No acute distress  LUNGS: Clear to auscultation bilaterally   HEART: S1/S2 present. RRR.   ABD/ GI: Soft, non-tender, non-distended. Bowel sounds present  EXT: lt thigh in dressing  NEURO: confused

## 2021-09-18 NOTE — PROGRESS NOTE ADULT - ASSESSMENT
85F w/PMHx CAD, CHF, Afib on Eliquis, DM, HTN, colon cancer, CKD stage 3, hx of diverticulitis, presents s/p mechanical fall at home, +HT, -LOC, +AC (Eliquis). Patient was in her kitchen and "turned around too fast" when she fell over and hit her head on a chair. She denies loss of consciousness. She remained down for ~30 min before EMS came to take her into the ED. She is GCS 15, primary survey negative. No external signs of trauma. Complains of LLE pain. Denies scalp, spinal, chest, abdominal pain. ROM of all extremities in tact. Was admitted to ortho service for eval and surgical correction of left femoral IT fracture s/p mechanical fall at home. Pt went to the OR and underwent successful fixation of left hip intertochanteric hip fracture.     While on the floors the patient had an episode of melena. As per patient she had coffee ground emesis on 9/9 PM 2 episodes followed by black tarry stools and abdominal pain intermittently in epigastric region worse with eating. 6/10 in intensity, non radiating. GI was consulted and decided that there was questionable GI bleeding. Anticoagulation was stopped. The patient's Hb was 5.4. She was transfused and the hemoglobin improved. The patient had typical chest pain with elevated tropes and was diagnosed with NSTEMI type 2. She was upgraded to the CCU. Due to the possibility of a GI bleed the patient was not cath'd. The patient was also found to be C. Diff positive and started on Vancomycin. The patient also developed a hematoma at the site of the surgery, Ortho evaluated her and are following her and replacing dressings as needed. The patient is now stable and her chest pain has improved. Per discussion with ID the patient was switched to Fidaxomicin. Due to continued drainage from surgical site will hold Eliquis for one week. Resume once drainage has ceased. Per ortho recommendation will also begin Ancef 2g q8. The patient is still having diarrhea though it has improved.     # NSTEMi-- restarted aspirin and held plavix- concern for operative site hematoma.-- extending into muscles.-- which will be debrided on monday-- no chest pain currently-- she has not had angiogram.    # Jordy worsening-- patient has urinary retention-- bladder scan showed 850cc-- elliott to drainage-- renal function improved post drainge.  # Lt intertrochanteric  fx-- patient has drains --  rovena dressing with 250cc output overnight  Provena removed, bulky dry dressing placed over top, pelvic binder placed  Continue to monitor dressings  Patient is booked for OR for irrigation and debridement LLE on Monday 9/20/21  # C diff-- still has diarrhea.  # A fib-- eliquis is held  # Hyperkalemia-- on lokelma    ortho will take her to OR on monday .

## 2021-09-18 NOTE — PROGRESS NOTE ADULT - ASSESSMENT
Orthopaedic Surgery Progress Note    S&E at bedside this AM. Pain well controlled. Patient states she feels more fatigued this morning. She is otherwise doing well with no events overnight. Provena dressing attached to portable wound vac in place, 250cc output overnight. Das placed yesterday by urology for urinary retention.       T(C): 36.2 (09-18-21 @ 06:00), Max: 36.4 (09-18-21 @ 00:00)  HR: 77 (09-18-21 @ 06:00) (77 - 86)  BP: 127/64 (09-18-21 @ 06:00) (111/56 - 127/64)  RR: 17 (09-18-21 @ 06:00) (17 - 18)  SpO2: --    P/E:  AOx3, NAD  Nonlabored breathing    LLE  Provena dressing in place attached to portable wound vac  250cc output in cannister from overnight  Dressing removed, surgical incisions appear c/d/i  New dressing placed with gauze/abd pads/foam tape  SILT sp/dp/t/sural/saph  Firing ta/ehl/fhl/gs  Foot WWP, 2+ DP pulse    Labs                        9.9    14.52 )-----------( 311      ( 17 Sep 2021 04:30 )             30.7     09-17    132<L>  |  104  |  68<HH>  ----------------------------<  169<H>  5.9<H>   |  17  |  2.5<H>    Ca    9.5      17 Sep 2021 04:30  Mg     2.2     09-17    TPro  4.8<L>  /  Alb  2.6<L>  /  TBili  0.3  /  DBili  x   /  AST  13  /  ALT  <5  /  AlkPhos  107  09-17    LIVER FUNCTIONS - ( 17 Sep 2021 04:30 )  Alb: 2.6 g/dL / Pro: 4.8 g/dL / ALK PHOS: 107 U/L / ALT: <5 U/L / AST: 13 U/L / GGT: x             A/P:   86yo Female POD12 s/p left femur CMN. Doing well, being monitored for persistent serous drainage from surgical sites.    Provena dressing with 250cc output overnight  Provena removed, bulky dry dressing placed over top  Continue to monitor dressings  Patient is booked for OR for irrigation and debridement LLE on Monday 9/20/21  Weightbearing: WBAT LLE  DVT ppx: SCD, SQH  Abx: d/c ancef, being treated for c diff with fidoxamicin  Pain control  Trend labs  PT/OT/Rehab  Dispo: Pending           Orthopaedic Surgery Progress Note    S&E at bedside this AM. Pain well controlled. Patient states she feels more fatigued this morning. She is otherwise doing well with no events overnight. Provena dressing attached to portable wound vac in place, 250cc output overnight. Das placed yesterday by urology for urinary retention.       T(C): 36.2 (09-18-21 @ 06:00), Max: 36.4 (09-18-21 @ 00:00)  HR: 77 (09-18-21 @ 06:00) (77 - 86)  BP: 127/64 (09-18-21 @ 06:00) (111/56 - 127/64)  RR: 17 (09-18-21 @ 06:00) (17 - 18)  SpO2: --    P/E:  AOx3, NAD  Nonlabored breathing    LLE  Provena dressing in place attached to portable wound vac  250cc output in cannister from overnight  Dressing removed, surgical incisions appear c/d/i  New dressing placed with gauze/abd pads/foam tape  SILT sp/dp/t/sural/saph  Firing ta/ehl/fhl/gs  Foot WWP, 2+ DP pulse    Labs                        9.9    14.52 )-----------( 311      ( 17 Sep 2021 04:30 )             30.7     09-17    132<L>  |  104  |  68<HH>  ----------------------------<  169<H>  5.9<H>   |  17  |  2.5<H>    Ca    9.5      17 Sep 2021 04:30  Mg     2.2     09-17    TPro  4.8<L>  /  Alb  2.6<L>  /  TBili  0.3  /  DBili  x   /  AST  13  /  ALT  <5  /  AlkPhos  107  09-17    LIVER FUNCTIONS - ( 17 Sep 2021 04:30 )  Alb: 2.6 g/dL / Pro: 4.8 g/dL / ALK PHOS: 107 U/L / ALT: <5 U/L / AST: 13 U/L / GGT: x             A/P:   84yo Female POD12 s/p left femur CMN. Doing well, being monitored for persistent serous drainage from surgical sites.    Provena dressing with 250cc output overnight  Provena removed, bulky dry dressing placed over top  Continue to monitor dressings  Patient is booked for OR for irrigation and debridement LLE on Monday 9/20/21  Weightbearing: WBAT LLE  DVT ppx: SCD, SQH - from ortho perspective, consider stopping all anticoagulation except for aspirin to improve wound drainange if acceptable from a medical perspective  Abx: d/c ancef, being treated for c diff with fidoxamicin  Pain control  Trend labs  PT/OT/Rehab  Dispo: Pending           Orthopaedic Surgery Progress Note    S&E at bedside this AM. Pain well controlled. Patient states she feels more fatigued this morning. She is otherwise doing well with no events overnight. Provena dressing attached to portable wound vac in place, 250cc output overnight. Das placed yesterday by urology for urinary retention.       T(C): 36.2 (09-18-21 @ 06:00), Max: 36.4 (09-18-21 @ 00:00)  HR: 77 (09-18-21 @ 06:00) (77 - 86)  BP: 127/64 (09-18-21 @ 06:00) (111/56 - 127/64)  RR: 17 (09-18-21 @ 06:00) (17 - 18)  SpO2: --    P/E:  AOx3, NAD  Nonlabored breathing    LLE  Provena dressing in place attached to portable wound vac  250cc output in cannister from overnight  Dressing removed, surgical incisions appear c/d/i  New dressing placed with gauze/abd pads/foam tape  Pelvic binder placed for compression  SILT sp/dp/t/sural/saph  Firing ta/ehl/fhl/gs  Foot WWP, 2+ DP pulse    Labs                        9.9    14.52 )-----------( 311      ( 17 Sep 2021 04:30 )             30.7     09-17    132<L>  |  104  |  68<HH>  ----------------------------<  169<H>  5.9<H>   |  17  |  2.5<H>    Ca    9.5      17 Sep 2021 04:30  Mg     2.2     09-17    TPro  4.8<L>  /  Alb  2.6<L>  /  TBili  0.3  /  DBili  x   /  AST  13  /  ALT  <5  /  AlkPhos  107  09-17    LIVER FUNCTIONS - ( 17 Sep 2021 04:30 )  Alb: 2.6 g/dL / Pro: 4.8 g/dL / ALK PHOS: 107 U/L / ALT: <5 U/L / AST: 13 U/L / GGT: x             A/P:   86yo Female POD12 s/p left femur CMN. Doing well, being monitored for persistent serous drainage from surgical sites.    Provena dressing with 250cc output overnight  Provena removed, bulky dry dressing placed over top, pelvic binder placed  Continue to monitor dressings  Patient is booked for OR for irrigation and debridement LLE on Monday 9/20/21  Weightbearing: WBAT LLE  DVT ppx: SCD, SQH - from ortho perspective, consider stopping all anticoagulation except for aspirin to improve wound drainange if acceptable from a medical perspective  Abx: d/c ancef, being treated for c diff with fidoxamicin  Pain control  Trend labs  PT/OT/Rehab  Dispo: Pending

## 2021-09-19 LAB
ALBUMIN SERPL ELPH-MCNC: 2.2 G/DL — LOW (ref 3.5–5.2)
ALBUMIN SERPL ELPH-MCNC: 2.7 G/DL — LOW (ref 3.5–5.2)
ALP SERPL-CCNC: 154 U/L — HIGH (ref 30–115)
ALP SERPL-CCNC: 156 U/L — HIGH (ref 30–115)
ALT FLD-CCNC: <5 U/L — SIGNIFICANT CHANGE UP (ref 0–41)
ALT FLD-CCNC: <5 U/L — SIGNIFICANT CHANGE UP (ref 0–41)
ANION GAP SERPL CALC-SCNC: 12 MMOL/L — SIGNIFICANT CHANGE UP (ref 7–14)
ANION GAP SERPL CALC-SCNC: 7 MMOL/L — SIGNIFICANT CHANGE UP (ref 7–14)
AST SERPL-CCNC: 19 U/L — SIGNIFICANT CHANGE UP (ref 0–41)
AST SERPL-CCNC: 22 U/L — SIGNIFICANT CHANGE UP (ref 0–41)
BILIRUB SERPL-MCNC: 0.4 MG/DL — SIGNIFICANT CHANGE UP (ref 0.2–1.2)
BILIRUB SERPL-MCNC: 0.4 MG/DL — SIGNIFICANT CHANGE UP (ref 0.2–1.2)
BLD GP AB SCN SERPL QL: SIGNIFICANT CHANGE UP
BUN SERPL-MCNC: 67 MG/DL — CRITICAL HIGH (ref 10–20)
BUN SERPL-MCNC: 68 MG/DL — CRITICAL HIGH (ref 10–20)
CALCIUM SERPL-MCNC: 9.3 MG/DL — SIGNIFICANT CHANGE UP (ref 8.5–10.1)
CALCIUM SERPL-MCNC: 9.4 MG/DL — SIGNIFICANT CHANGE UP (ref 8.5–10.1)
CHLORIDE SERPL-SCNC: 106 MMOL/L — SIGNIFICANT CHANGE UP (ref 98–110)
CHLORIDE SERPL-SCNC: 107 MMOL/L — SIGNIFICANT CHANGE UP (ref 98–110)
CO2 SERPL-SCNC: 15 MMOL/L — LOW (ref 17–32)
CO2 SERPL-SCNC: 17 MMOL/L — SIGNIFICANT CHANGE UP (ref 17–32)
CREAT SERPL-MCNC: 1.8 MG/DL — HIGH (ref 0.7–1.5)
CREAT SERPL-MCNC: 1.8 MG/DL — HIGH (ref 0.7–1.5)
GLUCOSE BLDC GLUCOMTR-MCNC: 119 MG/DL — HIGH (ref 70–99)
GLUCOSE BLDC GLUCOMTR-MCNC: 144 MG/DL — HIGH (ref 70–99)
GLUCOSE BLDC GLUCOMTR-MCNC: 144 MG/DL — HIGH (ref 70–99)
GLUCOSE BLDC GLUCOMTR-MCNC: 156 MG/DL — HIGH (ref 70–99)
GLUCOSE BLDC GLUCOMTR-MCNC: 178 MG/DL — HIGH (ref 70–99)
GLUCOSE SERPL-MCNC: 116 MG/DL — HIGH (ref 70–99)
GLUCOSE SERPL-MCNC: 130 MG/DL — HIGH (ref 70–99)
HCT VFR BLD CALC: 24.7 % — LOW (ref 37–47)
HCT VFR BLD CALC: 28.8 % — LOW (ref 37–47)
HGB BLD-MCNC: 8 G/DL — LOW (ref 12–16)
HGB BLD-MCNC: 9.2 G/DL — LOW (ref 12–16)
INR BLD: 1.04 RATIO — SIGNIFICANT CHANGE UP (ref 0.65–1.3)
MAGNESIUM SERPL-MCNC: 1.9 MG/DL — SIGNIFICANT CHANGE UP (ref 1.8–2.4)
MAGNESIUM SERPL-MCNC: 1.9 MG/DL — SIGNIFICANT CHANGE UP (ref 1.8–2.4)
MCHC RBC-ENTMCNC: 28.2 PG — SIGNIFICANT CHANGE UP (ref 27–31)
MCHC RBC-ENTMCNC: 28.4 PG — SIGNIFICANT CHANGE UP (ref 27–31)
MCHC RBC-ENTMCNC: 31.9 G/DL — LOW (ref 32–37)
MCHC RBC-ENTMCNC: 32.4 G/DL — SIGNIFICANT CHANGE UP (ref 32–37)
MCV RBC AUTO: 87 FL — SIGNIFICANT CHANGE UP (ref 81–99)
MCV RBC AUTO: 88.9 FL — SIGNIFICANT CHANGE UP (ref 81–99)
NRBC # BLD: 0 /100 WBCS — SIGNIFICANT CHANGE UP (ref 0–0)
NRBC # BLD: 0 /100 WBCS — SIGNIFICANT CHANGE UP (ref 0–0)
PLATELET # BLD AUTO: 299 K/UL — SIGNIFICANT CHANGE UP (ref 130–400)
PLATELET # BLD AUTO: 341 K/UL — SIGNIFICANT CHANGE UP (ref 130–400)
POTASSIUM SERPL-MCNC: 5.2 MMOL/L — HIGH (ref 3.5–5)
POTASSIUM SERPL-MCNC: 5.4 MMOL/L — HIGH (ref 3.5–5)
POTASSIUM SERPL-SCNC: 5.2 MMOL/L — HIGH (ref 3.5–5)
POTASSIUM SERPL-SCNC: 5.4 MMOL/L — HIGH (ref 3.5–5)
PROT SERPL-MCNC: 4.1 G/DL — LOW (ref 6–8)
PROT SERPL-MCNC: 4.7 G/DL — LOW (ref 6–8)
PROTHROM AB SERPL-ACNC: 12 SEC — SIGNIFICANT CHANGE UP (ref 9.95–12.87)
RBC # BLD: 2.84 M/UL — LOW (ref 4.2–5.4)
RBC # BLD: 3.24 M/UL — LOW (ref 4.2–5.4)
RBC # FLD: 18.4 % — HIGH (ref 11.5–14.5)
RBC # FLD: 18.4 % — HIGH (ref 11.5–14.5)
SODIUM SERPL-SCNC: 131 MMOL/L — LOW (ref 135–146)
SODIUM SERPL-SCNC: 133 MMOL/L — LOW (ref 135–146)
WBC # BLD: 11.18 K/UL — HIGH (ref 4.8–10.8)
WBC # BLD: 12.95 K/UL — HIGH (ref 4.8–10.8)
WBC # FLD AUTO: 11.18 K/UL — HIGH (ref 4.8–10.8)
WBC # FLD AUTO: 12.95 K/UL — HIGH (ref 4.8–10.8)

## 2021-09-19 PROCEDURE — 99233 SBSQ HOSP IP/OBS HIGH 50: CPT

## 2021-09-19 RX ORDER — TAMSULOSIN HYDROCHLORIDE 0.4 MG/1
0.4 CAPSULE ORAL AT BEDTIME
Refills: 0 | Status: DISCONTINUED | OUTPATIENT
Start: 2021-09-19 | End: 2021-09-27

## 2021-09-19 RX ORDER — SODIUM ZIRCONIUM CYCLOSILICATE 10 G/10G
10 POWDER, FOR SUSPENSION ORAL ONCE
Refills: 0 | Status: COMPLETED | OUTPATIENT
Start: 2021-09-19 | End: 2021-09-19

## 2021-09-19 RX ORDER — SODIUM ZIRCONIUM CYCLOSILICATE 10 G/10G
5 POWDER, FOR SUSPENSION ORAL
Refills: 0 | Status: DISCONTINUED | OUTPATIENT
Start: 2021-09-19 | End: 2021-09-19

## 2021-09-19 RX ADMIN — Medication 650 MILLIGRAM(S): at 01:18

## 2021-09-19 RX ADMIN — CALCITRIOL 0.25 MICROGRAM(S): 0.5 CAPSULE ORAL at 12:43

## 2021-09-19 RX ADMIN — CARVEDILOL PHOSPHATE 25 MILLIGRAM(S): 80 CAPSULE, EXTENDED RELEASE ORAL at 05:14

## 2021-09-19 RX ADMIN — Medication 1 TABLET(S): at 12:44

## 2021-09-19 RX ADMIN — Medication 650 MILLIGRAM(S): at 12:44

## 2021-09-19 RX ADMIN — Medication 1 MILLIGRAM(S): at 12:44

## 2021-09-19 RX ADMIN — Medication 650 MILLIGRAM(S): at 05:15

## 2021-09-19 RX ADMIN — Medication 650 MILLIGRAM(S): at 00:18

## 2021-09-19 RX ADMIN — Medication 3 UNIT(S): at 12:45

## 2021-09-19 RX ADMIN — SODIUM ZIRCONIUM CYCLOSILICATE 5 GRAM(S): 10 POWDER, FOR SUSPENSION ORAL at 17:33

## 2021-09-19 RX ADMIN — PANTOPRAZOLE SODIUM 40 MILLIGRAM(S): 20 TABLET, DELAYED RELEASE ORAL at 17:32

## 2021-09-19 RX ADMIN — Medication 325 MILLIGRAM(S): at 12:43

## 2021-09-19 RX ADMIN — PANTOPRAZOLE SODIUM 40 MILLIGRAM(S): 20 TABLET, DELAYED RELEASE ORAL at 05:15

## 2021-09-19 RX ADMIN — LATANOPROST 1 DROP(S): 0.05 SOLUTION/ DROPS OPHTHALMIC; TOPICAL at 21:18

## 2021-09-19 RX ADMIN — CHLORHEXIDINE GLUCONATE 1 APPLICATION(S): 213 SOLUTION TOPICAL at 06:56

## 2021-09-19 RX ADMIN — Medication 500 MILLIGRAM(S): at 17:33

## 2021-09-19 RX ADMIN — Medication 25 MICROGRAM(S): at 05:14

## 2021-09-19 RX ADMIN — Medication 2: at 00:22

## 2021-09-19 RX ADMIN — CARVEDILOL PHOSPHATE 25 MILLIGRAM(S): 80 CAPSULE, EXTENDED RELEASE ORAL at 17:33

## 2021-09-19 RX ADMIN — ATORVASTATIN CALCIUM 40 MILLIGRAM(S): 80 TABLET, FILM COATED ORAL at 21:18

## 2021-09-19 RX ADMIN — Medication 650 MILLIGRAM(S): at 07:30

## 2021-09-19 RX ADMIN — FIDAXOMICIN 200 MILLIGRAM(S): 200 GRANULE, FOR SUSPENSION ORAL at 18:24

## 2021-09-19 RX ADMIN — Medication 2: at 12:45

## 2021-09-19 RX ADMIN — FIDAXOMICIN 200 MILLIGRAM(S): 200 GRANULE, FOR SUSPENSION ORAL at 05:14

## 2021-09-19 RX ADMIN — INSULIN GLARGINE 10 UNIT(S): 100 INJECTION, SOLUTION SUBCUTANEOUS at 21:18

## 2021-09-19 RX ADMIN — Medication 650 MILLIGRAM(S): at 17:33

## 2021-09-19 RX ADMIN — SODIUM ZIRCONIUM CYCLOSILICATE 5 GRAM(S): 10 POWDER, FOR SUSPENSION ORAL at 05:15

## 2021-09-19 RX ADMIN — Medication 500 MILLIGRAM(S): at 05:15

## 2021-09-19 RX ADMIN — RALOXIFENE HYDROCHLORIDE 60 MILLIGRAM(S): 60 TABLET, COATED ORAL at 12:44

## 2021-09-19 RX ADMIN — Medication 650 MILLIGRAM(S): at 18:00

## 2021-09-19 RX ADMIN — Medication 650 MILLIGRAM(S): at 23:54

## 2021-09-19 RX ADMIN — Medication 81 MILLIGRAM(S): at 12:44

## 2021-09-19 RX ADMIN — TAMSULOSIN HYDROCHLORIDE 0.4 MILLIGRAM(S): 0.4 CAPSULE ORAL at 21:18

## 2021-09-19 RX ADMIN — SODIUM ZIRCONIUM CYCLOSILICATE 10 GRAM(S): 10 POWDER, FOR SUSPENSION ORAL at 20:32

## 2021-09-19 NOTE — PROGRESS NOTE ADULT - ATTENDING SUPERVISION STATEMENT
Resident
Fellow
Resident
Fellow
Resident
Resident

## 2021-09-19 NOTE — PROGRESS NOTE ADULT - ASSESSMENT
IMPRESSION:  #Non-ST elevation ACS v/s Type II MI secondary to demand ischemia from acute blood loss anemia  - Acute blood loss anemia (Hg dropped to 5)   - Operative site hematoma v/s possible upper GI bleeding (hematemesis?) --> currently stable at 9.9   - PREVENA WOUND VAC PLACED 9/16;   - Provena removed 9/18, bulky dry dressing placed over top, pelvic binder placed  - s/p prophylactic Ancef  - GI follow-up ---> deferred endoscopy due to no active bleeding   - Hold plavix d/t draining  - Keep Hg>8  - Continue pantoprazole 40mg IV bid  - Monitor CBC -- Hemoglobin trending down, T&S ordered, transfuse if < 7  - F/U GI  - Continue atorvastatin and coreg   - Plan for possible I&D in OR tomorrow with orthopaedics due to persistently draining wound  - f/u preop labs at 4 PM  - NPO at midnight    # C. diff colitis   # History of colon cancer  - on Fidaxomicin (started 9/14)  - per RN, patient has not had diarrhea overnight  - appreciate ID recommendations: Po Fidaxomicin 200 mg q12h for 10 days and then po vancomycin 125 mg q12h for 3 more weeks  - WBC trending down   - blood cx: NGTD  - urine cx: contaminated  - Monitor lactate level (WNL)    #Atrial fib  - Hold Eliquis per ortho    #CKD III  - Accurate I&O  - Monitor BUN/Cr  - Follow-up lytes.  Correct as needed to keep Mg>2.2 and K>4  - K+ 5.2, 5 mg Lokelma ordered, f/u BMP 4 PM  - per urology:  No acute  intervention needed at this time.   Cont Das catheter drain to gravity. Keep vaginal area clean.   Start Flomax   Strict I&O   TOV in 3-4 days if inpatient.    If Pt. awaits d/c can F/U with Dr. Khan in 1 week for TOV, UDS, Cystoscopy.   Outpatient work up for incidental CT A/P finding of Right kidney lesion.     # DM, HTN  # HFmEF EF 45%  - Keep lasix and losartan on hold for now (in context of C.diff infection and diarrhea), resume when acute illness resolves      CNS:  - Avoid sedatives    HEENT:   - Oral care    PULMONARY:    - HOB @ 45 degrees    HEMATOLOGICAL:    - DVT prophylaxis    ENDOCRINE:    - Follow up FS.  Insulin protocol if needed    MUSCULOSKELETAL:  - Increase activity as tolerated  - as per ortho, PT consults recommended for ambulation out of bed to chair     DISPOSITION:  - acute  - F/U primary cardiologist

## 2021-09-19 NOTE — PROGRESS NOTE ADULT - SUBJECTIVE AND OBJECTIVE BOX
HPI  Patient is a 85y old Female who presents with a chief complaint of hip fx n stemi (17 Sep 2021 11:04)    Currently admitted to medicine with the primary diagnosis of Intertrochanteric fracture of right hip       Today is hospital day 14d.     INTERVAL HPI / OVERNIGHT EVENTS:  Patient was examined and seen at bedside. This morning she is resting comfortably in bed and reports no new issues or overnight events. She denies pain or nausea.    ROS: Otherwise unremarkable     PAST MEDICAL & SURGICAL HISTORY  CAD (coronary artery disease)    Chronic CHF    Atrial fibrillation    Type 2 diabetes mellitus    Colon cancer    Hypertension    CKD (chronic kidney disease)  Stage 3    History of Clostridium difficile colitis  s/p fecal transplant    Diverticulitis    S/P hysterectomy      ALLERGIES  No Known Allergies    MEDICATIONS  STANDING MEDICATIONS  acetaminophen   Tablet .. 650 milliGRAM(s) Oral every 6 hours  ascorbic acid 500 milliGRAM(s) Oral two times a day  aspirin  chewable 81 milliGRAM(s) Oral daily  atorvastatin 40 milliGRAM(s) Oral at bedtime  calcitriol   Capsule 0.25 MICROGram(s) Oral daily  carvedilol 25 milliGRAM(s) Oral every 12 hours  chlorhexidine 4% Liquid 1 Application(s) Topical <User Schedule>  dextrose 40% Gel 15 Gram(s) Oral once  dextrose 5%. 1000 milliLiter(s) IV Continuous <Continuous>  dextrose 5%. 1000 milliLiter(s) IV Continuous <Continuous>  dextrose 50% Injectable 25 Gram(s) IV Push once  ferrous    sulfate 325 milliGRAM(s) Oral daily  fidaxomicin 200 milliGRAM(s) Oral two times a day  folic acid 1 milliGRAM(s) Oral daily  glucagon  Injectable 1 milliGRAM(s) IntraMuscular once  insulin glargine Injectable (LANTUS) 10 Unit(s) SubCutaneous at bedtime  insulin lispro (ADMELOG) corrective regimen sliding scale   SubCutaneous every 6 hours  insulin lispro Injectable (ADMELOG) 3 Unit(s) SubCutaneous three times a day before meals  latanoprost 0.005% Ophthalmic Solution 1 Drop(s) Both EYES at bedtime  levothyroxine 25 MICROGram(s) Oral daily  multivitamin 1 Tablet(s) Oral daily  pantoprazole  Injectable 40 milliGRAM(s) IV Push every 12 hours  raloxifene 60 milliGRAM(s) Oral daily  sodium chloride 0.9%. 1000 milliLiter(s) IV Continuous <Continuous>    PRN MEDICATIONS  diphenhydrAMINE 25 milliGRAM(s) Oral at bedtime PRN  melatonin 5 milliGRAM(s) Oral at bedtime PRN  ondansetron Injectable 4 milliGRAM(s) IV Push every 6 hours PRN    VITALS:  T(F): 96.8  HR: 73  BP: 117/58  RR: 18  SpO2: --    PHYSICAL EXAM  GEN: NAD, Resting comfortably in bed  PULM: Clear to auscultation bilaterally, No wheezes  CVS: Regular rate and rhythm, S1-S2, no murmurs  ABD: Soft, non-tender, non-distended, no guarding  EXT: No edema; LE bandaged at hip  NEURO: nonfocal    LABS                        8.0    11.18 )-----------( 299      ( 19 Sep 2021 06:35 )             24.7     09-19    131<L>  |  107  |  68<HH>  ----------------------------<  116<H>  5.2<H>   |  17  |  1.8<H>    Ca    9.3      19 Sep 2021 06:35  Mg     1.9     09-19    TPro  4.1<L>  /  Alb  2.2<L>  /  TBili  0.4  /  DBili  x   /  AST  19  /  ALT  <5  /  AlkPhos  154<H>  09-19          Troponin T, Serum: 0.21 ng/mL *HH* (09-18-21 @ 18:26)      CARDIAC MARKERS ( 18 Sep 2021 18:26 )  x     / 0.21 ng/mL / x     / x     / x          RADIOLOGY

## 2021-09-19 NOTE — PROGRESS NOTE ADULT - ASSESSMENT
Orthopaedic Surgery Progress Note    Patient seen and examined at bedside this morning. Pain controlled, no new complaints. Did not get OOBTC yesterday. BM continuing to slow down in frequency.    Vital Signs Last 24 Hrs  T(C): 36.4 (19 Sep 2021 00:56), Max: 36.4 (19 Sep 2021 00:56)  T(F): 97.5 (19 Sep 2021 00:56), Max: 97.5 (19 Sep 2021 00:56)  HR: 81 (19 Sep 2021 00:56) (70 - 88)  BP: 128/62 (19 Sep 2021 00:56) (122/59 - 141/68)  RR: 18 (19 Sep 2021 00:56) (18 - 18)    P/E:  AOx3, NAD  Nonlabored breathing    LLE  Pelvic binder in place, removed temporarily   Foam tape/abd/gauze in place; removed to examine skin/incisions  Moderate serous fluid present on dressings, though dressings are NOT drenched/saturated  Incisions c/d/i with staples in place; not actively draining on exam  SILT sp/dp/t/sural/saph  Firing ta/ehl/fhl/gs  Foot WWP, 2+ DP pulse    Labs                        8.6    11.80 )-----------( 295      ( 18 Sep 2021 09:16 )             26.4       09-18    133<L>  |  106  |  71<HH>  ----------------------------<  152<H>  5.3<H>   |  16<L>  |  2.0<H>    Ca    9.3      18 Sep 2021 09:16  Mg     2.0     09-18    TPro  4.5<L>  /  Alb  2.4<L>  /  TBili  0.4  /  DBili  x   /  AST  19  /  ALT  <5  /  AlkPhos  112  09-18          CARDIAC MARKERS ( 18 Sep 2021 18:26 )  x     / 0.21 ng/mL / x     / x     / x                  A/P:   84yo Female POD13 s/p left femur CMN. Medically stabilized but continues to have serous drainage from hip incisions. Plan for possible I&D in OR tomorrow with orthopaedics due to persistently draining wound.     WBAT LLE  DVT ppx; currently being held in setting of persistent wound drainage  Abx: d/c ancef, being treated for c diff with fidoxamicin  Pain control  Please pre-op patient for OR tomorrow  NPO at midnight  PT/OT/Rehab

## 2021-09-19 NOTE — PRE PROCEDURE NOTE - PRE PROCEDURE EVALUATION
ORTHOPEDIC SURGERY PRE OP NOTE    Diagnosis: BASICERVICAL FEMORAL NECK FRACTURE    Planned Procedure: OPEN REDUCTION INTERNAL FIXATION LEFT FEMORAL NECK    Consent: TO BE OBTAINED BY ATTENDING                   Risks/benefits/alternatives were discussed with the patient/family and they wish to proceed with surgery.       ANTICIPATED DATE OF PROCEDURE : 9/6/2021  SCHEDULED CASE OR ADD-ON CASE: ADD ON      Consent:     Clearances:   [X] Cardiology    T(C): 36.6 (09-05-21 @ 17:13), Max: 36.6 (09-05-21 @ 17:13)  HR: 68 (09-05-21 @ 17:13) (64 - 75)  BP: 122/59 (09-05-21 @ 17:13) (122/59 - 145/73)  RR: 18 (09-05-21 @ 17:13) (18 - 18)  SpO2: 99% (09-05-21 @ 17:13) (98% - 100%)    Labs:                        7.4    9.18  )-----------( 140      ( 05 Sep 2021 11:37 )             23.2     09-05    139  |  108  |  50<H>  ----------------------------<  170<H>  3.8   |  17  |  1.9<H>    Ca    9.5      05 Sep 2021 06:12  Phos  3.2     09-05  Mg     1.8     09-05    TPro  6.6  /  Alb  4.2  /  TBili  0.2  /  DBili  x   /  AST  13  /  ALT  11  /  AlkPhos  114  09-04    PT/INR - ( 04 Sep 2021 19:50 )   PT: 17.80 sec;   INR: 1.55 ratio         PTT - ( 04 Sep 2021 19:50 )  PTT:35.0 sec  Type and Screen X 2:    COVID-19 PCR: NotDetec (04 Sep 2021 22:40)    [X]EKG:   [X]CXR:       DIET: NPO after midnight  IVF: per primary team      ANTICOAGULATION STATUS ( include name of anticoagulant): ELIQUIS, last dose 9/4/21                                         A/P: Patient is a 85y y/o Female with a L basicervical femoral neck fracture, indicated for open reduction internal fixation left hip with orthopaedics tomorrow morning.     [X] NPO and IVF @ midnight  [ ] pain control/analgesia prn per primary team   [ ] Incentive Spirometry   [ ] Notify Ortho with any questions- spectra [id number]    [X]DISCUSSED WITH PRIMARY TEAM MEMBER (name of team member): trauma  [X]Date and Time DISCUSSED WITH PRIMARY TEAM MEMBER: 9/5/2021 at approximately 12pm
ORTHOPEDIC SURGERY PRE OP NOTE      Diagnosis: LEFT BASI-CERVICAL FEMORAL NECK FRACTURE    Planned Procedure: Left femur open reduction internal fixations vs. stephanie-arthroplasty    Consent: TO BE OBTAINED BY ATTENDING                   Risks/benefits/alternatives were discussed with the patient/family and they wish to proceed with surgery.       ANTICIPATED DATE OF PROCEDURE : 9/5/21  SCHEDULED CASE OR ADD-ON CASE: add-on      Consent: patient has decision making capacity    Clearances:   [***] Medicine: pending  [***] Other: cardiology clearance needed     T(C): 36.2 (09-05-21 @ 03:30), Max: 36.9 (09-04-21 @ 19:22)  HR: 75 (09-05-21 @ 03:30) (68 - 80)  BP: 128/61 (09-05-21 @ 03:30) (107/87 - 167/81)  RR: 18 (09-05-21 @ 03:30) (17 - 19)  SpO2: 99% (09-05-21 @ 03:30) (99% - 100%)    Labs:                        9.7    9.42  )-----------( 179      ( 04 Sep 2021 19:50 )             29.6     09-04    140  |  106  |  50<H>  ----------------------------<  164<H>  3.9   |  21  |  1.8<H>    Ca    9.8      04 Sep 2021 19:50    TPro  6.6  /  Alb  4.2  /  TBili  0.2  /  DBili  x   /  AST  13  /  ALT  11  /  AlkPhos  114  09-04    PT/INR - ( 04 Sep 2021 19:50 )   PT: 17.80 sec;   INR: 1.55 ratio         PTT - ( 04 Sep 2021 19:50 )  PTT:35.0 sec  Type and Screen X 2:  #1 completed   #2 - pending     Tropnin on admission: 0.02  repeat troponin ordered, pending results     COVID-19 PCR: NotDetec (04 Sep 2021 22:40)    [ ]Pregnancy test ( if female of childbearing age) : ***  [X]EKG:   [X]CXR:       DIET: NPO after midnight  IVF: per primary team      ANTICOAGULATION STATUS ( include name of anticoagulant) :  [***] CONTINUE (**name of anticoagulant)   [X] DISCONTINUE(** name of anticoagulant): hold eliquis in anticipation of surgery                                           A/P: Patient is a 85y y/o Female Pending Left femur open reduction internal fixations vs. stephanie-arthroplasty tomorrow    [ ] -NPO and IVF @ midnight  [ ]pain control/analgesia prn per primary team   [ ]Incentive Spirometry   [ ]F/U Clearance  [ ]F/U Pending Labs  [ ]Notify Ortho with any questions- spectra [id number]    [X]DISCUSSED WITH PRIMARY TEAM MEMBER (name of team member):   - Hospitalist: Dr. Aleman, [phone]  - Trauma Surgery team, [phone]  - Cardiology fellow, [phone]  [ ]Date and Time DISCUSSED WITH PRIMARY TEAM MEMBER:   - Trauma Sx, 9/4/21, 10PM  - Hospitalist service, 9/5/21, 6AM  - Cardiology, 9/5/21, 6Am
ORTHOPEDIC SURGERY PRE OP NOTE      Diagnosis: left lower extremity wound seroma    Planned Procedure: left lower extremity irrigation and debridement    Consent: TO BE OBTAINED BY ATTENDING                   Risks/benefits/alternatives were discussed with the patient/family and they wish to proceed with surgery.       ANTICIPATED DATE OF PROCEDURE :   SCHEDULED CASE OR ADD-ON CASE: scheduled        Clearances:   [***] Medicine:     T(C): 36.2 (21 @ 16:10), Max: 36.4 (21 @ 00:56)  HR: 76 (21 @ 16:10) (73 - 81)  BP: 131/62 (21 @ 16:10) (117/58 - 131/62)  RR: 18 (21 @ 16:10) (18 - 18)  SpO2: --    Labs:                        9.2    12.95 )-----------( 341      ( 19 Sep 2021 19:55 )             28.8         133<L>  |  106  |  67<HH>  ----------------------------<  130<H>  5.4<H>   |  15<L>  |  1.8<H>    Ca    9.4      19 Sep 2021 19:55  Mg     1.9         TPro  4.7<L>  /  Alb  2.7<L>  /  TBili  0.4  /  DBili  x   /  AST  22  /  ALT  <5  /  AlkPhos  156<H>      PT/INR - ( 19 Sep 2021 19:55 )   PT: 12.00 sec;   INR: 1.04 ratio           Type and Screen X 2:  Updated T&S ordered for collection tonight    COVID- PCR: NotDetec (13 Sep 2021 09:30)  COVID- PCR: NotDetec (04 Sep 2021 22:40)  Confirmed with Taras in recovery at 8:50PM  that COVID was up to date for OR tomorrow    [x]EK/18    Ventricular Rate 84 BPM    Atrial Rate 84 BPM    P-R Interval 214 ms    QRS Duration 92 ms    Q-T Interval 372 ms    QTC Calculation(Bazett) 439 ms    P Axis 94 degrees    R Axis -45 degrees    T Axis 140 degrees    Diagnosis Line Sinus rhythm with 1st degree A-V block  Left axis deviation  ST & T wave abnormality, consider lateral ischemia  Abnormal ECG  [x]CXR:   Impression:    No radiographic evidence of acute cardiopulmonary disease.      DIET: NPO after midnight  IVF: per primary team      ANTICOAGULATION STATUS ( include name of anticoagulant) :  Hold morning dose of anticoagulation                                           A/P: Patient is a 85y y/o Female Pending left lower extremity irrigation and debridement tomorrow    [ ] NPO and IVF @ midnight  [ ]pain control/analgesia prn per primary team   [ ]Incentive Spirometry   [ ]F/U Medical Clearance  [ ] F/U repeat electrolytes (K 5.4)  [ ]F/U Pending Labs: coags, repeat T&S  [ ]Notify Ortho with any questions- spectra [id number]    [x]DISCUSSED WITH PRIMARY TEAM MEMBER (name of team member): Dr. Gee at 8:50PM at spectra [id number]

## 2021-09-19 NOTE — PROGRESS NOTE ADULT - ATTENDING COMMENTS
85F w/PMHx CAD, CHF, Afib on Eliquis, DM, HTN, colon cancer, CKD stage 3, hx of diverticulitis, presents s/p mechanical fall at home, +HT, -LOC, +AC (Eliquis). Patient was in her kitchen and "turned around too fast" when she fell over and hit her head on a chair. She denies loss of consciousness. She remained down for ~30 min before EMS came to take her into the ED. She is GCS 15, primary survey negative. No external signs of trauma. Complains of LLE pain. Denies scalp, spinal, chest, abdominal pain. ROM of all extremities in tact. Was admitted to ortho service for eval and surgical correction of left femoral IT fracture s/p mechanical fall at home. Pt went to the OR and underwent successful fixation of left hip intertochanteric hip fracture.     While on the floors the patient had an episode of melena. As per patient she had coffee ground emesis on 9/9 PM 2 episodes followed by black tarry stools and abdominal pain intermittently in epigastric region worse with eating. 6/10 in intensity, non radiating. GI was consulted and decided that there was questionable GI bleeding. Anticoagulation was stopped. The patient's Hb was 5.4. She was transfused and the hemoglobin improved. The patient had typical chest pain with elevated tropes and was diagnosed with NSTEMI type 2. She was upgraded to the CCU. Due to the possibility of a GI bleed the patient was not cath'd. The patient was also found to be C. Diff positive and started on Vancomycin. The patient also developed a hematoma at the site of the surgery, Ortho evaluated her and are following her and replacing dressings as needed. The patient is now stable and her chest pain has improved. Per discussion with ID the patient was switched to Fidaxomicin. Due to continued drainage from surgical site will hold Eliquis for one week. Resume once drainage has ceased. Per ortho recommendation will also begin Ancef 2g q8. The patient is still having diarrhea though it has improved.     # NSTEMi-- restarted aspirin and held plavix- concern for operative site hematoma.-- extending into muscles.-- which will be debrided on monday-- no chest pain currently-- she has not had angiogram.    # Jordy worsening-- patient has urinary retention-- bladder scan showed 850cc-- elliott to drainage-- renal function improved post drainge.  # Lt intertrochanteric  fx-- patient has drains --  rovena dressing with 250cc output overnight  Provena removed, bulky dry dressing placed over top, pelvic binder placed  Continue to monitor dressings  Patient is booked for OR for irrigation and debridement LLE on Monday 9/20/21    patient sitting up in chair and looks very good.  # Anemia-- patient is s/p 3units of PRBC-- may need more PRBC-- transfusion-- type and screen AM.  # C diff--  no diarrhea in last 24hrs  # A fib-- eliquis is held  # Hyperkalemia-- on [facility]    ortho will take her to OR on monday .

## 2021-09-20 LAB
ANION GAP SERPL CALC-SCNC: 6 MMOL/L — LOW (ref 7–14)
ANION GAP SERPL CALC-SCNC: 7 MMOL/L — SIGNIFICANT CHANGE UP (ref 7–14)
ANION GAP SERPL CALC-SCNC: 9 MMOL/L — SIGNIFICANT CHANGE UP (ref 7–14)
APTT BLD: 27.1 SEC — SIGNIFICANT CHANGE UP (ref 27–39.2)
BLD GP AB SCN SERPL QL: SIGNIFICANT CHANGE UP
BLD GP AB SCN SERPL QL: SIGNIFICANT CHANGE UP
BUN SERPL-MCNC: 59 MG/DL — HIGH (ref 10–20)
BUN SERPL-MCNC: 63 MG/DL — CRITICAL HIGH (ref 10–20)
BUN SERPL-MCNC: 65 MG/DL — CRITICAL HIGH (ref 10–20)
CALCIUM SERPL-MCNC: 9.3 MG/DL — SIGNIFICANT CHANGE UP (ref 8.5–10.1)
CALCIUM SERPL-MCNC: 9.4 MG/DL — SIGNIFICANT CHANGE UP (ref 8.5–10.1)
CALCIUM SERPL-MCNC: 9.5 MG/DL — SIGNIFICANT CHANGE UP (ref 8.5–10.1)
CHLORIDE SERPL-SCNC: 102 MMOL/L — SIGNIFICANT CHANGE UP (ref 98–110)
CHLORIDE SERPL-SCNC: 105 MMOL/L — SIGNIFICANT CHANGE UP (ref 98–110)
CHLORIDE SERPL-SCNC: 107 MMOL/L — SIGNIFICANT CHANGE UP (ref 98–110)
CO2 SERPL-SCNC: 18 MMOL/L — SIGNIFICANT CHANGE UP (ref 17–32)
CO2 SERPL-SCNC: 19 MMOL/L — SIGNIFICANT CHANGE UP (ref 17–32)
CO2 SERPL-SCNC: 20 MMOL/L — SIGNIFICANT CHANGE UP (ref 17–32)
CREAT SERPL-MCNC: 1.5 MG/DL — SIGNIFICANT CHANGE UP (ref 0.7–1.5)
CREAT SERPL-MCNC: 1.6 MG/DL — HIGH (ref 0.7–1.5)
CREAT SERPL-MCNC: 1.7 MG/DL — HIGH (ref 0.7–1.5)
GLUCOSE BLDC GLUCOMTR-MCNC: 122 MG/DL — HIGH (ref 70–99)
GLUCOSE BLDC GLUCOMTR-MCNC: 132 MG/DL — HIGH (ref 70–99)
GLUCOSE BLDC GLUCOMTR-MCNC: 140 MG/DL — HIGH (ref 70–99)
GLUCOSE BLDC GLUCOMTR-MCNC: 149 MG/DL — HIGH (ref 70–99)
GLUCOSE BLDC GLUCOMTR-MCNC: 183 MG/DL — HIGH (ref 70–99)
GLUCOSE SERPL-MCNC: 128 MG/DL — HIGH (ref 70–99)
GLUCOSE SERPL-MCNC: 144 MG/DL — HIGH (ref 70–99)
GLUCOSE SERPL-MCNC: 146 MG/DL — HIGH (ref 70–99)
HCT VFR BLD CALC: 25.6 % — LOW (ref 37–47)
HGB BLD-MCNC: 8.1 G/DL — LOW (ref 12–16)
INR BLD: 1.04 RATIO — SIGNIFICANT CHANGE UP (ref 0.65–1.3)
MCHC RBC-ENTMCNC: 28.2 PG — SIGNIFICANT CHANGE UP (ref 27–31)
MCHC RBC-ENTMCNC: 31.6 G/DL — LOW (ref 32–37)
MCV RBC AUTO: 89.2 FL — SIGNIFICANT CHANGE UP (ref 81–99)
NRBC # BLD: 0 /100 WBCS — SIGNIFICANT CHANGE UP (ref 0–0)
PLATELET # BLD AUTO: 300 K/UL — SIGNIFICANT CHANGE UP (ref 130–400)
POTASSIUM SERPL-MCNC: 5.1 MMOL/L — HIGH (ref 3.5–5)
POTASSIUM SERPL-MCNC: 5.6 MMOL/L — HIGH (ref 3.5–5)
POTASSIUM SERPL-MCNC: 5.6 MMOL/L — HIGH (ref 3.5–5)
POTASSIUM SERPL-SCNC: 5.1 MMOL/L — HIGH (ref 3.5–5)
POTASSIUM SERPL-SCNC: 5.6 MMOL/L — HIGH (ref 3.5–5)
POTASSIUM SERPL-SCNC: 5.6 MMOL/L — HIGH (ref 3.5–5)
PROTHROM AB SERPL-ACNC: 11.9 SEC — SIGNIFICANT CHANGE UP (ref 9.95–12.87)
RBC # BLD: 2.87 M/UL — LOW (ref 4.2–5.4)
RBC # FLD: 18.3 % — HIGH (ref 11.5–14.5)
SODIUM SERPL-SCNC: 129 MMOL/L — LOW (ref 135–146)
SODIUM SERPL-SCNC: 132 MMOL/L — LOW (ref 135–146)
SODIUM SERPL-SCNC: 132 MMOL/L — LOW (ref 135–146)
WBC # BLD: 9.53 K/UL — SIGNIFICANT CHANGE UP (ref 4.8–10.8)
WBC # FLD AUTO: 9.53 K/UL — SIGNIFICANT CHANGE UP (ref 4.8–10.8)

## 2021-09-20 PROCEDURE — 99232 SBSQ HOSP IP/OBS MODERATE 35: CPT

## 2021-09-20 RX ORDER — CLOPIDOGREL BISULFATE 75 MG/1
75 TABLET, FILM COATED ORAL DAILY
Refills: 0 | Status: DISCONTINUED | OUTPATIENT
Start: 2021-09-20 | End: 2021-09-27

## 2021-09-20 RX ORDER — IRON SUCROSE 20 MG/ML
100 INJECTION, SOLUTION INTRAVENOUS EVERY 24 HOURS
Refills: 0 | Status: COMPLETED | OUTPATIENT
Start: 2021-09-20 | End: 2021-09-24

## 2021-09-20 RX ORDER — DEXTROSE 50 % IN WATER 50 %
50 SYRINGE (ML) INTRAVENOUS ONCE
Refills: 0 | Status: COMPLETED | OUTPATIENT
Start: 2021-09-20 | End: 2021-09-20

## 2021-09-20 RX ORDER — SODIUM CHLORIDE 9 MG/ML
1000 INJECTION INTRAMUSCULAR; INTRAVENOUS; SUBCUTANEOUS
Refills: 0 | Status: DISCONTINUED | OUTPATIENT
Start: 2021-09-20 | End: 2021-09-21

## 2021-09-20 RX ORDER — SODIUM ZIRCONIUM CYCLOSILICATE 10 G/10G
10 POWDER, FOR SUSPENSION ORAL
Refills: 0 | Status: DISCONTINUED | OUTPATIENT
Start: 2021-09-20 | End: 2021-09-24

## 2021-09-20 RX ORDER — INSULIN HUMAN 100 [IU]/ML
10 INJECTION, SOLUTION SUBCUTANEOUS ONCE
Refills: 0 | Status: COMPLETED | OUTPATIENT
Start: 2021-09-20 | End: 2021-09-20

## 2021-09-20 RX ADMIN — Medication 325 MILLIGRAM(S): at 11:59

## 2021-09-20 RX ADMIN — Medication 3 UNIT(S): at 18:00

## 2021-09-20 RX ADMIN — Medication 650 MILLIGRAM(S): at 17:57

## 2021-09-20 RX ADMIN — INSULIN GLARGINE 10 UNIT(S): 100 INJECTION, SOLUTION SUBCUTANEOUS at 21:28

## 2021-09-20 RX ADMIN — PANTOPRAZOLE SODIUM 40 MILLIGRAM(S): 20 TABLET, DELAYED RELEASE ORAL at 17:56

## 2021-09-20 RX ADMIN — SODIUM CHLORIDE 75 MILLILITER(S): 9 INJECTION INTRAMUSCULAR; INTRAVENOUS; SUBCUTANEOUS at 19:56

## 2021-09-20 RX ADMIN — CARVEDILOL PHOSPHATE 25 MILLIGRAM(S): 80 CAPSULE, EXTENDED RELEASE ORAL at 17:56

## 2021-09-20 RX ADMIN — CLOPIDOGREL BISULFATE 75 MILLIGRAM(S): 75 TABLET, FILM COATED ORAL at 11:59

## 2021-09-20 RX ADMIN — SODIUM ZIRCONIUM CYCLOSILICATE 10 GRAM(S): 10 POWDER, FOR SUSPENSION ORAL at 17:57

## 2021-09-20 RX ADMIN — ATORVASTATIN CALCIUM 40 MILLIGRAM(S): 80 TABLET, FILM COATED ORAL at 21:28

## 2021-09-20 RX ADMIN — Medication 3 UNIT(S): at 12:41

## 2021-09-20 RX ADMIN — IRON SUCROSE 210 MILLIGRAM(S): 20 INJECTION, SOLUTION INTRAVENOUS at 18:06

## 2021-09-20 RX ADMIN — Medication 500 MILLIGRAM(S): at 17:56

## 2021-09-20 RX ADMIN — CHLORHEXIDINE GLUCONATE 1 APPLICATION(S): 213 SOLUTION TOPICAL at 05:14

## 2021-09-20 RX ADMIN — INSULIN HUMAN 10 UNIT(S): 100 INJECTION, SOLUTION SUBCUTANEOUS at 06:58

## 2021-09-20 RX ADMIN — Medication 500 MILLIGRAM(S): at 05:13

## 2021-09-20 RX ADMIN — RALOXIFENE HYDROCHLORIDE 60 MILLIGRAM(S): 60 TABLET, COATED ORAL at 12:41

## 2021-09-20 RX ADMIN — Medication 650 MILLIGRAM(S): at 12:41

## 2021-09-20 RX ADMIN — TAMSULOSIN HYDROCHLORIDE 0.4 MILLIGRAM(S): 0.4 CAPSULE ORAL at 21:29

## 2021-09-20 RX ADMIN — Medication 650 MILLIGRAM(S): at 11:59

## 2021-09-20 RX ADMIN — LATANOPROST 1 DROP(S): 0.05 SOLUTION/ DROPS OPHTHALMIC; TOPICAL at 21:29

## 2021-09-20 RX ADMIN — CALCITRIOL 0.25 MICROGRAM(S): 0.5 CAPSULE ORAL at 11:59

## 2021-09-20 RX ADMIN — FIDAXOMICIN 200 MILLIGRAM(S): 200 GRANULE, FOR SUSPENSION ORAL at 18:00

## 2021-09-20 RX ADMIN — Medication 650 MILLIGRAM(S): at 05:14

## 2021-09-20 RX ADMIN — Medication 1 MILLIGRAM(S): at 11:59

## 2021-09-20 RX ADMIN — FIDAXOMICIN 200 MILLIGRAM(S): 200 GRANULE, FOR SUSPENSION ORAL at 06:46

## 2021-09-20 RX ADMIN — Medication 650 MILLIGRAM(S): at 19:06

## 2021-09-20 RX ADMIN — Medication 50 MILLILITER(S): at 06:58

## 2021-09-20 RX ADMIN — Medication 25 MICROGRAM(S): at 05:13

## 2021-09-20 RX ADMIN — CARVEDILOL PHOSPHATE 25 MILLIGRAM(S): 80 CAPSULE, EXTENDED RELEASE ORAL at 05:14

## 2021-09-20 RX ADMIN — Medication 1 TABLET(S): at 11:59

## 2021-09-20 RX ADMIN — Medication 81 MILLIGRAM(S): at 11:59

## 2021-09-20 RX ADMIN — PANTOPRAZOLE SODIUM 40 MILLIGRAM(S): 20 TABLET, DELAYED RELEASE ORAL at 05:17

## 2021-09-20 NOTE — PROGRESS NOTE ADULT - SUBJECTIVE AND OBJECTIVE BOX
SUBJECTIVE:    Patient is a 85y old Female who presents with a chief complaint of hip fx n stemi (17 Sep 2021 11:04)    Currently admitted to medicine with the primary diagnosis of Intertrochanteric fracture of right hip       Today is hospital day 15d.     PAST MEDICAL & SURGICAL HISTORY  CAD (coronary artery disease)    Chronic CHF    Atrial fibrillation    Type 2 diabetes mellitus    Colon cancer    Hypertension    CKD (chronic kidney disease)  Stage 3    History of Clostridium difficile colitis  s/p fecal transplant    Diverticulitis    S/P hysterectomy      ALLERGIES:  No Known Allergies    MEDICATIONS:  STANDING MEDICATIONS  acetaminophen   Tablet .. 650 milliGRAM(s) Oral every 6 hours  ascorbic acid 500 milliGRAM(s) Oral two times a day  aspirin  chewable 81 milliGRAM(s) Oral daily  atorvastatin 40 milliGRAM(s) Oral at bedtime  calcitriol   Capsule 0.25 MICROGram(s) Oral daily  carvedilol 25 milliGRAM(s) Oral every 12 hours  chlorhexidine 4% Liquid 1 Application(s) Topical <User Schedule>  clopidogrel Tablet 75 milliGRAM(s) Oral daily  dextrose 40% Gel 15 Gram(s) Oral once  dextrose 5%. 1000 milliLiter(s) IV Continuous <Continuous>  dextrose 5%. 1000 milliLiter(s) IV Continuous <Continuous>  dextrose 50% Injectable 25 Gram(s) IV Push once  ferrous    sulfate 325 milliGRAM(s) Oral daily  fidaxomicin 200 milliGRAM(s) Oral two times a day  folic acid 1 milliGRAM(s) Oral daily  glucagon  Injectable 1 milliGRAM(s) IntraMuscular once  insulin glargine Injectable (LANTUS) 10 Unit(s) SubCutaneous at bedtime  insulin lispro (ADMELOG) corrective regimen sliding scale   SubCutaneous every 6 hours  insulin lispro Injectable (ADMELOG) 3 Unit(s) SubCutaneous three times a day before meals  latanoprost 0.005% Ophthalmic Solution 1 Drop(s) Both EYES at bedtime  levothyroxine 25 MICROGram(s) Oral daily  multivitamin 1 Tablet(s) Oral daily  pantoprazole  Injectable 40 milliGRAM(s) IV Push every 12 hours  raloxifene 60 milliGRAM(s) Oral daily  sodium chloride 0.9%. 1000 milliLiter(s) IV Continuous <Continuous>  sodium chloride 0.9%. 1000 milliLiter(s) IV Continuous <Continuous>  sodium zirconium cyclosilicate 10 Gram(s) Oral two times a day  tamsulosin 0.4 milliGRAM(s) Oral at bedtime    PRN MEDICATIONS  diphenhydrAMINE 25 milliGRAM(s) Oral at bedtime PRN  melatonin 5 milliGRAM(s) Oral at bedtime PRN  ondansetron Injectable 4 milliGRAM(s) IV Push every 6 hours PRN    VITALS:   T(F): 97.2  HR: 78  BP: 130/61  RR: 18  SpO2: 99%    LABS:                        8.1    9.53  )-----------( 300      ( 20 Sep 2021 08:25 )             25.6     09-20    129<L>  |  102  |  59<H>  ----------------------------<  128<H>  5.6<H>   |  20  |  1.5    Ca    9.5      20 Sep 2021 12:29  Mg     1.9     09-19    TPro  4.7<L>  /  Alb  2.7<L>  /  TBili  0.4  /  DBili  x   /  AST  22  /  ALT  <5  /  AlkPhos  156<H>  09-19    PT/INR - ( 20 Sep 2021 01:30 )   PT: 11.90 sec;   INR: 1.04 ratio         PTT - ( 20 Sep 2021 01:30 )  PTT:27.1 sec          CARDIAC MARKERS ( 18 Sep 2021 18:26 )  x     / 0.21 ng/mL / x     / x     / x          RADIOLOGY:    PHYSICAL EXAM:  GEN: No acute distress  LUNGS: Clear to auscultation bilaterally   HEART: S1/S2 present. RRR.   ABD/ GI: Soft, non-tender, non-distended. Bowel sounds present  EXT: lt thigh has a dressing  NEURO: AAOX3

## 2021-09-20 NOTE — CHART NOTE - NSCHARTNOTEFT_GEN_A_CORE
ORTHO UPDATE NOTE    Patient seen and evaluated. Patient's dressing is now completely dry (yesterday into today) with no drainage. This obviates the need for surgery, thus a formal I/D is no longer necessary as long as dressing remains dry. Ortho will follow.

## 2021-09-20 NOTE — CHART NOTE - NSCHARTNOTEFT_GEN_A_CORE
Registered Dietitian Follow-Up     Patient Profile Reviewed                           Yes []   No []     Nutrition History Previously Obtained        Yes []  No []       Pertinent Subjective Information: Pt annoyed this morning d/t not being able to eat since last night. Per discussion in rounds, pt to resume diet today as no need for procedure per ortho note . Now noted on PO diet. reports eating good prior to being NPO. Says she was also drinking the Glucerna. eager to eat today.      Pertinent Medical Interventions:  #Non-ST elevation ACS v/s Type II MI secondary to demand ischemia from acute blood loss anemia  - Acute blood loss anemia (Hg dropped to 5)   - Operative site hematoma v/s possible upper GI bleeding (hematemesis?) --> currently stable at 9.9   --Patient seen and evaluated. Patient's dressing is now completely dry (yesterday into today) with no drainage. This obviates the need for surgery, thus a formal I/D is no longer necessary as long as dressing remains dry. Ortho will follow.  # C. diff colitis - per RN, patient has not had diarrhea overnight  #CKD III- Accurate I&O  # Jordy worsening-- patient has urinary retention-- bladder scan showed 850cc-- elliott to drainage-- renal function improved post drainage.     Diet order: Diet, Dysphagia 2 Mechanical Soft-Thin Liquids:   Consistent Carbohydrate {Evening Snack}  No Concentrated Potassium  Supplement Feeding Modality:  Oral  Glucerna Shake Cans or Servings Per Day:  1       Frequency:  Two Times a day  Ensure Pudding Cans or Servings Per Day:  1       Frequency:  Daily (21 @ 07:57) [Active]    Anthropometrics:  Height (cm): 160 (21 @ 00:12)  Weight (kg): 61.3 (21 @ 00:12) vs. 61.8 kg (). Wt has fluctuated from 50.7 kg () to 61.8 kg (). ? etiology of wt fluctuations. Edema vs. chronic CHF noted at this time.  BMI (kg/m2): 23.9 (21 @ 00:12)  IBW: 52kg    Daily Weight in k.1 (), Weight in k.1 (), Weight in k.8 ()    MEDICATIONS  (STANDING):  ascorbic acid 500 milliGRAM(s) Oral two times a day  aspirin  chewable 81 milliGRAM(s) Oral daily  atorvastatin 40 milliGRAM(s) Oral at bedtime  calcitriol   Capsule 0.25 MICROGram(s) Oral daily  carvedilol 25 milliGRAM(s) Oral every 12 hours  clopidogrel Tablet 75 milliGRAM(s) Oral daily  dextrose 40% Gel 15 Gram(s) Oral once  dextrose 5%. 1000 milliLiter(s) (50 mL/Hr) IV Continuous <Continuous>  dextrose 5%. 1000 milliLiter(s) (100 mL/Hr) IV Continuous <Continuous>  dextrose 50% Injectable 25 Gram(s) IV Push once  ferrous    sulfate 325 milliGRAM(s) Oral daily  fidaxomicin 200 milliGRAM(s) Oral two times a day  folic acid 1 milliGRAM(s) Oral daily  glucagon  Injectable 1 milliGRAM(s) IntraMuscular once  insulin glargine Injectable (LANTUS) 10 Unit(s) SubCutaneous at bedtime  insulin lispro (ADMELOG) corrective regimen sliding scale   SubCutaneous every 6 hours  insulin lispro Injectable (ADMELOG) 3 Unit(s) SubCutaneous three times a day before meals  latanoprost 0.005% Ophthalmic Solution 1 Drop(s) Both EYES at bedtime  levothyroxine 25 MICROGram(s) Oral daily  multivitamin 1 Tablet(s) Oral daily  pantoprazole  Injectable 40 milliGRAM(s) IV Push every 12 hours  raloxifene 60 milliGRAM(s) Oral daily  sodium chloride 0.9%. 1000 milliLiter(s) (75 mL/Hr) IV Continuous <Continuous>  tamsulosin 0.4 milliGRAM(s) Oral at bedtime    MEDICATIONS  (PRN):  ondansetron Injectable 4 milliGRAM(s) IV Push every 6 hours PRN Nausea and/or Vomiting    Pertinent Labs:  @ 08:25: Na 132<L>, BUN 63<HH>, Cr 1.6<H>, <H>, K+ 5.1<H>, Phos --, Mg --, Alk Phos --, ALT/SGPT --, AST/SGOT --, HbA1c --   @ 01:30: Na 132<L>, BUN 65<HH>, Cr 1.7<H>, <H>, K+ 5.6<H>, Phos --, Mg --, Alk Phos --, ALT/SGPT --, AST/SGOT --, HbA1c --   @ 19:55: Na 133<L>, BUN 67<HH>, Cr 1.8<H>, <H>, K+ 5.4<H>, Phos --, Mg 1.9, Alk Phos 156<H>, ALT/SGPT <5, AST/SGOT 22, HbA1c --    Finger Sticks:  POCT Blood Glucose.: 140 mg/dL ( @ 07:57)  POCT Blood Glucose.: 122 mg/dL ( @ 06:32)  POCT Blood Glucose.: 144 mg/dL ( @ 21:06)  POCT Blood Glucose.: 144 mg/dL ( @ 16:57)  POCT Blood Glucose.: 156 mg/dL ( @ 11:43)    Physical Findings:  - Appearance: AAOX4; : 2+ L hip/ankle edema noted   - GI function: LBM  -- pt reports previously having diarrhea d/t C.diff; no other discomfort reported   - Tubes: n/a   - Oral/Mouth cavity: pt reports tolerating current diet texture, no complaints   - Skin: bruised/ecchymotic, surgical incision      Nutrition Requirements  Weight Used: 50.7kg -Derived from nutrition note () - estimated needs derived here are similar to what would be estimated by using IBW 52 kg. Given wt discrepancy, would opt to assess nutrient needs via IBW. Assessed needs below are similar to what would be estimated using IBW.     Estimated Energy Needs    Continue [x]  Adjust []  1112-1205kcal (MSJ 1.2-1.3AF) -Derived from nutrition note ()      Estimated Protein Needs    Continue [x]  Adjust []  61-64g/day (1.2-1.3 g/kg -- d/t recent sx vs. renal fx noted -- will mon) -Derived from nutrition note ()     Estimated Fluid Needs        Continue [x]  Adjust []  1mL/kcal or LIP -Derived from nutrition note ()      Nutrient Intake:  reports eating we;; before diet order was changed; EMR Po : 50% -- will monitor PO      [x] Previous Nutrition Diagnosis: Inadequate protein energy intake -- ongoing/resolved???     Nutrition Intervention: Meals & Snacks     Goal/Expected Outcome: Pt to consume and tolerate >75% of meals/snacks and PO supplements in 4-6 days.     Nutrition Monitoring:diet order,energy intake,body composition,NFPF, renal/glucose profile     Recommendation:   1. cont w/ current diet order + Supplements   2. Do not provide any Chocolate supplements d/t elevated k+, provide Vanilla only   3. cont w/ MVI   4. Obtain daily wts    [phone]  RD remains available: Mervat Baker, RDN [phone]   ** Will monitor PO in 4-6 days.

## 2021-09-20 NOTE — PROGRESS NOTE ADULT - SUBJECTIVE AND OBJECTIVE BOX
HPI  Patient is a 85y old Female who presents with a chief complaint of hip fx n stemi (17 Sep 2021 11:04)    Currently admitted to medicine with the primary diagnosis of Intertrochanteric fracture of right hip.        Today is hospital day 15d.     INTERVAL HPI / OVERNIGHT EVENTS:  Patient was examined and seen at bedside. This morning she is resting comfortably in bed and reports no new issues or overnight events. Denies pain and nausea today.    ROS: All systems negative except as stated in HPI    PAST MEDICAL & SURGICAL HISTORY  CAD (coronary artery disease)    Chronic CHF    Atrial fibrillation    Type 2 diabetes mellitus    Colon cancer    Hypertension    CKD (chronic kidney disease)  Stage 3    History of Clostridium difficile colitis  s/p fecal transplant    Diverticulitis    S/P hysterectomy      ALLERGIES  No Known Allergies    MEDICATIONS  STANDING MEDICATIONS  acetaminophen   Tablet .. 650 milliGRAM(s) Oral every 6 hours  ascorbic acid 500 milliGRAM(s) Oral two times a day  aspirin  chewable 81 milliGRAM(s) Oral daily  atorvastatin 40 milliGRAM(s) Oral at bedtime  calcitriol   Capsule 0.25 MICROGram(s) Oral daily  carvedilol 25 milliGRAM(s) Oral every 12 hours  chlorhexidine 4% Liquid 1 Application(s) Topical <User Schedule>  clopidogrel Tablet 75 milliGRAM(s) Oral daily  dextrose 40% Gel 15 Gram(s) Oral once  dextrose 5%. 1000 milliLiter(s) IV Continuous <Continuous>  dextrose 5%. 1000 milliLiter(s) IV Continuous <Continuous>  dextrose 50% Injectable 25 Gram(s) IV Push once  ferrous    sulfate 325 milliGRAM(s) Oral daily  fidaxomicin 200 milliGRAM(s) Oral two times a day  folic acid 1 milliGRAM(s) Oral daily  glucagon  Injectable 1 milliGRAM(s) IntraMuscular once  insulin glargine Injectable (LANTUS) 10 Unit(s) SubCutaneous at bedtime  insulin lispro (ADMELOG) corrective regimen sliding scale   SubCutaneous every 6 hours  insulin lispro Injectable (ADMELOG) 3 Unit(s) SubCutaneous three times a day before meals  latanoprost 0.005% Ophthalmic Solution 1 Drop(s) Both EYES at bedtime  levothyroxine 25 MICROGram(s) Oral daily  multivitamin 1 Tablet(s) Oral daily  pantoprazole  Injectable 40 milliGRAM(s) IV Push every 12 hours  raloxifene 60 milliGRAM(s) Oral daily  sodium chloride 0.9%. 1000 milliLiter(s) IV Continuous <Continuous>  tamsulosin 0.4 milliGRAM(s) Oral at bedtime    PRN MEDICATIONS  diphenhydrAMINE 25 milliGRAM(s) Oral at bedtime PRN  melatonin 5 milliGRAM(s) Oral at bedtime PRN  ondansetron Injectable 4 milliGRAM(s) IV Push every 6 hours PRN    VITALS:  T(F): 96  HR: 77  BP: 133/62  RR: 18  SpO2: 98%    PHYSICAL EXAM  GEN: NAD, Resting comfortably in bed  PULM: Clear to auscultation bilaterally, No wheezes  CVS: Regular rate and rhythm, S1-S2, no murmurs  ABD: Soft, non-tender, non-distended, no guarding  EXT: No edema  NEURO: A&Ox3, no focal deficits    LABS                        8.1    9.53  )-----------( 300      ( 20 Sep 2021 08:25 )             25.6     09-20    129<L>  |  102  |  59<H>  ----------------------------<  128<H>  5.6<H>   |  20  |  1.5    Ca    9.5      20 Sep 2021 12:29  Mg     1.9     09-19    TPro  4.7<L>  /  Alb  2.7<L>  /  TBili  0.4  /  DBili  x   /  AST  22  /  ALT  <5  /  AlkPhos  156<H>  09-19    PT/INR - ( 20 Sep 2021 01:30 )   PT: 11.90 sec;   INR: 1.04 ratio         PTT - ( 20 Sep 2021 01:30 )  PTT:27.1 sec          CARDIAC MARKERS ( 18 Sep 2021 18:26 )  x     / 0.21 ng/mL / x     / x     / x          RADIOLOGY

## 2021-09-20 NOTE — PROGRESS NOTE ADULT - ASSESSMENT
85F w/PMHx CAD, CHF, Afib on Eliquis, DM, HTN, colon cancer, CKD stage 3, hx of diverticulitis, presents s/p mechanical fall at home, +HT, -LOC, +AC (Eliquis). Patient was in her kitchen and "turned around too fast" when she fell over and hit her head on a chair. She denies loss of consciousness. She remained down for ~30 min before EMS came to take her into the ED. She is GCS 15, primary survey negative. No external signs of trauma. Complains of LLE pain. Denies scalp, spinal, chest, abdominal pain. ROM of all extremities in tact. Was admitted to ortho service for eval and surgical correction of left femoral IT fracture s/p mechanical fall at home. Pt went to the OR and underwent successful fixation of left hip intertochanteric hip fracture.     While on the floors the patient had an episode of melena. As per patient she had coffee ground emesis on 9/9 PM 2 episodes followed by black tarry stools and abdominal pain intermittently in epigastric region worse with eating. 6/10 in intensity, non radiating. GI was consulted and decided that there was questionable GI bleeding. Anticoagulation was stopped. The patient's Hb was 5.4. She was transfused and the hemoglobin improved. The patient had typical chest pain with elevated tropes and was diagnosed with NSTEMI type 2. She was upgraded to the CCU. Due to the possibility of a GI bleed the patient was not cath'd. The patient was also found to be C. Diff positive and started on Vancomycin. The patient also developed a hematoma at the site of the surgery, Ortho evaluated her and are following her and replacing dressings as needed. The patient is now stable and her chest pain has improved. Per discussion with ID the patient was switched to Fidaxomicin. Due to continued drainage from surgical site will hold Eliquis for one week. Resume once drainage has ceased. Per ortho recommendation will also begin Ancef 2g q8. The patient is still having diarrhea though it has improved.     # NSTEMi-- restarted aspirin and held plavix- concern for operative site hematoma.-- extending into muscles.-- which will be debrided on monday-- no chest pain currently-- she has not had angiogram.    # Jordy improving-- patient had urinary retention-- bladder scan showed 850cc-- elliott to drainage-- renal function improved post drainge.  # Lt intertrochanteric  fx-- patient had drains --    Patient is booked for OR for irrigation and debridement LLE on 9/20/21-- found to have no drainage so procedure not done      # Anemia-- patient is s/p 3units of PRBC-- may need  iron infusion    # C diff--  no diarrhea in last 24hrs  # A fib-- eliquis is held  # Hyperkalemia-- on lokelma    PT eval as per ortho

## 2021-09-21 LAB
ALBUMIN SERPL ELPH-MCNC: 2.3 G/DL — LOW (ref 3.5–5.2)
ALP SERPL-CCNC: 140 U/L — HIGH (ref 30–115)
ALT FLD-CCNC: 12 U/L — SIGNIFICANT CHANGE UP (ref 0–41)
ANION GAP SERPL CALC-SCNC: 8 MMOL/L — SIGNIFICANT CHANGE UP (ref 7–14)
AST SERPL-CCNC: 30 U/L — SIGNIFICANT CHANGE UP (ref 0–41)
BILIRUB SERPL-MCNC: 0.4 MG/DL — SIGNIFICANT CHANGE UP (ref 0.2–1.2)
BUN SERPL-MCNC: 55 MG/DL — HIGH (ref 10–20)
CALCIUM SERPL-MCNC: 9.1 MG/DL — SIGNIFICANT CHANGE UP (ref 8.5–10.1)
CHLORIDE SERPL-SCNC: 109 MMOL/L — SIGNIFICANT CHANGE UP (ref 98–110)
CO2 SERPL-SCNC: 17 MMOL/L — SIGNIFICANT CHANGE UP (ref 17–32)
CREAT SERPL-MCNC: 1.4 MG/DL — SIGNIFICANT CHANGE UP (ref 0.7–1.5)
GLUCOSE BLDC GLUCOMTR-MCNC: 110 MG/DL — HIGH (ref 70–99)
GLUCOSE BLDC GLUCOMTR-MCNC: 112 MG/DL — HIGH (ref 70–99)
GLUCOSE BLDC GLUCOMTR-MCNC: 141 MG/DL — HIGH (ref 70–99)
GLUCOSE BLDC GLUCOMTR-MCNC: 158 MG/DL — HIGH (ref 70–99)
GLUCOSE BLDC GLUCOMTR-MCNC: 163 MG/DL — HIGH (ref 70–99)
GLUCOSE SERPL-MCNC: 110 MG/DL — HIGH (ref 70–99)
HCT VFR BLD CALC: 25.7 % — LOW (ref 37–47)
HGB BLD-MCNC: 8.1 G/DL — LOW (ref 12–16)
MAGNESIUM SERPL-MCNC: 1.7 MG/DL — LOW (ref 1.8–2.4)
MCHC RBC-ENTMCNC: 28.2 PG — SIGNIFICANT CHANGE UP (ref 27–31)
MCHC RBC-ENTMCNC: 31.5 G/DL — LOW (ref 32–37)
MCV RBC AUTO: 89.5 FL — SIGNIFICANT CHANGE UP (ref 81–99)
NRBC # BLD: 0 /100 WBCS — SIGNIFICANT CHANGE UP (ref 0–0)
PLATELET # BLD AUTO: 299 K/UL — SIGNIFICANT CHANGE UP (ref 130–400)
POTASSIUM SERPL-MCNC: 5.4 MMOL/L — HIGH (ref 3.5–5)
POTASSIUM SERPL-SCNC: 5.4 MMOL/L — HIGH (ref 3.5–5)
PROT SERPL-MCNC: 4.1 G/DL — LOW (ref 6–8)
RBC # BLD: 2.87 M/UL — LOW (ref 4.2–5.4)
RBC # FLD: 18.2 % — HIGH (ref 11.5–14.5)
SODIUM SERPL-SCNC: 134 MMOL/L — LOW (ref 135–146)
WBC # BLD: 9.28 K/UL — SIGNIFICANT CHANGE UP (ref 4.8–10.8)
WBC # FLD AUTO: 9.28 K/UL — SIGNIFICANT CHANGE UP (ref 4.8–10.8)

## 2021-09-21 PROCEDURE — 99232 SBSQ HOSP IP/OBS MODERATE 35: CPT

## 2021-09-21 RX ORDER — MAGNESIUM SULFATE 500 MG/ML
2 VIAL (ML) INJECTION ONCE
Refills: 0 | Status: COMPLETED | OUTPATIENT
Start: 2021-09-21 | End: 2021-09-21

## 2021-09-21 RX ADMIN — Medication 3 UNIT(S): at 17:35

## 2021-09-21 RX ADMIN — Medication 50 GRAM(S): at 10:56

## 2021-09-21 RX ADMIN — IRON SUCROSE 210 MILLIGRAM(S): 20 INJECTION, SOLUTION INTRAVENOUS at 17:37

## 2021-09-21 RX ADMIN — Medication 3 UNIT(S): at 11:54

## 2021-09-21 RX ADMIN — Medication 650 MILLIGRAM(S): at 23:22

## 2021-09-21 RX ADMIN — Medication 650 MILLIGRAM(S): at 01:20

## 2021-09-21 RX ADMIN — Medication 500 MILLIGRAM(S): at 05:20

## 2021-09-21 RX ADMIN — Medication 25 MICROGRAM(S): at 05:21

## 2021-09-21 RX ADMIN — CHLORHEXIDINE GLUCONATE 1 APPLICATION(S): 213 SOLUTION TOPICAL at 09:01

## 2021-09-21 RX ADMIN — TAMSULOSIN HYDROCHLORIDE 0.4 MILLIGRAM(S): 0.4 CAPSULE ORAL at 21:08

## 2021-09-21 RX ADMIN — LATANOPROST 1 DROP(S): 0.05 SOLUTION/ DROPS OPHTHALMIC; TOPICAL at 21:09

## 2021-09-21 RX ADMIN — CALCITRIOL 0.25 MICROGRAM(S): 0.5 CAPSULE ORAL at 11:54

## 2021-09-21 RX ADMIN — PANTOPRAZOLE SODIUM 40 MILLIGRAM(S): 20 TABLET, DELAYED RELEASE ORAL at 17:37

## 2021-09-21 RX ADMIN — SODIUM ZIRCONIUM CYCLOSILICATE 10 GRAM(S): 10 POWDER, FOR SUSPENSION ORAL at 17:37

## 2021-09-21 RX ADMIN — Medication 325 MILLIGRAM(S): at 11:52

## 2021-09-21 RX ADMIN — FIDAXOMICIN 200 MILLIGRAM(S): 200 GRANULE, FOR SUSPENSION ORAL at 05:20

## 2021-09-21 RX ADMIN — CARVEDILOL PHOSPHATE 25 MILLIGRAM(S): 80 CAPSULE, EXTENDED RELEASE ORAL at 05:20

## 2021-09-21 RX ADMIN — Medication 1 TABLET(S): at 11:52

## 2021-09-21 RX ADMIN — INSULIN GLARGINE 10 UNIT(S): 100 INJECTION, SOLUTION SUBCUTANEOUS at 21:09

## 2021-09-21 RX ADMIN — Medication 650 MILLIGRAM(S): at 00:50

## 2021-09-21 RX ADMIN — ATORVASTATIN CALCIUM 40 MILLIGRAM(S): 80 TABLET, FILM COATED ORAL at 21:08

## 2021-09-21 RX ADMIN — FIDAXOMICIN 200 MILLIGRAM(S): 200 GRANULE, FOR SUSPENSION ORAL at 17:36

## 2021-09-21 RX ADMIN — SODIUM ZIRCONIUM CYCLOSILICATE 10 GRAM(S): 10 POWDER, FOR SUSPENSION ORAL at 05:21

## 2021-09-21 RX ADMIN — Medication 1 MILLIGRAM(S): at 11:56

## 2021-09-21 RX ADMIN — Medication 500 MILLIGRAM(S): at 17:36

## 2021-09-21 RX ADMIN — CARVEDILOL PHOSPHATE 25 MILLIGRAM(S): 80 CAPSULE, EXTENDED RELEASE ORAL at 17:36

## 2021-09-21 RX ADMIN — Medication 650 MILLIGRAM(S): at 17:51

## 2021-09-21 RX ADMIN — CLOPIDOGREL BISULFATE 75 MILLIGRAM(S): 75 TABLET, FILM COATED ORAL at 11:53

## 2021-09-21 RX ADMIN — RALOXIFENE HYDROCHLORIDE 60 MILLIGRAM(S): 60 TABLET, COATED ORAL at 11:52

## 2021-09-21 RX ADMIN — Medication 650 MILLIGRAM(S): at 17:36

## 2021-09-21 RX ADMIN — Medication 2: at 11:54

## 2021-09-21 RX ADMIN — Medication 3 UNIT(S): at 08:34

## 2021-09-21 RX ADMIN — PANTOPRAZOLE SODIUM 40 MILLIGRAM(S): 20 TABLET, DELAYED RELEASE ORAL at 05:21

## 2021-09-21 RX ADMIN — Medication 650 MILLIGRAM(S): at 11:52

## 2021-09-21 RX ADMIN — Medication 650 MILLIGRAM(S): at 13:12

## 2021-09-21 RX ADMIN — Medication 81 MILLIGRAM(S): at 11:53

## 2021-09-21 NOTE — PROGRESS NOTE ADULT - SUBJECTIVE AND OBJECTIVE BOX
SUBJECTIVE:    Patient is a 85y old Female who presents with a chief complaint of hip fx n stemi (17 Sep 2021 11:04)    Currently admitted to medicine with the primary diagnosis of Intertrochanteric fracture of right hip       Today is hospital day 16d.     PAST MEDICAL & SURGICAL HISTORY  CAD (coronary artery disease)    Chronic CHF    Atrial fibrillation    Type 2 diabetes mellitus    Colon cancer    Hypertension    CKD (chronic kidney disease)  Stage 3    History of Clostridium difficile colitis  s/p fecal transplant    Diverticulitis    S/P hysterectomy      ALLERGIES:  No Known Allergies    MEDICATIONS:  STANDING MEDICATIONS  acetaminophen   Tablet .. 650 milliGRAM(s) Oral every 6 hours  ascorbic acid 500 milliGRAM(s) Oral two times a day  aspirin  chewable 81 milliGRAM(s) Oral daily  atorvastatin 40 milliGRAM(s) Oral at bedtime  calcitriol   Capsule 0.25 MICROGram(s) Oral daily  carvedilol 25 milliGRAM(s) Oral every 12 hours  chlorhexidine 4% Liquid 1 Application(s) Topical <User Schedule>  clopidogrel Tablet 75 milliGRAM(s) Oral daily  dextrose 40% Gel 15 Gram(s) Oral once  dextrose 5%. 1000 milliLiter(s) IV Continuous <Continuous>  dextrose 5%. 1000 milliLiter(s) IV Continuous <Continuous>  dextrose 50% Injectable 25 Gram(s) IV Push once  ferrous    sulfate 325 milliGRAM(s) Oral daily  fidaxomicin 200 milliGRAM(s) Oral two times a day  folic acid 1 milliGRAM(s) Oral daily  glucagon  Injectable 1 milliGRAM(s) IntraMuscular once  insulin glargine Injectable (LANTUS) 10 Unit(s) SubCutaneous at bedtime  insulin lispro (ADMELOG) corrective regimen sliding scale   SubCutaneous every 6 hours  insulin lispro Injectable (ADMELOG) 3 Unit(s) SubCutaneous three times a day before meals  iron sucrose IVPB 100 milliGRAM(s) IV Intermittent every 24 hours  latanoprost 0.005% Ophthalmic Solution 1 Drop(s) Both EYES at bedtime  levothyroxine 25 MICROGram(s) Oral daily  multivitamin 1 Tablet(s) Oral daily  pantoprazole  Injectable 40 milliGRAM(s) IV Push every 12 hours  raloxifene 60 milliGRAM(s) Oral daily  sodium chloride 0.9%. 1000 milliLiter(s) IV Continuous <Continuous>  sodium chloride 0.9%. 1000 milliLiter(s) IV Continuous <Continuous>  sodium zirconium cyclosilicate 10 Gram(s) Oral two times a day  tamsulosin 0.4 milliGRAM(s) Oral at bedtime    PRN MEDICATIONS  diphenhydrAMINE 25 milliGRAM(s) Oral at bedtime PRN  melatonin 5 milliGRAM(s) Oral at bedtime PRN  ondansetron Injectable 4 milliGRAM(s) IV Push every 6 hours PRN    VITALS:   T(F): 98.4  HR: 82  BP: 134/63  RR: 18  SpO2: --    LABS:                        8.1    9.28  )-----------( 299      ( 21 Sep 2021 06:16 )             25.7     09-21    134<L>  |  109  |  55<H>  ----------------------------<  110<H>  5.4<H>   |  17  |  1.4    Ca    9.1      21 Sep 2021 06:16  Mg     1.7     09-21    TPro  4.1<L>  /  Alb  2.3<L>  /  TBili  0.4  /  DBili  x   /  AST  30  /  ALT  12  /  AlkPhos  140<H>  09-21    PT/INR - ( 20 Sep 2021 01:30 )   PT: 11.90 sec;   INR: 1.04 ratio         PTT - ( 20 Sep 2021 01:30 )  PTT:27.1 sec              RADIOLOGY:    PHYSICAL EXAM:  GEN: No acute distress  LUNGS: Clear to auscultation bilaterally   HEART: S1/S2 present. RRR.   ABD/ GI: Soft, non-tender, non-distended. Bowel sounds present  EXT:  lt thigh in dressing  NEURO: AAOX3

## 2021-09-21 NOTE — PROGRESS NOTE ADULT - ASSESSMENT
85F w/PMHx CAD, CHF, Afib on Eliquis, DM, HTN, colon cancer, CKD stage 3, hx of diverticulitis, presents s/p mechanical fall at home, +HT, -LOC, +AC (Eliquis). Patient was in her kitchen and "turned around too fast" when she fell over and hit her head on a chair. She denies loss of consciousness. She remained down for ~30 min before EMS came to take her into the ED. She is GCS 15, primary survey negative. No external signs of trauma. Complains of LLE pain. Denies scalp, spinal, chest, abdominal pain. ROM of all extremities in tact. Was admitted to ortho service for eval and surgical correction of left femoral IT fracture s/p mechanical fall at home. Pt went to the OR and underwent successful fixation of left hip intertochanteric hip fracture.     While on the floors the patient had an episode of melena. As per patient she had coffee ground emesis on 9/9 PM 2 episodes followed by black tarry stools and abdominal pain intermittently in epigastric region worse with eating. 6/10 in intensity, non radiating. GI was consulted and decided that there was questionable GI bleeding. Anticoagulation was stopped. The patient's Hb was 5.4. She was transfused and the hemoglobin improved. The patient had typical chest pain with elevated tropes and was diagnosed with NSTEMI type 2. She was upgraded to the CCU. Due to the possibility of a GI bleed the patient was not cath'd. The patient was also found to be C. Diff positive and started on Vancomycin. The patient also developed a hematoma at the site of the surgery, Ortho evaluated her and are following her and replacing dressings as needed. The patient is now stable and her chest pain has improved. Per discussion with ID the patient was switched to Fidaxomicin. Due to continued drainage from surgical site will hold Eliquis for one week. Resume once drainage has ceased. Per ortho recommendation will also begin Ancef 2g q8. The patient is still having diarrhea though it has improved.     # NSTEMi-- restarted aspirin and held plavix- concern for operative site hematoma.-- extending into muscles.-- which will be debrided on monday-- no chest pain currently-- she has not had angiogram.    # Jordy improved-- patient had urinary retention-- bladder scan showed 850cc-- elliott to drainage-- renal function improved post drainage. can attempt trila of void after she ambulates.  # Lt intertrochanteric  fx-- patient had drains -that were removed-- as she bled in the operative site  Patient is booked for OR for irrigation and debridement LLE on 9/20/21-- found to have no drainage so procedure not done      # Anemia-- patient is s/p 3units of PRBC-- may need  iron infusion    # C diff--  no diarrhea in last 24hrs. ID recommended--Po Fidaxomicin 200 mg q12h for 10 days and then po vancomycin 125 mg q12h for 3 more weeks  Fidaxomicin stared on 9/14    # A fib-- eliquis is held  # Hyperkalemia-- on lokelma -- still hyperkalemia is persistent.  Monitor for bowel movement.    PT eval  and -- discuss with ortho when eliquis could be resumed. cardio follow up-- DR Perez.

## 2021-09-22 LAB
ANION GAP SERPL CALC-SCNC: 8 MMOL/L — SIGNIFICANT CHANGE UP (ref 7–14)
BUN SERPL-MCNC: 50 MG/DL — HIGH (ref 10–20)
CALCIUM SERPL-MCNC: 9.2 MG/DL — SIGNIFICANT CHANGE UP (ref 8.5–10.1)
CHLORIDE SERPL-SCNC: 109 MMOL/L — SIGNIFICANT CHANGE UP (ref 98–110)
CO2 SERPL-SCNC: 18 MMOL/L — SIGNIFICANT CHANGE UP (ref 17–32)
CREAT SERPL-MCNC: 1.5 MG/DL — SIGNIFICANT CHANGE UP (ref 0.7–1.5)
GLUCOSE BLDC GLUCOMTR-MCNC: 106 MG/DL — HIGH (ref 70–99)
GLUCOSE BLDC GLUCOMTR-MCNC: 121 MG/DL — HIGH (ref 70–99)
GLUCOSE BLDC GLUCOMTR-MCNC: 133 MG/DL — HIGH (ref 70–99)
GLUCOSE BLDC GLUCOMTR-MCNC: 164 MG/DL — HIGH (ref 70–99)
GLUCOSE SERPL-MCNC: 101 MG/DL — HIGH (ref 70–99)
HCT VFR BLD CALC: 26.1 % — LOW (ref 37–47)
HGB BLD-MCNC: 8.2 G/DL — LOW (ref 12–16)
MCHC RBC-ENTMCNC: 28.3 PG — SIGNIFICANT CHANGE UP (ref 27–31)
MCHC RBC-ENTMCNC: 31.4 G/DL — LOW (ref 32–37)
MCV RBC AUTO: 90 FL — SIGNIFICANT CHANGE UP (ref 81–99)
NRBC # BLD: 0 /100 WBCS — SIGNIFICANT CHANGE UP (ref 0–0)
PLATELET # BLD AUTO: 285 K/UL — SIGNIFICANT CHANGE UP (ref 130–400)
POTASSIUM SERPL-MCNC: 4.9 MMOL/L — SIGNIFICANT CHANGE UP (ref 3.5–5)
POTASSIUM SERPL-SCNC: 4.9 MMOL/L — SIGNIFICANT CHANGE UP (ref 3.5–5)
RBC # BLD: 2.9 M/UL — LOW (ref 4.2–5.4)
RBC # FLD: 18.3 % — HIGH (ref 11.5–14.5)
SODIUM SERPL-SCNC: 135 MMOL/L — SIGNIFICANT CHANGE UP (ref 135–146)
WBC # BLD: 9.5 K/UL — SIGNIFICANT CHANGE UP (ref 4.8–10.8)
WBC # FLD AUTO: 9.5 K/UL — SIGNIFICANT CHANGE UP (ref 4.8–10.8)

## 2021-09-22 PROCEDURE — 99233 SBSQ HOSP IP/OBS HIGH 50: CPT

## 2021-09-22 RX ORDER — APIXABAN 2.5 MG/1
2.5 TABLET, FILM COATED ORAL
Refills: 0 | Status: DISCONTINUED | OUTPATIENT
Start: 2021-09-22 | End: 2021-09-27

## 2021-09-22 RX ORDER — FUROSEMIDE 40 MG
40 TABLET ORAL DAILY
Refills: 0 | Status: DISCONTINUED | OUTPATIENT
Start: 2021-09-22 | End: 2021-09-27

## 2021-09-22 RX ADMIN — CALCITRIOL 0.25 MICROGRAM(S): 0.5 CAPSULE ORAL at 11:52

## 2021-09-22 RX ADMIN — RALOXIFENE HYDROCHLORIDE 60 MILLIGRAM(S): 60 TABLET, COATED ORAL at 11:51

## 2021-09-22 RX ADMIN — Medication 500 MILLIGRAM(S): at 18:00

## 2021-09-22 RX ADMIN — Medication 1 MILLIGRAM(S): at 11:51

## 2021-09-22 RX ADMIN — CLOPIDOGREL BISULFATE 75 MILLIGRAM(S): 75 TABLET, FILM COATED ORAL at 11:52

## 2021-09-22 RX ADMIN — Medication 650 MILLIGRAM(S): at 18:00

## 2021-09-22 RX ADMIN — Medication 1 TABLET(S): at 11:51

## 2021-09-22 RX ADMIN — Medication 3 UNIT(S): at 08:31

## 2021-09-22 RX ADMIN — Medication 650 MILLIGRAM(S): at 11:51

## 2021-09-22 RX ADMIN — CARVEDILOL PHOSPHATE 25 MILLIGRAM(S): 80 CAPSULE, EXTENDED RELEASE ORAL at 17:59

## 2021-09-22 RX ADMIN — Medication 40 MILLIGRAM(S): at 11:52

## 2021-09-22 RX ADMIN — ATORVASTATIN CALCIUM 40 MILLIGRAM(S): 80 TABLET, FILM COATED ORAL at 21:25

## 2021-09-22 RX ADMIN — Medication 325 MILLIGRAM(S): at 11:52

## 2021-09-22 RX ADMIN — Medication 650 MILLIGRAM(S): at 05:44

## 2021-09-22 RX ADMIN — APIXABAN 2.5 MILLIGRAM(S): 2.5 TABLET, FILM COATED ORAL at 18:00

## 2021-09-22 RX ADMIN — Medication 81 MILLIGRAM(S): at 11:52

## 2021-09-22 RX ADMIN — CARVEDILOL PHOSPHATE 25 MILLIGRAM(S): 80 CAPSULE, EXTENDED RELEASE ORAL at 05:44

## 2021-09-22 RX ADMIN — INSULIN GLARGINE 10 UNIT(S): 100 INJECTION, SOLUTION SUBCUTANEOUS at 21:25

## 2021-09-22 RX ADMIN — Medication 25 MICROGRAM(S): at 05:44

## 2021-09-22 RX ADMIN — LATANOPROST 1 DROP(S): 0.05 SOLUTION/ DROPS OPHTHALMIC; TOPICAL at 21:26

## 2021-09-22 RX ADMIN — Medication 500 MILLIGRAM(S): at 05:44

## 2021-09-22 RX ADMIN — IRON SUCROSE 210 MILLIGRAM(S): 20 INJECTION, SOLUTION INTRAVENOUS at 17:59

## 2021-09-22 RX ADMIN — SODIUM ZIRCONIUM CYCLOSILICATE 10 GRAM(S): 10 POWDER, FOR SUSPENSION ORAL at 17:59

## 2021-09-22 RX ADMIN — FIDAXOMICIN 200 MILLIGRAM(S): 200 GRANULE, FOR SUSPENSION ORAL at 17:59

## 2021-09-22 RX ADMIN — SODIUM ZIRCONIUM CYCLOSILICATE 10 GRAM(S): 10 POWDER, FOR SUSPENSION ORAL at 05:39

## 2021-09-22 RX ADMIN — Medication 3 UNIT(S): at 18:00

## 2021-09-22 RX ADMIN — FIDAXOMICIN 200 MILLIGRAM(S): 200 GRANULE, FOR SUSPENSION ORAL at 05:43

## 2021-09-22 RX ADMIN — Medication 3 UNIT(S): at 11:52

## 2021-09-22 RX ADMIN — Medication 650 MILLIGRAM(S): at 23:08

## 2021-09-22 RX ADMIN — Medication 650 MILLIGRAM(S): at 05:43

## 2021-09-22 RX ADMIN — PANTOPRAZOLE SODIUM 40 MILLIGRAM(S): 20 TABLET, DELAYED RELEASE ORAL at 05:43

## 2021-09-22 RX ADMIN — CHLORHEXIDINE GLUCONATE 1 APPLICATION(S): 213 SOLUTION TOPICAL at 05:44

## 2021-09-22 RX ADMIN — TAMSULOSIN HYDROCHLORIDE 0.4 MILLIGRAM(S): 0.4 CAPSULE ORAL at 21:25

## 2021-09-22 RX ADMIN — PANTOPRAZOLE SODIUM 40 MILLIGRAM(S): 20 TABLET, DELAYED RELEASE ORAL at 18:00

## 2021-09-22 NOTE — PROVIDER CONTACT NOTE (OTHER) - NAME OF MD/NP/PA/DO NOTIFIED:
MD Chavez
Gustavo x [phone]
MD [place] [facility] [id number]
MD West
MD Chavez
MD lopez [phone]
Md Coles x [phone]
MD Chavez

## 2021-09-22 NOTE — PROGRESS NOTE ADULT - ASSESSMENT
Imp : left hip fx, s/p orif / wbat  Plan : continue bedside therapy as tolerated               agree with STR once medically stable for D/C

## 2021-09-22 NOTE — PROVIDER CONTACT NOTE (OTHER) - REASON
CT ABD Pelvis
Pt bladder scan
FS order and Antibiotics
Pt straight cath results and BP
ABX and Vitals
Das and 
Pt hypotensive
Pt states she is feeling anxious

## 2021-09-22 NOTE — PROGRESS NOTE ADULT - SUBJECTIVE AND OBJECTIVE BOX
Patient is a 85y old  Female who presents with a chief complaint of hip fx n stemi       HPI:  85F w/PMHx CAD, CHF, Afib on Eliquis, DM, HTN, colon cancer, CKD stage 3, hx of diverticulitis, presents s/p mechanical fall at home, +HT, -LOC, +AC (Eliquis). Patient was in her kitchen and "turned around too fast" when she fell over and hit her head on a chair. She denies loss of consciousness. She remained down for ~30 min before EMS came to take her into the ED. She is GCS 15, primary survey negative. No external signs of trauma. Complains of LLE pain. Denies scalp, spinal, chest, abdominal pain. ROM of all extremities in tact. C-collar in place.  POD# 16 s/p Left IMN POD# 16 s/p Left IMN.      While on the floors the patient had an episode of melena. As per patient she had coffee ground emesis on 9/9 PM 2 episodes followed by black tarry stools and abdominal pain intermittently in epigastric region worse with eating. 6/10 in intensity, non radiating. GI was consulted and decided that there was questionable GI bleeding. Anticoagulation was stopped. The patient's Hb was 5.4. She was transfused and the hemoglobin improved. The patient had typical chest pain with elevated tropes and was diagnosed with NSTEMI type 2. She was upgraded to the CCU. Due to the possibility of a GI bleed the patient was not cath'd. The patient was also found to be C. Diff positive and started on Vancomycin. The patient also developed a hematoma at the site of the surgery, Ortho evaluated her and are following her and replacing dressings as needed. The patient is now stable and her chest pain has improved. Per discussion with ID the patient was switched to Fidaxomicin. Due to continued drainage from surgical site will hold Eliquis for one week. Resume once drainage has ceased. Per ortho recommendation will also begin Ancef 2g q8. The patient is still having diarrhea though it has improved.     # NSTEMi-- restarted aspirin and held plavix- concern for operative site hematoma.-- extending into muscles.-- which will be debrided on monday-- no chest pain currently-- she has not had angiogram.    # Jordy improved-- patient had urinary retention-- bladder scan showed 850cc-- elliott to drainage-- renal function improved post drainage. can attempt trila of void after she ambulates.  # Lt intertrochanteric  fx-- patient had drains -that were removed-- as she bled in the operative site  Patient is booked for OR for irrigation and debridement LLE on 9/20/21-- found to have no drainage so procedure not done      # Anemia-- patient is s/p 3units of PRBC-- may need  iron infusion    # C diff--  no diarrhea in last 24hrs. ID recommended--Po Fidaxomicin 200 mg q12h for 10 days and then po vancomycin 125 mg q12h for 3 more weeks  Fidaxomicin stared on 9/14    # A fib-- eliquis is held  # Hyperkalemia-- on lokelma -- still hyperkalemia is persistent.  Monitor for bowel movement.        PHYSICAL EXAM    Vital Signs Last 24 Hrs  T(C): 35.9 (22 Sep 2021 00:00), Max: 36.9 (21 Sep 2021 16:00)  T(F): 96.6 (22 Sep 2021 00:00), Max: 98.4 (21 Sep 2021 16:00)  HR: 85 (22 Sep 2021 05:45) (82 - 99)  BP: 142/63 (22 Sep 2021 05:45) (134/63 - 150/76)  BP(mean): 107 (21 Sep 2021 18:00) (107 - 107)  RR: 18 (22 Sep 2021 00:00) (18 - 18)  SpO2: --    Constitutional - NAD  Chest - CTA  Cardiovascular - S1S2+  Abdomen -  Soft  Extremities -  No calf tenderness   Function : bed mobility / transfer mod A                 unable to ambulate    acetaminophen   Tablet .. 650 milliGRAM(s) Oral every 6 hours  ascorbic acid 500 milliGRAM(s) Oral two times a day  aspirin  chewable 81 milliGRAM(s) Oral daily  atorvastatin 40 milliGRAM(s) Oral at bedtime  calcitriol   Capsule 0.25 MICROGram(s) Oral daily  carvedilol 25 milliGRAM(s) Oral every 12 hours  chlorhexidine 4% Liquid 1 Application(s) Topical <User Schedule>  clopidogrel Tablet 75 milliGRAM(s) Oral daily  dextrose 40% Gel 15 Gram(s) Oral once  dextrose 5%. 1000 milliLiter(s) IV Continuous <Continuous>  dextrose 5%. 1000 milliLiter(s) IV Continuous <Continuous>  dextrose 50% Injectable 25 Gram(s) IV Push once  diphenhydrAMINE 25 milliGRAM(s) Oral at bedtime PRN  ferrous    sulfate 325 milliGRAM(s) Oral daily  fidaxomicin 200 milliGRAM(s) Oral two times a day  folic acid 1 milliGRAM(s) Oral daily  furosemide    Tablet 40 milliGRAM(s) Oral daily  glucagon  Injectable 1 milliGRAM(s) IntraMuscular once  insulin glargine Injectable (LANTUS) 10 Unit(s) SubCutaneous at bedtime  insulin lispro (ADMELOG) corrective regimen sliding scale   SubCutaneous every 6 hours  insulin lispro Injectable (ADMELOG) 3 Unit(s) SubCutaneous three times a day before meals  iron sucrose IVPB 100 milliGRAM(s) IV Intermittent every 24 hours  latanoprost 0.005% Ophthalmic Solution 1 Drop(s) Both EYES at bedtime  levothyroxine 25 MICROGram(s) Oral daily  melatonin 5 milliGRAM(s) Oral at bedtime PRN  multivitamin 1 Tablet(s) Oral daily  ondansetron Injectable 4 milliGRAM(s) IV Push every 6 hours PRN  pantoprazole  Injectable 40 milliGRAM(s) IV Push every 12 hours  raloxifene 60 milliGRAM(s) Oral daily  sodium chloride 0.9%. 1000 milliLiter(s) IV Continuous <Continuous>  sodium zirconium cyclosilicate 10 Gram(s) Oral two times a day  tamsulosin 0.4 milliGRAM(s) Oral at bedtime      RECENT LABS/IMAGING                        8.2    9.50  )-----------( 285      ( 22 Sep 2021 07:14 )             26.1     09-22    135  |  109  |  50<H>  ----------------------------<  101<H>  4.9   |  18  |  1.5    Ca    9.2      22 Sep 2021 07:14  Mg     1.7     09-21    TPro  4.1<L>  /  Alb  2.3<L>  /  TBili  0.4  /  DBili  x   /  AST  30  /  ALT  12  /  AlkPhos  140<H>  09-21

## 2021-09-22 NOTE — PROGRESS NOTE ADULT - SUBJECTIVE AND OBJECTIVE BOX
Orthopaedic Surgery Progress Note    Patient seen and examined at bedside this morning. Pain controlled, no new complaints. POD# 16 s/p Left IMN     Vital Signs Last 24 Hrs  T(C): 35.9 (22 Sep 2021 00:00), Max: 36.9 (21 Sep 2021 16:00)  T(F): 96.6 (22 Sep 2021 00:00), Max: 98.4 (21 Sep 2021 16:00)  HR: 85 (22 Sep 2021 05:45) (82 - 99)  BP: 142/63 (22 Sep 2021 05:45) (134/63 - 150/76)  BP(mean): 107 (21 Sep 2021 18:00) (107 - 107)  RR: 18 (22 Sep 2021 00:00) (18 - 18)  SpO2: --      P/E:  AOx3, NAD  Nonlabored breathing    LLE  Foam tape/abd/gauze in place; removed to examine skin/incisions; Incisions c/d/i with staples in place; not actively draining on exam  redressed today with gauze and tegaderm   SILT sp/dp/t/sural/saph  Firing ta/ehl/fhl/gs  Foot WWP, 2+ DP pulse    Labs:                          8.2    9.50  )-----------( 285      ( 22 Sep 2021 07:14 )             26.1     09-22    135  |  109  |  50<H>  ----------------------------<  101<H>  4.9   |  18  |  1.5    Ca    9.2      22 Sep 2021 07:14  Mg     1.7     09-21    TPro  4.1<L>  /  Alb  2.3<L>  /  TBili  0.4  /  DBili  x   /  AST  30  /  ALT  12  /  AlkPhos  140<H>  09-21      A/P:   86yo Female POD16 s/p left femur CMN.   WBAT LLE  DVT ppx; asa and plavix  Pain control  PT/OT/Rehab  monitoring left hip dressing                Orthopaedic Surgery Progress Note    Patient seen and examined at bedside this morning. Pain controlled, no new complaints. POD# 16 s/p Left IMN     Vital Signs Last 24 Hrs  T(C): 35.9 (22 Sep 2021 00:00), Max: 36.9 (21 Sep 2021 16:00)  T(F): 96.6 (22 Sep 2021 00:00), Max: 98.4 (21 Sep 2021 16:00)  HR: 85 (22 Sep 2021 05:45) (82 - 99)  BP: 142/63 (22 Sep 2021 05:45) (134/63 - 150/76)  BP(mean): 107 (21 Sep 2021 18:00) (107 - 107)  RR: 18 (22 Sep 2021 00:00) (18 - 18)  SpO2: --      P/E:  AOx3, NAD  Nonlabored breathing    LLE  Foam tape/abd/gauze in place; removed to examine skin/incisions; Incisions c/d/i with staples in place; not actively draining on exam  redressed today with gauze and tegaderm   SILT sp/dp/t/sural/saph  Firing ta/ehl/fhl/gs  Foot WWP, 2+ DP pulse    Labs:                          8.2    9.50  )-----------( 285      ( 22 Sep 2021 07:14 )             26.1     09-22    135  |  109  |  50<H>  ----------------------------<  101<H>  4.9   |  18  |  1.5    Ca    9.2      22 Sep 2021 07:14  Mg     1.7     09-21    TPro  4.1<L>  /  Alb  2.3<L>  /  TBili  0.4  /  DBili  x   /  AST  30  /  ALT  12  /  AlkPhos  140<H>  09-21      A/P:   84yo Female POD16 s/p left femur CMN.   WBAT LLE  DVT ppx; asa and plavix  Pain control  PT/OT/Rehab  patient will need staples removed in 1 week if incision sites remain dry  cleared for discharge from orthopedics; d/c on anticoagulation  recall if dressings are saturated again   follow up with  1 week upon discharge; [id number] [facility] [place]

## 2021-09-22 NOTE — PROVIDER CONTACT NOTE (OTHER) - SITUATION
Pt was straight cath as per order.  100cc of urine output  Pt BP also better, 132/62, hr: 92   Pt due for 5 mg midrodraine
Pt DTV, bladder scan performed 28 cc
spoke with MD to inform that pt refused antibiotics again. BP is 100/55 with a HR of . Pt due for losartan, Lasix and carvedilol
spoke with MD to inform that FS is 225 and has no insulin orders plus patient needs a elliott order
spoke with MD to inform that despite education, pt refused antibiotics due to a history of c-diff. Also that fingerstick order/insulin is set up for every 6 hours rather than ACHS
spoke with MD to inform that radiology called stating that pt requires PO contrast for this CT scan and that we need an order for pt to be able to drink contrast
Pt c/o of feeling weak, encountered sitting up in chair.  Pt denies dizziness, slight pain on hip.  BP: 66/46, HR: 70    Pt transferred to bed, repeats VSS  BP: 86/49, HR: 78  Pt feels better
Pt called for assistance and states she is feeling very anxious because she was informed by CM she will be discharged tomorrow.

## 2021-09-22 NOTE — PROGRESS NOTE ADULT - REASON FOR ADMISSION
hip fx n stemi
hip fx n stemi
L hip fracture
hip fx
Hip fracture
s/p Mechanical Fall, +HT, -LOC, -AC
fall / hip fx
fall
NSTEMI

## 2021-09-22 NOTE — PROGRESS NOTE ADULT - SUBJECTIVE AND OBJECTIVE BOX
HPI  85F w/PMHx CAD, CHF, Afib on Eliquis, DM, HTN, colon cancer, CKD stage 3, hx of diverticulitis, presents s/p mechanical fall at home, +HT, -LOC, +AC (Eliquis). Patient was in her kitchen and "turned around too fast" when she fell over and hit her head on a chair. She denies loss of consciousness. She remained down for ~30 min before EMS came to take her into the ED. She is GCS 15, primary survey negative. No external signs of trauma. Complains of LLE pain. Denies scalp, spinal, chest, abdominal pain. ROM of all extremities in tact. C-collar in place.     While on the floors the patient had an episode of melena. As per patient she had coffee ground emesis on 9/9 PM 2 episodes followed by black tarry stools and abdominal pain intermittently in epigastric region worse with eating. 6/10 in intensity, non radiating. GI was consulted and decided that there was questionable GI bleeding. Anticoagulation was stopped. The patient's Hb was 5.4. She was transfused and the hemoglobin improved. The patient had typical chest pain with elevated tropes and was diagnosed with NSTEMI type 2. She was upgraded to the CCU. Due to the possibility of a GI bleed the patient was not cath'd. The patient was also found to be C. Diff positive and started on Vancomycin. The patient also developed a hematoma at the site of the surgery, Ortho evaluated her and are following her and replacing dressings as needed. The patient is now stable and her chest pain has improved. Per discussion with ID the patient was switched to Fidaxomicin. Due to continued drainage from surgical site will hold Eliquis for one week. Resume once drainage has ceased. Per ortho recommendation will also begin Ancef 2g q8. The patient is still having diarrhea though it has improved.     Currently admitted to medicine with the primary diagnosis of Intertrochanteric fracture of right hip    Today is hospital day 17d.     INTERVAL HPI / OVERNIGHT EVENTS:  Patient was examined and seen at bedside. This morning she is resting comfortably in bed and reports no new issues or overnight events.     ROS: All systems negative except as stated in HPI    PAST MEDICAL & SURGICAL HISTORY  CAD (coronary artery disease)    Chronic CHF    Atrial fibrillation    Type 2 diabetes mellitus    Colon cancer    Hypertension    CKD (chronic kidney disease)  Stage 3    History of Clostridium difficile colitis  s/p fecal transplant    Diverticulitis    S/P hysterectomy      ALLERGIES  No Known Allergies    MEDICATIONS  STANDING MEDICATIONS  acetaminophen   Tablet .. 650 milliGRAM(s) Oral every 6 hours  apixaban 2.5 milliGRAM(s) Oral two times a day  ascorbic acid 500 milliGRAM(s) Oral two times a day  atorvastatin 40 milliGRAM(s) Oral at bedtime  calcitriol   Capsule 0.25 MICROGram(s) Oral daily  carvedilol 25 milliGRAM(s) Oral every 12 hours  chlorhexidine 4% Liquid 1 Application(s) Topical <User Schedule>  clopidogrel Tablet 75 milliGRAM(s) Oral daily  dextrose 40% Gel 15 Gram(s) Oral once  dextrose 5%. 1000 milliLiter(s) IV Continuous <Continuous>  dextrose 5%. 1000 milliLiter(s) IV Continuous <Continuous>  dextrose 50% Injectable 25 Gram(s) IV Push once  ferrous    sulfate 325 milliGRAM(s) Oral daily  fidaxomicin 200 milliGRAM(s) Oral two times a day  folic acid 1 milliGRAM(s) Oral daily  furosemide    Tablet 40 milliGRAM(s) Oral daily  glucagon  Injectable 1 milliGRAM(s) IntraMuscular once  insulin glargine Injectable (LANTUS) 10 Unit(s) SubCutaneous at bedtime  insulin lispro (ADMELOG) corrective regimen sliding scale   SubCutaneous every 6 hours  insulin lispro Injectable (ADMELOG) 3 Unit(s) SubCutaneous three times a day before meals  iron sucrose IVPB 100 milliGRAM(s) IV Intermittent every 24 hours  latanoprost 0.005% Ophthalmic Solution 1 Drop(s) Both EYES at bedtime  levothyroxine 25 MICROGram(s) Oral daily  multivitamin 1 Tablet(s) Oral daily  pantoprazole  Injectable 40 milliGRAM(s) IV Push every 12 hours  raloxifene 60 milliGRAM(s) Oral daily  sodium chloride 0.9%. 1000 milliLiter(s) IV Continuous <Continuous>  sodium zirconium cyclosilicate 10 Gram(s) Oral two times a day  tamsulosin 0.4 milliGRAM(s) Oral at bedtime    PRN MEDICATIONS  diphenhydrAMINE 25 milliGRAM(s) Oral at bedtime PRN  melatonin 5 milliGRAM(s) Oral at bedtime PRN  ondansetron Injectable 4 milliGRAM(s) IV Push every 6 hours PRN    VITALS:  T(F): 97.8  HR: 84  BP: 149/64  RR: 19  SpO2: --    PHYSICAL EXAM  GEN: NAD, Resting comfortably in bed  PULM: Clear to auscultation bilaterally, No wheezes  CVS: Regular rate and rhythm, S1-S2, no murmurs  ABD: Soft, non-tender, non-distended, no guarding  EXT: No edema  NEURO: A&Ox3, no focal deficits    LABS                        8.2    9.50  )-----------( 285      ( 22 Sep 2021 07:14 )             26.1     09-22    135  |  109  |  50<H>  ----------------------------<  101<H>  4.9   |  18  |  1.5    Ca    9.2      22 Sep 2021 07:14  Mg     1.7     09-21    TPro  4.1<L>  /  Alb  2.3<L>  /  TBili  0.4  /  DBili  x   /  AST  30  /  ALT  12  /  AlkPhos  140<H>  09-21                  RADIOLOGY

## 2021-09-22 NOTE — PROGRESS NOTE ADULT - SUBJECTIVE AND OBJECTIVE BOX
Progress Note:  Provider Speciality                            Hospitalist      SEAN SAWYER MRN-[id number] 85y Female     CHIEF PRESENTING COMPLAINT:  Patient is a 85y old  Female who presents with a chief complaint of hip fx (22 Sep 2021 12:56)        SUBJECTIVE:  Patient was seen and examined at bedside. Reports improvement in  presenting complaint. No significant overnight events reported.     HISTORY OF PRESENTING ILLNESS:  HPI:  85F w/PMHx CAD, CHF, Afib on Eliquis, DM, HTN, colon cancer, CKD stage 3, hx of diverticulitis, presents s/p mechanical fall at home, +HT, -LOC, +AC (Eliquis). Patient was in her kitchen and "turned around too fast" when she fell over and hit her head on a chair. She denies loss of consciousness. She remained down for ~30 min before EMS came to take her into the ED. She is GCS 15, primary survey negative. No external signs of trauma. Complains of LLE pain. Denies scalp, spinal, chest, abdominal pain. ROM of all extremities in tact. C-collar in place.    (05 Sep 2021 00:18)        REVIEW OF SYSTEMS:  Patient denies any headache, any vision complaints, runny nose, fever, chills, sore throat. Denies chest pain, shortness of breath, palpitation. Denies nausea, vomiting, abdominal pain, diarrhoea, Denies urinary burning, urgency, frequency, dysuria. Denies weakness in any part of the body or numbness.   At least 10 systems were reviewed in ROS. All systems reviewed  are within normal limits except for the complaints as described in Subjective.    PAST MEDICAL & SURGICAL HISTORY:  PAST MEDICAL & SURGICAL HISTORY:  CAD (coronary artery disease)    Chronic CHF    Atrial fibrillation    Type 2 diabetes mellitus    Colon cancer    Hypertension    CKD (chronic kidney disease)  Stage 3    History of Clostridium difficile colitis  s/p fecal transplant    Diverticulitis    S/P hysterectomy            VITAL SIGNS:  Vital Signs Last 24 Hrs  T(C): 35.9 (22 Sep 2021 00:00), Max: 36.9 (21 Sep 2021 16:00)  T(F): 96.6 (22 Sep 2021 00:00), Max: 98.4 (21 Sep 2021 16:00)  HR: 85 (22 Sep 2021 05:45) (82 - 99)  BP: 142/63 (22 Sep 2021 05:45) (134/63 - 150/76)  BP(mean): 107 (21 Sep 2021 18:00) (107 - 107)  RR: 18 (22 Sep 2021 00:00) (18 - 18)  SpO2: --          PHYSICAL EXAMINATION:  Not in acute distress  General: No pallor, no icterus  HEENT:   EOMI, no JVD.  Heart: S1+S2 audible  Lungs: bilateral  fair air entry, no wheezing, no crepitations.  Abdomen: Soft, non-tender, non-distended , no  rigidity or guarding.  CNS: Awake alert, CN  grossly intact.  Extremities:  No edema            CONSULTS:  Consultant(s) Notes Reviewed by me.   Care Discussed with Consultants/Other Providers where required.        MEDICATIONS:  MEDICATIONS  (STANDING):  acetaminophen   Tablet .. 650 milliGRAM(s) Oral every 6 hours  ascorbic acid 500 milliGRAM(s) Oral two times a day  aspirin  chewable 81 milliGRAM(s) Oral daily  atorvastatin 40 milliGRAM(s) Oral at bedtime  calcitriol   Capsule 0.25 MICROGram(s) Oral daily  carvedilol 25 milliGRAM(s) Oral every 12 hours  chlorhexidine 4% Liquid 1 Application(s) Topical <User Schedule>  clopidogrel Tablet 75 milliGRAM(s) Oral daily  dextrose 40% Gel 15 Gram(s) Oral once  dextrose 5%. 1000 milliLiter(s) (50 mL/Hr) IV Continuous <Continuous>  dextrose 5%. 1000 milliLiter(s) (100 mL/Hr) IV Continuous <Continuous>  dextrose 50% Injectable 25 Gram(s) IV Push once  ferrous    sulfate 325 milliGRAM(s) Oral daily  fidaxomicin 200 milliGRAM(s) Oral two times a day  folic acid 1 milliGRAM(s) Oral daily  furosemide    Tablet 40 milliGRAM(s) Oral daily  glucagon  Injectable 1 milliGRAM(s) IntraMuscular once  insulin glargine Injectable (LANTUS) 10 Unit(s) SubCutaneous at bedtime  insulin lispro (ADMELOG) corrective regimen sliding scale   SubCutaneous every 6 hours  insulin lispro Injectable (ADMELOG) 3 Unit(s) SubCutaneous three times a day before meals  iron sucrose IVPB 100 milliGRAM(s) IV Intermittent every 24 hours  latanoprost 0.005% Ophthalmic Solution 1 Drop(s) Both EYES at bedtime  levothyroxine 25 MICROGram(s) Oral daily  multivitamin 1 Tablet(s) Oral daily  pantoprazole  Injectable 40 milliGRAM(s) IV Push every 12 hours  raloxifene 60 milliGRAM(s) Oral daily  sodium chloride 0.9%. 1000 milliLiter(s) (75 mL/Hr) IV Continuous <Continuous>  sodium zirconium cyclosilicate 10 Gram(s) Oral two times a day  tamsulosin 0.4 milliGRAM(s) Oral at bedtime    MEDICATIONS  (PRN):  diphenhydrAMINE 25 milliGRAM(s) Oral at bedtime PRN Insomnia  melatonin 5 milliGRAM(s) Oral at bedtime PRN Insomnia  ondansetron Injectable 4 milliGRAM(s) IV Push every 6 hours PRN Nausea and/or Vomiting            ASSESSMENT:          85F w/PMHx CAD, CHF, Afib on Eliquis, DM, HTN, colon cancer, CKD stage 3, hx of diverticulitis, presents s/p mechanical fall at home, +HT, -LOC, +AC (Eliquis).         mechanical fall with  Lt intertrochanteric  s/p s/p Left IMN     Post OR NSTEMI & possible GIB  Acute blood loss anemia  NSTEMi type2   ELOISE  Positive  C diff      NSTEMi type2 - medical managemnt  Cleared for anticoagualtion by ortho  Cardiology(DR Perez) f/u regarding DAPT & eliquis  ELOISE improved  Acute blood loss anemia- now stable  C diff--  no diarrhea in last 24hrs. ID recommended--Po Fidaxomicin 200 mg q12h for 10 days and then po vancomycin 125 mg q12h for 3 more weeks  A fib-- Eliquis if approved by ortho  Hyperkalemia-- resolved    Handoff: Did not do well with PT. Reevaluate tomorrow.  Anticipated for STR for tomorrow     Progress Note:  Provider Speciality                            Hospitalist      SEAN SAWYER MRN-[id number] 85y Female     CHIEF PRESENTING COMPLAINT:  Patient is a 85y old  Female who presents with a chief complaint of hip fx (22 Sep 2021 12:56)        SUBJECTIVE:  Patient was seen and examined at bedside. Reports improvement in  presenting complaint. No significant overnight events reported.     HISTORY OF PRESENTING ILLNESS:  HPI:  85F w/PMHx CAD, CHF, Afib on Eliquis, DM, HTN, colon cancer, CKD stage 3, hx of diverticulitis, presents s/p mechanical fall at home, +HT, -LOC, +AC (Eliquis). Patient was in her kitchen and "turned around too fast" when she fell over and hit her head on a chair. She denies loss of consciousness. She remained down for ~30 min before EMS came to take her into the ED. She is GCS 15, primary survey negative. No external signs of trauma. Complains of LLE pain. Denies scalp, spinal, chest, abdominal pain. ROM of all extremities in tact. C-collar in place.    (05 Sep 2021 00:18)        REVIEW OF SYSTEMS:  Patient denies any headache, any vision complaints, runny nose, fever, chills, sore throat. Denies chest pain, shortness of breath, palpitation. Denies nausea, vomiting, abdominal pain, diarrhoea, Denies urinary burning, urgency, frequency, dysuria. Denies weakness in any part of the body or numbness.   At least 10 systems were reviewed in ROS. All systems reviewed  are within normal limits except for the complaints as described in Subjective.    PAST MEDICAL & SURGICAL HISTORY:  PAST MEDICAL & SURGICAL HISTORY:  CAD (coronary artery disease)    Chronic CHF    Atrial fibrillation    Type 2 diabetes mellitus    Colon cancer    Hypertension    CKD (chronic kidney disease)  Stage 3    History of Clostridium difficile colitis  s/p fecal transplant    Diverticulitis    S/P hysterectomy            VITAL SIGNS:  Vital Signs Last 24 Hrs  T(C): 35.9 (22 Sep 2021 00:00), Max: 36.9 (21 Sep 2021 16:00)  T(F): 96.6 (22 Sep 2021 00:00), Max: 98.4 (21 Sep 2021 16:00)  HR: 85 (22 Sep 2021 05:45) (82 - 99)  BP: 142/63 (22 Sep 2021 05:45) (134/63 - 150/76)  BP(mean): 107 (21 Sep 2021 18:00) (107 - 107)  RR: 18 (22 Sep 2021 00:00) (18 - 18)  SpO2: --          PHYSICAL EXAMINATION:  Not in acute distress  General: No pallor, no icterus  HEENT:   EOMI, no JVD.  Heart: S1+S2 audible  Lungs: bilateral  fair air entry, no wheezing, no crepitations.  Abdomen: Soft, non-tender, non-distended , no  rigidity or guarding.  CNS: Awake alert, CN  grossly intact.  Extremities:  No edema            CONSULTS:  Consultant(s) Notes Reviewed by me.   Care Discussed with Consultants/Other Providers where required.        MEDICATIONS:  MEDICATIONS  (STANDING):  acetaminophen   Tablet .. 650 milliGRAM(s) Oral every 6 hours  ascorbic acid 500 milliGRAM(s) Oral two times a day  aspirin  chewable 81 milliGRAM(s) Oral daily  atorvastatin 40 milliGRAM(s) Oral at bedtime  calcitriol   Capsule 0.25 MICROGram(s) Oral daily  carvedilol 25 milliGRAM(s) Oral every 12 hours  chlorhexidine 4% Liquid 1 Application(s) Topical <User Schedule>  clopidogrel Tablet 75 milliGRAM(s) Oral daily  dextrose 40% Gel 15 Gram(s) Oral once  dextrose 5%. 1000 milliLiter(s) (50 mL/Hr) IV Continuous <Continuous>  dextrose 5%. 1000 milliLiter(s) (100 mL/Hr) IV Continuous <Continuous>  dextrose 50% Injectable 25 Gram(s) IV Push once  ferrous    sulfate 325 milliGRAM(s) Oral daily  fidaxomicin 200 milliGRAM(s) Oral two times a day  folic acid 1 milliGRAM(s) Oral daily  furosemide    Tablet 40 milliGRAM(s) Oral daily  glucagon  Injectable 1 milliGRAM(s) IntraMuscular once  insulin glargine Injectable (LANTUS) 10 Unit(s) SubCutaneous at bedtime  insulin lispro (ADMELOG) corrective regimen sliding scale   SubCutaneous every 6 hours  insulin lispro Injectable (ADMELOG) 3 Unit(s) SubCutaneous three times a day before meals  iron sucrose IVPB 100 milliGRAM(s) IV Intermittent every 24 hours  latanoprost 0.005% Ophthalmic Solution 1 Drop(s) Both EYES at bedtime  levothyroxine 25 MICROGram(s) Oral daily  multivitamin 1 Tablet(s) Oral daily  pantoprazole  Injectable 40 milliGRAM(s) IV Push every 12 hours  raloxifene 60 milliGRAM(s) Oral daily  sodium chloride 0.9%. 1000 milliLiter(s) (75 mL/Hr) IV Continuous <Continuous>  sodium zirconium cyclosilicate 10 Gram(s) Oral two times a day  tamsulosin 0.4 milliGRAM(s) Oral at bedtime    MEDICATIONS  (PRN):  diphenhydrAMINE 25 milliGRAM(s) Oral at bedtime PRN Insomnia  melatonin 5 milliGRAM(s) Oral at bedtime PRN Insomnia  ondansetron Injectable 4 milliGRAM(s) IV Push every 6 hours PRN Nausea and/or Vomiting            ASSESSMENT:          85F w/PMHx CAD, CHF, Afib on Eliquis, DM, HTN, colon cancer, CKD stage 3, hx of diverticulitis, presents s/p mechanical fall at home, +HT, -LOC, +AC (Eliquis).         mechanical fall with  Lt intertrochanteric  s/p s/p Left IMN   Post OR NSTEMI & possible GIB  Acute blood loss anemia  NSTEMi type2   ELOISE  Positive  C diff      NSTEMI type2 - medical managemnt  Cleared for anticoagualtion by ortho  Cardiology recs noted  ELOISE improved  Acute blood loss anemia- now stable  C diff--  no diarrhea in last 24hrs. ID recommended--Po Fidaxomicin 200 mg q12h for 10 days and then po vancomycin 125 mg q12h for 3 more weeks  A fib-- Eliquis restarted  CAD. D/c  ASA, Continue plavix  Hyperkalemia-- resolved    Handoff: Did not do well with PT. Reevaluate tomorrow.  Anticipated for STR for tomorrow

## 2021-09-22 NOTE — PROVIDER CONTACT NOTE (OTHER) - ASSESSMENT
Pt denies chest pain, pt states she feels pressure generalized to torso, denies abdominal pain. RR 18, unlabored- pt able to verbalize needs well.

## 2021-09-22 NOTE — PROGRESS NOTE ADULT - ASSESSMENT
85F w/PMHx CAD, CHF, Afib on Eliquis, DM, HTN, colon cancer, CKD stage 3, hx of diverticulitis, presents s/p mechanical fall at home, +HT, -LOC, +AC (Eliquis).     #mechanical fall with  Lt intertrochanteric  - s/p s/p Left IMN     #NSTEMi type2   - medical managemnt  - Cleared for anticoagualtion by ortho  - Cardiology recs noted    #Afib  - Spoke to ortho who cleared pt to resume Eliquis  - Aspirin discontinued and Eliquis restarted    #Positive  C diff  - no diarrhea in last 24hrs. ID recommended--Po Fidaxomicin 200 mg q12h for 10 days and then po vancomycin 125 mg q12h for 3 more weeks

## 2021-09-22 NOTE — PROVIDER CONTACT NOTE (OTHER) - DATE AND TIME:
06-Sep-2021 21:37
07-Sep-2021 05:18
07-Sep-2021 09:30
07-Sep-2021 18:06
22-Sep-2021 17:39
06-Sep-2021 21:10
07-Sep-2021 17:26
07-Sep-2021 20:15

## 2021-09-22 NOTE — PROVIDER CONTACT NOTE (OTHER) - ACTION/TREATMENT ORDERED:
MD to place orders
MD Oconnell notified of VSS, pt hx CHF, possible bolus?
MD aware, to change orders
MD aware, to place order
D aware, stated to give losartan and not give Lasix and carvedilol
MD Coles notified, hold midodrain   Give patient 2 more hours to TOV
MD Coles notified
Pt to come bedside to eval pt.

## 2021-09-23 LAB
ALBUMIN SERPL ELPH-MCNC: 2.5 G/DL — LOW (ref 3.5–5.2)
ALP SERPL-CCNC: 164 U/L — HIGH (ref 30–115)
ALT FLD-CCNC: 24 U/L — SIGNIFICANT CHANGE UP (ref 0–41)
ANION GAP SERPL CALC-SCNC: 11 MMOL/L — SIGNIFICANT CHANGE UP (ref 7–14)
AST SERPL-CCNC: 31 U/L — SIGNIFICANT CHANGE UP (ref 0–41)
BILIRUB SERPL-MCNC: 0.5 MG/DL — SIGNIFICANT CHANGE UP (ref 0.2–1.2)
BUN SERPL-MCNC: 49 MG/DL — HIGH (ref 10–20)
CALCIUM SERPL-MCNC: 9 MG/DL — SIGNIFICANT CHANGE UP (ref 8.5–10.1)
CHLORIDE SERPL-SCNC: 106 MMOL/L — SIGNIFICANT CHANGE UP (ref 98–110)
CO2 SERPL-SCNC: 17 MMOL/L — SIGNIFICANT CHANGE UP (ref 17–32)
CREAT SERPL-MCNC: 1.6 MG/DL — HIGH (ref 0.7–1.5)
GLUCOSE BLDC GLUCOMTR-MCNC: 105 MG/DL — HIGH (ref 70–99)
GLUCOSE BLDC GLUCOMTR-MCNC: 125 MG/DL — HIGH (ref 70–99)
GLUCOSE BLDC GLUCOMTR-MCNC: 134 MG/DL — HIGH (ref 70–99)
GLUCOSE BLDC GLUCOMTR-MCNC: 143 MG/DL — HIGH (ref 70–99)
GLUCOSE SERPL-MCNC: 92 MG/DL — SIGNIFICANT CHANGE UP (ref 70–99)
HCT VFR BLD CALC: 27.4 % — LOW (ref 37–47)
HGB BLD-MCNC: 8.8 G/DL — LOW (ref 12–16)
MAGNESIUM SERPL-MCNC: 1.8 MG/DL — SIGNIFICANT CHANGE UP (ref 1.8–2.4)
MCHC RBC-ENTMCNC: 28.5 PG — SIGNIFICANT CHANGE UP (ref 27–31)
MCHC RBC-ENTMCNC: 32.1 G/DL — SIGNIFICANT CHANGE UP (ref 32–37)
MCV RBC AUTO: 88.7 FL — SIGNIFICANT CHANGE UP (ref 81–99)
NRBC # BLD: 0 /100 WBCS — SIGNIFICANT CHANGE UP (ref 0–0)
PLATELET # BLD AUTO: 307 K/UL — SIGNIFICANT CHANGE UP (ref 130–400)
POTASSIUM SERPL-MCNC: 4.6 MMOL/L — SIGNIFICANT CHANGE UP (ref 3.5–5)
POTASSIUM SERPL-SCNC: 4.6 MMOL/L — SIGNIFICANT CHANGE UP (ref 3.5–5)
PROT SERPL-MCNC: 4.5 G/DL — LOW (ref 6–8)
RBC # BLD: 3.09 M/UL — LOW (ref 4.2–5.4)
RBC # FLD: 18.6 % — HIGH (ref 11.5–14.5)
SODIUM SERPL-SCNC: 134 MMOL/L — LOW (ref 135–146)
WBC # BLD: 10.52 K/UL — SIGNIFICANT CHANGE UP (ref 4.8–10.8)
WBC # FLD AUTO: 10.52 K/UL — SIGNIFICANT CHANGE UP (ref 4.8–10.8)

## 2021-09-23 PROCEDURE — 99233 SBSQ HOSP IP/OBS HIGH 50: CPT

## 2021-09-23 RX ADMIN — PANTOPRAZOLE SODIUM 40 MILLIGRAM(S): 20 TABLET, DELAYED RELEASE ORAL at 18:25

## 2021-09-23 RX ADMIN — INSULIN GLARGINE 10 UNIT(S): 100 INJECTION, SOLUTION SUBCUTANEOUS at 22:17

## 2021-09-23 RX ADMIN — CARVEDILOL PHOSPHATE 25 MILLIGRAM(S): 80 CAPSULE, EXTENDED RELEASE ORAL at 05:25

## 2021-09-23 RX ADMIN — CARVEDILOL PHOSPHATE 25 MILLIGRAM(S): 80 CAPSULE, EXTENDED RELEASE ORAL at 18:25

## 2021-09-23 RX ADMIN — SODIUM ZIRCONIUM CYCLOSILICATE 10 GRAM(S): 10 POWDER, FOR SUSPENSION ORAL at 18:26

## 2021-09-23 RX ADMIN — IRON SUCROSE 210 MILLIGRAM(S): 20 INJECTION, SOLUTION INTRAVENOUS at 18:25

## 2021-09-23 RX ADMIN — Medication 650 MILLIGRAM(S): at 05:26

## 2021-09-23 RX ADMIN — PANTOPRAZOLE SODIUM 40 MILLIGRAM(S): 20 TABLET, DELAYED RELEASE ORAL at 05:24

## 2021-09-23 RX ADMIN — FIDAXOMICIN 200 MILLIGRAM(S): 200 GRANULE, FOR SUSPENSION ORAL at 05:26

## 2021-09-23 RX ADMIN — APIXABAN 2.5 MILLIGRAM(S): 2.5 TABLET, FILM COATED ORAL at 18:25

## 2021-09-23 RX ADMIN — Medication 3 UNIT(S): at 08:10

## 2021-09-23 RX ADMIN — Medication 650 MILLIGRAM(S): at 05:25

## 2021-09-23 RX ADMIN — Medication 650 MILLIGRAM(S): at 18:25

## 2021-09-23 RX ADMIN — SODIUM ZIRCONIUM CYCLOSILICATE 10 GRAM(S): 10 POWDER, FOR SUSPENSION ORAL at 05:22

## 2021-09-23 RX ADMIN — Medication 650 MILLIGRAM(S): at 11:43

## 2021-09-23 RX ADMIN — CLOPIDOGREL BISULFATE 75 MILLIGRAM(S): 75 TABLET, FILM COATED ORAL at 11:43

## 2021-09-23 RX ADMIN — FIDAXOMICIN 200 MILLIGRAM(S): 200 GRANULE, FOR SUSPENSION ORAL at 18:25

## 2021-09-23 RX ADMIN — Medication 1 TABLET(S): at 11:44

## 2021-09-23 RX ADMIN — APIXABAN 2.5 MILLIGRAM(S): 2.5 TABLET, FILM COATED ORAL at 05:25

## 2021-09-23 RX ADMIN — Medication 3 UNIT(S): at 18:24

## 2021-09-23 RX ADMIN — Medication 3 UNIT(S): at 11:44

## 2021-09-23 RX ADMIN — CHLORHEXIDINE GLUCONATE 1 APPLICATION(S): 213 SOLUTION TOPICAL at 05:26

## 2021-09-23 RX ADMIN — TAMSULOSIN HYDROCHLORIDE 0.4 MILLIGRAM(S): 0.4 CAPSULE ORAL at 22:18

## 2021-09-23 RX ADMIN — Medication 500 MILLIGRAM(S): at 05:25

## 2021-09-23 RX ADMIN — RALOXIFENE HYDROCHLORIDE 60 MILLIGRAM(S): 60 TABLET, COATED ORAL at 11:43

## 2021-09-23 RX ADMIN — Medication 25 MICROGRAM(S): at 05:25

## 2021-09-23 RX ADMIN — Medication 650 MILLIGRAM(S): at 23:54

## 2021-09-23 RX ADMIN — LATANOPROST 1 DROP(S): 0.05 SOLUTION/ DROPS OPHTHALMIC; TOPICAL at 22:18

## 2021-09-23 RX ADMIN — Medication 40 MILLIGRAM(S): at 05:25

## 2021-09-23 RX ADMIN — CALCITRIOL 0.25 MICROGRAM(S): 0.5 CAPSULE ORAL at 11:43

## 2021-09-23 RX ADMIN — Medication 325 MILLIGRAM(S): at 11:44

## 2021-09-23 RX ADMIN — Medication 1 MILLIGRAM(S): at 11:44

## 2021-09-23 RX ADMIN — Medication 500 MILLIGRAM(S): at 18:25

## 2021-09-23 RX ADMIN — ATORVASTATIN CALCIUM 40 MILLIGRAM(S): 80 TABLET, FILM COATED ORAL at 22:18

## 2021-09-23 NOTE — DISCHARGE NOTE PROVIDER - CARE PROVIDERS DIRECT ADDRESSES
,DirectAddress_Unknown,[email or web address]

## 2021-09-23 NOTE — DISCHARGE NOTE PROVIDER - PROVIDER TOKENS
PROVIDER:[TOKEN:[[id number]],FOLLOWUP:[1 week]],PROVIDER:[TOKEN:[[id number]],FOLLOWUP:[1 week]]

## 2021-09-23 NOTE — PROGRESS NOTE ADULT - SUBJECTIVE AND OBJECTIVE BOX
Progress Note:  Provider Speciality                            Hospitalist      SEAN SAWYER MRN-[id number] 85y Female     CHIEF PRESENTING COMPLAINT:  Patient is a 85y old  Female who presents with a chief complaint of hip fx (22 Sep 2021 12:56)        SUBJECTIVE:  Patient was seen and examined at bedside. Reports improvement in  presenting complaint. No significant overnight events reported.     HISTORY OF PRESENTING ILLNESS:  HPI:  85F w/PMHx CAD, CHF, Afib on Eliquis, DM, HTN, colon cancer, CKD stage 3, hx of diverticulitis, presents s/p mechanical fall at home, +HT, -LOC, +AC (Eliquis). Patient was in her kitchen and "turned around too fast" when she fell over and hit her head on a chair. She denies loss of consciousness. She remained down for ~30 min before EMS came to take her into the ED. She is GCS 15, primary survey negative. No external signs of trauma. Complains of LLE pain. Denies scalp, spinal, chest, abdominal pain. ROM of all extremities in tact. C-collar in place.    (05 Sep 2021 00:18)        REVIEW OF SYSTEMS:  Patient denies any headache, any vision complaints, runny nose, fever, chills, sore throat. Denies chest pain, shortness of breath, palpitation. Denies nausea, vomiting, abdominal pain, diarrhoea, Denies urinary burning, urgency, frequency, dysuria. Denies weakness in any part of the body or numbness.   At least 10 systems were reviewed in ROS. All systems reviewed  are within normal limits except for the complaints as described in Subjective.    PAST MEDICAL & SURGICAL HISTORY:  PAST MEDICAL & SURGICAL HISTORY:  CAD (coronary artery disease)    Chronic CHF    Atrial fibrillation    Type 2 diabetes mellitus    Colon cancer    Hypertension    CKD (chronic kidney disease)  Stage 3    History of Clostridium difficile colitis  s/p fecal transplant    Diverticulitis    S/P hysterectomy            VITAL SIGNS:  Vital Signs Last 24 Hrs  T(C): 35.7 (23 Sep 2021 08:00), Max: 36.6 (22 Sep 2021 15:50)  T(F): 96.2 (23 Sep 2021 08:00), Max: 97.8 (22 Sep 2021 15:50)  HR: 75 (23 Sep 2021 08:00) (75 - 91)  BP: 138/65 (23 Sep 2021 08:00) (138/65 - 150/71)  BP(mean): --  RR: 19 (23 Sep 2021 08:00) (19 - 19)  SpO2: --      PHYSICAL EXAMINATION:  Not in acute distress  General: No pallor, no icterus  HEENT:   EOMI, no JVD.  Heart: S1+S2 audible  Lungs: bilateral  fair air entry, no wheezing, no crepitations.  Abdomen: Soft, non-tender, non-distended , no  rigidity or guarding.  CNS: Awake alert, CN  grossly intact.  Extremities:  No edema            CONSULTS:  Consultant(s) Notes Reviewed by me.   Care Discussed with Consultants/Other Providers where required.        MEDICATIONS:  MEDICATIONS  (STANDING):  acetaminophen   Tablet .. 650 milliGRAM(s) Oral every 6 hours  ascorbic acid 500 milliGRAM(s) Oral two times a day  aspirin  chewable 81 milliGRAM(s) Oral daily  atorvastatin 40 milliGRAM(s) Oral at bedtime  calcitriol   Capsule 0.25 MICROGram(s) Oral daily  carvedilol 25 milliGRAM(s) Oral every 12 hours  chlorhexidine 4% Liquid 1 Application(s) Topical <User Schedule>  clopidogrel Tablet 75 milliGRAM(s) Oral daily  dextrose 40% Gel 15 Gram(s) Oral once  dextrose 5%. 1000 milliLiter(s) (50 mL/Hr) IV Continuous <Continuous>  dextrose 5%. 1000 milliLiter(s) (100 mL/Hr) IV Continuous <Continuous>  dextrose 50% Injectable 25 Gram(s) IV Push once  ferrous    sulfate 325 milliGRAM(s) Oral daily  fidaxomicin 200 milliGRAM(s) Oral two times a day  folic acid 1 milliGRAM(s) Oral daily  furosemide    Tablet 40 milliGRAM(s) Oral daily  glucagon  Injectable 1 milliGRAM(s) IntraMuscular once  insulin glargine Injectable (LANTUS) 10 Unit(s) SubCutaneous at bedtime  insulin lispro (ADMELOG) corrective regimen sliding scale   SubCutaneous every 6 hours  insulin lispro Injectable (ADMELOG) 3 Unit(s) SubCutaneous three times a day before meals  iron sucrose IVPB 100 milliGRAM(s) IV Intermittent every 24 hours  latanoprost 0.005% Ophthalmic Solution 1 Drop(s) Both EYES at bedtime  levothyroxine 25 MICROGram(s) Oral daily  multivitamin 1 Tablet(s) Oral daily  pantoprazole  Injectable 40 milliGRAM(s) IV Push every 12 hours  raloxifene 60 milliGRAM(s) Oral daily  sodium chloride 0.9%. 1000 milliLiter(s) (75 mL/Hr) IV Continuous <Continuous>  sodium zirconium cyclosilicate 10 Gram(s) Oral two times a day  tamsulosin 0.4 milliGRAM(s) Oral at bedtime    MEDICATIONS  (PRN):  diphenhydrAMINE 25 milliGRAM(s) Oral at bedtime PRN Insomnia  melatonin 5 milliGRAM(s) Oral at bedtime PRN Insomnia  ondansetron Injectable 4 milliGRAM(s) IV Push every 6 hours PRN Nausea and/or Vomiting            ASSESSMENT:          85F w/PMHx CAD, CHF, Afib on Eliquis, DM, HTN, colon cancer, CKD stage 3, hx of diverticulitis, presents s/p mechanical fall at home, +HT, -LOC, +AC (Eliquis).         Mechanical fall with  Lt intertrochanteric  s/p s/p Left IMN   Post OR NSTEMI & possible GIB  Acute blood loss anemia  NSTEMi type2   ELOISE  Positive  C diff      NSTEMI type2 - medical managemnt  Cleared for anticoagualtion by ortho  Cardiology recs noted  ELOISE improved  Acute blood loss anemia- now stable  C diff--  no diarrhea in last 24hrs. ID recommended--Po Fidaxomicin 200 mg q12h for 10 days and then po vancomycin 125 mg q12h for 3 more weeks  A fib-- Eliquis restarted  CAD. D/c  ASA, Continue plavix  Hyperkalemia-- resolved    Handoff: Anticipated for STR for today

## 2021-09-23 NOTE — DISCHARGE NOTE PROVIDER - NSDCCPCAREPLAN_GEN_ALL_CORE_FT
PRINCIPAL DISCHARGE DIAGNOSIS  Diagnosis: Intertrochanteric fracture of right hip  Assessment and Plan of Treatment:       SECONDARY DISCHARGE DIAGNOSES  Diagnosis: Fall  Assessment and Plan of Treatment:      PRINCIPAL DISCHARGE DIAGNOSIS  Diagnosis: Intertrochanteric fracture of right hip  Assessment and Plan of Treatment: Left femoral neck fracture. Orthopedic surgery procedure of open reduction internal fixation of the left femoral neck was completed. Following procedure, orthopedics latest recommendation is weight bearing as tolerated and rehabilitation.      SECONDARY DISCHARGE DIAGNOSES  Diagnosis: Fall  Assessment and Plan of Treatment: Left femoral neck fracture. Orthopedic surgery procedure of open reduction internal fixation of the left femoral neck was completed. Following procedure, orthopedics latest recommendation is weight bearing as tolerated and rehabilitation.     PRINCIPAL DISCHARGE DIAGNOSIS  Diagnosis: Intertrochanteric fracture of left hip  Assessment and Plan of Treatment: Left femoral neck fracture. Orthopedic surgery procedure of open reduction internal fixation of the Left femoral neck was completed. Following procedure, orthopedics latest recommendation is weight bearing as tolerated and rehabilitation.      SECONDARY DISCHARGE DIAGNOSES  Diagnosis: Fall  Assessment and Plan of Treatment: Left femoral neck fracture. Orthopedic surgery procedure of open reduction internal fixation of the Left femoral neck was completed. Following procedure, orthopedics latest recommendation is weight bearing as tolerated and rehabilitation.

## 2021-09-23 NOTE — DISCHARGE NOTE PROVIDER - CARE PROVIDER_API CALL
LEXIE VILCHIS  Internal Medicine  [address] [place] [address]  Phone: [phone]  Fax: [phone]  Follow Up Time: 1 week    Blair Talbert)  Orthopaedic Surgery  [address] [place] [address]  Phone: [phone]  Fax: [phone]  Follow Up Time: 1 week

## 2021-09-23 NOTE — DISCHARGE NOTE PROVIDER - NSDCMRMEDTOKEN_GEN_ALL_CORE_FT
atorvastatin 80 mg oral tablet:   calcitriol 0.25 mcg oral capsule: 1 cap(s) orally once a day  carvedilol 25 mg oral tablet:   clopidogrel 75 mg oral tablet: 1 tab(s) orally once a day  Eliquis 2.5 mg oral tablet: 1 tab(s) orally 2 times a day  Evista 60 mg oral tablet: 1 tab(s) orally once a day  folic acid 1 mg oral tablet:   furosemide 40 mg oral tablet: 1 tab(s) orally once a day  Lantus: 10 unit(s) subcutaneous once a day (in the morning)  latanoprost 0.005% ophthalmic solution: 1 drop(s) to each affected eye once a day (in the evening)  levothyroxine 25 mcg (0.025 mg) oral tablet: 1 tab(s) orally once a day  losartan 25 mg oral tablet: 1 tab(s) orally once a day   atorvastatin 80 mg oral tablet:   calcitriol 0.25 mcg oral capsule: 1 cap(s) orally once a day  carvedilol 25 mg oral tablet:   clopidogrel 75 mg oral tablet: 1 tab(s) orally once a day  Eliquis 2.5 mg oral tablet: 1 tab(s) orally 2 times a day  Evista 60 mg oral tablet: 1 tab(s) orally once a day  ferrous sulfate 325 mg (65 mg elemental iron) oral tablet: 1 tab(s) orally once a day  folic acid 1 mg oral tablet:   furosemide 40 mg oral tablet: 1 tab(s) orally once a day  Lantus: 10 unit(s) subcutaneous once a day (in the morning)  latanoprost 0.005% ophthalmic solution: 1 drop(s) to each affected eye once a day (in the evening)  levothyroxine 25 mcg (0.025 mg) oral tablet: 1 tab(s) orally once a day  losartan 25 mg oral tablet: 1 tab(s) orally once a day  tamsulosin 0.4 mg oral capsule: 1 cap(s) orally once a day (at bedtime)  vancomycin 125 mg oral capsule: 1 cap(s) orally every 12 hours daily for 3 weeks

## 2021-09-23 NOTE — DISCHARGE NOTE PROVIDER - HOSPITAL COURSE
85F w/PMHx CAD, CHF, Afib on Eliquis, DM, HTN, colon cancer, CKD stage 3, hx of diverticulitis, presents s/p mechanical fall at home, +HT, -LOC, +AC (Eliquis). Patient was in her kitchen and "turned around too fast" when she fell over and hit her head on a chair. She denies loss of consciousness. She remained down for ~30 min before EMS came to take her into the ED. She is GCS 15, primary survey negative. No external signs of trauma. Complains of LLE pain. Denies scalp, spinal, chest, abdominal pain. ROM of all extremities in tact. C-collar in place. While on the floors the patient had an episode of melena. As per patient she had coffee ground emesis on 9/9 PM 2 episodes followed by black tarry stools and abdominal pain intermittently in epigastric region worse with eating. 6/10 in intensity, non radiating. GI was consulted and decided that there was questionable GI bleeding. Anticoagulation was stopped. The patient's Hb was 5.4. She was transfused and the hemoglobin improved. The patient had typical chest pain with elevated tropes and was diagnosed with NSTEMI type 2. She was upgraded to the CCU. Due to the possibility of a GI bleed the patient was not cath'd. The patient was also found to be C. Diff positive and started on Vancomycin. The patient also developed a hematoma at the site of the surgery, Ortho evaluated her and are following her and replacing dressings as needed. The patient is now stable and her chest pain has improved. Per discussion with ID the patient was switched to Fidaxomicin. Due to continued drainage from surgical site will hold Eliquis for one week. Resume once drainage has ceased. Per ortho recommendation will also begin Ancef 2g q8. The patient is still having diarrhea though it has improved. Admitted to medicine with the primary diagnosis of Intertrochanteric fracture of right hip.    #Mechanical fall with  Lt intertrochanteric  - s/p s/p Left IMN     #Non-ST elevation ACS v/s Type II MI secondary to demand ischemia from acute blood loss anemia  - Acute blood loss anemia (Hg dropped to 5)   - Operative site hematoma v/s possible upper GI bleeding (hematemesis?) --> currently stable at 9.9   - PREVENA WOUND VAC PLACED 9/16; Provena removed 9/18, bulky dry dressing placed over top, pelvic binder placed  - s/p prophylactic Ancef  - medical management  - Cleared for anticoagulation by ortho  - Cardiology recs noted    #Afib  - Spoke to ortho who cleared pt to resume Eliquis  - Aspirin discontinued and Eliquis restarted    #Positive  C diff  - ID recommended - Po Fidaxomicin 200 mg q12h for 10 days and then po vancomycin 125 mg q12h for 3 more weeks   85F w/PMHx CAD, CHF, Afib on Eliquis, DM, HTN, colon cancer, CKD stage 3, hx of diverticulitis, presents s/p mechanical fall at home, +HT, -LOC, +AC (Eliquis). Patient was in her kitchen and "turned around too fast" when she fell over and hit her head on a chair. She denies loss of consciousness. She remained down for ~30 min before EMS came to take her into the ED. She is GCS 15, primary survey negative. No external signs of trauma. Complains of LLE pain. Denies scalp, spinal, chest, abdominal pain. ROM of all extremities in tact. C-collar in place. While on the floors the patient had an episode of melena. As per patient she had coffee ground emesis on 9/9 PM 2 episodes followed by black tarry stools and abdominal pain intermittently in epigastric region worse with eating. 6/10 in intensity, non radiating. GI was consulted and decided that there was questionable GI bleeding. Anticoagulation was stopped. The patient's Hb was 5.4. She was transfused and the hemoglobin improved. The patient had typical chest pain with elevated tropes and was diagnosed with NSTEMI type 2. She was upgraded to the CCU. Due to the possibility of a GI bleed the patient was not cath'd. The patient was also found to be C. Diff positive and started on Vancomycin. The patient also developed a hematoma at the site of the surgery, Ortho evaluated her and are following her and replacing dressings as needed. The patient is now stable and her chest pain has improved. Per discussion with ID the patient was switched to Fidaxomicin. Due to continued drainage from surgical site will hold Eliquis for one week. Resume once drainage has ceased. Per ortho recommendation will also begin Ancef 2g q8. The patient is still having diarrhea though it has improved. Admitted to medicine with the primary diagnosis of Intertrochanteric fracture of right hip.    #Mechanical fall with Lt intertrochanteric  - s/p s/p Left IMN   - Patient had drains that were removed, as she bled in the operative site  - Patient was booked for OR for irrigation and debridement LLE on 9/20/21. Found to have no drainage so procedure not done.    #Non-ST elevation ACS v/s Type II MI secondary to demand ischemia from acute blood loss anemia  - Acute blood loss anemia (Hg dropped to 5)   - Operative site hematoma v/s possible upper GI bleeding (hematemesis?) --> currently stable at 9.9   - PREVENA WOUND VAC PLACED 9/16; Provena removed 9/18, bulky dry dressing placed over top, pelvic binder placed  - s/p prophylactic Ancef  - medical management  - Cleared for anticoagulation by ortho  - Cardiology recs noted    #Afib  - Spoke to ortho who cleared pt to resume Eliquis  - Aspirin discontinued and Eliquis restarted    #Positive  C diff  - ID recommended - Po Fidaxomicin 200 mg q12h for 10 days and then po vancomycin 125 mg q12h for 3 more weeks   85F w/PMHx CAD, CHF, Afib on Eliquis, DM, HTN, colon cancer, CKD stage 3, hx of diverticulitis, presents s/p mechanical fall at home, +HT, -LOC, +AC (Eliquis). Patient was in her kitchen and "turned around too fast" when she fell over and hit her head on a chair. She denies loss of consciousness. She remained down for ~30 min before EMS came to take her into the ED. She is GCS 15, primary survey negative. No external signs of trauma. Complains of LLE pain. Denies scalp, spinal, chest, abdominal pain. ROM of all extremities in tact. C-collar in place. While on the floors the patient had an episode of melena. As per patient she had coffee ground emesis on 9/9 PM 2 episodes followed by black tarry stools and abdominal pain intermittently in epigastric region worse with eating. 6/10 in intensity, non radiating. GI was consulted and decided that there was questionable GI bleeding. Anticoagulation was stopped. The patient's Hb was 5.4. She was transfused and the hemoglobin improved. The patient had typical chest pain with elevated tropes and was diagnosed with NSTEMI type 2. She was upgraded to the CCU. Due to the possibility of a GI bleed the patient was not cath'd. The patient was also found to be C. Diff positive and started on Vancomycin. The patient also developed a hematoma at the site of the surgery, Ortho evaluated her and are following her and replacing dressings as needed. The patient is now stable and her chest pain has improved. Per discussion with ID the patient was switched to Fidaxomicin. Due to continued drainage from surgical site will hold Eliquis for one week. Resume once drainage has ceased. Per ortho recommendation will also begin Ancef 2g q8. The patient is still having diarrhea though it has improved. Admitted to medicine with the primary diagnosis of Intertrochanteric fracture of left hip.    #Mechanical fall with Lt intertrochanteric  - s/p s/p Left IMN   - Patient had drains that were removed, as she bled in the operative site  - Patient was booked for OR for irrigation and debridement LLE on 9/20/21. Found to have no drainage so procedure not done.    #Non-ST elevation ACS v/s Type II MI secondary to demand ischemia from acute blood loss anemia  - Acute blood loss anemia (Hg dropped to 5)   - Operative site hematoma v/s possible upper GI bleeding (hematemesis?) --> currently stable at 9.9   - PREVENA WOUND VAC PLACED 9/16; Provena removed 9/18, bulky dry dressing placed over top, pelvic binder placed  - s/p prophylactic Ancef  - medical management  - Cleared for anticoagulation by ortho  - Cardiology recs noted    #Afib  - Spoke to ortho who cleared pt to resume Eliquis  - Aspirin discontinued and Eliquis restarted    #Positive  C diff  - ID recommended - Po Fidaxomicin 200 mg q12h for 10 days and then po vancomycin 125 mg q12h for 3 more weeks   85F w/PMHx CAD, CHF, Afib on Eliquis, DM, HTN, colon cancer, CKD stage 3, hx of diverticulitis, presents s/p mechanical fall at home, +HT, -LOC, +AC (Eliquis). Patient was in her kitchen and "turned around too fast" when she fell over and hit her head on a chair. She denies loss of consciousness. She remained down for ~30 min before EMS came to take her into the ED. She is GCS 15, primary survey negative. No external signs of trauma. Complains of LLE pain. Denies scalp, spinal, chest, abdominal pain. ROM of all extremities in tact. C-collar in place. While on the floors the patient had an episode of melena. As per patient she had coffee ground emesis on 9/9 PM 2 episodes followed by black tarry stools and abdominal pain intermittently in epigastric region worse with eating. 6/10 in intensity, non radiating. GI was consulted and decided that there was questionable GI bleeding. Anticoagulation was stopped. The patient's Hb was 5.4. She was transfused and the hemoglobin improved. The patient had typical chest pain with elevated tropes and was diagnosed with NSTEMI type 2. She was upgraded to the CCU. Due to the possibility of a GI bleed the patient was not cath'd. The patient was also found to be C. Diff positive and started on Vancomycin. The patient also developed a hematoma at the site of the surgery, Ortho evaluated her and are following her and replacing dressings as needed. The patient is now stable and her chest pain has improved. Per discussion with ID the patient was switched to Fidaxomicin. Due to continued drainage from surgical site will hold Eliquis for one week. Resume once drainage has ceased. Per ortho recommendation will also begin Ancef 2g q8. The patient is still having diarrhea though it has improved. Admitted to medicine with the primary diagnosis of Intertrochanteric fracture of left hip.    #Mechanical fall with Lt intertrochanteric  - s/p s/p Left IMN   - Patient had drains that were removed, as she bled in the operative site  - Patient was booked for OR for irrigation and debridement LLE on 9/20/21. Found to have no drainage so procedure not done.    #Non-ST elevation ACS v/s Type II MI secondary to demand ischemia from acute blood loss anemia  - Acute blood loss anemia (Hg dropped to 5)   - Operative site hematoma v/s possible upper GI bleeding (hematemesis?) --> currently stable at 9.9   - PREVENA WOUND VAC PLACED 9/16; Provena removed 9/18, bulky dry dressing placed over top, pelvic binder placed  - s/p prophylactic Ancef  - medical management  - Cleared for anticoagulation by ortho  - Cardiology recs noted    #Afib  - Spoke to ortho who cleared pt to resume Eliquis  - Aspirin discontinued and Eliquis restarted    #Positive  C diff  - ID recommended - Po Fidaxomicin 200 mg q12h for 10 days and then po vancomycin 125 mg q12h for 3 more weeks  Attending Attestation:  Patient was seen & examined independently. At least 10 systems were reviewed in ROS. All systems reviewed  are within normal limits. Latest vital signs and labs were reviewed today. Case was discussed with house staff in morning rounds for assessment and plan.  Patient is medically stable for discharge . About 38 mins spent on discharge disposition.

## 2021-09-24 LAB
ALBUMIN SERPL ELPH-MCNC: 2.4 G/DL — LOW (ref 3.5–5.2)
ALP SERPL-CCNC: 153 U/L — HIGH (ref 30–115)
ALT FLD-CCNC: 24 U/L — SIGNIFICANT CHANGE UP (ref 0–41)
ANION GAP SERPL CALC-SCNC: 12 MMOL/L — SIGNIFICANT CHANGE UP (ref 7–14)
AST SERPL-CCNC: 27 U/L — SIGNIFICANT CHANGE UP (ref 0–41)
BILIRUB SERPL-MCNC: 0.5 MG/DL — SIGNIFICANT CHANGE UP (ref 0.2–1.2)
BUN SERPL-MCNC: 45 MG/DL — HIGH (ref 10–20)
CALCIUM SERPL-MCNC: 9.1 MG/DL — SIGNIFICANT CHANGE UP (ref 8.5–10.1)
CHLORIDE SERPL-SCNC: 107 MMOL/L — SIGNIFICANT CHANGE UP (ref 98–110)
CO2 SERPL-SCNC: 19 MMOL/L — SIGNIFICANT CHANGE UP (ref 17–32)
CREAT SERPL-MCNC: 1.4 MG/DL — SIGNIFICANT CHANGE UP (ref 0.7–1.5)
GLUCOSE BLDC GLUCOMTR-MCNC: 139 MG/DL — HIGH (ref 70–99)
GLUCOSE BLDC GLUCOMTR-MCNC: 143 MG/DL — HIGH (ref 70–99)
GLUCOSE BLDC GLUCOMTR-MCNC: 177 MG/DL — HIGH (ref 70–99)
GLUCOSE BLDC GLUCOMTR-MCNC: 98 MG/DL — SIGNIFICANT CHANGE UP (ref 70–99)
GLUCOSE SERPL-MCNC: 89 MG/DL — SIGNIFICANT CHANGE UP (ref 70–99)
HCT VFR BLD CALC: 27 % — LOW (ref 37–47)
HGB BLD-MCNC: 8.6 G/DL — LOW (ref 12–16)
MAGNESIUM SERPL-MCNC: 1.8 MG/DL — SIGNIFICANT CHANGE UP (ref 1.8–2.4)
MCHC RBC-ENTMCNC: 28.6 PG — SIGNIFICANT CHANGE UP (ref 27–31)
MCHC RBC-ENTMCNC: 31.9 G/DL — LOW (ref 32–37)
MCV RBC AUTO: 89.7 FL — SIGNIFICANT CHANGE UP (ref 81–99)
NRBC # BLD: 0 /100 WBCS — SIGNIFICANT CHANGE UP (ref 0–0)
PLATELET # BLD AUTO: 278 K/UL — SIGNIFICANT CHANGE UP (ref 130–400)
POTASSIUM SERPL-MCNC: 4.3 MMOL/L — SIGNIFICANT CHANGE UP (ref 3.5–5)
POTASSIUM SERPL-SCNC: 4.3 MMOL/L — SIGNIFICANT CHANGE UP (ref 3.5–5)
PROT SERPL-MCNC: 4.4 G/DL — LOW (ref 6–8)
RBC # BLD: 3.01 M/UL — LOW (ref 4.2–5.4)
RBC # FLD: 18.5 % — HIGH (ref 11.5–14.5)
SODIUM SERPL-SCNC: 138 MMOL/L — SIGNIFICANT CHANGE UP (ref 135–146)
WBC # BLD: 11.32 K/UL — HIGH (ref 4.8–10.8)
WBC # FLD AUTO: 11.32 K/UL — HIGH (ref 4.8–10.8)

## 2021-09-24 PROCEDURE — 99232 SBSQ HOSP IP/OBS MODERATE 35: CPT

## 2021-09-24 RX ORDER — VANCOMYCIN HCL 1 G
1 VIAL (EA) INTRAVENOUS
Qty: 42 | Refills: 0
Start: 2021-09-24 | End: 2021-10-14

## 2021-09-24 RX ORDER — FERROUS SULFATE 325(65) MG
1 TABLET ORAL
Qty: 0 | Refills: 0 | DISCHARGE
Start: 2021-09-24

## 2021-09-24 RX ORDER — TAMSULOSIN HYDROCHLORIDE 0.4 MG/1
1 CAPSULE ORAL
Qty: 0 | Refills: 0 | DISCHARGE
Start: 2021-09-24

## 2021-09-24 RX ADMIN — SODIUM ZIRCONIUM CYCLOSILICATE 10 GRAM(S): 10 POWDER, FOR SUSPENSION ORAL at 06:30

## 2021-09-24 RX ADMIN — CARVEDILOL PHOSPHATE 25 MILLIGRAM(S): 80 CAPSULE, EXTENDED RELEASE ORAL at 06:29

## 2021-09-24 RX ADMIN — IRON SUCROSE 210 MILLIGRAM(S): 20 INJECTION, SOLUTION INTRAVENOUS at 17:24

## 2021-09-24 RX ADMIN — FIDAXOMICIN 200 MILLIGRAM(S): 200 GRANULE, FOR SUSPENSION ORAL at 17:22

## 2021-09-24 RX ADMIN — Medication 650 MILLIGRAM(S): at 17:20

## 2021-09-24 RX ADMIN — CLOPIDOGREL BISULFATE 75 MILLIGRAM(S): 75 TABLET, FILM COATED ORAL at 11:48

## 2021-09-24 RX ADMIN — RALOXIFENE HYDROCHLORIDE 60 MILLIGRAM(S): 60 TABLET, COATED ORAL at 11:48

## 2021-09-24 RX ADMIN — CALCITRIOL 0.25 MICROGRAM(S): 0.5 CAPSULE ORAL at 11:49

## 2021-09-24 RX ADMIN — INSULIN GLARGINE 10 UNIT(S): 100 INJECTION, SOLUTION SUBCUTANEOUS at 22:03

## 2021-09-24 RX ADMIN — FIDAXOMICIN 200 MILLIGRAM(S): 200 GRANULE, FOR SUSPENSION ORAL at 06:29

## 2021-09-24 RX ADMIN — Medication 650 MILLIGRAM(S): at 11:48

## 2021-09-24 RX ADMIN — TAMSULOSIN HYDROCHLORIDE 0.4 MILLIGRAM(S): 0.4 CAPSULE ORAL at 22:04

## 2021-09-24 RX ADMIN — Medication 1 MILLIGRAM(S): at 11:48

## 2021-09-24 RX ADMIN — ATORVASTATIN CALCIUM 40 MILLIGRAM(S): 80 TABLET, FILM COATED ORAL at 22:04

## 2021-09-24 RX ADMIN — PANTOPRAZOLE SODIUM 40 MILLIGRAM(S): 20 TABLET, DELAYED RELEASE ORAL at 17:20

## 2021-09-24 RX ADMIN — APIXABAN 2.5 MILLIGRAM(S): 2.5 TABLET, FILM COATED ORAL at 06:29

## 2021-09-24 RX ADMIN — Medication 3 UNIT(S): at 11:47

## 2021-09-24 RX ADMIN — Medication 500 MILLIGRAM(S): at 06:30

## 2021-09-24 RX ADMIN — Medication 25 MICROGRAM(S): at 06:30

## 2021-09-24 RX ADMIN — Medication 500 MILLIGRAM(S): at 17:21

## 2021-09-24 RX ADMIN — Medication 650 MILLIGRAM(S): at 06:29

## 2021-09-24 RX ADMIN — CHLORHEXIDINE GLUCONATE 1 APPLICATION(S): 213 SOLUTION TOPICAL at 06:30

## 2021-09-24 RX ADMIN — Medication 2: at 11:46

## 2021-09-24 RX ADMIN — PANTOPRAZOLE SODIUM 40 MILLIGRAM(S): 20 TABLET, DELAYED RELEASE ORAL at 06:30

## 2021-09-24 RX ADMIN — Medication 40 MILLIGRAM(S): at 06:30

## 2021-09-24 RX ADMIN — APIXABAN 2.5 MILLIGRAM(S): 2.5 TABLET, FILM COATED ORAL at 17:19

## 2021-09-24 RX ADMIN — CARVEDILOL PHOSPHATE 25 MILLIGRAM(S): 80 CAPSULE, EXTENDED RELEASE ORAL at 17:20

## 2021-09-24 RX ADMIN — Medication 325 MILLIGRAM(S): at 11:48

## 2021-09-24 RX ADMIN — Medication 3 UNIT(S): at 17:19

## 2021-09-24 RX ADMIN — Medication 1 TABLET(S): at 11:49

## 2021-09-24 RX ADMIN — LATANOPROST 1 DROP(S): 0.05 SOLUTION/ DROPS OPHTHALMIC; TOPICAL at 22:04

## 2021-09-24 NOTE — PROGRESS NOTE ADULT - SUBJECTIVE AND OBJECTIVE BOX
Progress Note:  Provider Speciality                            Hospitalist      SEAN SAWYER MRN-[id number] 85y Female     CHIEF PRESENTING COMPLAINT:  Patient is a 85y old  Female who presents with a chief complaint of hip fx (22 Sep 2021 12:56)        SUBJECTIVE:  Patient was seen and examined at bedside. Reports improvement in  presenting complaint. No significant overnight events reported.     HISTORY OF PRESENTING ILLNESS:  HPI:  85F w/PMHx CAD, CHF, Afib on Eliquis, DM, HTN, colon cancer, CKD stage 3, hx of diverticulitis, presents s/p mechanical fall at home, +HT, -LOC, +AC (Eliquis). Patient was in her kitchen and "turned around too fast" when she fell over and hit her head on a chair. She denies loss of consciousness. She remained down for ~30 min before EMS came to take her into the ED. She is GCS 15, primary survey negative. No external signs of trauma. Complains of LLE pain. Denies scalp, spinal, chest, abdominal pain. ROM of all extremities in tact. C-collar in place.    (05 Sep 2021 00:18)        REVIEW OF SYSTEMS:  Patient denies any headache, any vision complaints, runny nose, fever, chills, sore throat. Denies chest pain, shortness of breath, palpitation. Denies nausea, vomiting, abdominal pain, diarrhoea, Denies urinary burning, urgency, frequency, dysuria. Denies weakness in any part of the body or numbness.   At least 10 systems were reviewed in ROS. All systems reviewed  are within normal limits except for the complaints as described in Subjective.    PAST MEDICAL & SURGICAL HISTORY:  PAST MEDICAL & SURGICAL HISTORY:  CAD (coronary artery disease)    Chronic CHF    Atrial fibrillation    Type 2 diabetes mellitus    Colon cancer    Hypertension    CKD (chronic kidney disease)  Stage 3    History of Clostridium difficile colitis  s/p fecal transplant    Diverticulitis    S/P hysterectomy            VITAL SIGNS:  Vital Signs Last 24 Hrs  T(C): 35.8 (24 Sep 2021 08:00), Max: 36.3 (24 Sep 2021 00:00)  T(F): 96.5 (24 Sep 2021 08:00), Max: 97.4 (24 Sep 2021 00:00)  HR: 70 (24 Sep 2021 08:00) (70 - 97)  BP: 118/59 (24 Sep 2021 08:00) (118/59 - 136/66)  BP(mean): --  RR: 18 (24 Sep 2021 08:00) (18 - 18)  SpO2: --    PHYSICAL EXAMINATION:  Not in acute distress  General: No pallor, no icterus  HEENT:   EOMI, no JVD.  Heart: S1+S2 audible  Lungs: bilateral  fair air entry, no wheezing, no crepitations.  Abdomen: Soft, non-tender, non-distended , no  rigidity or guarding.  CNS: Awake alert, CN  grossly intact.  Extremities:  No edema            CONSULTS:  Consultant(s) Notes Reviewed by me.   Care Discussed with Consultants/Other Providers where required.        MEDICATIONS:  MEDICATIONS  (STANDING):  acetaminophen   Tablet .. 650 milliGRAM(s) Oral every 6 hours  ascorbic acid 500 milliGRAM(s) Oral two times a day  aspirin  chewable 81 milliGRAM(s) Oral daily  atorvastatin 40 milliGRAM(s) Oral at bedtime  calcitriol   Capsule 0.25 MICROGram(s) Oral daily  carvedilol 25 milliGRAM(s) Oral every 12 hours  chlorhexidine 4% Liquid 1 Application(s) Topical <User Schedule>  clopidogrel Tablet 75 milliGRAM(s) Oral daily  dextrose 40% Gel 15 Gram(s) Oral once  dextrose 5%. 1000 milliLiter(s) (50 mL/Hr) IV Continuous <Continuous>  dextrose 5%. 1000 milliLiter(s) (100 mL/Hr) IV Continuous <Continuous>  dextrose 50% Injectable 25 Gram(s) IV Push once  ferrous    sulfate 325 milliGRAM(s) Oral daily  fidaxomicin 200 milliGRAM(s) Oral two times a day  folic acid 1 milliGRAM(s) Oral daily  furosemide    Tablet 40 milliGRAM(s) Oral daily  glucagon  Injectable 1 milliGRAM(s) IntraMuscular once  insulin glargine Injectable (LANTUS) 10 Unit(s) SubCutaneous at bedtime  insulin lispro (ADMELOG) corrective regimen sliding scale   SubCutaneous every 6 hours  insulin lispro Injectable (ADMELOG) 3 Unit(s) SubCutaneous three times a day before meals  iron sucrose IVPB 100 milliGRAM(s) IV Intermittent every 24 hours  latanoprost 0.005% Ophthalmic Solution 1 Drop(s) Both EYES at bedtime  levothyroxine 25 MICROGram(s) Oral daily  multivitamin 1 Tablet(s) Oral daily  pantoprazole  Injectable 40 milliGRAM(s) IV Push every 12 hours  raloxifene 60 milliGRAM(s) Oral daily  sodium chloride 0.9%. 1000 milliLiter(s) (75 mL/Hr) IV Continuous <Continuous>  sodium zirconium cyclosilicate 10 Gram(s) Oral two times a day  tamsulosin 0.4 milliGRAM(s) Oral at bedtime    MEDICATIONS  (PRN):  diphenhydrAMINE 25 milliGRAM(s) Oral at bedtime PRN Insomnia  melatonin 5 milliGRAM(s) Oral at bedtime PRN Insomnia  ondansetron Injectable 4 milliGRAM(s) IV Push every 6 hours PRN Nausea and/or Vomiting            ASSESSMENT:          85F w/PMHx CAD, CHF, Afib on Eliquis, DM, HTN, colon cancer, CKD stage 3, hx of diverticulitis, presents s/p mechanical fall at home, +HT, -LOC, +AC (Eliquis).         Mechanical fall with  Lt intertrochanteric  s/p s/p Left IMN   Post OR NSTEMI & possible GIB  Acute blood loss anemia  NSTEMi type2   ELOISE  Positive  C diff      NSTEMI type2 - medical managemnt  Cleared for anticoagualtion by ortho  Cardiology recs noted  ELOISE improved  Acute blood loss anemia- now stable  C diff--  no diarrhea in last 24hrs. ID recommended--Po Fidaxomicin 200 mg q12h for 10 days and then po vancomycin 125 mg q12h for 3 more weeks  A fib-- Eliquis restarted  CAD. D/c  ASA, Continue plavix  Hyperkalemia-- resolved    Handoff: Anticipated for STR for today. If refuses discharge , can appeal

## 2021-09-24 NOTE — DISCHARGE NOTE NURSING/CASE MANAGEMENT/SOCIAL WORK - PATIENT PORTAL LINK FT
You can access the FollowMyHealth Patient Portal offered by [facility] by registering at the following website: [email or web address]. By joining [employer]’s FollowMyHealth portal, you will also be able to view your health information using other applications (apps) compatible with our system.

## 2021-09-25 LAB
GLUCOSE BLDC GLUCOMTR-MCNC: 114 MG/DL — HIGH (ref 70–99)
GLUCOSE BLDC GLUCOMTR-MCNC: 144 MG/DL — HIGH (ref 70–99)
GLUCOSE BLDC GLUCOMTR-MCNC: 154 MG/DL — HIGH (ref 70–99)
GLUCOSE BLDC GLUCOMTR-MCNC: 157 MG/DL — HIGH (ref 70–99)

## 2021-09-25 PROCEDURE — 99231 SBSQ HOSP IP/OBS SF/LOW 25: CPT

## 2021-09-25 RX ADMIN — RALOXIFENE HYDROCHLORIDE 60 MILLIGRAM(S): 60 TABLET, COATED ORAL at 12:48

## 2021-09-25 RX ADMIN — CARVEDILOL PHOSPHATE 25 MILLIGRAM(S): 80 CAPSULE, EXTENDED RELEASE ORAL at 06:02

## 2021-09-25 RX ADMIN — Medication 650 MILLIGRAM(S): at 23:05

## 2021-09-25 RX ADMIN — Medication 650 MILLIGRAM(S): at 00:43

## 2021-09-25 RX ADMIN — Medication 25 MICROGRAM(S): at 06:01

## 2021-09-25 RX ADMIN — ATORVASTATIN CALCIUM 40 MILLIGRAM(S): 80 TABLET, FILM COATED ORAL at 21:58

## 2021-09-25 RX ADMIN — Medication 650 MILLIGRAM(S): at 06:01

## 2021-09-25 RX ADMIN — Medication 2: at 16:58

## 2021-09-25 RX ADMIN — Medication 500 MILLIGRAM(S): at 17:36

## 2021-09-25 RX ADMIN — CALCITRIOL 0.25 MICROGRAM(S): 0.5 CAPSULE ORAL at 12:48

## 2021-09-25 RX ADMIN — APIXABAN 2.5 MILLIGRAM(S): 2.5 TABLET, FILM COATED ORAL at 17:35

## 2021-09-25 RX ADMIN — PANTOPRAZOLE SODIUM 40 MILLIGRAM(S): 20 TABLET, DELAYED RELEASE ORAL at 06:02

## 2021-09-25 RX ADMIN — INSULIN GLARGINE 10 UNIT(S): 100 INJECTION, SOLUTION SUBCUTANEOUS at 21:57

## 2021-09-25 RX ADMIN — Medication 650 MILLIGRAM(S): at 17:35

## 2021-09-25 RX ADMIN — CARVEDILOL PHOSPHATE 25 MILLIGRAM(S): 80 CAPSULE, EXTENDED RELEASE ORAL at 17:36

## 2021-09-25 RX ADMIN — CLOPIDOGREL BISULFATE 75 MILLIGRAM(S): 75 TABLET, FILM COATED ORAL at 12:48

## 2021-09-25 RX ADMIN — Medication 1 TABLET(S): at 12:48

## 2021-09-25 RX ADMIN — LATANOPROST 1 DROP(S): 0.05 SOLUTION/ DROPS OPHTHALMIC; TOPICAL at 22:00

## 2021-09-25 RX ADMIN — APIXABAN 2.5 MILLIGRAM(S): 2.5 TABLET, FILM COATED ORAL at 06:02

## 2021-09-25 RX ADMIN — Medication 325 MILLIGRAM(S): at 12:48

## 2021-09-25 RX ADMIN — Medication 40 MILLIGRAM(S): at 06:01

## 2021-09-25 RX ADMIN — TAMSULOSIN HYDROCHLORIDE 0.4 MILLIGRAM(S): 0.4 CAPSULE ORAL at 21:58

## 2021-09-25 RX ADMIN — Medication 1 MILLIGRAM(S): at 12:48

## 2021-09-25 RX ADMIN — Medication 650 MILLIGRAM(S): at 12:48

## 2021-09-25 RX ADMIN — PANTOPRAZOLE SODIUM 40 MILLIGRAM(S): 20 TABLET, DELAYED RELEASE ORAL at 17:37

## 2021-09-25 RX ADMIN — FIDAXOMICIN 200 MILLIGRAM(S): 200 GRANULE, FOR SUSPENSION ORAL at 06:02

## 2021-09-25 RX ADMIN — FIDAXOMICIN 200 MILLIGRAM(S): 200 GRANULE, FOR SUSPENSION ORAL at 18:34

## 2021-09-25 RX ADMIN — Medication 3 UNIT(S): at 16:57

## 2021-09-25 RX ADMIN — Medication 500 MILLIGRAM(S): at 06:00

## 2021-09-25 RX ADMIN — Medication 3 UNIT(S): at 08:09

## 2021-09-25 NOTE — PROGRESS NOTE ADULT - SUBJECTIVE AND OBJECTIVE BOX
Progress Note:  Provider Speciality                            Hospitalist      SEAN SAWYER MRN-[id number] 85y Female     CHIEF PRESENTING COMPLAINT:  Patient is a 85y old  Female who presents with a chief complaint of hip fx (22 Sep 2021 12:56)        SUBJECTIVE:  Patient was seen and examined at bedside. Reports improvement in  presenting complaint. No significant overnight events reported.     HISTORY OF PRESENTING ILLNESS:  HPI:  85F w/PMHx CAD, CHF, Afib on Eliquis, DM, HTN, colon cancer, CKD stage 3, hx of diverticulitis, presents s/p mechanical fall at home, +HT, -LOC, +AC (Eliquis). Patient was in her kitchen and "turned around too fast" when she fell over and hit her head on a chair. She denies loss of consciousness. She remained down for ~30 min before EMS came to take her into the ED. She is GCS 15, primary survey negative. No external signs of trauma. Complains of LLE pain. Denies scalp, spinal, chest, abdominal pain. ROM of all extremities in tact. C-collar in place.    (05 Sep 2021 00:18)        REVIEW OF SYSTEMS:  Patient denies any headache, any vision complaints, runny nose, fever, chills, sore throat. Denies chest pain, shortness of breath, palpitation. Denies nausea, vomiting, abdominal pain, diarrhoea, Denies urinary burning, urgency, frequency, dysuria. At least 10 systems were reviewed in ROS. All systems reviewed  are within normal limits except for the complaints as described in Subjective.    PAST MEDICAL & SURGICAL HISTORY:  PAST MEDICAL & SURGICAL HISTORY:  CAD (coronary artery disease)    Chronic CHF    Atrial fibrillation    Type 2 diabetes mellitus    Colon cancer    Hypertension    CKD (chronic kidney disease)  Stage 3    History of Clostridium difficile colitis  s/p fecal transplant    Diverticulitis    S/P hysterectomy            VITAL SIGNS:  Vital Signs Last 24 Hrs  T(C): 36.3 (25 Sep 2021 08:00), Max: 36.3 (25 Sep 2021 00:00)  T(F): 97.4 (25 Sep 2021 08:00), Max: 97.4 (25 Sep 2021 00:00)  HR: 79 (25 Sep 2021 08:00) (79 - 95)  BP: 130/60 (25 Sep 2021 08:00) (130/60 - 133/88)  BP(mean): --  RR: 19 (25 Sep 2021 08:00) (19 - 19)  SpO2: --      PHYSICAL EXAMINATION:  Not in acute distress  General: No pallor, no icterus  HEENT:   EOMI, no JVD.  Heart: S1+S2 audible  Lungs: bilateral  fair air entry, no wheezing, no crepitations.  Abdomen: Soft, non-tender, non-distended , no  rigidity or guarding.  CNS: Awake alert, CN  grossly intact.  Extremities:  No edema            CONSULTS:  Consultant(s) Notes Reviewed by me.   Care Discussed with Consultants/Other Providers where required.        MEDICATIONS:  MEDICATIONS  (STANDING):  acetaminophen   Tablet .. 650 milliGRAM(s) Oral every 6 hours  ascorbic acid 500 milliGRAM(s) Oral two times a day  aspirin  chewable 81 milliGRAM(s) Oral daily  atorvastatin 40 milliGRAM(s) Oral at bedtime  calcitriol   Capsule 0.25 MICROGram(s) Oral daily  carvedilol 25 milliGRAM(s) Oral every 12 hours  chlorhexidine 4% Liquid 1 Application(s) Topical <User Schedule>  clopidogrel Tablet 75 milliGRAM(s) Oral daily  dextrose 40% Gel 15 Gram(s) Oral once  dextrose 5%. 1000 milliLiter(s) (50 mL/Hr) IV Continuous <Continuous>  dextrose 5%. 1000 milliLiter(s) (100 mL/Hr) IV Continuous <Continuous>  dextrose 50% Injectable 25 Gram(s) IV Push once  ferrous    sulfate 325 milliGRAM(s) Oral daily  fidaxomicin 200 milliGRAM(s) Oral two times a day  folic acid 1 milliGRAM(s) Oral daily  furosemide    Tablet 40 milliGRAM(s) Oral daily  glucagon  Injectable 1 milliGRAM(s) IntraMuscular once  insulin glargine Injectable (LANTUS) 10 Unit(s) SubCutaneous at bedtime  insulin lispro (ADMELOG) corrective regimen sliding scale   SubCutaneous every 6 hours  insulin lispro Injectable (ADMELOG) 3 Unit(s) SubCutaneous three times a day before meals  iron sucrose IVPB 100 milliGRAM(s) IV Intermittent every 24 hours  latanoprost 0.005% Ophthalmic Solution 1 Drop(s) Both EYES at bedtime  levothyroxine 25 MICROGram(s) Oral daily  multivitamin 1 Tablet(s) Oral daily  pantoprazole  Injectable 40 milliGRAM(s) IV Push every 12 hours  raloxifene 60 milliGRAM(s) Oral daily  sodium chloride 0.9%. 1000 milliLiter(s) (75 mL/Hr) IV Continuous <Continuous>  sodium zirconium cyclosilicate 10 Gram(s) Oral two times a day  tamsulosin 0.4 milliGRAM(s) Oral at bedtime    MEDICATIONS  (PRN):  diphenhydrAMINE 25 milliGRAM(s) Oral at bedtime PRN Insomnia  melatonin 5 milliGRAM(s) Oral at bedtime PRN Insomnia  ondansetron Injectable 4 milliGRAM(s) IV Push every 6 hours PRN Nausea and/or Vomiting            ASSESSMENT:          85F with PMHx CAD, CHF, Afib on Eliquis, DM, HTN, colon cancer, CKD stage 3, hx of diverticulitis, presents s/p mechanical fall at home, +HT, -LOC, +AC (Eliquis).         Mechanical fall with  Lt intertrochanteric  s/p s/p Left IMN   Post OR NSTEMI & possible GIB  Acute blood loss anemia  NSTEMi type2   ELOISE  Positive  C diff      NSTEMI type2 - medical management-stable now  Cleared for anticoagulation by ortho  Cardiology recs noted  ELOISE improved  Acute blood loss anemia- now stable  C diff--  no diarrhea in last 24hrs. ID recommended--Po Fidaxomicin 200 mg q12h for 10 days( completed) and then po vancomycin 125 mg q12h for 3 more weeks  A fib-- Eliquis restarted  CAD. D/c  ASA, Continue Plavix  Hyperkalemia-- resolved\  Not a candidate for 4A as may not participate in aggressive rehab but requires STR on discharge  Handoff: Patient refused discharge and appealed pending decision. No  further Inpatient medical management required.

## 2021-09-26 LAB
ALBUMIN SERPL ELPH-MCNC: 2.7 G/DL — LOW (ref 3.5–5.2)
ALP SERPL-CCNC: 165 U/L — HIGH (ref 30–115)
ALT FLD-CCNC: 20 U/L — SIGNIFICANT CHANGE UP (ref 0–41)
ANION GAP SERPL CALC-SCNC: 9 MMOL/L — SIGNIFICANT CHANGE UP (ref 7–14)
AST SERPL-CCNC: 17 U/L — SIGNIFICANT CHANGE UP (ref 0–41)
BILIRUB SERPL-MCNC: 0.4 MG/DL — SIGNIFICANT CHANGE UP (ref 0.2–1.2)
BUN SERPL-MCNC: 44 MG/DL — HIGH (ref 10–20)
CALCIUM SERPL-MCNC: 9 MG/DL — SIGNIFICANT CHANGE UP (ref 8.5–10.1)
CHLORIDE SERPL-SCNC: 107 MMOL/L — SIGNIFICANT CHANGE UP (ref 98–110)
CO2 SERPL-SCNC: 19 MMOL/L — SIGNIFICANT CHANGE UP (ref 17–32)
CREAT SERPL-MCNC: 1.5 MG/DL — SIGNIFICANT CHANGE UP (ref 0.7–1.5)
GLUCOSE BLDC GLUCOMTR-MCNC: 115 MG/DL — HIGH (ref 70–99)
GLUCOSE BLDC GLUCOMTR-MCNC: 141 MG/DL — HIGH (ref 70–99)
GLUCOSE BLDC GLUCOMTR-MCNC: 143 MG/DL — HIGH (ref 70–99)
GLUCOSE BLDC GLUCOMTR-MCNC: 187 MG/DL — HIGH (ref 70–99)
GLUCOSE SERPL-MCNC: 121 MG/DL — HIGH (ref 70–99)
HCT VFR BLD CALC: 29 % — LOW (ref 37–47)
HGB BLD-MCNC: 9 G/DL — LOW (ref 12–16)
MCHC RBC-ENTMCNC: 28.3 PG — SIGNIFICANT CHANGE UP (ref 27–31)
MCHC RBC-ENTMCNC: 31 G/DL — LOW (ref 32–37)
MCV RBC AUTO: 91.2 FL — SIGNIFICANT CHANGE UP (ref 81–99)
NRBC # BLD: 0 /100 WBCS — SIGNIFICANT CHANGE UP (ref 0–0)
PLATELET # BLD AUTO: 252 K/UL — SIGNIFICANT CHANGE UP (ref 130–400)
POTASSIUM SERPL-MCNC: 4.3 MMOL/L — SIGNIFICANT CHANGE UP (ref 3.5–5)
POTASSIUM SERPL-SCNC: 4.3 MMOL/L — SIGNIFICANT CHANGE UP (ref 3.5–5)
PROT SERPL-MCNC: 4.6 G/DL — LOW (ref 6–8)
RBC # BLD: 3.18 M/UL — LOW (ref 4.2–5.4)
RBC # FLD: 18.6 % — HIGH (ref 11.5–14.5)
SODIUM SERPL-SCNC: 135 MMOL/L — SIGNIFICANT CHANGE UP (ref 135–146)
WBC # BLD: 9.75 K/UL — SIGNIFICANT CHANGE UP (ref 4.8–10.8)
WBC # FLD AUTO: 9.75 K/UL — SIGNIFICANT CHANGE UP (ref 4.8–10.8)

## 2021-09-26 PROCEDURE — 99231 SBSQ HOSP IP/OBS SF/LOW 25: CPT

## 2021-09-26 RX ORDER — VANCOMYCIN HCL 1 G
125 VIAL (EA) INTRAVENOUS EVERY 12 HOURS
Refills: 0 | Status: DISCONTINUED | OUTPATIENT
Start: 2021-09-26 | End: 2021-09-27

## 2021-09-26 RX ADMIN — FIDAXOMICIN 200 MILLIGRAM(S): 200 GRANULE, FOR SUSPENSION ORAL at 23:01

## 2021-09-26 RX ADMIN — CARVEDILOL PHOSPHATE 25 MILLIGRAM(S): 80 CAPSULE, EXTENDED RELEASE ORAL at 17:48

## 2021-09-26 RX ADMIN — Medication 650 MILLIGRAM(S): at 11:19

## 2021-09-26 RX ADMIN — Medication 3 UNIT(S): at 17:47

## 2021-09-26 RX ADMIN — PANTOPRAZOLE SODIUM 40 MILLIGRAM(S): 20 TABLET, DELAYED RELEASE ORAL at 17:49

## 2021-09-26 RX ADMIN — Medication 25 MICROGRAM(S): at 06:30

## 2021-09-26 RX ADMIN — TAMSULOSIN HYDROCHLORIDE 0.4 MILLIGRAM(S): 0.4 CAPSULE ORAL at 23:02

## 2021-09-26 RX ADMIN — CHLORHEXIDINE GLUCONATE 1 APPLICATION(S): 213 SOLUTION TOPICAL at 06:34

## 2021-09-26 RX ADMIN — LATANOPROST 1 DROP(S): 0.05 SOLUTION/ DROPS OPHTHALMIC; TOPICAL at 23:03

## 2021-09-26 RX ADMIN — Medication 25 MILLIGRAM(S): at 01:11

## 2021-09-26 RX ADMIN — Medication 3 UNIT(S): at 11:17

## 2021-09-26 RX ADMIN — Medication 325 MILLIGRAM(S): at 11:19

## 2021-09-26 RX ADMIN — FIDAXOMICIN 200 MILLIGRAM(S): 200 GRANULE, FOR SUSPENSION ORAL at 06:32

## 2021-09-26 RX ADMIN — Medication 500 MILLIGRAM(S): at 17:48

## 2021-09-26 RX ADMIN — Medication 40 MILLIGRAM(S): at 06:30

## 2021-09-26 RX ADMIN — Medication 25 MILLIGRAM(S): at 23:58

## 2021-09-26 RX ADMIN — INSULIN GLARGINE 10 UNIT(S): 100 INJECTION, SOLUTION SUBCUTANEOUS at 23:02

## 2021-09-26 RX ADMIN — CARVEDILOL PHOSPHATE 25 MILLIGRAM(S): 80 CAPSULE, EXTENDED RELEASE ORAL at 06:31

## 2021-09-26 RX ADMIN — ATORVASTATIN CALCIUM 40 MILLIGRAM(S): 80 TABLET, FILM COATED ORAL at 23:02

## 2021-09-26 RX ADMIN — Medication 125 MILLIGRAM(S): at 06:32

## 2021-09-26 RX ADMIN — Medication 125 MILLIGRAM(S): at 17:48

## 2021-09-26 RX ADMIN — Medication 1 TABLET(S): at 11:19

## 2021-09-26 RX ADMIN — Medication 3 UNIT(S): at 08:27

## 2021-09-26 RX ADMIN — CALCITRIOL 0.25 MICROGRAM(S): 0.5 CAPSULE ORAL at 11:19

## 2021-09-26 RX ADMIN — Medication 650 MILLIGRAM(S): at 06:30

## 2021-09-26 RX ADMIN — APIXABAN 2.5 MILLIGRAM(S): 2.5 TABLET, FILM COATED ORAL at 06:30

## 2021-09-26 RX ADMIN — Medication 650 MILLIGRAM(S): at 17:47

## 2021-09-26 RX ADMIN — Medication 650 MILLIGRAM(S): at 23:03

## 2021-09-26 RX ADMIN — Medication 2: at 23:02

## 2021-09-26 RX ADMIN — PANTOPRAZOLE SODIUM 40 MILLIGRAM(S): 20 TABLET, DELAYED RELEASE ORAL at 06:32

## 2021-09-26 RX ADMIN — Medication 1 MILLIGRAM(S): at 11:19

## 2021-09-26 RX ADMIN — Medication 500 MILLIGRAM(S): at 06:31

## 2021-09-26 RX ADMIN — CLOPIDOGREL BISULFATE 75 MILLIGRAM(S): 75 TABLET, FILM COATED ORAL at 11:19

## 2021-09-26 RX ADMIN — APIXABAN 2.5 MILLIGRAM(S): 2.5 TABLET, FILM COATED ORAL at 17:48

## 2021-09-26 RX ADMIN — RALOXIFENE HYDROCHLORIDE 60 MILLIGRAM(S): 60 TABLET, COATED ORAL at 11:19

## 2021-09-26 NOTE — CHART NOTE - NSCHARTNOTEFT_GEN_A_CORE
Registered Dietitian Follow-Up     Patient Profile Reviewed                           Yes [x]   No []     Nutrition History Previously Obtained        Yes [x]  No []       Pertinent Subjective Information: Pt annoyed this morning d/t not being able to eat since last night. Per discussion in rounds, pt to resume diet today as no need for procedure per ortho note . Now noted on PO diet. reports eating good prior to being NPO. Says she was also drinking the Glucerna. eager to eat today.      Pertinent Medical Interventions:  #Non-ST elevation ACS v/s Type II MI secondary to demand ischemia from acute blood loss anemia  - Acute blood loss anemia (Hg dropped to 5)   - Operative site hematoma v/s possible upper GI bleeding (hematemesis?) --> currently stable at 9.9   --Patient seen and evaluated. Patient's dressing is now completely dry (yesterday into today) with no drainage. This obviates the need for surgery, thus a formal I/D is no longer necessary as long as dressing remains dry. Ortho will follow.  # C. diff colitis - per RN, patient has not had diarrhea overnight  #CKD III- Accurate I&O  # Jordy worsening-- patient has urinary retention-- bladder scan showed 850cc-- elliott to drainage-- renal function improved post drainage.     Diet order: Diet, Dysphagia 2 Mechanical Soft-Thin Liquids:   Consistent Carbohydrate {Evening Snack}  No Concentrated Potassium  Supplement Feeding Modality:  Oral  Glucerna Shake Cans or Servings Per Day:  1       Frequency:  Two Times a day  Ensure Pudding Cans or Servings Per Day:  1       Frequency:  Daily (21 @ 07:57) [Active]    Anthropometrics:  Height (cm): 160 (21 @ 00:12)  Weight (kg): 61.3 (21 @ 00:12) vs. 61.8 kg (). Wt has fluctuated from 50.7 kg () to 61.8 kg (). ? etiology of wt fluctuations. Edema vs. chronic CHF noted at this time.  BMI (kg/m2): 23.9 (21 @ 00:12)  IBW: 52kg    Daily Weight in k.1 (), Weight in k.1 (), Weight in k.8 ()    MEDICATIONS  (STANDING):  ascorbic acid 500 milliGRAM(s) Oral two times a day  aspirin  chewable 81 milliGRAM(s) Oral daily  atorvastatin 40 milliGRAM(s) Oral at bedtime  calcitriol   Capsule 0.25 MICROGram(s) Oral daily  carvedilol 25 milliGRAM(s) Oral every 12 hours  clopidogrel Tablet 75 milliGRAM(s) Oral daily  dextrose 40% Gel 15 Gram(s) Oral once  dextrose 5%. 1000 milliLiter(s) (50 mL/Hr) IV Continuous <Continuous>  dextrose 5%. 1000 milliLiter(s) (100 mL/Hr) IV Continuous <Continuous>  dextrose 50% Injectable 25 Gram(s) IV Push once  ferrous    sulfate 325 milliGRAM(s) Oral daily  fidaxomicin 200 milliGRAM(s) Oral two times a day  folic acid 1 milliGRAM(s) Oral daily  glucagon  Injectable 1 milliGRAM(s) IntraMuscular once  insulin glargine Injectable (LANTUS) 10 Unit(s) SubCutaneous at bedtime  insulin lispro (ADMELOG) corrective regimen sliding scale   SubCutaneous every 6 hours  insulin lispro Injectable (ADMELOG) 3 Unit(s) SubCutaneous three times a day before meals  latanoprost 0.005% Ophthalmic Solution 1 Drop(s) Both EYES at bedtime  levothyroxine 25 MICROGram(s) Oral daily  multivitamin 1 Tablet(s) Oral daily  pantoprazole  Injectable 40 milliGRAM(s) IV Push every 12 hours  raloxifene 60 milliGRAM(s) Oral daily  sodium chloride 0.9%. 1000 milliLiter(s) (75 mL/Hr) IV Continuous <Continuous>  tamsulosin 0.4 milliGRAM(s) Oral at bedtime    MEDICATIONS  (PRN):  ondansetron Injectable 4 milliGRAM(s) IV Push every 6 hours PRN Nausea and/or Vomiting    Pertinent Labs:  @ 08:25: Na 132<L>, BUN 63<HH>, Cr 1.6<H>, <H>, K+ 5.1<H>, Phos --, Mg --, Alk Phos --, ALT/SGPT --, AST/SGOT --, HbA1c --   @ 01:30: Na 132<L>, BUN 65<HH>, Cr 1.7<H>, <H>, K+ 5.6<H>, Phos --, Mg --, Alk Phos --, ALT/SGPT --, AST/SGOT --, HbA1c --   @ 19:55: Na 133<L>, BUN 67<HH>, Cr 1.8<H>, <H>, K+ 5.4<H>, Phos --, Mg 1.9, Alk Phos 156<H>, ALT/SGPT <5, AST/SGOT 22, HbA1c --    Finger Sticks:  POCT Blood Glucose.: 140 mg/dL ( @ 07:57)  POCT Blood Glucose.: 122 mg/dL ( @ 06:32)  POCT Blood Glucose.: 144 mg/dL ( @ 21:06)  POCT Blood Glucose.: 144 mg/dL ( @ 16:57)  POCT Blood Glucose.: 156 mg/dL ( @ 11:43)    Physical Findings:  - Appearance: AAOX4; : 2+ L hip/ankle edema noted   - GI function: LBM  -- pt reports previously having diarrhea d/t C.diff; no other discomfort reported   - Tubes: n/a   - Oral/Mouth cavity: pt reports tolerating current diet texture, no complaints   - Skin: bruised/ecchymotic, surgical incision      Nutrition Requirements  Weight Used: 50.7kg -Derived from nutrition note () - estimated needs derived here are similar to what would be estimated by using IBW 52 kg. Given wt discrepancy, would opt to assess nutrient needs via IBW. Assessed needs below are similar to what would be estimated using IBW.     Estimated Energy Needs    Continue [x]  Adjust []  1112-1205kcal (MSJ 1.2-1.3AF) -Derived from nutrition note ()      Estimated Protein Needs    Continue [x]  Adjust []  61-64g/day (1.2-1.3 g/kg -- d/t recent sx vs. renal fx noted -- will mon) -Derived from nutrition note ()     Estimated Fluid Needs        Continue [x]  Adjust []  1mL/kcal or LIP -Derived from nutrition note ()      Nutrient Intake:  reports eating we;; before diet order was changed; EMR Po : 50% -- will monitor PO      [x] Previous Nutrition Diagnosis: Inadequate protein energy intake -- ongoing/resolved???     Nutrition Intervention: Meals & Snacks     Goal/Expected Outcome: Pt to consume and tolerate >75% of meals/snacks and PO supplements in 4-6 days.     Nutrition Monitoring:diet order,energy intake,body composition,NFPF, renal/glucose profile     Recommendation:   1. cont w/ current diet order + Supplements   2. Do not provide any Chocolate supplements d/t elevated k+, provide Vanilla only   3. cont w/ MVI   4. Obtain daily wts Registered Dietitian Follow-Up     Patient Profile Reviewed                           Yes [x]   No []     Nutrition History Previously Obtained        Yes [x]  No []      Pertinent Medical Interventions: Admitted with hip fx. s/p mechanical fall with Lt intertrochanteric s/p Left IMN. NSTEMI Type 2 noted. Positive C diff noted - no diarrhea in last 24hrs per progress notes.     Diet order: Diet, Dysphagia 2 Mechanical Soft-Thin Liquids:   Consistent Carbohydrate {Evening Snack}  No Concentrated Potassium  Supplement Feeding Modality:  Oral  Glucerna Shake Cans or Servings Per Day:  1       Frequency:  Two Times a day  Ensure Pudding Cans or Servings Per Day:  1       Frequency:  Daily (21 @ 07:57) [Active]    Anthropometrics:  Height (cm): 160 (21 @ 00:12)  Weight (kg): 61.3 (21 @ 00:12) vs. 61.8 kg (). Wt has fluctuated from 50.7 kg () to 61.8 kg (). ? etiology of wt fluctuations. Edema vs. chronic CHF noted at this time.  BMI (kg/m2): 23.9 (21 @ 00:12)  IBW: 52kg    Daily Weight in k.1 (), Weight in k.1 (), Weight in k.8 ()    MEDICATIONS: vanco, 0.9% NaCl, insulin glargine, insulin lispro, lasix, atorvastatin, protonix, vit C, calcitriol, FeSO4, folic acid, MVI, zofran    Pertinent Labs: : RBC-3.18, H/H-9.0/29.0, BUN-44, gluc-121, POCT gluc-187 (21:14) vs 115 (17:05) vs 141 (11:10) vs 143 (7:57); : EsfV9D-0.3%    Physical Findings:  - Appearance: Alert; no edema noted at this time  - GI function: Last BM ; formed.  - Tubes: no feeding tubes  - Oral/Mouth cavity: Pt reportedly tolerating current diet texture at this time.  - Skin: bruised/ecchymotic, surgical incision      Nutrition Requirements  Weight Used: 50.7kg -Derived from nutrition note () - estimated needs derived here are similar to what would be estimated by using IBW 52 kg. Given wt discrepancy, would opt to assess nutrient needs via IBW. Assessed needs below are similar to what would be estimated using IBW.     Estimated Energy Needs    Continue [x]  Adjust []  1112-1205kcal (MSJ 1.2-1.3AF) -Derived from nutrition note ()      Estimated Protein Needs    Continue [x]  Adjust []  61-64g/day (1.2-1.3 g/kg -- d/t recent sx vs. renal fx noted -- will mon) -Derived from nutrition note ()     Estimated Fluid Needs        Continue [x]  Adjust []  1mL/kcal or LIP -Derived from nutrition note ()      Nutrient Intake: Reports good appetite persists; consuming 75% of meals + po supplements.     [x] Previous Nutrition Diagnosis: Inadequate protein energy intake (resolved)     Nutrition Intervention: Meals & Snacks     Goal/Expected Outcome: Pt to consume and tolerate >75% of meals/snacks and PO supplements in 10 days.     Nutrition Monitoring: PO, labs, skin, BM, wt, diet.    Recommendation: Continue current nutrition regimen as tolerated.

## 2021-09-26 NOTE — CHART NOTE - NSCHARTNOTESELECT_GEN_ALL_CORE
Dietitian f/u/Event Note
Event Note
Nutrition Note
Anesthesia
Event Note
RD Follow-Up/Event Note
RD Follow-Up/Event Note
Transfer Note

## 2021-09-26 NOTE — PROGRESS NOTE ADULT - ASSESSMENT
85F w/PMHx CAD, CHF, Afib on Eliquis, DM, HTN, colon cancer, CKD stage 3, hx of diverticulitis, presents s/p mechanical fall at home, +HT, -LOC, +AC (Eliquis).     #mechanical fall with  Lt intertrochanteric  - s/p s/p Left IMN     #NSTEMi type2   - medical managemnt  - Cleared for anticoagualtion by ortho  - Cardiology recs noted    #Afib  - Spoke to ortho who cleared pt to resume Eliquis  - Aspirin discontinued and Eliquis restarted    #Positive  C diff  - no diarrhea in last 24hrs. ID recommended--Po Fidaxomicin 200 mg q12h for 10 days and then po vancomycin 125 mg q12h for 3 more weeks   85F w/PMHx CAD, CHF, Afib on Eliquis, DM, HTN, colon cancer, CKD stage 3, hx of diverticulitis, presents s/p mechanical fall at home, +HT, -LOC, +AC (Eliquis).     #mechanical fall with  Lt intertrochanteric  - s/p Left IMN     #NSTEMi type2   - medical management  - Cleared for anticoagualtion by ortho  - Cardiology recs noted    #Afib  - Spoke to ortho who cleared pt to resume Eliquis  - Aspirin discontinued and Eliquis restarted    #Positive C diff  - no diarrhea in last 24hrs. ID recommended--Po Fidaxomicin 200 mg q12h for 10 days (completed) and then po vancomycin 125 mg q12h for 3 more weeks    Dispo: Pt medically cleared for discharge. However, pt appealing d/c

## 2021-09-26 NOTE — PROGRESS NOTE ADULT - SUBJECTIVE AND OBJECTIVE BOX
Progress Note:  Provider Speciality                            Hospitalist      SEAN SAWYER MRN-[id number] 85y Female     CHIEF PRESENTING COMPLAINT:  Patient is a 85y old  Female who presents with a chief complaint of hip fx (22 Sep 2021 12:56)        SUBJECTIVE:  Patient was seen and examined at bedside. Reports improvement in  presenting complaint. No significant overnight events reported.     HISTORY OF PRESENTING ILLNESS:  HPI:  85F w/PMHx CAD, CHF, Afib on Eliquis, DM, HTN, colon cancer, CKD stage 3, hx of diverticulitis, presents s/p mechanical fall at home, +HT, -LOC, +AC (Eliquis). Patient was in her kitchen and "turned around too fast" when she fell over and hit her head on a chair. She denies loss of consciousness. She remained down for ~30 min before EMS came to take her into the ED. She is GCS 15, primary survey negative. No external signs of trauma. Complains of LLE pain. Denies scalp, spinal, chest, abdominal pain. ROM of all extremities in tact. C-collar in place.    (05 Sep 2021 00:18)        REVIEW OF SYSTEMS:  Patient denies any headache, any vision complaints, runny nose, fever, chills, sore throat. Denies chest pain, shortness of breath, palpitation. Denies nausea, vomiting, abdominal pain, diarrhoea, Denies urinary burning, urgency, frequency, dysuria. At least 10 systems were reviewed in ROS. All systems reviewed  are within normal limits except for the complaints as described in Subjective.    PAST MEDICAL & SURGICAL HISTORY:  PAST MEDICAL & SURGICAL HISTORY:  CAD (coronary artery disease)    Chronic CHF    Atrial fibrillation    Type 2 diabetes mellitus    Colon cancer    Hypertension    CKD (chronic kidney disease)  Stage 3    History of Clostridium difficile colitis  s/p fecal transplant    Diverticulitis    S/P hysterectomy            VITAL SIGNS:  Vital Signs Last 24 Hrs  T(C): 35.6 (26 Sep 2021 08:10), Max: 36.5 (25 Sep 2021 15:59)  T(F): 96 (26 Sep 2021 08:10), Max: 97.7 (25 Sep 2021 15:59)  HR: 83 (26 Sep 2021 08:10) (83 - 90)  BP: 151/69 (26 Sep 2021 08:10) (135/65 - 151/69)  BP(mean): --  RR: 18 (26 Sep 2021 08:10) (17 - 19)  SpO2: --    PHYSICAL EXAMINATION:  Not in acute distress  General: No pallor, no icterus  HEENT:   EOMI, no JVD.  Heart: S1+S2 audible  Lungs: bilateral  fair air entry, no wheezing, no crepitations.  Abdomen: Soft, non-tender, non-distended , no  rigidity or guarding.  CNS: Awake alert, CN  grossly intact.  Extremities:  No edema            CONSULTS:  Consultant(s) Notes Reviewed by me.   Care Discussed with Consultants/Other Providers where required.        MEDICATIONS:  MEDICATIONS  (STANDING):  acetaminophen   Tablet .. 650 milliGRAM(s) Oral every 6 hours  ascorbic acid 500 milliGRAM(s) Oral two times a day  aspirin  chewable 81 milliGRAM(s) Oral daily  atorvastatin 40 milliGRAM(s) Oral at bedtime  calcitriol   Capsule 0.25 MICROGram(s) Oral daily  carvedilol 25 milliGRAM(s) Oral every 12 hours  chlorhexidine 4% Liquid 1 Application(s) Topical <User Schedule>  clopidogrel Tablet 75 milliGRAM(s) Oral daily  dextrose 40% Gel 15 Gram(s) Oral once  dextrose 5%. 1000 milliLiter(s) (50 mL/Hr) IV Continuous <Continuous>  dextrose 5%. 1000 milliLiter(s) (100 mL/Hr) IV Continuous <Continuous>  dextrose 50% Injectable 25 Gram(s) IV Push once  ferrous    sulfate 325 milliGRAM(s) Oral daily  fidaxomicin 200 milliGRAM(s) Oral two times a day  folic acid 1 milliGRAM(s) Oral daily  furosemide    Tablet 40 milliGRAM(s) Oral daily  glucagon  Injectable 1 milliGRAM(s) IntraMuscular once  insulin glargine Injectable (LANTUS) 10 Unit(s) SubCutaneous at bedtime  insulin lispro (ADMELOG) corrective regimen sliding scale   SubCutaneous every 6 hours  insulin lispro Injectable (ADMELOG) 3 Unit(s) SubCutaneous three times a day before meals  iron sucrose IVPB 100 milliGRAM(s) IV Intermittent every 24 hours  latanoprost 0.005% Ophthalmic Solution 1 Drop(s) Both EYES at bedtime  levothyroxine 25 MICROGram(s) Oral daily  multivitamin 1 Tablet(s) Oral daily  pantoprazole  Injectable 40 milliGRAM(s) IV Push every 12 hours  raloxifene 60 milliGRAM(s) Oral daily  sodium chloride 0.9%. 1000 milliLiter(s) (75 mL/Hr) IV Continuous <Continuous>  sodium zirconium cyclosilicate 10 Gram(s) Oral two times a day  tamsulosin 0.4 milliGRAM(s) Oral at bedtime    MEDICATIONS  (PRN):  diphenhydrAMINE 25 milliGRAM(s) Oral at bedtime PRN Insomnia  melatonin 5 milliGRAM(s) Oral at bedtime PRN Insomnia  ondansetron Injectable 4 milliGRAM(s) IV Push every 6 hours PRN Nausea and/or Vomiting            ASSESSMENT:          85F with PMHx CAD, CHF, Afib on Eliquis, DM, HTN, colon cancer, CKD stage 3, hx of diverticulitis, presents s/p mechanical fall at home, +HT, -LOC, +AC (Eliquis).         Mechanical fall with  Lt intertrochanteric  s/p s/p Left IMN   Post OR NSTEMI & possible GIB  Acute blood loss anemia  NSTEMi type2   ELOISE  Positive  C diff      NSTEMI type2 - medical management-stable now  Cleared for anticoagulation by ortho  Cardiology recs noted  ELOISE improved  Acute blood loss anemia- now stable  C diff--  no diarrhea in last 24hrs. ID recommended--Po Fidaxomicin 200 mg q12h for 10 days( completed) and then po vancomycin 125 mg q12h for 3 more weeks  A fib-- Eliquis restarted  CAD. D/c  ASA, Continue Plavix  Hyperkalemia-- resolved\  Not a candidate for 4A as may not participate in aggressive rehab but requires STR on discharge  Handoff: Patient refused discharge and appealed pending decision. No  further Inpatient medical management required.

## 2021-09-27 VITALS
SYSTOLIC BLOOD PRESSURE: 134 MMHG | RESPIRATION RATE: 18 BRPM | HEART RATE: 91 BPM | DIASTOLIC BLOOD PRESSURE: 66 MMHG | TEMPERATURE: 98 F

## 2021-09-27 LAB
GLUCOSE BLDC GLUCOMTR-MCNC: 119 MG/DL — HIGH (ref 70–99)
GLUCOSE BLDC GLUCOMTR-MCNC: 139 MG/DL — HIGH (ref 70–99)
GLUCOSE BLDC GLUCOMTR-MCNC: 88 MG/DL — SIGNIFICANT CHANGE UP (ref 70–99)
SARS-COV-2 RNA SPEC QL NAA+PROBE: SIGNIFICANT CHANGE UP

## 2021-09-27 PROCEDURE — 99239 HOSP IP/OBS DSCHRG MGMT >30: CPT

## 2021-09-27 RX ADMIN — Medication 650 MILLIGRAM(S): at 06:26

## 2021-09-27 RX ADMIN — CLOPIDOGREL BISULFATE 75 MILLIGRAM(S): 75 TABLET, FILM COATED ORAL at 12:33

## 2021-09-27 RX ADMIN — Medication 40 MILLIGRAM(S): at 06:26

## 2021-09-27 RX ADMIN — Medication 25 MICROGRAM(S): at 06:25

## 2021-09-27 RX ADMIN — Medication 650 MILLIGRAM(S): at 12:33

## 2021-09-27 RX ADMIN — RALOXIFENE HYDROCHLORIDE 60 MILLIGRAM(S): 60 TABLET, COATED ORAL at 12:33

## 2021-09-27 RX ADMIN — Medication 1 MILLIGRAM(S): at 12:32

## 2021-09-27 RX ADMIN — CARVEDILOL PHOSPHATE 25 MILLIGRAM(S): 80 CAPSULE, EXTENDED RELEASE ORAL at 18:36

## 2021-09-27 RX ADMIN — Medication 650 MILLIGRAM(S): at 12:32

## 2021-09-27 RX ADMIN — FIDAXOMICIN 200 MILLIGRAM(S): 200 GRANULE, FOR SUSPENSION ORAL at 06:26

## 2021-09-27 RX ADMIN — Medication 125 MILLIGRAM(S): at 18:36

## 2021-09-27 RX ADMIN — PANTOPRAZOLE SODIUM 40 MILLIGRAM(S): 20 TABLET, DELAYED RELEASE ORAL at 06:25

## 2021-09-27 RX ADMIN — Medication 500 MILLIGRAM(S): at 06:26

## 2021-09-27 RX ADMIN — Medication 325 MILLIGRAM(S): at 12:32

## 2021-09-27 RX ADMIN — Medication 650 MILLIGRAM(S): at 18:44

## 2021-09-27 RX ADMIN — Medication 1 TABLET(S): at 12:32

## 2021-09-27 RX ADMIN — Medication 650 MILLIGRAM(S): at 18:35

## 2021-09-27 RX ADMIN — APIXABAN 2.5 MILLIGRAM(S): 2.5 TABLET, FILM COATED ORAL at 06:25

## 2021-09-27 RX ADMIN — PANTOPRAZOLE SODIUM 40 MILLIGRAM(S): 20 TABLET, DELAYED RELEASE ORAL at 18:57

## 2021-09-27 RX ADMIN — CARVEDILOL PHOSPHATE 25 MILLIGRAM(S): 80 CAPSULE, EXTENDED RELEASE ORAL at 06:26

## 2021-09-27 RX ADMIN — Medication 125 MILLIGRAM(S): at 06:25

## 2021-09-27 RX ADMIN — CHLORHEXIDINE GLUCONATE 1 APPLICATION(S): 213 SOLUTION TOPICAL at 06:08

## 2021-09-27 RX ADMIN — APIXABAN 2.5 MILLIGRAM(S): 2.5 TABLET, FILM COATED ORAL at 18:35

## 2021-09-27 RX ADMIN — Medication 500 MILLIGRAM(S): at 18:36

## 2021-09-27 RX ADMIN — CALCITRIOL 0.25 MICROGRAM(S): 0.5 CAPSULE ORAL at 12:32

## 2021-09-27 RX ADMIN — Medication 3 UNIT(S): at 12:31

## 2021-09-27 NOTE — PROGRESS NOTE ADULT - SUBJECTIVE AND OBJECTIVE BOX
Progress Note:  Provider Speciality                            Hospitalist      SEAN SAWYER MRN-[id number] 85y Female     CHIEF PRESENTING COMPLAINT:  Patient is a 85y old  Female who presents with a chief complaint of hip fx (22 Sep 2021 12:56)        SUBJECTIVE:  Patient was seen and examined at bedside. Reports improvement in  presenting complaint. No significant overnight events reported.     HISTORY OF PRESENTING ILLNESS:  HPI:  85F w/PMHx CAD, CHF, Afib on Eliquis, DM, HTN, colon cancer, CKD stage 3, hx of diverticulitis, presents s/p mechanical fall at home, +HT, -LOC, +AC (Eliquis). Patient was in her kitchen and "turned around too fast" when she fell over and hit her head on a chair. She denies loss of consciousness. She remained down for ~30 min before EMS came to take her into the ED. She is GCS 15, primary survey negative. No external signs of trauma. Complains of LLE pain. Denies scalp, spinal, chest, abdominal pain. ROM of all extremities in tact. C-collar in place.    (05 Sep 2021 00:18)        REVIEW OF SYSTEMS:  Patient denies any headache, any vision complaints, runny nose, fever, chills, sore throat. Denies chest pain, shortness of breath, palpitation. Denies nausea, vomiting, abdominal pain, diarrhoea, Denies urinary burning, urgency, frequency, dysuria. At least 10 systems were reviewed in ROS. All systems reviewed  are within normal limits except for the complaints as described in Subjective.    PAST MEDICAL & SURGICAL HISTORY:  PAST MEDICAL & SURGICAL HISTORY:  CAD (coronary artery disease)    Chronic CHF    Atrial fibrillation    Type 2 diabetes mellitus    Colon cancer    Hypertension    CKD (chronic kidney disease)  Stage 3    History of Clostridium difficile colitis  s/p fecal transplant    Diverticulitis    S/P hysterectomy            VITAL SIGNS:  Vital Signs Last 24 Hrs  T(C): 35.9 (27 Sep 2021 08:00), Max: 36.2 (27 Sep 2021 00:00)  T(F): 96.7 (27 Sep 2021 08:00), Max: 97.2 (27 Sep 2021 00:00)  HR: 74 (27 Sep 2021 08:00) (74 - 87)  BP: 135/60 (27 Sep 2021 08:00) (135/60 - 150/68)  BP(mean): --  RR: 18 (27 Sep 2021 08:00) (18 - 19)  SpO2: --      PHYSICAL EXAMINATION:  Not in acute distress  General: No pallor, no icterus  HEENT:   EOMI, no JVD.  Heart: S1+S2 audible  Lungs: bilateral  fair air entry, no wheezing, no crepitations.  Abdomen: Soft, non-tender, non-distended , no  rigidity or guarding.  CNS: Awake alert, CN  grossly intact.  Extremities:  No edema            CONSULTS:  Consultant(s) Notes Reviewed by me.   Care Discussed with Consultants/Other Providers where required.        MEDICATIONS:  MEDICATIONS  (STANDING):  acetaminophen   Tablet .. 650 milliGRAM(s) Oral every 6 hours  ascorbic acid 500 milliGRAM(s) Oral two times a day  aspirin  chewable 81 milliGRAM(s) Oral daily  atorvastatin 40 milliGRAM(s) Oral at bedtime  calcitriol   Capsule 0.25 MICROGram(s) Oral daily  carvedilol 25 milliGRAM(s) Oral every 12 hours  chlorhexidine 4% Liquid 1 Application(s) Topical <User Schedule>  clopidogrel Tablet 75 milliGRAM(s) Oral daily  dextrose 40% Gel 15 Gram(s) Oral once  dextrose 5%. 1000 milliLiter(s) (50 mL/Hr) IV Continuous <Continuous>  dextrose 5%. 1000 milliLiter(s) (100 mL/Hr) IV Continuous <Continuous>  dextrose 50% Injectable 25 Gram(s) IV Push once  ferrous    sulfate 325 milliGRAM(s) Oral daily  fidaxomicin 200 milliGRAM(s) Oral two times a day  folic acid 1 milliGRAM(s) Oral daily  furosemide    Tablet 40 milliGRAM(s) Oral daily  glucagon  Injectable 1 milliGRAM(s) IntraMuscular once  insulin glargine Injectable (LANTUS) 10 Unit(s) SubCutaneous at bedtime  insulin lispro (ADMELOG) corrective regimen sliding scale   SubCutaneous every 6 hours  insulin lispro Injectable (ADMELOG) 3 Unit(s) SubCutaneous three times a day before meals  iron sucrose IVPB 100 milliGRAM(s) IV Intermittent every 24 hours  latanoprost 0.005% Ophthalmic Solution 1 Drop(s) Both EYES at bedtime  levothyroxine 25 MICROGram(s) Oral daily  multivitamin 1 Tablet(s) Oral daily  pantoprazole  Injectable 40 milliGRAM(s) IV Push every 12 hours  raloxifene 60 milliGRAM(s) Oral daily  sodium chloride 0.9%. 1000 milliLiter(s) (75 mL/Hr) IV Continuous <Continuous>  sodium zirconium cyclosilicate 10 Gram(s) Oral two times a day  tamsulosin 0.4 milliGRAM(s) Oral at bedtime    MEDICATIONS  (PRN):  diphenhydrAMINE 25 milliGRAM(s) Oral at bedtime PRN Insomnia  melatonin 5 milliGRAM(s) Oral at bedtime PRN Insomnia  ondansetron Injectable 4 milliGRAM(s) IV Push every 6 hours PRN Nausea and/or Vomiting            ASSESSMENT:          85F with PMHx CAD, CHF, Afib on Eliquis, DM, HTN, colon cancer, CKD stage 3, hx of diverticulitis, presents s/p mechanical fall at home, +HT, -LOC, +AC (Eliquis).         Mechanical fall with  Lt intertrochanteric  s/p s/p Left IMN   Post OR NSTEMI & possible GIB  Acute blood loss anemia  NSTEMi type2   ELOISE  Positive  C diff      NSTEMI type2 - medical management-stable now  Cleared for anticoagulation by ortho  Cardiology recs noted  ELOISE improved  Acute blood loss anemia- now stable  C diff--  no diarrhea in last 24hrs. ID recommended--Po Fidaxomicin 200 mg q12h for 10 days( completed) and then po vancomycin 125 mg q12h for 3 more weeks  A fib-- Eliquis restarted  CAD. D/c  ASA, Continue Plavix  Hyperkalemia-- resolved  Not a candidate for 4A as may not participate in aggressive rehab but requires STR on discharge  Handoff: Stable for discharge to STR, pending Covid result

## 2021-09-27 NOTE — PROGRESS NOTE ADULT - PROVIDER SPECIALTY LIST ADULT
Hospitalist
Orthopedics
Orthopedics
Physiatry
CCU
Cardiology
Hospitalist
Internal Medicine
Internal Medicine
Orthopedics
Internal Medicine
Gastroenterology
Orthopedics
Physiatry
Trauma Surgery
Infectious Disease
Infectious Disease

## 2021-09-29 DIAGNOSIS — M96.840 POSTPROCEDURAL HEMATOMA OF A MUSCULOSKELETAL STRUCTURE FOLLOWING A MUSCULOSKELETAL SYSTEM PROCEDURE: ICD-10-CM
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
## 2021-09-29 DIAGNOSIS — I48.0 PAROXYSMAL ATRIAL FIBRILLATION: ICD-10-CM

## 2021-09-29 DIAGNOSIS — A04.72 ENTEROCOLITIS DUE TO CLOSTRIDIUM DIFFICILE, NOT SPECIFIED AS RECURRENT: ICD-10-CM

## 2021-09-29 DIAGNOSIS — D62 ACUTE POSTHEMORRHAGIC ANEMIA: ICD-10-CM

## 2021-09-29 DIAGNOSIS — N28.1 CYST OF KIDNEY, ACQUIRED: ICD-10-CM

## 2021-09-29 DIAGNOSIS — Z85.038 PERSONAL HISTORY OF OTHER MALIGNANT NEOPLASM OF LARGE INTESTINE: ICD-10-CM

## 2021-09-29 DIAGNOSIS — I95.9 HYPOTENSION, UNSPECIFIED: ICD-10-CM
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
## 2021-09-29 DIAGNOSIS — N95.2 POSTMENOPAUSAL ATROPHIC VAGINITIS: ICD-10-CM

## 2021-09-29 DIAGNOSIS — N17.9 ACUTE KIDNEY FAILURE, UNSPECIFIED: ICD-10-CM

## 2021-09-29 DIAGNOSIS — N18.4 CHRONIC KIDNEY DISEASE, STAGE 4 (SEVERE): ICD-10-CM

## 2021-09-29 DIAGNOSIS — I25.10 ATHEROSCLEROTIC HEART DISEASE OF NATIVE CORONARY ARTERY WITHOUT ANGINA PECTORIS: ICD-10-CM

## 2021-09-29 DIAGNOSIS — Z79.01 LONG TERM (CURRENT) USE OF ANTICOAGULANTS: ICD-10-CM

## 2021-09-29 DIAGNOSIS — K92.2 GASTROINTESTINAL HEMORRHAGE, UNSPECIFIED: ICD-10-CM

## 2021-09-29 DIAGNOSIS — S72.145A NONDISPLACED INTERTROCHANTERIC FRACTURE OF LEFT FEMUR, INITIAL ENCOUNTER FOR CLOSED FRACTURE: ICD-10-CM

## 2021-09-29 DIAGNOSIS — I13.0 HYPERTENSIVE HEART AND CHRONIC KIDNEY DISEASE WITH HEART FAILURE AND STAGE 1 THROUGH STAGE 4 CHRONIC KIDNEY DISEASE, OR UNSPECIFIED CHRONIC KIDNEY DISEASE: ICD-10-CM

## 2021-09-29 DIAGNOSIS — I50.22 CHRONIC SYSTOLIC (CONGESTIVE) HEART FAILURE: ICD-10-CM

## 2021-09-29 DIAGNOSIS — E87.5 HYPERKALEMIA: ICD-10-CM

## 2021-09-29 DIAGNOSIS — I21.A1 MYOCARDIAL INFARCTION TYPE 2: ICD-10-CM
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
## 2021-09-29 DIAGNOSIS — E11.22 TYPE 2 DIABETES MELLITUS WITH DIABETIC CHRONIC KIDNEY DISEASE: ICD-10-CM

## 2021-09-29 DIAGNOSIS — R33.9 RETENTION OF URINE, UNSPECIFIED: ICD-10-CM

## 2021-09-29 DIAGNOSIS — Z79.4 LONG TERM (CURRENT) USE OF INSULIN: ICD-10-CM

## 2024-03-04 NOTE — PHYSICAL THERAPY INITIAL EVALUATION ADULT - STANDING BALANCE: DYNAMIC, REHAB EVAL
SW following for DC planning.    Per Dr. Platt, podiatry consulted today.  DC anticipated for tomorrow.    Updated [facility] liaison.    TO will follow pending medical mgnt.     Radha Romero, MSW, LCSW   [id number]    poor balance

## 2024-03-05 NOTE — ED ADULT NURSE NOTE - NSSEPSISSUSPECTED_ED_A_ED
03/05/24 1044   Encounter Summary   Encounter Overview/Reason  Crisis   Referral/Consult From: Other (comment)   Support System Unknown   Begin Time 1025   End Time  1046   Total Time Calculated 21 min   Crisis   Type Rapid Response      provided support during rapid response.  Lexy Mcgrath., MDiv., B[facility]   No

## 2024-04-03 NOTE — CONSULT NOTE ADULT - ASSESSMENT
History of Present Illness:   Patient has continued heel pain  Patient had shingles the last week  Was unable to go to [facility]  States the pain has continued  Denies trauma  No other pedal complaints  States the heel continues to be tender  States she is unable to bear weight on right foot  Has venous insuff  States pain keeps her up at night.   Very painful today, unable to move foot.    concerned of pain level.     States area is more red.     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    GERD (gastroesophageal reflux disease) 04/20/2023    Lower urinary tract symptoms 04/20/2023    Obesity, unspecified 08/01/2022    Class 2 obesity with body mass index (BMI) of 39.0 to 39.9 in adult    Otalgia, left ear 02/18/2020    Earache, left    Other abnormal glucose 09/29/2016    Abnormal glucose    Other specified abnormal findings of blood chemistry 10/05/2016    Abnormal thyroid blood test    Other specified disorders of ear, unspecified ear 03/18/2015    Fullness in ear    Pelvic and perineal pain 10/24/2019    Pelvic pressure in female    Personal history of other diseases of the respiratory system 12/05/2013    History of acute bronchitis    Personal history of other diseases of the respiratory system 03/18/2015    History of acute sinusitis    Personal history of other diseases of the respiratory system 12/17/2019    History of upper respiratory infection    Personal history of other drug therapy 10/25/2019    History of influenza vaccination    Personal history of other endocrine, nutritional and metabolic  disease     History of dehydration    Personal history of other specified conditions 09/26/2013    History of fatigue    Personal history of other specified conditions 10/29/2013    History of shortness of breath    Personal history of other specified conditions 02/18/2020    History of nasal congestion    Personal history of other specified conditions 02/18/2020    History of postnasal drip    Personal history of other specified conditions 02/09/2015    History of diarrhea    Personal history of urinary (tract) infections 12/18/2018    History of urinary tract infection    Persons encountering health services in other specified circumstances 10/25/2019    Encounter to establish care    Right knee pain 04/20/2023    Simple chronic bronchitis (CMS/HCC) 08/23/2023    SOB (shortness of breath) 08/23/2023    Toe pain 08/23/2023    Unspecified abnormal findings in urine 08/25/2021    Abnormal urinalysis    Urinary tract infection, site not specified 10/18/2021    Acute lower UTI    Urinary tract infection, site not specified 08/30/2022    Acute UTI       Medications and Allergies have been reviewed.    Review Of Systems:  GENERAL: No weight loss, malaise or fevers.  HEENT: Negative for frequent or significant headaches,   RESPIRATORY: Negative for cough, wheezing or shortness of breath.  CARDIOVASCULAR: Negative for chest pain, leg swelling or palpitations.    Physical Exam:  Vascular exam: Dorsalis pedis and Posterior Tibial pulses palpable 2/4 bilateral. Capillary refill time less than 3 seconds b/l. Hair growth noted to digits. +2 b/l LE edema noted. Skin temp warm to warm from proximal to distal b/l. + varicosities noted.     Neurologic exam: Gross sensation present b/l. No neuro deficits noted b/l      Musculoskeletal exam: Muscle strength 5/5 for all major muscle groups tested in the lower extremity b/l. No gross deformities noted b/l. Pain to right plantar and post heel . NO Soi noted. However tender with  palpation     Dermatologic exam:  Webspaces 1-4 b/l are clean, dry and intact.  Ulcer noted to right platnar heel. Granular  and mixed fibrotic tissue base. No tracking, tunneling or undermining. ++ Drainage noted. ++ Mlaodor noted.  Painful noted to palpation. No streaking noted.    1. Heel ulcer, right, limited to breakdown of skin (CMS/HCC)  traMADol (Ultram) 50 mg tablet    MR foot right wo IV contrast      2. Right foot pain  traMADol (Ultram) 50 mg tablet    MR foot right wo IV contrast      3. Pain in both feet  traMADol (Ultram) 50 mg tablet    MR foot right wo IV contrast      4. Heel pain, bilateral  traMADol (Ultram) 50 mg tablet    MR foot right wo IV contrast          Patient exam and eval  Patient on day 5 of antiviral from shingles  Recommend patient go to Er for admission for abx  Was unable to get MRI done  Can get that done on admission as well  Will call [place].   Will continue to follow and monitor    Kirstie Martino DPM  [phone]  Option 2  Fax: [phone]   82 YO F with PMH of HTN, DLD, CAD s/p MI s/p PCI x 3 stents in 2017, HFrEF (pt. reported EF of 45%), CKD 3, colon cancer s/p resection, DM 2, A.Fib on Eliquis presented with cc of chest pain.     A & P:    CAD s/p MI s/p PCI x 3 stents (in 2017 at [facility])  HFrEF (last EF 45% reported by pt.)  A.Fib on Eliquis  CKD 3  Chest pain    -rule out ACS, serial cardiac troponins, and repeat EKG  -EKG shows NSR @69bpm, with inferior and anterolateral TWI  -obtain 2d to assess LV function, wall motion, and valvular function  -continue with ASA 81mg, lipitor 80mg, losartan 25mg, Eliquis 2.5mg Q12, spironolactone 25mg, lasix 40mg, and Coreg   -contact Cardiologist at [facility] and obtain records of cardiac cath  -Nephrology consultation for ELOISE vs ELOISE on CKD, and assessment of risk for KENY in the event of cardiac catheterization  -pt. will require work up for CAD, stress test vs cardiac catheterization 82 YO F with PMH of HTN, DLD, CAD s/p MI s/p PCI x 3 stents in 2017, HFrEF (pt. reported EF of 45%), CKD 3, colon cancer s/p resection, DM 2, A.Fib on Eliquis presented with cc of chest pain.     A & P:    CAD s/p MI s/p PCI x 3 stents (in 2017 at [facility])  HFrEF (last EF 45% reported by pt.)  A.Fib on Eliquis  CKD 3  Chest pain    -rule out ACS, serial cardiac troponins, and repeat EKG  -EKG shows NSR @69bpm, with inferior and anterolateral TWI  -obtain 2d to assess LV function, wall motion, and valvular function  -continue with ASA 81mg, lipitor 80mg, losartan 25mg, Eliquis 2.5mg Q12, lasix 40mg, and Coreg, hold aldactone   -contact Cardiologist at [facility] to transfer for cardiac cath - pt's and family wishes  -add low dose imdur

## 2025-03-01 NOTE — PHYSICAL THERAPY INITIAL EVALUATION ADULT - ONSET DATE, REHAB EVAL
Problem: Psychosis  Goal: Will report no hallucinations or delusions  Description: INTERVENTIONS:  1. Administer medication as  ordered  2. Assist with reality testing to support increasing orientation  3. Assess if patient's hallucinations or delusions are encouraging self harm or harm to others and intervene as appropriate  Outcome: Progressing     Problem: Safety - Adult  Goal: Free from fall injury  Outcome: Progressing      06-Sep-2021